# Patient Record
Sex: FEMALE | Race: WHITE | Employment: OTHER | URBAN - METROPOLITAN AREA
[De-identification: names, ages, dates, MRNs, and addresses within clinical notes are randomized per-mention and may not be internally consistent; named-entity substitution may affect disease eponyms.]

---

## 2017-01-10 DIAGNOSIS — K21.9 GASTROESOPHAGEAL REFLUX DISEASE WITHOUT ESOPHAGITIS: ICD-10-CM

## 2017-01-11 ENCOUNTER — OFFICE VISIT (OUTPATIENT)
Dept: PAIN MANAGEMENT | Age: 62
End: 2017-01-11

## 2017-01-11 VITALS
BODY MASS INDEX: 19.77 KG/M2 | WEIGHT: 130 LBS | HEART RATE: 99 BPM | DIASTOLIC BLOOD PRESSURE: 84 MMHG | SYSTOLIC BLOOD PRESSURE: 140 MMHG

## 2017-01-11 DIAGNOSIS — M48.061 SPINAL STENOSIS OF LUMBAR REGION: ICD-10-CM

## 2017-01-11 DIAGNOSIS — G51.0 FACIAL NERVE PARALYSIS: ICD-10-CM

## 2017-01-11 DIAGNOSIS — M47.816 FACET ARTHROPATHY, LUMBAR: ICD-10-CM

## 2017-01-11 DIAGNOSIS — M48.00 CENTRAL SPINAL STENOSIS: ICD-10-CM

## 2017-01-11 DIAGNOSIS — G89.4 CHRONIC PAIN SYNDROME: Primary | ICD-10-CM

## 2017-01-11 DIAGNOSIS — F51.01 PRIMARY INSOMNIA: ICD-10-CM

## 2017-01-11 DIAGNOSIS — M47.819 FACET ARTHROPATHY: ICD-10-CM

## 2017-01-11 DIAGNOSIS — M96.1 FAILED BACK SYNDROME: ICD-10-CM

## 2017-01-11 PROCEDURE — 99213 OFFICE O/P EST LOW 20 MIN: CPT | Performed by: NURSE PRACTITIONER

## 2017-01-11 RX ORDER — METAXALONE 400 MG/1
400 TABLET ORAL 2 TIMES DAILY
Qty: 56 TABLET | Refills: 0 | Status: SHIPPED | OUTPATIENT
Start: 2017-01-11 | End: 2017-02-08

## 2017-01-11 RX ORDER — NORTRIPTYLINE HYDROCHLORIDE 25 MG/1
25 CAPSULE ORAL NIGHTLY
Qty: 30 CAPSULE | Refills: 0 | Status: SHIPPED | OUTPATIENT
Start: 2017-01-11 | End: 2017-01-11 | Stop reason: SDUPTHER

## 2017-01-11 RX ORDER — HYDROCODONE BITARTRATE AND ACETAMINOPHEN 7.5; 325 MG/1; MG/1
1 TABLET ORAL EVERY 6 HOURS PRN
Qty: 112 TABLET | Refills: 0 | Status: SHIPPED | OUTPATIENT
Start: 2017-01-11 | End: 2017-02-08 | Stop reason: SDUPTHER

## 2017-01-12 RX ORDER — DEXLANSOPRAZOLE 60 MG/1
CAPSULE, DELAYED RELEASE ORAL
Qty: 30 CAPSULE | Refills: 5 | Status: SHIPPED | OUTPATIENT
Start: 2017-01-12 | End: 2017-07-04 | Stop reason: SDUPTHER

## 2017-02-08 ENCOUNTER — OFFICE VISIT (OUTPATIENT)
Dept: PAIN MANAGEMENT | Age: 62
End: 2017-02-08

## 2017-02-08 VITALS
SYSTOLIC BLOOD PRESSURE: 129 MMHG | DIASTOLIC BLOOD PRESSURE: 77 MMHG | BODY MASS INDEX: 19.77 KG/M2 | WEIGHT: 130 LBS | HEART RATE: 97 BPM

## 2017-02-08 DIAGNOSIS — M47.819 FACET ARTHROPATHY: ICD-10-CM

## 2017-02-08 DIAGNOSIS — G89.4 CHRONIC PAIN SYNDROME: Primary | ICD-10-CM

## 2017-02-08 DIAGNOSIS — M47.816 FACET ARTHROPATHY, LUMBAR: ICD-10-CM

## 2017-02-08 DIAGNOSIS — M48.00 CENTRAL SPINAL STENOSIS: ICD-10-CM

## 2017-02-08 DIAGNOSIS — M48.061 SPINAL STENOSIS OF LUMBAR REGION: ICD-10-CM

## 2017-02-08 DIAGNOSIS — F51.01 PRIMARY INSOMNIA: ICD-10-CM

## 2017-02-08 DIAGNOSIS — M96.1 FAILED BACK SYNDROME: ICD-10-CM

## 2017-02-08 PROCEDURE — 99213 OFFICE O/P EST LOW 20 MIN: CPT | Performed by: NURSE PRACTITIONER

## 2017-02-08 RX ORDER — HYDROCODONE BITARTRATE AND ACETAMINOPHEN 7.5; 325 MG/1; MG/1
1 TABLET ORAL EVERY 6 HOURS PRN
Qty: 112 TABLET | Refills: 0 | Status: SHIPPED | OUTPATIENT
Start: 2017-02-08 | End: 2017-03-08 | Stop reason: SDUPTHER

## 2017-02-08 RX ORDER — TRAZODONE HYDROCHLORIDE 50 MG/1
50 TABLET ORAL NIGHTLY
Qty: 60 TABLET | Refills: 0 | Status: SHIPPED | OUTPATIENT
Start: 2017-02-08 | End: 2017-02-08 | Stop reason: SDUPTHER

## 2017-02-09 RX ORDER — TRAZODONE HYDROCHLORIDE 50 MG/1
TABLET ORAL
Qty: 90 TABLET | Refills: 0 | Status: SHIPPED | OUTPATIENT
Start: 2017-02-09 | End: 2017-04-05 | Stop reason: SDUPTHER

## 2017-03-08 ENCOUNTER — OFFICE VISIT (OUTPATIENT)
Dept: PAIN MANAGEMENT | Age: 62
End: 2017-03-08

## 2017-03-08 VITALS
BODY MASS INDEX: 19.46 KG/M2 | DIASTOLIC BLOOD PRESSURE: 74 MMHG | WEIGHT: 128 LBS | HEART RATE: 92 BPM | SYSTOLIC BLOOD PRESSURE: 116 MMHG

## 2017-03-08 DIAGNOSIS — F51.01 PRIMARY INSOMNIA: ICD-10-CM

## 2017-03-08 DIAGNOSIS — F33.9 RECURRENT MAJOR DEPRESSIVE DISORDER, REMISSION STATUS UNSPECIFIED (HCC): ICD-10-CM

## 2017-03-08 DIAGNOSIS — R10.30 LOWER ABDOMINAL PAIN: ICD-10-CM

## 2017-03-08 DIAGNOSIS — M47.816 FACET ARTHROPATHY, LUMBAR: ICD-10-CM

## 2017-03-08 DIAGNOSIS — M47.819 FACET ARTHROPATHY: ICD-10-CM

## 2017-03-08 DIAGNOSIS — M48.00 CENTRAL SPINAL STENOSIS: ICD-10-CM

## 2017-03-08 DIAGNOSIS — G89.4 CHRONIC PAIN SYNDROME: Primary | ICD-10-CM

## 2017-03-08 DIAGNOSIS — M48.061 SPINAL STENOSIS OF LUMBAR REGION: ICD-10-CM

## 2017-03-08 DIAGNOSIS — M96.1 FAILED BACK SYNDROME: ICD-10-CM

## 2017-03-08 PROCEDURE — 99213 OFFICE O/P EST LOW 20 MIN: CPT | Performed by: NURSE PRACTITIONER

## 2017-03-08 RX ORDER — HYDROCODONE BITARTRATE AND ACETAMINOPHEN 7.5; 325 MG/1; MG/1
1 TABLET ORAL EVERY 6 HOURS PRN
Qty: 112 TABLET | Refills: 0 | Status: SHIPPED | OUTPATIENT
Start: 2017-03-08 | End: 2017-04-05 | Stop reason: SDUPTHER

## 2017-04-05 ENCOUNTER — OFFICE VISIT (OUTPATIENT)
Dept: PAIN MANAGEMENT | Age: 62
End: 2017-04-05

## 2017-04-05 VITALS
HEART RATE: 102 BPM | DIASTOLIC BLOOD PRESSURE: 76 MMHG | BODY MASS INDEX: 19.31 KG/M2 | SYSTOLIC BLOOD PRESSURE: 111 MMHG | WEIGHT: 127 LBS

## 2017-04-05 DIAGNOSIS — F33.9 RECURRENT MAJOR DEPRESSIVE DISORDER, REMISSION STATUS UNSPECIFIED (HCC): ICD-10-CM

## 2017-04-05 DIAGNOSIS — M47.816 FACET ARTHROPATHY, LUMBAR: ICD-10-CM

## 2017-04-05 DIAGNOSIS — M48.061 SPINAL STENOSIS OF LUMBAR REGION: ICD-10-CM

## 2017-04-05 DIAGNOSIS — G89.4 CHRONIC PAIN SYNDROME: Primary | ICD-10-CM

## 2017-04-05 DIAGNOSIS — M48.00 CENTRAL SPINAL STENOSIS: ICD-10-CM

## 2017-04-05 DIAGNOSIS — F51.01 PRIMARY INSOMNIA: ICD-10-CM

## 2017-04-05 DIAGNOSIS — M96.1 FAILED BACK SYNDROME: ICD-10-CM

## 2017-04-05 DIAGNOSIS — M47.819 FACET ARTHROPATHY: ICD-10-CM

## 2017-04-05 PROCEDURE — 99213 OFFICE O/P EST LOW 20 MIN: CPT | Performed by: NURSE PRACTITIONER

## 2017-04-05 RX ORDER — HYDROCODONE BITARTRATE AND ACETAMINOPHEN 7.5; 325 MG/1; MG/1
1 TABLET ORAL EVERY 6 HOURS PRN
Qty: 40 TABLET | Refills: 0 | Status: SHIPPED | OUTPATIENT
Start: 2017-04-05 | End: 2017-04-24

## 2017-04-05 RX ORDER — TRAZODONE HYDROCHLORIDE 50 MG/1
TABLET ORAL
Qty: 90 TABLET | Refills: 0 | Status: SHIPPED | OUTPATIENT
Start: 2017-04-05 | End: 2017-04-24

## 2017-04-24 ENCOUNTER — OFFICE VISIT (OUTPATIENT)
Dept: FAMILY MEDICINE CLINIC | Age: 62
End: 2017-04-24

## 2017-04-24 VITALS
RESPIRATION RATE: 16 BRPM | SYSTOLIC BLOOD PRESSURE: 114 MMHG | WEIGHT: 124 LBS | HEIGHT: 68 IN | BODY MASS INDEX: 18.79 KG/M2 | OXYGEN SATURATION: 98 % | HEART RATE: 111 BPM | TEMPERATURE: 98.6 F | DIASTOLIC BLOOD PRESSURE: 72 MMHG

## 2017-04-24 DIAGNOSIS — R05.9 COUGH: Primary | ICD-10-CM

## 2017-04-24 DIAGNOSIS — J45.41 MODERATE PERSISTENT ASTHMA WITH ACUTE EXACERBATION: ICD-10-CM

## 2017-04-24 DIAGNOSIS — J06.9 ACUTE URI: ICD-10-CM

## 2017-04-24 DIAGNOSIS — H61.23 BILATERAL IMPACTED CERUMEN: ICD-10-CM

## 2017-04-24 PROCEDURE — 99213 OFFICE O/P EST LOW 20 MIN: CPT | Performed by: NURSE PRACTITIONER

## 2017-04-24 RX ORDER — ALBUTEROL SULFATE 2.5 MG/3ML
SOLUTION RESPIRATORY (INHALATION)
Qty: 180 VIAL | Refills: 11 | Status: SHIPPED | OUTPATIENT
Start: 2017-04-24 | End: 2019-08-26 | Stop reason: SDUPTHER

## 2017-04-24 RX ORDER — FLUTICASONE PROPIONATE 110 UG/1
1 AEROSOL, METERED RESPIRATORY (INHALATION) 2 TIMES DAILY
Qty: 12 G | Refills: 5 | Status: SHIPPED | OUTPATIENT
Start: 2017-04-24 | End: 2018-10-26 | Stop reason: SDUPTHER

## 2017-04-24 RX ORDER — CEFUROXIME AXETIL 250 MG/1
250 TABLET ORAL 2 TIMES DAILY
Qty: 20 TABLET | Refills: 0 | Status: SHIPPED | OUTPATIENT
Start: 2017-04-24 | End: 2017-05-04

## 2017-04-24 ASSESSMENT — PATIENT HEALTH QUESTIONNAIRE - PHQ9
2. FEELING DOWN, DEPRESSED OR HOPELESS: 0
1. LITTLE INTEREST OR PLEASURE IN DOING THINGS: 0
SUM OF ALL RESPONSES TO PHQ QUESTIONS 1-9: 0
SUM OF ALL RESPONSES TO PHQ9 QUESTIONS 1 & 2: 0

## 2017-04-24 ASSESSMENT — ENCOUNTER SYMPTOMS
RHINORRHEA: 1
SINUS PRESSURE: 1
SORE THROAT: 0
COUGH: 1
WHEEZING: 1

## 2017-04-27 DIAGNOSIS — G89.29 CHRONIC BACK PAIN: ICD-10-CM

## 2017-04-27 DIAGNOSIS — M54.9 CHRONIC BACK PAIN: ICD-10-CM

## 2017-04-28 RX ORDER — TIZANIDINE 4 MG/1
TABLET ORAL
Qty: 90 TABLET | Refills: 0 | Status: SHIPPED | OUTPATIENT
Start: 2017-04-28 | End: 2017-05-03 | Stop reason: SDUPTHER

## 2017-05-03 ENCOUNTER — OFFICE VISIT (OUTPATIENT)
Dept: PAIN MANAGEMENT | Age: 62
End: 2017-05-03

## 2017-05-03 VITALS
WEIGHT: 127 LBS | DIASTOLIC BLOOD PRESSURE: 71 MMHG | SYSTOLIC BLOOD PRESSURE: 103 MMHG | BODY MASS INDEX: 19.31 KG/M2 | HEART RATE: 100 BPM

## 2017-05-03 DIAGNOSIS — M96.1 FAILED BACK SYNDROME: ICD-10-CM

## 2017-05-03 DIAGNOSIS — M48.00 CENTRAL SPINAL STENOSIS: ICD-10-CM

## 2017-05-03 DIAGNOSIS — F32.A DEPRESSION, UNSPECIFIED DEPRESSION TYPE: ICD-10-CM

## 2017-05-03 DIAGNOSIS — F33.9 RECURRENT MAJOR DEPRESSIVE DISORDER, REMISSION STATUS UNSPECIFIED (HCC): ICD-10-CM

## 2017-05-03 DIAGNOSIS — M47.819 FACET ARTHROPATHY: ICD-10-CM

## 2017-05-03 DIAGNOSIS — K58.9 IRRITABLE BOWEL SYNDROME WITHOUT DIARRHEA: ICD-10-CM

## 2017-05-03 DIAGNOSIS — G89.4 CHRONIC PAIN SYNDROME: Primary | ICD-10-CM

## 2017-05-03 DIAGNOSIS — F51.01 PRIMARY INSOMNIA: ICD-10-CM

## 2017-05-03 PROCEDURE — 99213 OFFICE O/P EST LOW 20 MIN: CPT | Performed by: NURSE PRACTITIONER

## 2017-05-03 RX ORDER — SERTRALINE HYDROCHLORIDE 100 MG/1
TABLET, FILM COATED ORAL
Qty: 90 TABLET | Refills: 1 | Status: SHIPPED | OUTPATIENT
Start: 2017-05-03 | End: 2017-07-06

## 2017-05-03 RX ORDER — TIZANIDINE 4 MG/1
TABLET ORAL
Qty: 90 TABLET | Refills: 0 | Status: SHIPPED | OUTPATIENT
Start: 2017-05-03 | End: 2017-06-07 | Stop reason: SDUPTHER

## 2017-05-09 ENCOUNTER — OFFICE VISIT (OUTPATIENT)
Dept: ENT CLINIC | Age: 62
End: 2017-05-09

## 2017-05-09 VITALS
HEART RATE: 101 BPM | WEIGHT: 128 LBS | BODY MASS INDEX: 19.4 KG/M2 | DIASTOLIC BLOOD PRESSURE: 73 MMHG | SYSTOLIC BLOOD PRESSURE: 125 MMHG | HEIGHT: 68 IN

## 2017-05-09 DIAGNOSIS — J20.9 ACUTE BRONCHITIS, UNSPECIFIED ORGANISM: Primary | ICD-10-CM

## 2017-05-09 DIAGNOSIS — J30.9 ALLERGIC SINUSITIS: ICD-10-CM

## 2017-05-09 PROCEDURE — 96372 THER/PROPH/DIAG INJ SC/IM: CPT | Performed by: OTOLARYNGOLOGY

## 2017-05-09 PROCEDURE — 99213 OFFICE O/P EST LOW 20 MIN: CPT | Performed by: OTOLARYNGOLOGY

## 2017-05-09 RX ORDER — METHYLPREDNISOLONE ACETATE 40 MG/ML
40 INJECTION, SUSPENSION INTRA-ARTICULAR; INTRALESIONAL; INTRAMUSCULAR; SOFT TISSUE ONCE
Status: COMPLETED | OUTPATIENT
Start: 2017-05-09 | End: 2017-05-09

## 2017-05-09 RX ORDER — TRAMADOL HYDROCHLORIDE 50 MG/1
50 TABLET ORAL EVERY 6 HOURS PRN
Qty: 25 TABLET | Refills: 0 | Status: SHIPPED | OUTPATIENT
Start: 2017-05-09 | End: 2017-05-19

## 2017-05-09 RX ORDER — LEVOFLOXACIN 500 MG/1
500 TABLET, FILM COATED ORAL DAILY
Qty: 10 TABLET | Refills: 0 | Status: SHIPPED | OUTPATIENT
Start: 2017-05-09 | End: 2017-06-06

## 2017-05-09 RX ORDER — BENZONATATE 100 MG/1
100 CAPSULE ORAL EVERY 6 HOURS PRN
Qty: 20 CAPSULE | Refills: 0 | Status: SHIPPED | OUTPATIENT
Start: 2017-05-09 | End: 2017-06-06

## 2017-05-09 RX ADMIN — METHYLPREDNISOLONE ACETATE 40 MG: 40 INJECTION, SUSPENSION INTRA-ARTICULAR; INTRALESIONAL; INTRAMUSCULAR; SOFT TISSUE at 15:43

## 2017-05-12 ENCOUNTER — TELEPHONE (OUTPATIENT)
Dept: PAIN MANAGEMENT | Age: 62
End: 2017-05-12

## 2017-05-12 DIAGNOSIS — M48.061 SPINAL STENOSIS OF LUMBAR REGION: ICD-10-CM

## 2017-05-12 DIAGNOSIS — G89.4 CHRONIC PAIN SYNDROME: ICD-10-CM

## 2017-05-12 DIAGNOSIS — M96.1 FAILED BACK SYNDROME: ICD-10-CM

## 2017-05-12 DIAGNOSIS — M47.816 FACET ARTHROPATHY, LUMBAR: ICD-10-CM

## 2017-05-15 RX ORDER — HYDROCODONE BITARTRATE AND ACETAMINOPHEN 7.5; 325 MG/1; MG/1
1 TABLET ORAL 3 TIMES DAILY
Qty: 72 TABLET | Refills: 0 | Status: SHIPPED | OUTPATIENT
Start: 2017-05-15 | End: 2017-06-07 | Stop reason: SDUPTHER

## 2017-06-06 ENCOUNTER — OFFICE VISIT (OUTPATIENT)
Dept: FAMILY MEDICINE CLINIC | Age: 62
End: 2017-06-06

## 2017-06-06 ENCOUNTER — TELEPHONE (OUTPATIENT)
Dept: FAMILY MEDICINE CLINIC | Age: 62
End: 2017-06-06

## 2017-06-06 VITALS
OXYGEN SATURATION: 97 % | HEIGHT: 68 IN | WEIGHT: 125 LBS | RESPIRATION RATE: 16 BRPM | SYSTOLIC BLOOD PRESSURE: 128 MMHG | BODY MASS INDEX: 18.94 KG/M2 | DIASTOLIC BLOOD PRESSURE: 82 MMHG | HEART RATE: 100 BPM

## 2017-06-06 DIAGNOSIS — R25.2 CRAMPS OF LEFT LOWER EXTREMITY: ICD-10-CM

## 2017-06-06 DIAGNOSIS — R25.2 CRAMPS OF LEFT LOWER EXTREMITY: Primary | ICD-10-CM

## 2017-06-06 LAB
ANION GAP SERPL CALCULATED.3IONS-SCNC: 16 MMOL/L (ref 3–16)
BUN BLDV-MCNC: 17 MG/DL (ref 7–20)
CALCIUM SERPL-MCNC: 9.2 MG/DL (ref 8.3–10.6)
CHLORIDE BLD-SCNC: 101 MMOL/L (ref 99–110)
CO2: 24 MMOL/L (ref 21–32)
CREAT SERPL-MCNC: 0.6 MG/DL (ref 0.6–1.2)
GFR AFRICAN AMERICAN: >60
GFR NON-AFRICAN AMERICAN: >60
GLUCOSE BLD-MCNC: 88 MG/DL (ref 70–99)
POTASSIUM SERPL-SCNC: 5 MMOL/L (ref 3.5–5.1)
SODIUM BLD-SCNC: 141 MMOL/L (ref 136–145)
TOTAL CK: 95 U/L (ref 26–192)

## 2017-06-06 PROCEDURE — 99213 OFFICE O/P EST LOW 20 MIN: CPT | Performed by: NURSE PRACTITIONER

## 2017-06-06 ASSESSMENT — ENCOUNTER SYMPTOMS: BACK PAIN: 1

## 2017-06-07 ENCOUNTER — OFFICE VISIT (OUTPATIENT)
Dept: PAIN MANAGEMENT | Age: 62
End: 2017-06-07

## 2017-06-07 ENCOUNTER — TELEPHONE (OUTPATIENT)
Dept: PAIN MANAGEMENT | Age: 62
End: 2017-06-07

## 2017-06-07 VITALS
WEIGHT: 125 LBS | DIASTOLIC BLOOD PRESSURE: 81 MMHG | SYSTOLIC BLOOD PRESSURE: 136 MMHG | HEART RATE: 76 BPM | BODY MASS INDEX: 19.01 KG/M2

## 2017-06-07 DIAGNOSIS — M47.819 FACET ARTHROPATHY: ICD-10-CM

## 2017-06-07 DIAGNOSIS — M96.1 FAILED BACK SYNDROME: ICD-10-CM

## 2017-06-07 DIAGNOSIS — F51.01 PRIMARY INSOMNIA: ICD-10-CM

## 2017-06-07 DIAGNOSIS — F33.9 RECURRENT MAJOR DEPRESSIVE DISORDER, REMISSION STATUS UNSPECIFIED (HCC): ICD-10-CM

## 2017-06-07 DIAGNOSIS — M48.00 CENTRAL SPINAL STENOSIS: ICD-10-CM

## 2017-06-07 DIAGNOSIS — M47.816 FACET ARTHROPATHY, LUMBAR: ICD-10-CM

## 2017-06-07 DIAGNOSIS — G89.4 CHRONIC PAIN SYNDROME: ICD-10-CM

## 2017-06-07 DIAGNOSIS — M25.552 HIP PAIN, ACUTE, LEFT: Primary | ICD-10-CM

## 2017-06-07 DIAGNOSIS — M48.061 SPINAL STENOSIS OF LUMBAR REGION: ICD-10-CM

## 2017-06-07 PROCEDURE — 99213 OFFICE O/P EST LOW 20 MIN: CPT | Performed by: NURSE PRACTITIONER

## 2017-06-07 RX ORDER — TIZANIDINE 4 MG/1
TABLET ORAL
Qty: 90 TABLET | Refills: 0 | Status: SHIPPED | OUTPATIENT
Start: 2017-06-07 | End: 2017-08-09 | Stop reason: SDUPTHER

## 2017-06-07 RX ORDER — HYDROCODONE BITARTRATE AND ACETAMINOPHEN 7.5; 325 MG/1; MG/1
1 TABLET ORAL 3 TIMES DAILY
Qty: 84 TABLET | Refills: 0 | Status: SHIPPED | OUTPATIENT
Start: 2017-06-07 | End: 2017-07-05

## 2017-06-07 RX ORDER — BUPRENORPHINE 10 UG/H
1 PATCH TRANSDERMAL WEEKLY
Qty: 4 PATCH | Refills: 0 | Status: SHIPPED | OUTPATIENT
Start: 2017-06-07 | End: 2017-06-19

## 2017-06-07 RX ORDER — TRAZODONE HYDROCHLORIDE 50 MG/1
50 TABLET ORAL NIGHTLY
Qty: 30 TABLET | Refills: 0 | Status: SHIPPED | OUTPATIENT
Start: 2017-06-07 | End: 2017-07-05 | Stop reason: SDUPTHER

## 2017-06-09 RX ORDER — TIZANIDINE 4 MG/1
TABLET ORAL
Qty: 270 TABLET | Refills: 0 | OUTPATIENT
Start: 2017-06-09

## 2017-06-12 RX ORDER — TRAZODONE HYDROCHLORIDE 50 MG/1
TABLET ORAL
Qty: 90 TABLET | Refills: 0 | OUTPATIENT
Start: 2017-06-12

## 2017-06-14 RX ORDER — MONTELUKAST SODIUM 10 MG/1
TABLET ORAL
Qty: 90 TABLET | Refills: 5 | Status: SHIPPED | OUTPATIENT
Start: 2017-06-14 | End: 2018-07-05 | Stop reason: SDUPTHER

## 2017-06-19 ENCOUNTER — HOSPITAL ENCOUNTER (OUTPATIENT)
Dept: OTHER | Age: 62
Discharge: HOME | End: 2017-06-19
Attending: NURSE PRACTITIONER | Admitting: NURSE PRACTITIONER

## 2017-06-19 ENCOUNTER — HOSPITAL ENCOUNTER (OUTPATIENT)
Dept: OTHER | Age: 62
Discharge: HOME | End: 2017-06-19
Attending: INTERNAL MEDICINE | Admitting: INTERNAL MEDICINE

## 2017-06-19 ENCOUNTER — OFFICE VISIT (OUTPATIENT)
Dept: RHEUMATOLOGY | Age: 62
End: 2017-06-19

## 2017-06-19 VITALS
DIASTOLIC BLOOD PRESSURE: 60 MMHG | HEIGHT: 67 IN | SYSTOLIC BLOOD PRESSURE: 98 MMHG | BODY MASS INDEX: 19.21 KG/M2 | HEART RATE: 82 BPM | WEIGHT: 122.4 LBS

## 2017-06-19 DIAGNOSIS — M25.552 HIP PAIN, ACUTE, LEFT: ICD-10-CM

## 2017-06-19 DIAGNOSIS — M15.9 PRIMARY OSTEOARTHRITIS INVOLVING MULTIPLE JOINTS: ICD-10-CM

## 2017-06-19 DIAGNOSIS — M15.9 PRIMARY OSTEOARTHRITIS INVOLVING MULTIPLE JOINTS: Primary | ICD-10-CM

## 2017-06-19 PROCEDURE — 99214 OFFICE O/P EST MOD 30 MIN: CPT | Performed by: INTERNAL MEDICINE

## 2017-06-23 ENCOUNTER — TELEPHONE (OUTPATIENT)
Dept: INTERNAL MEDICINE CLINIC | Age: 62
End: 2017-06-23

## 2017-07-04 DIAGNOSIS — K21.9 GASTROESOPHAGEAL REFLUX DISEASE WITHOUT ESOPHAGITIS: ICD-10-CM

## 2017-07-05 ENCOUNTER — OFFICE VISIT (OUTPATIENT)
Dept: PAIN MANAGEMENT | Age: 62
End: 2017-07-05

## 2017-07-05 VITALS
SYSTOLIC BLOOD PRESSURE: 123 MMHG | HEART RATE: 105 BPM | DIASTOLIC BLOOD PRESSURE: 76 MMHG | WEIGHT: 121 LBS | BODY MASS INDEX: 19.18 KG/M2

## 2017-07-05 DIAGNOSIS — F33.9 RECURRENT MAJOR DEPRESSIVE DISORDER, REMISSION STATUS UNSPECIFIED (HCC): ICD-10-CM

## 2017-07-05 DIAGNOSIS — I31.39 PERICARDIAL EFFUSION: ICD-10-CM

## 2017-07-05 DIAGNOSIS — G89.4 CHRONIC PAIN SYNDROME: Primary | ICD-10-CM

## 2017-07-05 DIAGNOSIS — F32.A DEPRESSION, UNSPECIFIED DEPRESSION TYPE: ICD-10-CM

## 2017-07-05 DIAGNOSIS — M48.00 CENTRAL SPINAL STENOSIS: ICD-10-CM

## 2017-07-05 DIAGNOSIS — M47.819 FACET ARTHROPATHY: ICD-10-CM

## 2017-07-05 DIAGNOSIS — F51.01 PRIMARY INSOMNIA: ICD-10-CM

## 2017-07-05 DIAGNOSIS — M96.1 FAILED BACK SYNDROME: ICD-10-CM

## 2017-07-05 DIAGNOSIS — K58.9 IRRITABLE BOWEL SYNDROME WITHOUT DIARRHEA: ICD-10-CM

## 2017-07-05 PROCEDURE — 99213 OFFICE O/P EST LOW 20 MIN: CPT | Performed by: NURSE PRACTITIONER

## 2017-07-05 RX ORDER — TRAZODONE HYDROCHLORIDE 50 MG/1
50 TABLET ORAL NIGHTLY
Qty: 30 TABLET | Refills: 0 | Status: SHIPPED | OUTPATIENT
Start: 2017-07-05 | End: 2017-08-11 | Stop reason: SDUPTHER

## 2017-07-05 RX ORDER — HYDROCODONE BITARTRATE AND ACETAMINOPHEN 7.5; 325 MG/1; MG/1
1 TABLET ORAL 2 TIMES DAILY PRN
Qty: 56 TABLET | Refills: 0 | Status: SHIPPED | OUTPATIENT
Start: 2017-07-05 | End: 2017-07-19 | Stop reason: SDUPTHER

## 2017-07-05 RX ORDER — DEXLANSOPRAZOLE 60 MG/1
CAPSULE, DELAYED RELEASE ORAL
Qty: 30 CAPSULE | Refills: 5 | Status: SHIPPED | OUTPATIENT
Start: 2017-07-05 | End: 2018-01-02 | Stop reason: SDUPTHER

## 2017-07-06 DIAGNOSIS — F32.A DEPRESSION, UNSPECIFIED DEPRESSION TYPE: ICD-10-CM

## 2017-07-06 RX ORDER — SERTRALINE HYDROCHLORIDE 100 MG/1
TABLET, FILM COATED ORAL
Qty: 90 TABLET | Refills: 0 | Status: SHIPPED | OUTPATIENT
Start: 2017-07-06 | End: 2017-07-06 | Stop reason: SDUPTHER

## 2017-07-06 RX ORDER — SERTRALINE HYDROCHLORIDE 100 MG/1
TABLET, FILM COATED ORAL
Qty: 270 TABLET | Refills: 0 | Status: SHIPPED | OUTPATIENT
Start: 2017-07-06 | End: 2018-01-02 | Stop reason: SDUPTHER

## 2017-07-19 ENCOUNTER — TELEPHONE (OUTPATIENT)
Dept: PAIN MANAGEMENT | Age: 62
End: 2017-07-19

## 2017-07-19 DIAGNOSIS — M48.00 CENTRAL SPINAL STENOSIS: ICD-10-CM

## 2017-07-19 DIAGNOSIS — M96.1 FAILED BACK SYNDROME: ICD-10-CM

## 2017-07-19 DIAGNOSIS — G89.4 CHRONIC PAIN SYNDROME: ICD-10-CM

## 2017-07-19 DIAGNOSIS — M47.819 FACET ARTHROPATHY: ICD-10-CM

## 2017-07-19 RX ORDER — HYDROCODONE BITARTRATE AND ACETAMINOPHEN 7.5; 325 MG/1; MG/1
1 TABLET ORAL 2 TIMES DAILY PRN
Qty: 14 TABLET | Refills: 0 | Status: SHIPPED | OUTPATIENT
Start: 2017-07-19 | End: 2017-08-09 | Stop reason: SDUPTHER

## 2017-08-09 ENCOUNTER — TELEPHONE (OUTPATIENT)
Dept: PAIN MANAGEMENT | Age: 62
End: 2017-08-09

## 2017-08-09 ENCOUNTER — OFFICE VISIT (OUTPATIENT)
Dept: PAIN MANAGEMENT | Age: 62
End: 2017-08-09

## 2017-08-09 VITALS
WEIGHT: 118 LBS | BODY MASS INDEX: 18.7 KG/M2 | SYSTOLIC BLOOD PRESSURE: 118 MMHG | DIASTOLIC BLOOD PRESSURE: 72 MMHG | HEART RATE: 81 BPM

## 2017-08-09 DIAGNOSIS — M96.1 FAILED BACK SYNDROME: ICD-10-CM

## 2017-08-09 DIAGNOSIS — F51.01 PRIMARY INSOMNIA: ICD-10-CM

## 2017-08-09 DIAGNOSIS — M48.00 CENTRAL SPINAL STENOSIS: ICD-10-CM

## 2017-08-09 DIAGNOSIS — M54.31 SCIATICA OF RIGHT SIDE: ICD-10-CM

## 2017-08-09 DIAGNOSIS — M47.819 FACET ARTHROPATHY: ICD-10-CM

## 2017-08-09 DIAGNOSIS — F33.9 RECURRENT MAJOR DEPRESSIVE DISORDER, REMISSION STATUS UNSPECIFIED (HCC): ICD-10-CM

## 2017-08-09 DIAGNOSIS — G89.4 CHRONIC PAIN SYNDROME: ICD-10-CM

## 2017-08-09 DIAGNOSIS — G89.4 CHRONIC PAIN SYNDROME: Primary | ICD-10-CM

## 2017-08-09 DIAGNOSIS — G51.0 FACIAL NERVE PARALYSIS: ICD-10-CM

## 2017-08-09 PROCEDURE — 99213 OFFICE O/P EST LOW 20 MIN: CPT | Performed by: NURSE PRACTITIONER

## 2017-08-09 RX ORDER — TIZANIDINE 4 MG/1
TABLET ORAL
Qty: 90 TABLET | Refills: 0 | Status: SHIPPED | OUTPATIENT
Start: 2017-08-09 | End: 2017-09-06 | Stop reason: SDUPTHER

## 2017-08-09 RX ORDER — HYDROCODONE BITARTRATE 20 MG/1
20 TABLET, EXTENDED RELEASE ORAL EVERY 24 HOURS
Qty: 28 TABLET | Refills: 0 | Status: SHIPPED | OUTPATIENT
Start: 2017-08-09 | End: 2017-09-06

## 2017-08-09 RX ORDER — HYDROCODONE BITARTRATE AND ACETAMINOPHEN 7.5; 325 MG/1; MG/1
1 TABLET ORAL 2 TIMES DAILY PRN
Qty: 56 TABLET | Refills: 0 | Status: SHIPPED | OUTPATIENT
Start: 2017-08-09 | End: 2017-09-06 | Stop reason: SDUPTHER

## 2017-08-09 RX ORDER — TRAZODONE HYDROCHLORIDE 50 MG/1
TABLET ORAL
Qty: 30 TABLET | Refills: 0 | OUTPATIENT
Start: 2017-08-09

## 2017-08-11 RX ORDER — TRAZODONE HYDROCHLORIDE 50 MG/1
50 TABLET ORAL NIGHTLY
Qty: 30 TABLET | Refills: 0 | Status: SHIPPED | OUTPATIENT
Start: 2017-08-11 | End: 2017-09-06 | Stop reason: SDUPTHER

## 2017-08-30 ENCOUNTER — OFFICE VISIT (OUTPATIENT)
Dept: ENT CLINIC | Age: 62
End: 2017-08-30

## 2017-08-30 VITALS
BODY MASS INDEX: 18.19 KG/M2 | HEIGHT: 68 IN | DIASTOLIC BLOOD PRESSURE: 69 MMHG | WEIGHT: 120 LBS | HEART RATE: 88 BPM | SYSTOLIC BLOOD PRESSURE: 122 MMHG

## 2017-08-30 DIAGNOSIS — M79.2 NEURITIS: ICD-10-CM

## 2017-08-30 DIAGNOSIS — M79.2 NEURITIS: Primary | ICD-10-CM

## 2017-08-30 PROCEDURE — 99213 OFFICE O/P EST LOW 20 MIN: CPT | Performed by: OTOLARYNGOLOGY

## 2017-08-30 PROCEDURE — 96372 THER/PROPH/DIAG INJ SC/IM: CPT | Performed by: OTOLARYNGOLOGY

## 2017-08-30 RX ORDER — BUTALBITAL, ACETAMINOPHEN AND CAFFEINE 50; 325; 40 MG/1; MG/1; MG/1
1 TABLET ORAL EVERY 4 HOURS PRN
Qty: 50 TABLET | Refills: 0 | Status: SHIPPED | OUTPATIENT
Start: 2017-08-30 | End: 2017-10-04

## 2017-08-30 RX ORDER — METHYLPREDNISOLONE ACETATE 40 MG/ML
40 INJECTION, SUSPENSION INTRA-ARTICULAR; INTRALESIONAL; INTRAMUSCULAR; SOFT TISSUE ONCE
Status: COMPLETED | OUTPATIENT
Start: 2017-08-30 | End: 2017-08-30

## 2017-08-30 RX ORDER — GABAPENTIN 300 MG/1
300 CAPSULE ORAL 2 TIMES DAILY
Qty: 60 CAPSULE | Refills: 3 | Status: SHIPPED | OUTPATIENT
Start: 2017-08-30 | End: 2017-08-30 | Stop reason: SDUPTHER

## 2017-08-30 RX ORDER — GABAPENTIN 300 MG/1
CAPSULE ORAL
Qty: 180 CAPSULE | Refills: 3 | Status: SHIPPED | OUTPATIENT
Start: 2017-08-30 | End: 2017-09-06 | Stop reason: SDUPTHER

## 2017-08-30 RX ADMIN — METHYLPREDNISOLONE ACETATE 40 MG: 40 INJECTION, SUSPENSION INTRA-ARTICULAR; INTRALESIONAL; INTRAMUSCULAR; SOFT TISSUE at 15:39

## 2017-09-06 ENCOUNTER — OFFICE VISIT (OUTPATIENT)
Dept: PAIN MANAGEMENT | Age: 62
End: 2017-09-06

## 2017-09-06 VITALS
WEIGHT: 115 LBS | BODY MASS INDEX: 17.49 KG/M2 | HEART RATE: 86 BPM | DIASTOLIC BLOOD PRESSURE: 89 MMHG | SYSTOLIC BLOOD PRESSURE: 132 MMHG

## 2017-09-06 DIAGNOSIS — K52.9 GASTROENTERITIS: ICD-10-CM

## 2017-09-06 DIAGNOSIS — M79.2 NEURITIS: ICD-10-CM

## 2017-09-06 DIAGNOSIS — M48.00 CENTRAL SPINAL STENOSIS: ICD-10-CM

## 2017-09-06 DIAGNOSIS — G51.0 FACIAL NERVE PARALYSIS: ICD-10-CM

## 2017-09-06 DIAGNOSIS — K58.9 IRRITABLE BOWEL SYNDROME WITHOUT DIARRHEA: ICD-10-CM

## 2017-09-06 DIAGNOSIS — M47.819 FACET ARTHROPATHY: ICD-10-CM

## 2017-09-06 DIAGNOSIS — F51.01 PRIMARY INSOMNIA: ICD-10-CM

## 2017-09-06 DIAGNOSIS — G89.4 CHRONIC PAIN SYNDROME: Primary | ICD-10-CM

## 2017-09-06 DIAGNOSIS — F33.9 RECURRENT MAJOR DEPRESSIVE DISORDER, REMISSION STATUS UNSPECIFIED (HCC): ICD-10-CM

## 2017-09-06 DIAGNOSIS — F32.A DEPRESSION, UNSPECIFIED DEPRESSION TYPE: ICD-10-CM

## 2017-09-06 DIAGNOSIS — M96.1 FAILED BACK SYNDROME: ICD-10-CM

## 2017-09-06 PROCEDURE — 99213 OFFICE O/P EST LOW 20 MIN: CPT | Performed by: NURSE PRACTITIONER

## 2017-09-06 RX ORDER — SERTRALINE HYDROCHLORIDE 100 MG/1
TABLET, FILM COATED ORAL
Qty: 90 TABLET | Refills: 0 | OUTPATIENT
Start: 2017-09-06

## 2017-09-06 RX ORDER — HYDROCODONE BITARTRATE AND ACETAMINOPHEN 7.5; 325 MG/1; MG/1
1 TABLET ORAL EVERY 6 HOURS PRN
Qty: 40 TABLET | Refills: 0 | Status: SHIPPED | OUTPATIENT
Start: 2017-09-06 | End: 2017-09-19 | Stop reason: SDUPTHER

## 2017-09-06 RX ORDER — GABAPENTIN 300 MG/1
CAPSULE ORAL
Qty: 180 CAPSULE | Refills: 0 | Status: SHIPPED | OUTPATIENT
Start: 2017-09-06 | End: 2017-10-04

## 2017-09-06 RX ORDER — TIZANIDINE 4 MG/1
4 TABLET ORAL 2 TIMES DAILY
Qty: 60 TABLET | Refills: 1 | Status: SHIPPED | OUTPATIENT
Start: 2017-09-06 | End: 2017-11-01 | Stop reason: SDUPTHER

## 2017-09-06 RX ORDER — TRAZODONE HYDROCHLORIDE 50 MG/1
50 TABLET ORAL NIGHTLY
Qty: 30 TABLET | Refills: 0 | Status: SHIPPED | OUTPATIENT
Start: 2017-09-06 | End: 2017-10-04 | Stop reason: SDUPTHER

## 2017-09-18 ENCOUNTER — TELEPHONE (OUTPATIENT)
Dept: PAIN MANAGEMENT | Age: 62
End: 2017-09-18

## 2017-09-18 DIAGNOSIS — M47.819 FACET ARTHROPATHY: ICD-10-CM

## 2017-09-18 DIAGNOSIS — G89.4 CHRONIC PAIN SYNDROME: ICD-10-CM

## 2017-09-18 DIAGNOSIS — M96.1 FAILED BACK SYNDROME: ICD-10-CM

## 2017-09-18 DIAGNOSIS — M48.00 CENTRAL SPINAL STENOSIS: ICD-10-CM

## 2017-09-19 RX ORDER — HYDROCODONE BITARTRATE AND ACETAMINOPHEN 7.5; 325 MG/1; MG/1
1 TABLET ORAL EVERY 6 HOURS PRN
Qty: 64 TABLET | Refills: 0 | Status: SHIPPED | OUTPATIENT
Start: 2017-09-19 | End: 2017-10-04 | Stop reason: SDUPTHER

## 2017-10-04 ENCOUNTER — OFFICE VISIT (OUTPATIENT)
Dept: PAIN MANAGEMENT | Age: 62
End: 2017-10-04

## 2017-10-04 VITALS
BODY MASS INDEX: 17.64 KG/M2 | HEART RATE: 95 BPM | DIASTOLIC BLOOD PRESSURE: 77 MMHG | WEIGHT: 116 LBS | SYSTOLIC BLOOD PRESSURE: 121 MMHG

## 2017-10-04 DIAGNOSIS — M96.1 FAILED BACK SYNDROME: ICD-10-CM

## 2017-10-04 DIAGNOSIS — M47.819 FACET ARTHROPATHY: ICD-10-CM

## 2017-10-04 DIAGNOSIS — G51.0 FACIAL NERVE PARALYSIS: ICD-10-CM

## 2017-10-04 DIAGNOSIS — M48.00 CENTRAL SPINAL STENOSIS: ICD-10-CM

## 2017-10-04 DIAGNOSIS — F51.01 PRIMARY INSOMNIA: ICD-10-CM

## 2017-10-04 DIAGNOSIS — G89.4 CHRONIC PAIN SYNDROME: Primary | ICD-10-CM

## 2017-10-04 DIAGNOSIS — F33.9 RECURRENT MAJOR DEPRESSIVE DISORDER, REMISSION STATUS UNSPECIFIED (HCC): ICD-10-CM

## 2017-10-04 DIAGNOSIS — K58.9 IRRITABLE BOWEL SYNDROME WITHOUT DIARRHEA: ICD-10-CM

## 2017-10-04 PROCEDURE — 99213 OFFICE O/P EST LOW 20 MIN: CPT | Performed by: NURSE PRACTITIONER

## 2017-10-04 RX ORDER — HYDROCODONE BITARTRATE AND ACETAMINOPHEN 7.5; 325 MG/1; MG/1
1 TABLET ORAL EVERY 8 HOURS PRN
Qty: 84 TABLET | Refills: 0 | Status: SHIPPED | OUTPATIENT
Start: 2017-10-04 | End: 2017-11-01

## 2017-10-04 RX ORDER — TRAZODONE HYDROCHLORIDE 50 MG/1
50 TABLET ORAL NIGHTLY
Qty: 30 TABLET | Refills: 0 | Status: SHIPPED | OUTPATIENT
Start: 2017-10-04 | End: 2017-11-01 | Stop reason: SDUPTHER

## 2017-10-04 NOTE — MR AVS SNAPSHOT
BMI by increasing your calorie intake and building muscle through strength training. Learn more at: [email or web address] org/healthy-ways-to-gain-weight-if-youre-underweight/          Instructions         Back Stretches: Exercises  Your Care Instructions  Here are some examples of exercises for stretching your back. Start each exercise slowly. Ease off the exercise if you start to have pain. Your doctor or physical therapist will tell you when you can start these exercises and which ones will work best for you. How to do the exercises  Overhead stretch    1. Stand comfortably with your feet shoulder-width apart. 2. Looking straight ahead, raise both arms over your head and reach toward the ceiling. Do not allow your head to tilt back. 3. Hold for 15 to 30 seconds, then lower your arms to your sides. 4. Repeat 2 to 4 times. Side stretch    1. Stand comfortably with your feet shoulder-width apart. 2. Raise one arm over your head, and then lean to the other side. 3. Slide your hand down your leg as you let the weight of your arm gently stretch your side muscles. Hold for 15 to 30 seconds. 4. Repeat 2 to 4 times on each side. Press-up    1. Lie on your stomach, supporting your body with your forearms. 2. Press your elbows down into the floor to raise your upper back. As you do this, relax your stomach muscles and allow your back to arch without using your back muscles. As your press up, do not let your hips or pelvis come off the floor. 3. Hold for 15 to 30 seconds, then relax. 4. Repeat 2 to 4 times. Relax and rest    1. Lie on your back with a rolled towel under your neck and a pillow under your knees. Extend your arms comfortably to your sides. 2. Relax and breathe normally. 3. Remain in this position for about 10 minutes. 4. If you can, do this 2 or 3 times each day. Follow-up care is a key part of your treatment and safety.  Be sure to make and go to all appointments, and call your doctor if you are having problems. It's also a good idea to know your test results and keep a list of the medicines you take. Where can you learn more? Go to [email or web address] and sign in to your [employer] account. Enter E723 in the [facility] box to learn more about \"Back Stretches: Exercises. \"     If you do not have an account, please click on the \"Sign Up Now\" link. Current as of: March 21, 2017  Content Version: 11.3  © 9905-2861 [employer]. Care instructions adapted under license by [facility] ([facility]). If you have questions about a medical condition or this instruction, always ask your healthcare professional. [address] any warranty or liability for your use of this information. Today's Medication Changes          These changes are accurate as of: 10/4/17 10:26 AM.  If you have any questions, ask your nurse or doctor. CHANGE how you take these medications           HYDROcodone-acetaminophen 7.5-325 MG per tablet   Commonly known as:  NORCO   Instructions: Take 1 tablet by mouth every 8 hours as needed for Pain .    Quantity:  84 tablet   Refills:  0   What changed:  when to take this   Changed by:  Jorge Keane NP         STOP taking these medications           butalbital-acetaminophen-caffeine -40 MG per tablet   Commonly known as:  FIORICET, ESGIC   Stopped by:  Jorge Keane NP       gabapentin 300 MG capsule   Commonly known as:  NEURONTIN   Stopped by:  Jorge Keane NP            Where to Get Your Medications      These medications were sent to [facility], [address]  [address], [address] [place] [id number]     Phone:  [phone]     traZODone 50 MG tablet         You can get these medications from any pharmacy Primary insomnia    History of MI (myocardial infarction)    Diarrhea    Neuritis    Pyelonephritis    Choledocholithiasis    Elevated sed rate    Allergic rhinitis    Gastroenteritis    Hypokalemia    Accelerated hypertension    Vertigo, labyrinthine    Facial nerve paralysis    IBS (irritable bowel syndrome)    Hypokalemia    Colitis    Family history of breast cancer in first degree relative    Asthma    Chronic pain    Glaucoma      Your Goals as of 10/4/2017 at 10:26 AM              5/10/16    4/5/16    7/28/15       Lifestyle    will check my blood pressure at home at least 1X/week, and bring these readings to my next office visit. 10/28/14     Not on track  Not on track    Notes    Patient mainly checks it out at the stores. 05/28/15      Check bp at least once a week. 04/05/16   On track          Immunizations as of 10/4/2017     Name Date    Influenza Virus Vaccine 9/18/2015, 10/28/2014    Influenza, Intradermal, Preservative free 11/12/2013      Preventive Care        Date Due    Hepatitis C screening is recommended for all adults regardless of risk factors born between [facility] at least once (lifetime) who have never been tested. 1955    HIV screening is recommended for all people regardless of risk factors  aged 15-65 years at least once (lifetime) who have never been HIV tested. 4/1/1969    Tetanus Combination Vaccine (1 - Tdap) 4/1/1973    Pneumococcal Vaccine - Pneumovax for adults aged 19-64 years with: chronic heart disease, chronic lung disease, diabetes mellitus, alcoholism, chronic liver disease, or cigarette smoking. (1 of 1 - PPSV23) 4/1/1973    Zoster Vaccine 4/1/2014    Cholesterol Screening 1/24/2016    Mammograms are recommended every 2 years for low/average risk patients aged 48 - 69, and every year for high risk patients per updated national guidelines. However these guidelines can be individualized by your provider.  2/20/2016    Pap Smear 5/13/2016 Yearly Flu Vaccine (1) 9/1/2017    Colonoscopy 10/10/2022            MyChart Signup           Our records indicate that you have declined MyChart signup.

## 2017-10-04 NOTE — PROGRESS NOTES
[facility]  1955  [id number]      HISTORY OF PRESENT ILLNESS:  Ms. Sharri Neely is a 60 y.o. female returns for a follow up visit for pain management  She has a diagnosis of   1. Chronic pain syndrome    2. Failed back syndrome    3. Facet arthropathy ([facility] [address]    4. Central spinal stenosis    5. Irritable bowel syndrome without diarrhea    6. Recurrent major depressive disorder, remission status unspecified ([facility] [address]    7. Primary insomnia    8. Facial nerve paralysis    . She complains of pain in the lower back, legs. She rates the pain 6/10 and describes it as aching, burning with standing, bending, sitting, lifting, increased physical activities, walking. Current treatment regimen has helped relieve about 70% of the pain. She denies any side effects from the current pain regimen. Patient reports that since the last follow up visit the physical functioning is worse, family/social relationships are unchanged, mood is unchanged sleep patterns are worse, and that the overall functioning is better. Patient denies misusing/abusing her narcotic pain medications or using any illegal drugs. Patient states that pain has been bothersome to the upper back. She was ordered an MRI of the [place] spine by Dr. Char Maddox, and follows up with him next month. Her mood has been irritable due to pain, sleep is fair with an average of 4-5 hours. Denies having issues with constipation. Tolerating activities with moderate tenderness to the lower back. ALLERGIES: Patients list of allergies were reviewed     MEDICATIONS: Ms. Sharri Neely list of medications were reviewed. Her current medications are   Outpatient Medications Prior to Visit   Medication Sig Dispense Refill    diclofenac sodium (VOLTAREN) 1 % GEL APPLY 4 GRAMS TOPICALLY FOUR TIMES DAILY 500 g 3    tiZANidine (ZANAFLEX) 4 MG tablet Take 1 tablet by mouth 2 times daily 60 tablet 1    sertraline (ZOLOFT) 100 MG tablet TAKE 1 TABLET BY MOUTH THREE TIMES DAILY 270 rate, regular rhythm, normal heart sounds, and does not have murmur. Pulmonary/Chest: Effort normal. No respiratory distress. She does not have wheezes in the lung fields. She has no rales. Neurological/Psychiatric:She is alert and oriented to person, place, and time. Coordination is  normal. Her mood isAppropriate and affect is Flat/blunted . IMPRESSION:   1. Chronic pain syndrome    2. Failed back syndrome    3. Facet arthropathy ([facility] [address]    4. Central spinal stenosis    5. Irritable bowel syndrome without diarrhea    6. Recurrent major depressive disorder, remission status unspecified ([facility] [address]    7. Primary insomnia    8. Facial nerve paralysis        PLAN:  Informed verbal consent was obtained  -Continue with Norco, Desyrel, V.gel, Zanaflex,    -Opted to continue with home exercises  -Continue to f/u with Dr. David Jeronimo for DDD [place]  -Coaching completed on failed UDS +Marijuana, Patient has a failed UDS of Marijuana, patient is aware that that she can not be prescribed narcotics while using marijuana  -She continue under my care without narcotics, until she is able to comply with pain contract, and care. She verbalized understanding. She was given a 28 day prescription, and advised to stretch her medications as she will not get any prescriptions for opioids on the next visit  -. CBT techniques- relaxation therapies such as biofeedback, mindfulness based stress reduction, imagery, cognitive restructuring, problem solving discussed with patient  -Advised patient to quit smoking for  health related concerns and to improve the treatment outcomes. Education was given on quitting smoking and the use of different modalities including medications, hypnotherapy, counselling  and biofeedback. These were discussed with patient.  -Last UDS failed  -Return in about 4 weeks (around 11/1/2017).      Current Outpatient Prescriptions   Medication Sig Dispense Refill    HYDROcodone-acetaminophen (NORCO) 7.5-325 MG per tablet Take 1 tablet by mouth every 8 hours as needed for Pain . 84 tablet 0    traZODone (DESYREL) 50 MG tablet Take 1 tablet by mouth nightly 30 tablet 0    diclofenac sodium (VOLTAREN) 1 % GEL APPLY 4 GRAMS TOPICALLY FOUR TIMES DAILY 500 g 3    tiZANidine (ZANAFLEX) 4 MG tablet Take 1 tablet by mouth 2 times daily 60 tablet 1    sertraline (ZOLOFT) 100 MG tablet TAKE 1 TABLET BY MOUTH THREE TIMES DAILY 270 tablet 0    DEXILANT 60 MG CPDR delayed release capsule TAKE ONE CAPSULE BY MOUTH EVERY DAY 30 capsule 5    montelukast (SINGULAIR) 10 MG tablet TAKE 1 TABLET BY MOUTH DAILY 90 tablet 5    albuterol (PROVENTIL) (2.5 MG/3ML) 0.083% nebulizer solution USE 1 VIAL IN NEBULIZER EVERY 6 HOURS 180 vial 11    fluticasone (FLOVENT HFA) 110 MCG/ACT inhaler Inhale 1 puff into the lungs 2 times daily 12 g 5    nitroGLYCERIN (NITROSTAT) 0.4 MG SL tablet Place 1 tablet under the tongue every 5 minutes as needed for Chest pain 25 tablet 3    albuterol sulfate HFA (PROAIR HFA) 108 (90 BASE) MCG/ACT inhaler INHALE 2 PUFFS INTO THE LUNGS EVERY 4 HOURS AS NEEDED FOR WHEEZING 8.5 g 11     No current facility-administered medications for this visit. I will continue her current medication regimen  which is part of the above treatment schedule. It has been helping with Ms. Razo's chronic  medical problems which for this visit include: The primary encounter diagnosis was Chronic pain syndrome. Diagnoses of Failed back syndrome, Facet arthropathy ([address] Central spinal stenosis, Irritable bowel syndrome without diarrhea, Recurrent major depressive disorder, remission status unspecified ([address] Primary insomnia, and Facial nerve paralysis were also pertinent to this visit. Risks and benefits of the medications and other alternative treatments  including no treatment were discussed with the patient. The common side effects of these medications were also explained to the patient. Informed verbal consent was obtained.

## 2017-11-01 ENCOUNTER — OFFICE VISIT (OUTPATIENT)
Dept: PAIN MANAGEMENT | Age: 62
End: 2017-11-01

## 2017-11-01 VITALS
DIASTOLIC BLOOD PRESSURE: 71 MMHG | HEART RATE: 83 BPM | BODY MASS INDEX: 17.64 KG/M2 | SYSTOLIC BLOOD PRESSURE: 112 MMHG | WEIGHT: 116 LBS | OXYGEN SATURATION: 97 %

## 2017-11-01 DIAGNOSIS — M47.819 FACET ARTHROPATHY: ICD-10-CM

## 2017-11-01 DIAGNOSIS — M96.1 FAILED BACK SYNDROME: ICD-10-CM

## 2017-11-01 DIAGNOSIS — M48.00 CENTRAL SPINAL STENOSIS: ICD-10-CM

## 2017-11-01 DIAGNOSIS — K58.9 IRRITABLE BOWEL SYNDROME WITHOUT DIARRHEA: ICD-10-CM

## 2017-11-01 DIAGNOSIS — G89.4 CHRONIC PAIN SYNDROME: Primary | ICD-10-CM

## 2017-11-01 DIAGNOSIS — G51.0 FACIAL NERVE PARALYSIS: ICD-10-CM

## 2017-11-01 DIAGNOSIS — F33.9 RECURRENT MAJOR DEPRESSIVE DISORDER, REMISSION STATUS UNSPECIFIED (HCC): ICD-10-CM

## 2017-11-01 DIAGNOSIS — F51.01 PRIMARY INSOMNIA: ICD-10-CM

## 2017-11-01 PROCEDURE — G8484 FLU IMMUNIZE NO ADMIN: HCPCS | Performed by: NURSE PRACTITIONER

## 2017-11-01 PROCEDURE — G8427 DOCREV CUR MEDS BY ELIG CLIN: HCPCS | Performed by: NURSE PRACTITIONER

## 2017-11-01 PROCEDURE — 1036F TOBACCO NON-USER: CPT | Performed by: NURSE PRACTITIONER

## 2017-11-01 PROCEDURE — 99213 OFFICE O/P EST LOW 20 MIN: CPT | Performed by: NURSE PRACTITIONER

## 2017-11-01 PROCEDURE — G8419 CALC BMI OUT NRM PARAM NOF/U: HCPCS | Performed by: NURSE PRACTITIONER

## 2017-11-01 PROCEDURE — 3017F COLORECTAL CA SCREEN DOC REV: CPT | Performed by: NURSE PRACTITIONER

## 2017-11-01 PROCEDURE — 3014F SCREEN MAMMO DOC REV: CPT | Performed by: NURSE PRACTITIONER

## 2017-11-01 RX ORDER — TRAZODONE HYDROCHLORIDE 50 MG/1
50 TABLET ORAL NIGHTLY
Qty: 30 TABLET | Refills: 0 | Status: SHIPPED | OUTPATIENT
Start: 2017-11-01 | End: 2017-11-14 | Stop reason: SDUPTHER

## 2017-11-01 RX ORDER — TIZANIDINE 4 MG/1
4 TABLET ORAL 3 TIMES DAILY
Qty: 90 TABLET | Refills: 1 | Status: SHIPPED | OUTPATIENT
Start: 2017-11-01 | End: 2018-06-05 | Stop reason: SDUPTHER

## 2017-11-01 NOTE — PROGRESS NOTES
TIMES DAILY 500 g 3    sertraline (ZOLOFT) 100 MG tablet TAKE 1 TABLET BY MOUTH THREE TIMES DAILY 270 tablet 0    DEXILANT 60 MG CPDR delayed release capsule TAKE ONE CAPSULE BY MOUTH EVERY DAY 30 capsule 5    montelukast (SINGULAIR) 10 MG tablet TAKE 1 TABLET BY MOUTH DAILY 90 tablet 5    albuterol (PROVENTIL) (2.5 MG/3ML) 0.083% nebulizer solution USE 1 VIAL IN NEBULIZER EVERY 6 HOURS 180 vial 11    fluticasone (FLOVENT HFA) 110 MCG/ACT inhaler Inhale 1 puff into the lungs 2 times daily 12 g 5    nitroGLYCERIN (NITROSTAT) 0.4 MG SL tablet Place 1 tablet under the tongue every 5 minutes as needed for Chest pain 25 tablet 3    albuterol sulfate HFA (PROAIR HFA) 108 (90 BASE) MCG/ACT inhaler INHALE 2 PUFFS INTO THE LUNGS EVERY 4 HOURS AS NEEDED FOR WHEEZING 8.5 g 11    HYDROcodone-acetaminophen (NORCO) 7.5-325 MG per tablet Take 1 tablet by mouth every 8 hours as needed for Pain . 84 tablet 0    traZODone (DESYREL) 50 MG tablet Take 1 tablet by mouth nightly 30 tablet 0    tiZANidine (ZANAFLEX) 4 MG tablet Take 1 tablet by mouth 2 times daily 60 tablet 1     No facility-administered medications prior to visit. SOCIAL/FAMILY/PAST MEDICAL HISTORY: Ms. Rebeca Mcburney, family and past medical history was reviewed. REVIEW OF SYSTEMS:    Respiratory: Negative for apnea, chest tightness and shortness of breath or change in baseline breathing. Gastrointestinal: Negative for nausea, vomiting, abdominal pain, diarrhea, constipation, blood in stool and abdominal distention. PHYSICAL EXAM:   Nursing note and vitals reviewed. /71   Pulse 83   Wt 116 lb (52.6 kg)   SpO2 97%   Breastfeeding? No   BMI 17.64 kg/m²   Constitutional: She appears well-developed and well-nourished. No acute distress. Skin: Skin is warm and dry, good turgor. No rash noted. She is not diaphoretic. Cardiovascular: Normal rate, regular rhythm, normal heart sounds, and does not have murmur.      Pulmonary/Chest: Effort normal. No respiratory distress. She does not have wheezes in the lung fields. She has no rales. Neurological/Psychiatric:She is alert and oriented to person, place, and time. Coordination is  normal. Her mood isAppropriate and affect is Flat/blunted . IMPRESSION:   1. Chronic pain syndrome    2. Failed back syndrome    3. Facet arthropathy    4. Central spinal stenosis    5. Irritable bowel syndrome without diarrhea    6. Facial nerve paralysis    7. Recurrent major depressive disorder, remission status unspecified ([facility]    8. Primary insomnia        PLAN:  Informed verbal consent was obtained  -Continue with Zanaflex, trazodone , and Voltaren gel  -Opted to continue with home exercises  -Coaching completed on multiple failed UDS's, she has had a total 4 failed UDS's, she will not be a candidate for narcotics under my care. She can otherwise continue without narcotics. She verbalized understanding and requested for at least of other pain providers just incase she is able to allocate another provider.   -A list of other providers was provided to the patient, recommended that she abide by the [facility] contract if she is able to start therapy elsewhere, she verbalized understanding.   -Informed patient that opioids cannot be prescribed for her while using marijuana, she can otherwise be treated with non opioids if she chose to continue with marijuana. Advised to quit using marijuana if she hoped to obtain opioids therapy for pain elsewhere. Patient verbalized understanding  -CBT techniques- relaxation therapies such as biofeedback, mindfulness based stress reduction, imagery, cognitive restructuring, problem solving discussed with patient  -Last UDS incosistent  -Return in about 4 weeks (around 11/29/2017).      Current Outpatient Prescriptions   Medication Sig Dispense Refill    traZODone (DESYREL) 50 MG tablet Take 1 tablet by mouth nightly 30 tablet 0    tiZANidine (ZANAFLEX) 4 MG tablet Take 1 tablet utilization and  decreased medication consumption. Will continue to monitor progress towards achieving/maintaining therapeutic goals with special emphasis on  1. Improvement in perceived interfernce  of pain with ADL's. Ability to do home exercises independently. Ability to do household chores indoor and/or outdoor work and social and leisure activities. Improve psychosocial and physical functioning. - she is showing progression towards this treatment goal with the current regimen. She was advised against drinking alcohol with the narcotic pain medicines, advised against driving or handling machinery while adjusting the dose of medicines or if having cognitive  issues related to the current medications. Risk of overdose and death, if medicines not taken as prescribed, were also discussed. If the patient develops new symptoms or if the symptoms worsen, the patient should call the office. While transcribing every attempt was made to maintain the accuracy of the note in terms of it's contents,there may have been some errors made inadvertently. Thank you for allowing me to participate in the care of this patient. Daniel Kimbrough CNP.     Cc: Frank Walker MD

## 2017-11-01 NOTE — PATIENT INSTRUCTIONS
Patient Education        Back Stretches: Exercises  Your Care Instructions  Here are some examples of exercises for stretching your back. Start each exercise slowly. Ease off the exercise if you start to have pain. Your doctor or physical therapist will tell you when you can start these exercises and which ones will work best for you. How to do the exercises  Overhead stretch    1. Stand comfortably with your feet shoulder-width apart. 2. Looking straight ahead, raise both arms over your head and reach toward the ceiling. Do not allow your head to tilt back. 3. Hold for 15 to 30 seconds, then lower your arms to your sides. 4. Repeat 2 to 4 times. Side stretch    1. Stand comfortably with your feet shoulder-width apart. 2. Raise one arm over your head, and then lean to the other side. 3. Slide your hand down your leg as you let the weight of your arm gently stretch your side muscles. Hold for 15 to 30 seconds. 4. Repeat 2 to 4 times on each side. Press-up    1. Lie on your stomach, supporting your body with your forearms. 2. Press your elbows down into the floor to raise your upper back. As you do this, relax your stomach muscles and allow your back to arch without using your back muscles. As your press up, do not let your hips or pelvis come off the floor. 3. Hold for 15 to 30 seconds, then relax. 4. Repeat 2 to 4 times. Relax and rest    1. Lie on your back with a rolled towel under your neck and a pillow under your knees. Extend your arms comfortably to your sides. 2. Relax and breathe normally. 3. Remain in this position for about 10 minutes. 4. If you can, do this 2 or 3 times each day. Follow-up care is a key part of your treatment and safety. Be sure to make and go to all appointments, and call your doctor if you are having problems. It's also a good idea to know your test results and keep a list of the medicines you take. Where can you learn more?   Go to [email or web address] and sign in to your [employer] account. Enter W717 in the [employer] box to learn more about \"Back Stretches: Exercises. \"     If you do not have an account, please click on the \"Sign Up Now\" link. Current as of: March 21, 2017  Content Version: 11.3  © 3749-0489 [employer]. Care instructions adapted under license by [facility] ([facility]). If you have questions about a medical condition or this instruction, always ask your healthcare professional. [address] any warranty or liability for your use of this information.

## 2017-11-14 ENCOUNTER — OFFICE VISIT (OUTPATIENT)
Dept: FAMILY MEDICINE CLINIC | Age: 62
End: 2017-11-14

## 2017-11-14 VITALS
DIASTOLIC BLOOD PRESSURE: 68 MMHG | OXYGEN SATURATION: 98 % | WEIGHT: 121.8 LBS | BODY MASS INDEX: 18.52 KG/M2 | HEART RATE: 86 BPM | SYSTOLIC BLOOD PRESSURE: 128 MMHG

## 2017-11-14 DIAGNOSIS — Z23 NEEDS FLU SHOT: ICD-10-CM

## 2017-11-14 DIAGNOSIS — R09.89 BILATERAL CAROTID BRUITS: ICD-10-CM

## 2017-11-14 DIAGNOSIS — G89.4 CHRONIC PAIN SYNDROME: Primary | ICD-10-CM

## 2017-11-14 DIAGNOSIS — F51.01 PRIMARY INSOMNIA: ICD-10-CM

## 2017-11-14 PROCEDURE — G8420 CALC BMI NORM PARAMETERS: HCPCS | Performed by: FAMILY MEDICINE

## 2017-11-14 PROCEDURE — 1036F TOBACCO NON-USER: CPT | Performed by: FAMILY MEDICINE

## 2017-11-14 PROCEDURE — 3017F COLORECTAL CA SCREEN DOC REV: CPT | Performed by: FAMILY MEDICINE

## 2017-11-14 PROCEDURE — 90630 INFLUENZA, QUADV, 18-64 YRS, ID, PF, MICRO INJ, 0.1ML (FLUZONE QUADV, PF): CPT | Performed by: FAMILY MEDICINE

## 2017-11-14 PROCEDURE — G0008 ADMIN INFLUENZA VIRUS VAC: HCPCS | Performed by: FAMILY MEDICINE

## 2017-11-14 PROCEDURE — G8427 DOCREV CUR MEDS BY ELIG CLIN: HCPCS | Performed by: FAMILY MEDICINE

## 2017-11-14 PROCEDURE — G8484 FLU IMMUNIZE NO ADMIN: HCPCS | Performed by: FAMILY MEDICINE

## 2017-11-14 PROCEDURE — 99213 OFFICE O/P EST LOW 20 MIN: CPT | Performed by: FAMILY MEDICINE

## 2017-11-14 PROCEDURE — 3014F SCREEN MAMMO DOC REV: CPT | Performed by: FAMILY MEDICINE

## 2017-11-14 RX ORDER — HYDROCODONE BITARTRATE AND ACETAMINOPHEN 7.5; 325 MG/1; MG/1
1 TABLET ORAL EVERY 8 HOURS PRN
Qty: 90 TABLET | Refills: 0 | Status: SHIPPED | OUTPATIENT
Start: 2017-11-14 | End: 2017-12-12 | Stop reason: SDUPTHER

## 2017-11-14 RX ORDER — TRAZODONE HYDROCHLORIDE 50 MG/1
100 TABLET ORAL NIGHTLY
Qty: 60 TABLET | Refills: 3 | Status: SHIPPED | OUTPATIENT
Start: 2017-11-14 | End: 2018-03-07 | Stop reason: SDUPTHER

## 2017-11-14 ASSESSMENT — ENCOUNTER SYMPTOMS
RESPIRATORY NEGATIVE: 1
GASTROINTESTINAL NEGATIVE: 1
BACK PAIN: 1

## 2017-11-14 NOTE — PATIENT INSTRUCTIONS
Patient Education        Influenza (Flu) Vaccine (Inactivated or Recombinant): What You Need to Know  Why get vaccinated? Influenza (\"flu\") is a contagious disease that spreads around the [place] every winter, usually between October and May. Flu is caused by influenza viruses and is spread mainly by coughing, sneezing, and close contact. Anyone can get flu. Flu strikes suddenly and can last several days. Symptoms vary by age, but can include:  · Fever/chills. · Sore throat. · Muscle aches. · Fatigue. · Cough. · Headache. · Runny or stuffy nose. Flu can also lead to pneumonia and blood infections, and cause diarrhea and seizures in children. If you have a medical condition, such as heart or lung disease, flu can make it worse. Flu is more dangerous for some people. Infants and young children, people 72years of age and older, pregnant women, and people with certain health conditions or a weakened immune system are at greatest risk. Each year thousands of people in the [facility] die from flu, and many more are hospitalized. Flu vaccine can:  · Keep you from getting flu. · Make flu less severe if you do get it. · Keep you from spreading flu to your family and other people. Inactivated and recombinant flu vaccines  A dose of flu vaccine is recommended every flu season. Children 6 months through 6years of age may need two doses during the same flu season. Everyone else needs only one dose each flu season. Some inactivated flu vaccines contain a very small amount of a mercury-based preservative called thimerosal. Studies have not shown thimerosal in vaccines to be harmful, but flu vaccines that do not contain thimerosal are available. There is no live flu virus in flu shots. They cannot cause the flu. There are many flu viruses, and they are always changing.  Each year a new flu vaccine is made to protect against three or four viruses that are likely to cause disease in the upcoming flu season. But even when the vaccine doesn't exactly match these viruses, it may still provide some protection. Flu vaccine cannot prevent:  · Flu that is caused by a virus not covered by the vaccine. · Illnesses that look like flu but are not. Some people should not get this vaccine  Tell the person who is giving you the vaccine:  · If you have any severe (life-threatening) allergies. If you ever had a life-threatening allergic reaction after a dose of flu vaccine, or have a severe allergy to any part of this vaccine, you may be advised not to get vaccinated. Most, but not all, types of flu vaccine contain a small amount of egg protein. · If you ever had Guillain-Barré syndrome (also called GBS) Some people with a history of GBS should not get this vaccine. This should be discussed with your doctor. · If you are not feeling well. It is usually okay to get flu vaccine when you have a mild illness, but you might be asked to come back when you feel better. Risks of a vaccine reaction  With any medicine, including vaccines, there is a chance of reactions. These are usually mild and go away on their own, but serious reactions are also possible. Most people who get a flu shot do not have any problems with it. Minor problems following a flu shot include:  · Soreness, redness, or swelling where the shot was given  · Hoarseness  · Sore, red or itchy eyes  · Cough  · Fever  · Aches  · Headache  · Itching  · Fatigue  If these problems occur, they usually begin soon after the shot and last 1 or 2 days. More serious problems following a flu shot can include the following:  · There may be a small increased risk of Guillain-Barré Syndrome (GBS) after inactivated flu vaccine. This risk has been estimated at 1 or 2 additional cases per million people vaccinated. This is much lower than the risk of severe complications from flu, which can be prevented by flu vaccine.   · Sajan Maria children who get the flu shot along with [phone]. [facility] does not give medical advice. The National Vaccine Injury Compensation Program  The National Vaccine Injury Compensation Program (VICP) is a federal program that was created to compensate people who may have been injured by certain vaccines. Persons who believe they may have been injured by a vaccine can learn about the program and about filing a claim by calling 2-876.257.2559 or visiting the [employer] website at www.[facility].gov/vaccinecompensation. There is a time limit to file a claim for compensation. How can I learn more? · Ask your healthcare provider. He or she can give you the vaccine package insert or suggest other sources of information. · Call your local or state health department. · Contact the Centers for Disease Control and Prevention (CDC):  ¨ Call 0-340.803.6862 (1-800-CDC-INFO) or  ¨ Visit CDC's website at www.cdc.gov/flu  Vaccine Information Statement  Inactivated Influenza Vaccine  8/7/2015)  [address] [employer] Alert 179PN-36  Department of Health and Human Services  Centers for Disease Control and Prevention  Many Vaccine Information Statements are available in Irish and other languages. See www.immunize.org/vis. Muchas hojas de información sobre vacunas están disponibles en español y en otros idiomas. Visite www.immunize.org/vis. Care instructions adapted under license by [facility] ([facility]). If you have questions about a medical condition or this instruction, always ask your healthcare professional. [place] [address] any warranty or liability for your use of this information.

## 2017-11-14 NOTE — PROGRESS NOTES
Increase traZODone (DESYREL) 50 MG tablet; Take 2 tablets by mouth nightly    Needs flu shot  -     INFLUENZA, QUADV, 18-64 YRS, ID, PF, MICRO INJ, 0.1ML (FLUZONE QUADV, PF)    Bilateral carotid bruits  -     Ultrasound carotid doppler;  Future

## 2017-11-16 ENCOUNTER — TELEPHONE (OUTPATIENT)
Dept: FAMILY MEDICINE CLINIC | Age: 62
End: 2017-11-16

## 2017-12-12 DIAGNOSIS — G89.4 CHRONIC PAIN SYNDROME: ICD-10-CM

## 2017-12-12 RX ORDER — HYDROCODONE BITARTRATE AND ACETAMINOPHEN 7.5; 325 MG/1; MG/1
1 TABLET ORAL EVERY 8 HOURS PRN
Qty: 90 TABLET | Refills: 0 | Status: SHIPPED | OUTPATIENT
Start: 2017-12-12 | End: 2018-01-10 | Stop reason: SDUPTHER

## 2017-12-12 NOTE — TELEPHONE ENCOUNTER
Medication and Quantity requested:   HYDROcodone-acetaminophen (Joaquim Dus) 7.5-325 MG qty 90    Last Visit 11/14/2017    Pharmacy and phone number updated in EPIC:  yes

## 2017-12-20 ENCOUNTER — TELEPHONE (OUTPATIENT)
Dept: ENT CLINIC | Age: 62
End: 2017-12-20

## 2017-12-20 ENCOUNTER — OFFICE VISIT (OUTPATIENT)
Dept: ENT CLINIC | Age: 62
End: 2017-12-20

## 2017-12-20 VITALS — DIASTOLIC BLOOD PRESSURE: 70 MMHG | WEIGHT: 120 LBS | BODY MASS INDEX: 18.25 KG/M2 | SYSTOLIC BLOOD PRESSURE: 104 MMHG

## 2017-12-20 DIAGNOSIS — M79.2 NEURITIS: Primary | ICD-10-CM

## 2017-12-20 DIAGNOSIS — J30.9 ALLERGIC SINUSITIS: ICD-10-CM

## 2017-12-20 DIAGNOSIS — Z86.69 HISTORY OF HEARING LOSS: Primary | ICD-10-CM

## 2017-12-20 PROCEDURE — 99213 OFFICE O/P EST LOW 20 MIN: CPT | Performed by: OTOLARYNGOLOGY

## 2017-12-20 PROCEDURE — 1036F TOBACCO NON-USER: CPT | Performed by: OTOLARYNGOLOGY

## 2017-12-20 PROCEDURE — 3017F COLORECTAL CA SCREEN DOC REV: CPT | Performed by: OTOLARYNGOLOGY

## 2017-12-20 PROCEDURE — 3014F SCREEN MAMMO DOC REV: CPT | Performed by: OTOLARYNGOLOGY

## 2017-12-20 PROCEDURE — G8427 DOCREV CUR MEDS BY ELIG CLIN: HCPCS | Performed by: OTOLARYNGOLOGY

## 2017-12-20 PROCEDURE — G8484 FLU IMMUNIZE NO ADMIN: HCPCS | Performed by: OTOLARYNGOLOGY

## 2017-12-20 PROCEDURE — G8419 CALC BMI OUT NRM PARAM NOF/U: HCPCS | Performed by: OTOLARYNGOLOGY

## 2017-12-20 PROCEDURE — 96372 THER/PROPH/DIAG INJ SC/IM: CPT | Performed by: OTOLARYNGOLOGY

## 2017-12-20 RX ORDER — METHYLPREDNISOLONE ACETATE 40 MG/ML
40 INJECTION, SUSPENSION INTRA-ARTICULAR; INTRALESIONAL; INTRAMUSCULAR; SOFT TISSUE ONCE
Status: COMPLETED | OUTPATIENT
Start: 2017-12-20 | End: 2017-12-20

## 2017-12-20 RX ADMIN — METHYLPREDNISOLONE ACETATE 40 MG: 40 INJECTION, SUSPENSION INTRA-ARTICULAR; INTRALESIONAL; INTRAMUSCULAR; SOFT TISSUE at 15:29

## 2017-12-20 NOTE — TELEPHONE ENCOUNTER
The patient came into the office stating that she has had hearing loss and it is getting worse in the past few years in her right ear. She is requesting a hearing test please advise.

## 2017-12-20 NOTE — PROGRESS NOTES
Patient is known to this office for many years due to a facial nerve decompression she required in 2001 for a Bell's palsy    Today there is a several week history of nasal congestion with clear postnasal drip. There has been no fever, discolored secretions, or tender glands. There is been no exposure to fumes or chemicals, in no known contagious contacts   There is also discomfort in front of the ear which was diagnosed as neuritis during her last visit in August.  There is difficulty and discomfort when she moves her jaw and she does suspect that there is clenching and grinding  This is created an uncomfortable area just in front above the ear where she finds it necessary to taper her glasses. Denies autophony, otorrhea, otalgia, vertigo, or tinnitus. 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 She is also followed for depression, hypertension, insomnia. Her medications include Norco, Desyrel, Voltaren, Zoloft, Proventil, Singulair, Nitrostat, Flovent, pro-air    Exam:    The patient appears well developed and well nourished.; oriented to person, place, and time. The head is normocephalic and atraumatic; no facial scars or weakness are noted. The facial skeleton is normal.The voice has a normal quality and tonality to it. Eyes: Conjunctivae and lids normal. Full EOM. The skin is warm and dry. No pallor. There is a persistent mild right facial weakness but her eye closes without difficulty     The pinnae are normal in appearance. Otoscopy reveals clear ear canals without cerumen or keratin debris. Both eardrums are normal with good aeration of the middle ear space. There is no attic retraction, and a normal light reflex.      There is tenderness over the right temporomandibular joint but no palpable or audible crepitus    The turbinates appear 1+ boggy and swollen but not obstructive and not

## 2017-12-22 ENCOUNTER — TELEPHONE (OUTPATIENT)
Dept: INTERNAL MEDICINE CLINIC | Age: 62
End: 2017-12-22

## 2017-12-22 NOTE — TELEPHONE ENCOUNTER
Pt called at 9:15 to cx appt for today she is very sick and doesn't want to spread it---will call back next week to monique.

## 2018-01-02 DIAGNOSIS — F32.A DEPRESSION, UNSPECIFIED DEPRESSION TYPE: ICD-10-CM

## 2018-01-02 DIAGNOSIS — K21.9 GASTROESOPHAGEAL REFLUX DISEASE WITHOUT ESOPHAGITIS: ICD-10-CM

## 2018-01-02 RX ORDER — SERTRALINE HYDROCHLORIDE 100 MG/1
TABLET, FILM COATED ORAL
Qty: 270 TABLET | Refills: 0 | Status: SHIPPED | OUTPATIENT
Start: 2018-01-02 | End: 2018-04-04 | Stop reason: SDUPTHER

## 2018-01-02 RX ORDER — DEXLANSOPRAZOLE 60 MG/1
CAPSULE, DELAYED RELEASE ORAL
Qty: 30 CAPSULE | Refills: 0 | Status: SHIPPED | OUTPATIENT
Start: 2018-01-02 | End: 2018-02-06 | Stop reason: SDUPTHER

## 2018-01-10 DIAGNOSIS — G89.4 CHRONIC PAIN SYNDROME: ICD-10-CM

## 2018-01-10 RX ORDER — NITROGLYCERIN 0.4 MG/1
TABLET SUBLINGUAL
Qty: 25 TABLET | Refills: 0 | Status: SHIPPED | OUTPATIENT
Start: 2018-01-10 | End: 2018-11-30

## 2018-01-10 RX ORDER — HYDROCODONE BITARTRATE AND ACETAMINOPHEN 7.5; 325 MG/1; MG/1
1 TABLET ORAL EVERY 8 HOURS PRN
Qty: 90 TABLET | Refills: 0 | Status: SHIPPED | OUTPATIENT
Start: 2018-01-10 | End: 2018-02-05 | Stop reason: SDUPTHER

## 2018-01-10 NOTE — TELEPHONE ENCOUNTER
Medication and Quantity requested: Darius Villalobos      Last Visit  11/14/17    Pharmacy and phone number updated in EPIC:  yes

## 2018-02-05 DIAGNOSIS — G89.4 CHRONIC PAIN SYNDROME: ICD-10-CM

## 2018-02-05 RX ORDER — HYDROCODONE BITARTRATE AND ACETAMINOPHEN 7.5; 325 MG/1; MG/1
1 TABLET ORAL EVERY 8 HOURS PRN
Qty: 90 TABLET | Refills: 0 | Status: SHIPPED | OUTPATIENT
Start: 2018-02-05 | End: 2018-03-02 | Stop reason: SDUPTHER

## 2018-02-05 NOTE — TELEPHONE ENCOUNTER
.Medication and Quantity requested: HYDROcodone-acetaminophen ([address]) 7.5-325 MG per tablet qty 90  Last Visit 11/14/2017       Pharmacy and phone number updated in EPIC:  yes

## 2018-03-02 ENCOUNTER — OFFICE VISIT (OUTPATIENT)
Dept: FAMILY MEDICINE CLINIC | Age: 63
End: 2018-03-02

## 2018-03-02 VITALS
WEIGHT: 119.2 LBS | SYSTOLIC BLOOD PRESSURE: 122 MMHG | HEART RATE: 77 BPM | BODY MASS INDEX: 17.66 KG/M2 | HEIGHT: 69 IN | OXYGEN SATURATION: 98 % | DIASTOLIC BLOOD PRESSURE: 74 MMHG

## 2018-03-02 DIAGNOSIS — G89.4 CHRONIC PAIN SYNDROME: Primary | ICD-10-CM

## 2018-03-02 DIAGNOSIS — L98.9 LESION OF THUMB: ICD-10-CM

## 2018-03-02 DIAGNOSIS — R09.89 BILATERAL CAROTID BRUITS: ICD-10-CM

## 2018-03-02 DIAGNOSIS — J45.20 MILD INTERMITTENT ASTHMA WITHOUT COMPLICATION: ICD-10-CM

## 2018-03-02 PROCEDURE — G8484 FLU IMMUNIZE NO ADMIN: HCPCS | Performed by: FAMILY MEDICINE

## 2018-03-02 PROCEDURE — 99214 OFFICE O/P EST MOD 30 MIN: CPT | Performed by: FAMILY MEDICINE

## 2018-03-02 PROCEDURE — G8419 CALC BMI OUT NRM PARAM NOF/U: HCPCS | Performed by: FAMILY MEDICINE

## 2018-03-02 PROCEDURE — 3017F COLORECTAL CA SCREEN DOC REV: CPT | Performed by: FAMILY MEDICINE

## 2018-03-02 PROCEDURE — 1036F TOBACCO NON-USER: CPT | Performed by: FAMILY MEDICINE

## 2018-03-02 PROCEDURE — 3014F SCREEN MAMMO DOC REV: CPT | Performed by: FAMILY MEDICINE

## 2018-03-02 PROCEDURE — G8427 DOCREV CUR MEDS BY ELIG CLIN: HCPCS | Performed by: FAMILY MEDICINE

## 2018-03-02 RX ORDER — HYDROCODONE BITARTRATE AND ACETAMINOPHEN 7.5; 325 MG/1; MG/1
1 TABLET ORAL EVERY 8 HOURS PRN
Qty: 90 TABLET | Refills: 0 | Status: SHIPPED | OUTPATIENT
Start: 2018-03-02 | End: 2018-03-22 | Stop reason: SDUPTHER

## 2018-03-02 ASSESSMENT — ENCOUNTER SYMPTOMS
COUGH: 1
WHEEZING: 1
SHORTNESS OF BREATH: 0
BACK PAIN: 1
GASTROINTESTINAL NEGATIVE: 1

## 2018-03-07 DIAGNOSIS — F51.01 PRIMARY INSOMNIA: ICD-10-CM

## 2018-03-08 RX ORDER — TRAZODONE HYDROCHLORIDE 50 MG/1
TABLET ORAL
Qty: 60 TABLET | Refills: 5 | Status: SHIPPED | OUTPATIENT
Start: 2018-03-08 | End: 2018-09-14 | Stop reason: SDUPTHER

## 2018-03-14 ENCOUNTER — OFFICE VISIT (OUTPATIENT)
Dept: ORTHOPEDIC SURGERY | Age: 63
End: 2018-03-14

## 2018-03-14 VITALS — HEART RATE: 79 BPM | DIASTOLIC BLOOD PRESSURE: 63 MMHG | SYSTOLIC BLOOD PRESSURE: 129 MMHG

## 2018-03-14 DIAGNOSIS — M18.12 PRIMARY OSTEOARTHRITIS OF FIRST CARPOMETACARPAL JOINT OF LEFT HAND: ICD-10-CM

## 2018-03-14 DIAGNOSIS — M79.645 PAIN OF LEFT THUMB: Primary | ICD-10-CM

## 2018-03-14 DIAGNOSIS — M67.449 MUCOUS CYST OF FINGER: ICD-10-CM

## 2018-03-14 PROCEDURE — 3014F SCREEN MAMMO DOC REV: CPT | Performed by: ORTHOPAEDIC SURGERY

## 2018-03-14 PROCEDURE — G8419 CALC BMI OUT NRM PARAM NOF/U: HCPCS | Performed by: ORTHOPAEDIC SURGERY

## 2018-03-14 PROCEDURE — G8427 DOCREV CUR MEDS BY ELIG CLIN: HCPCS | Performed by: ORTHOPAEDIC SURGERY

## 2018-03-14 PROCEDURE — L3924 HFO WITHOUT JOINTS PRE OTS: HCPCS | Performed by: ORTHOPAEDIC SURGERY

## 2018-03-14 PROCEDURE — 99203 OFFICE O/P NEW LOW 30 MIN: CPT | Performed by: ORTHOPAEDIC SURGERY

## 2018-03-14 PROCEDURE — 3017F COLORECTAL CA SCREEN DOC REV: CPT | Performed by: ORTHOPAEDIC SURGERY

## 2018-03-14 PROCEDURE — G8482 FLU IMMUNIZE ORDER/ADMIN: HCPCS | Performed by: ORTHOPAEDIC SURGERY

## 2018-03-14 NOTE — LETTER
CONSENT TO OPERATION  AND/OR OTHER PROCEDURE(S)          PATIENT : Arturo Grandchild OF BIRTH:  1955      DATE : 3/14/18          1. I request and consent that Dr. Karlo Grullon. Sahra Walsh,  and/or his associates or assistants perform an operation and/or procedures on the above patient at  [place] [address], to treat the condition(s) which appear indicated by the diagnostic studies already performed. I have been told that in general terms the nature, purpose and reasonable expectations of the operation and/or procedure(s) are:      Left Thumb Digital Mucous Cyst  Excision with DIP Joint Debridement      2. It has been explained to me by the informing physician that during the course of the operation and/or procedure(s) unforeseen conditions may be revealed that necessitate an extension of the original operation and/or procedure(s) or different operation and/or procedures than those set forth in Paragraph 1. I therefore authorize and request that my physician and/or his associates or assistants perform such operations and/or procedures as are necessary and desirable in the exercise of professional judgment. The authority granted under this Paragraph 2 shall extend to all conditions that require treatment and are known to my physician at the time the operation is commenced. 3. I have been made aware by the informing physician of certain risks and consequences that are associated with the operation and/or procedure(s) described in Paragraph 1, the reasonable alternative methods or treatment, the possible consequences, the possibility that the operation and/or procedure(s) may be unsuccessful and the possibility of complications. I understand the reasonably known risks to be:      ? Bleeding  ? Infection  ? Poor Healing  ? Possible Damage to Nerve, Vessel, Tendon/Muscle or Bone  ? Need for further Treatment/Surgery  ? Stiffness  ? Pain  ?  Residual or Recurrent Symptoms ? Anesthetic and/or Medical Risks                    4. I have also been informed by the informing physician that there are other risks from both known and unknown causes that are attendant to the performance of any surgical procedure. I am aware that the practice of medicine and surgery is not an exact science, and that no guarantees have been made to me concerning the results of the operation and/or procedure(s). 5. I   CONSENT / REFUSE CONSENT  (strike the phrase that does not apply) to the taking of photographs before, during and/or after the operation or procedure for scientific/educational purposes. 6. I consent to the administration of anesthesia and to the use of such anesthetics as may be deemed advisable by the anesthesiologist who has been engaged by me or my physician. 7. I certify that I have read and understand the above consent to operation and/or other procedure(s); that the explanations therein referred to were made to me by the informing physician in advance of my signing this consent; that all blanks or statements requiring insertion or completion were filled in and inapplicable paragraphs, if any, were stricken before I signed; and that all questions asked by me about the operation and/or procedure(s) which I have consented to have been fully answered in a satisfactory manner.               _______________________  Witness        Signature Of Patient          Gagan Gayle Ahumada      _______________________  Informing Physician         Signature of Informing Physician           If patient is unable to sign or is a minor, complete one of the following:    (A)  Patient is a minor   years of age. (B)  Patient is unable to sign because: The undersigned represents that he or she is duly authorized to execute this consent for and on behalf of the above named patient.                Witness               o  Parent  o  Guardian   o  Spouse       o  Other (specify)

## 2018-03-14 NOTE — Clinical Note
Dear  Kandis Vail MD,  Thank you very much for your referral or Ms. Suki Walker to me for evaluation and treatment of her Hand & Wrist condition. I appreciate your confidence in me and thank you for allowing me the opportunity to care for your patients. If I can be of any further assistance to you on this or any other patient, please do not hesitate to contact me. Sincerely,  Karlo Grullon.  Sahra Walsh MD

## 2018-03-14 NOTE — PROGRESS NOTES
Ms. Izzy Nuñez is a 60 y.o. right handed individual  who is seen today in Hand Surgical Consultation at the request of Tala Gastelum MD.    She presents today regarding a left sided Thumb mass which have been present for approximately 3 weeks. A history of antecedent trauma or injury is Absent. She reports a prominent mass on the Dorsal aspect of the Thumb with symptoms that include pain at the extremes of position &  limitation of motion. Finger symptoms are exacerbated with certain gripping  or weight bearing activities. The size of the mass has been increasing with time and change in activity level. She reports that the mass has not ever drained in the past;  There has not been any previous sign of infection in the region of the mass. Previous treatment has included none. She does not claim relation of her symptoms to her required work activities. She has not undergone any form of testing. The patient's , past medical history, medications, allergies,  family history, social history, and review of systems have been reviewed, and dated and are recorded in the chart. Physical Exam:  Ms. Elizabeth Paige most recent vitals:  Vitals  BP: 129/63  Pulse: 79    She is well nourished, oriented to person, place & time. She demonstrates appropriate mood and affect as well as normal gait and station. Skin: Skin color, texture, turgor normal. No rashes or lesions bilaterally. There is not erythema, warmth or other sign of infection. Digital range of motion is limited by pain on the Left, normal on the Right. Wrist range of motion is full and equal bilateral bilaterally. There is no evidence of gross joint instability bilaterally. Sensation is normal Whole Hand bilaterally  Muscular strength is clinically appropriate bilaterally.   Vascular examination reveals normal, good capillary refill and good color bilaterally  There is mild Swelling on the Left, normal on the Right  There are

## 2018-03-15 ENCOUNTER — TELEPHONE (OUTPATIENT)
Dept: CARDIOLOGY CLINIC | Age: 63
End: 2018-03-15

## 2018-03-21 ENCOUNTER — OFFICE VISIT (OUTPATIENT)
Dept: FAMILY MEDICINE CLINIC | Age: 63
End: 2018-03-21

## 2018-03-21 VITALS
BODY MASS INDEX: 17.88 KG/M2 | TEMPERATURE: 97.1 F | OXYGEN SATURATION: 95 % | HEART RATE: 107 BPM | SYSTOLIC BLOOD PRESSURE: 100 MMHG | RESPIRATION RATE: 16 BRPM | HEIGHT: 68 IN | WEIGHT: 118 LBS | DIASTOLIC BLOOD PRESSURE: 70 MMHG

## 2018-03-21 DIAGNOSIS — G89.4 CHRONIC PAIN SYNDROME: ICD-10-CM

## 2018-03-21 DIAGNOSIS — M48.00 CENTRAL SPINAL STENOSIS: ICD-10-CM

## 2018-03-21 DIAGNOSIS — J45.20 MILD INTERMITTENT ASTHMA WITHOUT COMPLICATION: ICD-10-CM

## 2018-03-21 DIAGNOSIS — Z01.818 PREOP EXAMINATION: ICD-10-CM

## 2018-03-21 DIAGNOSIS — I42.9 CARDIOMYOPATHY, UNSPECIFIED TYPE (HCC): ICD-10-CM

## 2018-03-21 DIAGNOSIS — I25.2 HISTORY OF MI (MYOCARDIAL INFARCTION): ICD-10-CM

## 2018-03-21 DIAGNOSIS — M67.449 MUCOUS CYST OF FINGER: Primary | ICD-10-CM

## 2018-03-21 LAB
ANION GAP SERPL CALCULATED.3IONS-SCNC: 16 MMOL/L (ref 3–16)
BUN BLDV-MCNC: 15 MG/DL (ref 7–20)
CALCIUM SERPL-MCNC: 9.1 MG/DL (ref 8.3–10.6)
CHLORIDE BLD-SCNC: 99 MMOL/L (ref 99–110)
CO2: 27 MMOL/L (ref 21–32)
CREAT SERPL-MCNC: 0.6 MG/DL (ref 0.6–1.2)
GFR AFRICAN AMERICAN: >60
GFR NON-AFRICAN AMERICAN: >60
GLUCOSE BLD-MCNC: 62 MG/DL (ref 70–99)
HCT VFR BLD CALC: 39.7 % (ref 36–48)
HEMOGLOBIN: 13.5 G/DL (ref 12–16)
MCH RBC QN AUTO: 30.5 PG (ref 26–34)
MCHC RBC AUTO-ENTMCNC: 34.1 G/DL (ref 31–36)
MCV RBC AUTO: 89.4 FL (ref 80–100)
PDW BLD-RTO: 13.8 % (ref 12.4–15.4)
PLATELET # BLD: 238 K/UL (ref 135–450)
PMV BLD AUTO: 8.7 FL (ref 5–10.5)
POTASSIUM SERPL-SCNC: 4.2 MMOL/L (ref 3.5–5.1)
RBC # BLD: 4.44 M/UL (ref 4–5.2)
SODIUM BLD-SCNC: 142 MMOL/L (ref 136–145)
WBC # BLD: 7.5 K/UL (ref 4–11)

## 2018-03-21 PROCEDURE — 3014F SCREEN MAMMO DOC REV: CPT | Performed by: NURSE PRACTITIONER

## 2018-03-21 PROCEDURE — 3017F COLORECTAL CA SCREEN DOC REV: CPT | Performed by: NURSE PRACTITIONER

## 2018-03-21 PROCEDURE — G8427 DOCREV CUR MEDS BY ELIG CLIN: HCPCS | Performed by: NURSE PRACTITIONER

## 2018-03-21 PROCEDURE — G8482 FLU IMMUNIZE ORDER/ADMIN: HCPCS | Performed by: NURSE PRACTITIONER

## 2018-03-21 PROCEDURE — 99214 OFFICE O/P EST MOD 30 MIN: CPT | Performed by: NURSE PRACTITIONER

## 2018-03-21 PROCEDURE — G8419 CALC BMI OUT NRM PARAM NOF/U: HCPCS | Performed by: NURSE PRACTITIONER

## 2018-03-21 PROCEDURE — 1036F TOBACCO NON-USER: CPT | Performed by: NURSE PRACTITIONER

## 2018-03-22 DIAGNOSIS — G89.4 CHRONIC PAIN SYNDROME: ICD-10-CM

## 2018-03-22 RX ORDER — HYDROCODONE BITARTRATE AND ACETAMINOPHEN 7.5; 325 MG/1; MG/1
1 TABLET ORAL EVERY 6 HOURS PRN
Qty: 120 TABLET | Refills: 0 | Status: SHIPPED | OUTPATIENT
Start: 2018-03-22 | End: 2018-05-03 | Stop reason: SDUPTHER

## 2018-03-22 NOTE — TELEPHONE ENCOUNTER
Pharmacy received a request for Hydrocodone with directions every 6 hours. The patient picked up a prescription on March 8 with directions every 8 hours. When should they refill this prescription? It states may refill when time.

## 2018-03-22 NOTE — TELEPHONE ENCOUNTER
Patient is having surgery on her left hand on 4/3/18 by Dr. Ronaldo Zavaleta and saw Sonia Mina yesterday for the pre-op PE. She is asking if Dr. Edgardo Cespedes will increase her Norco to 4 per day and if she can get a refill before he leaves for vacation.

## 2018-03-22 NOTE — TELEPHONE ENCOUNTER
I have sent in a new prescription for her reflecting the directions that she can take it 4 times a day. She can pick this up when she is due for the prescription. Try and get back to 3 times a day as soon as possible.

## 2018-03-22 NOTE — TELEPHONE ENCOUNTER
Dennis Uribe called, on HIPAA,  Gave him Dr. Viral Tenorio instructions and he verbalized understanding

## 2018-03-23 ENCOUNTER — OFFICE VISIT (OUTPATIENT)
Dept: CARDIOLOGY CLINIC | Age: 63
End: 2018-03-23

## 2018-03-23 VITALS
SYSTOLIC BLOOD PRESSURE: 106 MMHG | DIASTOLIC BLOOD PRESSURE: 60 MMHG | HEIGHT: 68 IN | BODY MASS INDEX: 17.85 KG/M2 | HEART RATE: 72 BPM | WEIGHT: 117.8 LBS

## 2018-03-23 DIAGNOSIS — I42.9 CARDIOMYOPATHY, UNSPECIFIED TYPE (HCC): Primary | ICD-10-CM

## 2018-03-23 DIAGNOSIS — R09.89 BILATERAL CAROTID BRUITS: ICD-10-CM

## 2018-03-23 PROCEDURE — 3017F COLORECTAL CA SCREEN DOC REV: CPT | Performed by: INTERNAL MEDICINE

## 2018-03-23 PROCEDURE — 1036F TOBACCO NON-USER: CPT | Performed by: INTERNAL MEDICINE

## 2018-03-23 PROCEDURE — G8419 CALC BMI OUT NRM PARAM NOF/U: HCPCS | Performed by: INTERNAL MEDICINE

## 2018-03-23 PROCEDURE — G8482 FLU IMMUNIZE ORDER/ADMIN: HCPCS | Performed by: INTERNAL MEDICINE

## 2018-03-23 PROCEDURE — 99214 OFFICE O/P EST MOD 30 MIN: CPT | Performed by: INTERNAL MEDICINE

## 2018-03-23 PROCEDURE — 93000 ELECTROCARDIOGRAM COMPLETE: CPT | Performed by: INTERNAL MEDICINE

## 2018-03-23 PROCEDURE — G8427 DOCREV CUR MEDS BY ELIG CLIN: HCPCS | Performed by: INTERNAL MEDICINE

## 2018-03-23 PROCEDURE — 3014F SCREEN MAMMO DOC REV: CPT | Performed by: INTERNAL MEDICINE

## 2018-03-23 NOTE — TELEPHONE ENCOUNTER
Patient stated that has not picked up the medication that was prescribed for every 6 hours. Patient is going to start that medication after surgery which is on  April 3rd. Should I tell pharmacy not to fill until after 04/03/2018?

## 2018-04-02 ENCOUNTER — PAT TELEPHONE (OUTPATIENT)
Dept: PREADMISSION TESTING | Age: 63
End: 2018-04-02

## 2018-04-02 VITALS — BODY MASS INDEX: 17.88 KG/M2 | HEIGHT: 68 IN | WEIGHT: 118 LBS

## 2018-04-02 RX ORDER — GABAPENTIN 300 MG/1
300 CAPSULE ORAL 2 TIMES DAILY
COMMUNITY
Start: 2018-01-04 | End: 2018-04-06 | Stop reason: SDUPTHER

## 2018-04-02 ASSESSMENT — PAIN DESCRIPTION - ORIENTATION: ORIENTATION: LEFT

## 2018-04-02 ASSESSMENT — PAIN SCALES - GENERAL: PAINLEVEL_OUTOF10: 7

## 2018-04-02 ASSESSMENT — PAIN DESCRIPTION - PAIN TYPE: TYPE: ACUTE PAIN

## 2018-04-02 ASSESSMENT — PAIN DESCRIPTION - DIRECTION: RADIATING_TOWARDS: THUMB

## 2018-04-02 ASSESSMENT — PAIN - FUNCTIONAL ASSESSMENT: PAIN_FUNCTIONAL_ASSESSMENT: 0-10

## 2018-04-03 ENCOUNTER — HOSPITAL ENCOUNTER (OUTPATIENT)
Dept: SURGERY | Age: 63
Discharge: HOME | End: 2018-04-03
Attending: ORTHOPAEDIC SURGERY | Admitting: ORTHOPAEDIC SURGERY

## 2018-04-03 VITALS
SYSTOLIC BLOOD PRESSURE: 139 MMHG | OXYGEN SATURATION: 98 % | DIASTOLIC BLOOD PRESSURE: 59 MMHG | WEIGHT: 119.05 LBS | TEMPERATURE: 97 F | RESPIRATION RATE: 16 BRPM | HEART RATE: 62 BPM | BODY MASS INDEX: 18.1 KG/M2

## 2018-04-03 RX ORDER — FENTANYL CITRATE 50 UG/ML
25 INJECTION, SOLUTION INTRAMUSCULAR; INTRAVENOUS EVERY 5 MIN PRN
Status: DISCONTINUED | OUTPATIENT
Start: 2018-04-03 | End: 2018-04-04 | Stop reason: HOSPADM

## 2018-04-03 RX ORDER — SODIUM CHLORIDE 0.9 % (FLUSH) 0.9 %
10 SYRINGE (ML) INJECTION EVERY 12 HOURS SCHEDULED
Status: DISCONTINUED | OUTPATIENT
Start: 2018-04-03 | End: 2018-04-04 | Stop reason: HOSPADM

## 2018-04-03 RX ORDER — LABETALOL HYDROCHLORIDE 5 MG/ML
5 INJECTION, SOLUTION INTRAVENOUS EVERY 10 MIN PRN
Status: DISCONTINUED | OUTPATIENT
Start: 2018-04-03 | End: 2018-04-04 | Stop reason: HOSPADM

## 2018-04-03 RX ORDER — PROMETHAZINE HYDROCHLORIDE 25 MG/ML
6.25 INJECTION, SOLUTION INTRAMUSCULAR; INTRAVENOUS
Status: ACTIVE | OUTPATIENT
Start: 2018-04-03 | End: 2018-04-03

## 2018-04-03 RX ORDER — SODIUM CHLORIDE 0.9 % (FLUSH) 0.9 %
10 SYRINGE (ML) INJECTION PRN
Status: DISCONTINUED | OUTPATIENT
Start: 2018-04-03 | End: 2018-04-04 | Stop reason: HOSPADM

## 2018-04-03 RX ORDER — SODIUM CHLORIDE 9 MG/ML
INJECTION, SOLUTION INTRAVENOUS CONTINUOUS
Status: DISCONTINUED | OUTPATIENT
Start: 2018-04-03 | End: 2018-04-04 | Stop reason: HOSPADM

## 2018-04-03 RX ORDER — APREPITANT 40 MG/1
40 CAPSULE ORAL ONCE
Status: COMPLETED | OUTPATIENT
Start: 2018-04-03 | End: 2018-04-03

## 2018-04-03 RX ORDER — MORPHINE SULFATE 2 MG/ML
2 INJECTION, SOLUTION INTRAMUSCULAR; INTRAVENOUS EVERY 5 MIN PRN
Status: DISCONTINUED | OUTPATIENT
Start: 2018-04-03 | End: 2018-04-04 | Stop reason: HOSPADM

## 2018-04-03 RX ADMIN — FENTANYL CITRATE 25 MCG: 50 INJECTION, SOLUTION INTRAMUSCULAR; INTRAVENOUS at 11:16

## 2018-04-03 RX ADMIN — FENTANYL CITRATE 25 MCG: 50 INJECTION, SOLUTION INTRAMUSCULAR; INTRAVENOUS at 11:11

## 2018-04-03 RX ADMIN — SODIUM CHLORIDE: 9 INJECTION, SOLUTION INTRAVENOUS at 10:05

## 2018-04-03 RX ADMIN — APREPITANT 40 MG: 40 CAPSULE ORAL at 10:24

## 2018-04-03 ASSESSMENT — PAIN - FUNCTIONAL ASSESSMENT: PAIN_FUNCTIONAL_ASSESSMENT: 0-10

## 2018-04-03 ASSESSMENT — PAIN SCALES - GENERAL
PAINLEVEL_OUTOF10: 7
PAINLEVEL_OUTOF10: 0
PAINLEVEL_OUTOF10: 6
PAINLEVEL_OUTOF10: 0

## 2018-04-04 DIAGNOSIS — F32.A DEPRESSION, UNSPECIFIED DEPRESSION TYPE: ICD-10-CM

## 2018-04-04 RX ORDER — SERTRALINE HYDROCHLORIDE 100 MG/1
TABLET, FILM COATED ORAL
Qty: 270 TABLET | Refills: 0 | Status: SHIPPED | OUTPATIENT
Start: 2018-04-04 | End: 2018-10-01 | Stop reason: SDUPTHER

## 2018-04-06 ENCOUNTER — OFFICE VISIT (OUTPATIENT)
Dept: RHEUMATOLOGY | Age: 63
End: 2018-04-06

## 2018-04-06 VITALS
SYSTOLIC BLOOD PRESSURE: 98 MMHG | BODY MASS INDEX: 17.87 KG/M2 | WEIGHT: 117.9 LBS | DIASTOLIC BLOOD PRESSURE: 82 MMHG | HEIGHT: 68 IN

## 2018-04-06 DIAGNOSIS — M15.9 PRIMARY OSTEOARTHRITIS INVOLVING MULTIPLE JOINTS: Primary | ICD-10-CM

## 2018-04-06 DIAGNOSIS — M25.561 CHRONIC PAIN OF RIGHT KNEE: ICD-10-CM

## 2018-04-06 DIAGNOSIS — G89.29 CHRONIC PAIN OF RIGHT KNEE: ICD-10-CM

## 2018-04-06 PROCEDURE — G8419 CALC BMI OUT NRM PARAM NOF/U: HCPCS | Performed by: INTERNAL MEDICINE

## 2018-04-06 PROCEDURE — 3014F SCREEN MAMMO DOC REV: CPT | Performed by: INTERNAL MEDICINE

## 2018-04-06 PROCEDURE — 1036F TOBACCO NON-USER: CPT | Performed by: INTERNAL MEDICINE

## 2018-04-06 PROCEDURE — 99214 OFFICE O/P EST MOD 30 MIN: CPT | Performed by: INTERNAL MEDICINE

## 2018-04-06 PROCEDURE — 20600 DRAIN/INJ JOINT/BURSA W/O US: CPT | Performed by: INTERNAL MEDICINE

## 2018-04-06 PROCEDURE — G8427 DOCREV CUR MEDS BY ELIG CLIN: HCPCS | Performed by: INTERNAL MEDICINE

## 2018-04-06 PROCEDURE — 3017F COLORECTAL CA SCREEN DOC REV: CPT | Performed by: INTERNAL MEDICINE

## 2018-04-06 RX ORDER — TRIAMCINOLONE ACETONIDE 40 MG/ML
10 INJECTION, SUSPENSION INTRA-ARTICULAR; INTRAMUSCULAR ONCE
Status: COMPLETED | OUTPATIENT
Start: 2018-04-06 | End: 2018-04-06

## 2018-04-06 RX ORDER — LIDOCAINE HYDROCHLORIDE 10 MG/ML
0.5 INJECTION, SOLUTION INFILTRATION; PERINEURAL ONCE
Status: COMPLETED | OUTPATIENT
Start: 2018-04-06 | End: 2018-04-06

## 2018-04-06 RX ORDER — GABAPENTIN 300 MG/1
300 CAPSULE ORAL 3 TIMES DAILY
Qty: 90 CAPSULE | Refills: 2 | Status: SHIPPED | OUTPATIENT
Start: 2018-04-06 | End: 2018-08-23

## 2018-04-06 RX ADMIN — LIDOCAINE HYDROCHLORIDE 0.5 ML: 10 INJECTION, SOLUTION INFILTRATION; PERINEURAL at 12:38

## 2018-04-06 RX ADMIN — LIDOCAINE HYDROCHLORIDE 0.5 ML: 10 INJECTION, SOLUTION INFILTRATION; PERINEURAL at 12:39

## 2018-04-06 RX ADMIN — TRIAMCINOLONE ACETONIDE 10 MG: 40 INJECTION, SUSPENSION INTRA-ARTICULAR; INTRAMUSCULAR at 12:40

## 2018-04-11 ENCOUNTER — OFFICE VISIT (OUTPATIENT)
Dept: ORTHOPEDIC SURGERY | Age: 63
End: 2018-04-11

## 2018-04-11 VITALS
DIASTOLIC BLOOD PRESSURE: 76 MMHG | BODY MASS INDEX: 17.88 KG/M2 | SYSTOLIC BLOOD PRESSURE: 131 MMHG | HEART RATE: 70 BPM | WEIGHT: 118 LBS | HEIGHT: 68 IN

## 2018-04-11 DIAGNOSIS — M67.449 MUCOUS CYST OF FINGER: Primary | ICD-10-CM

## 2018-04-11 PROCEDURE — 99024 POSTOP FOLLOW-UP VISIT: CPT | Performed by: PHYSICIAN ASSISTANT

## 2018-04-12 ENCOUNTER — HOSPITAL ENCOUNTER (OUTPATIENT)
Dept: ULTRASOUND IMAGING | Age: 63
Discharge: HOME | End: 2018-04-12
Attending: INTERNAL MEDICINE | Admitting: INTERNAL MEDICINE

## 2018-04-12 ENCOUNTER — HOSPITAL ENCOUNTER (OUTPATIENT)
Dept: VASCULAR LAB | Age: 63
Discharge: HOME | End: 2018-04-12
Attending: FAMILY MEDICINE | Admitting: FAMILY MEDICINE

## 2018-04-12 DIAGNOSIS — R09.89 OTHER SPECIFIED SYMPTOMS AND SIGNS INVOLVING THE CIRCULATORY AND RESPIRATORY SYSTEMS: ICD-10-CM

## 2018-04-12 DIAGNOSIS — M25.561 RIGHT KNEE PAIN, UNSPECIFIED CHRONICITY: ICD-10-CM

## 2018-04-12 DIAGNOSIS — M25.561 PAIN IN RIGHT KNEE: ICD-10-CM

## 2018-04-17 ENCOUNTER — TELEPHONE (OUTPATIENT)
Dept: CARDIOLOGY CLINIC | Age: 63
End: 2018-04-17

## 2018-05-03 DIAGNOSIS — G89.4 CHRONIC PAIN SYNDROME: ICD-10-CM

## 2018-05-03 RX ORDER — HYDROCODONE BITARTRATE AND ACETAMINOPHEN 7.5; 325 MG/1; MG/1
1 TABLET ORAL EVERY 6 HOURS PRN
Qty: 120 TABLET | Refills: 0 | Status: SHIPPED | OUTPATIENT
Start: 2018-05-03 | End: 2018-06-05 | Stop reason: SDUPTHER

## 2018-06-05 ENCOUNTER — OFFICE VISIT (OUTPATIENT)
Dept: FAMILY MEDICINE CLINIC | Age: 63
End: 2018-06-05

## 2018-06-05 VITALS
SYSTOLIC BLOOD PRESSURE: 112 MMHG | BODY MASS INDEX: 17.52 KG/M2 | HEART RATE: 93 BPM | OXYGEN SATURATION: 98 % | DIASTOLIC BLOOD PRESSURE: 64 MMHG | WEIGHT: 115.2 LBS

## 2018-06-05 DIAGNOSIS — G89.4 CHRONIC PAIN SYNDROME: Primary | ICD-10-CM

## 2018-06-05 DIAGNOSIS — L30.1 DYSHIDROSIS: ICD-10-CM

## 2018-06-05 DIAGNOSIS — L72.0 EPIDERMOID CYST OF SKIN OF CHEST: ICD-10-CM

## 2018-06-05 PROCEDURE — 3017F COLORECTAL CA SCREEN DOC REV: CPT | Performed by: FAMILY MEDICINE

## 2018-06-05 PROCEDURE — G8427 DOCREV CUR MEDS BY ELIG CLIN: HCPCS | Performed by: FAMILY MEDICINE

## 2018-06-05 PROCEDURE — 1036F TOBACCO NON-USER: CPT | Performed by: FAMILY MEDICINE

## 2018-06-05 PROCEDURE — 99213 OFFICE O/P EST LOW 20 MIN: CPT | Performed by: FAMILY MEDICINE

## 2018-06-05 PROCEDURE — G8419 CALC BMI OUT NRM PARAM NOF/U: HCPCS | Performed by: FAMILY MEDICINE

## 2018-06-05 RX ORDER — HYDROCODONE BITARTRATE AND ACETAMINOPHEN 7.5; 325 MG/1; MG/1
1 TABLET ORAL EVERY 6 HOURS PRN
Qty: 120 TABLET | Refills: 0 | Status: SHIPPED | OUTPATIENT
Start: 2018-06-05 | End: 2018-07-02 | Stop reason: SDUPTHER

## 2018-06-05 RX ORDER — TIZANIDINE 4 MG/1
4 TABLET ORAL 3 TIMES DAILY
Qty: 90 TABLET | Refills: 5 | Status: SHIPPED | OUTPATIENT
Start: 2018-06-05 | End: 2018-06-05 | Stop reason: SDUPTHER

## 2018-06-05 ASSESSMENT — PATIENT HEALTH QUESTIONNAIRE - PHQ9
SUM OF ALL RESPONSES TO PHQ9 QUESTIONS 1 & 2: 0
SUM OF ALL RESPONSES TO PHQ QUESTIONS 1-9: 0
2. FEELING DOWN, DEPRESSED OR HOPELESS: 0
1. LITTLE INTEREST OR PLEASURE IN DOING THINGS: 0

## 2018-06-05 ASSESSMENT — ENCOUNTER SYMPTOMS
RESPIRATORY NEGATIVE: 1
GASTROINTESTINAL NEGATIVE: 1
BACK PAIN: 1

## 2018-06-14 ENCOUNTER — OFFICE VISIT (OUTPATIENT)
Dept: SURGERY | Age: 63
End: 2018-06-14

## 2018-06-14 VITALS
DIASTOLIC BLOOD PRESSURE: 66 MMHG | BODY MASS INDEX: 17.28 KG/M2 | SYSTOLIC BLOOD PRESSURE: 110 MMHG | HEIGHT: 68 IN | WEIGHT: 114 LBS

## 2018-06-14 DIAGNOSIS — L08.9 INFECTED EPIDERMOID CYST: Primary | ICD-10-CM

## 2018-06-14 DIAGNOSIS — L72.0 INFECTED EPIDERMOID CYST: Primary | ICD-10-CM

## 2018-06-14 PROCEDURE — G8419 CALC BMI OUT NRM PARAM NOF/U: HCPCS | Performed by: SURGERY

## 2018-06-14 PROCEDURE — 3017F COLORECTAL CA SCREEN DOC REV: CPT | Performed by: SURGERY

## 2018-06-14 PROCEDURE — G8427 DOCREV CUR MEDS BY ELIG CLIN: HCPCS | Performed by: SURGERY

## 2018-06-14 PROCEDURE — 99202 OFFICE O/P NEW SF 15 MIN: CPT | Performed by: SURGERY

## 2018-07-02 DIAGNOSIS — G89.4 CHRONIC PAIN SYNDROME: ICD-10-CM

## 2018-07-02 RX ORDER — HYDROCODONE BITARTRATE AND ACETAMINOPHEN 7.5; 325 MG/1; MG/1
1 TABLET ORAL EVERY 6 HOURS PRN
Qty: 120 TABLET | Refills: 0 | Status: SHIPPED | OUTPATIENT
Start: 2018-07-02 | End: 2018-07-31 | Stop reason: SDUPTHER

## 2018-07-03 ENCOUNTER — PROCEDURE VISIT (OUTPATIENT)
Dept: SURGERY | Age: 63
End: 2018-07-03

## 2018-07-03 VITALS — BODY MASS INDEX: 17.03 KG/M2 | DIASTOLIC BLOOD PRESSURE: 82 MMHG | SYSTOLIC BLOOD PRESSURE: 120 MMHG | WEIGHT: 112 LBS

## 2018-07-03 DIAGNOSIS — L72.3 SEBACEOUS CYST: Primary | ICD-10-CM

## 2018-07-03 DIAGNOSIS — L08.9 INFECTED EPIDERMOID CYST: ICD-10-CM

## 2018-07-03 DIAGNOSIS — L72.0 INFECTED EPIDERMOID CYST: ICD-10-CM

## 2018-07-03 PROCEDURE — 11402 EXC TR-EXT B9+MARG 1.1-2 CM: CPT | Performed by: SURGERY

## 2018-07-17 ENCOUNTER — OFFICE VISIT (OUTPATIENT)
Dept: SURGERY | Age: 63
End: 2018-07-17

## 2018-07-17 VITALS — WEIGHT: 110 LBS | SYSTOLIC BLOOD PRESSURE: 100 MMHG | DIASTOLIC BLOOD PRESSURE: 60 MMHG | BODY MASS INDEX: 16.73 KG/M2

## 2018-07-17 DIAGNOSIS — L72.3 SEBACEOUS CYST: Primary | ICD-10-CM

## 2018-07-17 PROCEDURE — 99024 POSTOP FOLLOW-UP VISIT: CPT | Performed by: SURGERY

## 2018-07-31 DIAGNOSIS — G89.4 CHRONIC PAIN SYNDROME: ICD-10-CM

## 2018-07-31 RX ORDER — HYDROCODONE BITARTRATE AND ACETAMINOPHEN 7.5; 325 MG/1; MG/1
1 TABLET ORAL EVERY 6 HOURS PRN
Qty: 120 TABLET | Refills: 0 | Status: SHIPPED | OUTPATIENT
Start: 2018-07-31 | End: 2018-08-30 | Stop reason: SDUPTHER

## 2018-08-23 ENCOUNTER — OFFICE VISIT (OUTPATIENT)
Dept: ENT CLINIC | Age: 63
End: 2018-08-23

## 2018-08-23 VITALS — SYSTOLIC BLOOD PRESSURE: 104 MMHG | OXYGEN SATURATION: 96 % | HEART RATE: 90 BPM | DIASTOLIC BLOOD PRESSURE: 60 MMHG

## 2018-08-23 DIAGNOSIS — J30.9 ALLERGIC SINUSITIS: ICD-10-CM

## 2018-08-23 DIAGNOSIS — J32.9 RECURRENT SINUSITIS: Primary | ICD-10-CM

## 2018-08-23 PROCEDURE — 96372 THER/PROPH/DIAG INJ SC/IM: CPT | Performed by: OTOLARYNGOLOGY

## 2018-08-23 PROCEDURE — 99213 OFFICE O/P EST LOW 20 MIN: CPT | Performed by: OTOLARYNGOLOGY

## 2018-08-23 PROCEDURE — G8427 DOCREV CUR MEDS BY ELIG CLIN: HCPCS | Performed by: OTOLARYNGOLOGY

## 2018-08-23 PROCEDURE — 3017F COLORECTAL CA SCREEN DOC REV: CPT | Performed by: OTOLARYNGOLOGY

## 2018-08-23 PROCEDURE — 1036F TOBACCO NON-USER: CPT | Performed by: OTOLARYNGOLOGY

## 2018-08-23 PROCEDURE — G8419 CALC BMI OUT NRM PARAM NOF/U: HCPCS | Performed by: OTOLARYNGOLOGY

## 2018-08-23 RX ORDER — METHYLPREDNISOLONE ACETATE 40 MG/ML
40 INJECTION, SUSPENSION INTRA-ARTICULAR; INTRALESIONAL; INTRAMUSCULAR; SOFT TISSUE ONCE
Status: COMPLETED | OUTPATIENT
Start: 2018-08-23 | End: 2018-08-23

## 2018-08-23 RX ORDER — DOXYCYCLINE 100 MG/1
100 TABLET ORAL 2 TIMES DAILY
Qty: 20 TABLET | Refills: 0 | Status: SHIPPED | OUTPATIENT
Start: 2018-08-23 | End: 2018-09-02

## 2018-08-23 RX ORDER — BUTALBITAL, ACETAMINOPHEN AND CAFFEINE 50; 325; 40 MG/1; MG/1; MG/1
1 TABLET ORAL EVERY 4 HOURS PRN
Qty: 50 TABLET | Refills: 0 | Status: SHIPPED | OUTPATIENT
Start: 2018-08-23 | End: 2020-03-06

## 2018-08-23 RX ADMIN — METHYLPREDNISOLONE ACETATE 40 MG: 40 INJECTION, SUSPENSION INTRA-ARTICULAR; INTRALESIONAL; INTRAMUSCULAR; SOFT TISSUE at 13:39

## 2018-08-23 NOTE — PROGRESS NOTES
Over the course the past 2 weeks, the patient has noted a marked increase in sinus congestion with drainage and sometimes puffiness in her eyes. She is using her Flonase nasal spray daily as well as Singulair. She's had questionable feverish feeling. She did have rather severe viral infection approximately a month ago which lasted for a month and resulted in some weight loss for which she is now making up. Currently, she does appear to be quite thin. No obvious acute distress is present. Irrigation examination reveals normal findings. Oral examination is unremarkable. The neck is free of any adenopathy, mass, thyroid enlargement. Nasal mucosa treated with cotton pledgets  impregnated with Afrin and lidocaine placed in middle meatuses against turbinates. Pledgets left in place for five minutes and removed enabling enhanced visualization. The exam revealed positive edema of mucosa. it was positive for erythema indicative of infection. There was not evidence of deviation of the septum which was not significant. There were not polyps present. .There were not other masses present. The turbinates were not enlarged beyond normal.  Findings remained suggestive of a sinus infection with associated allergy. We will start her on doxycycline monohydrate and give her Depo-Medrol injection. She will continue her Flonase and Singulair. I have asked that she contact me in 1 week if no significant improvement might occur.

## 2018-08-30 DIAGNOSIS — G89.4 CHRONIC PAIN SYNDROME: ICD-10-CM

## 2018-08-30 RX ORDER — HYDROCODONE BITARTRATE AND ACETAMINOPHEN 7.5; 325 MG/1; MG/1
1 TABLET ORAL EVERY 6 HOURS PRN
Qty: 120 TABLET | Refills: 0 | Status: SHIPPED | OUTPATIENT
Start: 2018-08-30 | End: 2018-09-26 | Stop reason: SDUPTHER

## 2018-08-30 NOTE — TELEPHONE ENCOUNTER
Medication and Quantity requested:  HYDROcodone-acetaminophen (NORCO) 7.5-325 MG - qty 120        Last Visit  06/05/18 - Dr Rachel Regalado and phone number updated in EPIC:  yes

## 2018-09-04 ENCOUNTER — OFFICE VISIT (OUTPATIENT)
Dept: FAMILY MEDICINE CLINIC | Age: 63
End: 2018-09-04

## 2018-09-04 VITALS
SYSTOLIC BLOOD PRESSURE: 98 MMHG | BODY MASS INDEX: 16.54 KG/M2 | OXYGEN SATURATION: 98 % | HEART RATE: 98 BPM | WEIGHT: 108.8 LBS | DIASTOLIC BLOOD PRESSURE: 72 MMHG

## 2018-09-04 DIAGNOSIS — R63.4 WEIGHT LOSS: ICD-10-CM

## 2018-09-04 DIAGNOSIS — G89.4 CHRONIC PAIN SYNDROME: Primary | ICD-10-CM

## 2018-09-04 LAB
T4 TOTAL: 4.7 UG/DL (ref 4.5–10.9)
TSH SERPL DL<=0.05 MIU/L-ACNC: 0.34 UIU/ML (ref 0.27–4.2)

## 2018-09-04 PROCEDURE — G8419 CALC BMI OUT NRM PARAM NOF/U: HCPCS | Performed by: FAMILY MEDICINE

## 2018-09-04 PROCEDURE — 3017F COLORECTAL CA SCREEN DOC REV: CPT | Performed by: FAMILY MEDICINE

## 2018-09-04 PROCEDURE — 1036F TOBACCO NON-USER: CPT | Performed by: FAMILY MEDICINE

## 2018-09-04 PROCEDURE — G8427 DOCREV CUR MEDS BY ELIG CLIN: HCPCS | Performed by: FAMILY MEDICINE

## 2018-09-04 PROCEDURE — 99213 OFFICE O/P EST LOW 20 MIN: CPT | Performed by: FAMILY MEDICINE

## 2018-09-04 ASSESSMENT — ENCOUNTER SYMPTOMS
BACK PAIN: 1
COUGH: 0
RESPIRATORY NEGATIVE: 1
GASTROINTESTINAL NEGATIVE: 1
VOMITING: 0
NAUSEA: 0
BLOOD IN STOOL: 0

## 2018-09-04 ASSESSMENT — PATIENT HEALTH QUESTIONNAIRE - PHQ9
SUM OF ALL RESPONSES TO PHQ QUESTIONS 1-9: 0
2. FEELING DOWN, DEPRESSED OR HOPELESS: 0
SUM OF ALL RESPONSES TO PHQ QUESTIONS 1-9: 0
1. LITTLE INTEREST OR PLEASURE IN DOING THINGS: 0
SUM OF ALL RESPONSES TO PHQ9 QUESTIONS 1 & 2: 0

## 2018-09-04 NOTE — LETTER
MEDICATION AGREEMENT     Sy Kaiserhannah  [birth year]      For certain conditions, multiple classes of medications may be used to help better manage your symptoms, and to improve your ability to function at home, work and in social settings. However, these medications do have risks, which will be discussed with you, including addiction and dependency. The following prescribed medications need frequent monitoring and will require you to partner and assist in your healthcare. Medication  Dose, instructions and quantity as indicated on current prescription bottle Diagnosis/Reason(s) for Taking Category   Norco 7.5/325                             Benefits and goals of Controlled Substance Medications: There are two potential goals for your treatment: (1) decreased pain and suffering (2) improved daily life functions. There are many possible treatments for your chronic condition(s), and, in addition to controlled substance medications, we will try alternatives such as physical therapy, yoga, massage, home daily exercise, meditation, relaxation techniques, injections, chiropractic manipulations, surgery, cognitive therapy, hypnosis and many medications that are not habit-forming. Use of controlled substance medications may be helpful, but they are unlikely to resolve all of your symptoms or restore all function. Risks of Controlled Substance Medications:    Opioid pain medications: These medications can lead to problems such as addiction/dependence, sedation, lightheadedness/dizziness, memory issues, falls, constipation, nausea, or vomiting. They may also impair the ability to drive or operate machinery. Additionally, these medications may lower testosterone levels, leading to loss of bone strength, stamina and sex drive.   They may cause problems with breathing, sleep apnea and reduced coughing, which are especially dangerous for patients with lung supplements and oral decongestants. Dependence withdrawal symptoms may include depressed mood, loss of interest, suicidal thoughts, anxiety, fatigue, appetite changes and agitation. Testosterone replacement therapy:  Potential side effects include increased risk of stroke and heart attack, blood clots, increased blood pressure, increased cholesterol, enlarged prostate, sleep apnea, irritability/aggression and other mood disorders, and decreased fertility. Other:     1. I understand that I have the following responsibilities:  · I will take medications at the dose and frequency prescribed. · I will not increase or change how I take my medications without the approval of the health care provider who signs this Medication Agreement. · I will arrange for refills at the prescribed interval ONLY during regular office hours. I will not ask for refills earlier than agreed, after-hours, on holidays or on weekends. · I will obtain all refills for these medications at  ·  ____________________________________  pharmacy (phone number  ·  ________________________), with full consent for my provider and pharmacist to exchange information in writing or verbally. · I will not request any pain medications or controlled substances from other providers and will inform this provider of all other medications I am taking. · I will inform my other health care providers that I am taking these medications and of the existence of this [address]. In the event of an emergency, I will provide the same information to the emergency department providers. · I will protect my prescriptions and medications. I understand that lost or misplaced prescriptions will not be replaced. · I will keep medications only for my own use and will not share them with others. I will keep all medications away from children. · I agree to participate in any medical, psychological or psychiatric assessments recommended by my provider. which may also result in my being prevented from receiving further care from this office. · Other:____________________________________________________________________    AGREEMENT:    I have read the above and have had all of my questions answered. For chronic disease management, I know that my symptoms can be managed with many types of treatments. A chronic medication trial may be part of my treatment, but I must be an active participant in my care. Medication therapy is only one part of my symptom management plan. In some cases, there may be limited scientific evidence to support the chronic use of certain medications to improve symptoms and daily function. Furthermore, in certain circumstances, there may be scientific information that suggests that use of chronic controlled substances may actually worsen my symptoms and increase my risk of unintentional death directly related to this medication therapy. I know that if my provider feels my risk from controlled medications is greater than my benefit, I will have my controlled substance medication(s) compassionately lowered or removed altogether. I agree to a controlled substance medication trial.      I further agree to allow this office to contact family or friends if there are concerns about my safety and use of the controlled medications. I have agreed to use the following medications above as instructed by my physician and as stated in this [address].      Patient Signature:  ______________________  Date:9/4/2018 or _____________    Provider Signature:______________________  Date:9/4/2018 or _____________

## 2018-09-04 NOTE — PROGRESS NOTES
Subjective:      Patient ID: Milton Bocanegra is a 58 y.o. female. HPI  OARRS visit and med check. She continues to need med for chronic pain and maintenance of ADL's and quality of life. Weight Loss: state she cannot gain weight more than losing weight althoug review of her Flowsheet shows she has lost approximately 10 lbs. . Has a good appetite and will gain some and the lose it. States she has been skinny all of her life. Despite her back pain she tries to walk 1 mile per day with her brace on. Besides the pain she denies systemic symptoms. See ROS. CBC and chemistry done in March. Review of Systems   Constitutional: Negative. Negative for activity change, chills, diaphoresis and fever. Respiratory: Negative. Negative for cough. Cardiovascular: Negative. Negative for chest pain. Gastrointestinal: Negative. Negative for blood in stool, nausea and vomiting. Endocrine: Negative. Genitourinary: Negative. Musculoskeletal: Positive for back pain. Neurological: Negative for syncope, speech difficulty and weakness. Objective:   Physical Exam   Constitutional: She is oriented to person, place, and time. No distress. Neck: Neck supple. Carotid bruit is not present. No thyromegaly present. Cardiovascular: Normal rate and regular rhythm. Pulmonary/Chest: Effort normal and breath sounds normal. She has no wheezes. She has no rales. Musculoskeletal: She exhibits no edema. Lymphadenopathy:     She has no cervical adenopathy. Neurological: She is alert and oriented to person, place, and time. Skin: Skin is warm and dry. Psychiatric: She has a normal mood and affect. Her behavior is normal. Judgment and thought content normal.       Assessment/Plan:    Carlo Officer was seen today for chronic pain.     Diagnoses and all orders for this visit:    Chronic pain syndrome  OARRS report reviewed and no inconsistencies noted   The patient understands the risks of dependency/addiction with Hydrocodone and will take as little as possible and discontinue as soon as possible  States she had a urine drug screen at her previous pain clinic. Will recheck next visit  Return 3 months      Weight loss  -     TSH without Reflex;  Future  -     T4; Future         Inge Spurling, MD

## 2018-09-04 NOTE — PATIENT INSTRUCTIONS
flexible. · Try heat, cold packs, and massage. · Get enough sleep. Chronic pain can make you tired and drain your energy. Talk with your doctor if you have trouble sleeping because of pain. · Think positive. Your thoughts can affect your pain level. Do things that you enjoy to distract yourself when you have pain instead of focusing on the pain. See a movie, read a book, listen to music, or spend time with a friend. · If you think you are depressed, talk to your doctor about treatment. · Keep a daily pain diary. Record how your moods, thoughts, sleep patterns, activities, and medicine affect your pain. You may find that your pain is worse during or after certain activities or when you are feeling a certain emotion. Having a record of your pain can help you and your doctor find the best ways to treat your pain. · Take pain medicines exactly as directed. ¨ If the doctor gave you a prescription medicine for pain, take it as prescribed. ¨ If you are not taking a prescription pain medicine, ask your doctor if you can take an over-the-counter medicine. Reducing constipation caused by pain medicine  · Include fruits, vegetables, beans, and whole grains in your diet each day. These foods are high in fiber. · Drink plenty of fluids, enough so that your urine is light yellow or clear like water. If you have kidney, heart, or liver disease and have to limit fluids, talk with your doctor before you increase the amount of fluids you drink. · If your doctor recommends it, get more exercise. Walking is a good choice. Bit by bit, increase the amount you walk every day. Try for at least 30 minutes on most days of the week. · Schedule time each day for a bowel movement. A daily routine may help. Take your time and do not strain when having a bowel movement. When should you call for help?   Call your doctor now or seek immediate medical care if:    · Your pain gets worse or is out of control.     · You feel down or blue,

## 2018-09-14 DIAGNOSIS — F51.01 PRIMARY INSOMNIA: ICD-10-CM

## 2018-09-14 RX ORDER — TRAZODONE HYDROCHLORIDE 50 MG/1
TABLET ORAL
Qty: 60 TABLET | Refills: 0 | Status: SHIPPED | OUTPATIENT
Start: 2018-09-14 | End: 2018-10-15 | Stop reason: SDUPTHER

## 2018-09-26 DIAGNOSIS — G89.4 CHRONIC PAIN SYNDROME: ICD-10-CM

## 2018-09-27 RX ORDER — HYDROCODONE BITARTRATE AND ACETAMINOPHEN 7.5; 325 MG/1; MG/1
1 TABLET ORAL EVERY 6 HOURS PRN
Qty: 120 TABLET | Refills: 0 | Status: SHIPPED | OUTPATIENT
Start: 2018-09-27 | End: 2018-10-24 | Stop reason: SDUPTHER

## 2018-10-01 DIAGNOSIS — F32.A DEPRESSION, UNSPECIFIED DEPRESSION TYPE: ICD-10-CM

## 2018-10-01 RX ORDER — SERTRALINE HYDROCHLORIDE 100 MG/1
TABLET, FILM COATED ORAL
Qty: 270 TABLET | Refills: 1 | Status: SHIPPED | OUTPATIENT
Start: 2018-10-01 | End: 2019-04-25 | Stop reason: SDUPTHER

## 2018-10-05 ENCOUNTER — TELEPHONE (OUTPATIENT)
Dept: INTERNAL MEDICINE CLINIC | Age: 63
End: 2018-10-05

## 2018-10-08 RX ORDER — GABAPENTIN 300 MG/1
300 CAPSULE ORAL 3 TIMES DAILY
Qty: 90 CAPSULE | Refills: 3 | Status: SHIPPED | OUTPATIENT
Start: 2018-10-08 | End: 2019-03-25 | Stop reason: SDUPTHER

## 2018-10-15 DIAGNOSIS — F51.01 PRIMARY INSOMNIA: ICD-10-CM

## 2018-10-15 RX ORDER — TRAZODONE HYDROCHLORIDE 50 MG/1
TABLET ORAL
Qty: 60 TABLET | Refills: 0 | Status: SHIPPED | OUTPATIENT
Start: 2018-10-15 | End: 2018-11-13 | Stop reason: SDUPTHER

## 2018-10-15 RX ORDER — TRAZODONE HYDROCHLORIDE 50 MG/1
TABLET ORAL
Qty: 180 TABLET | Refills: 0 | OUTPATIENT
Start: 2018-10-15

## 2018-10-24 DIAGNOSIS — G89.4 CHRONIC PAIN SYNDROME: ICD-10-CM

## 2018-10-24 RX ORDER — HYDROCODONE BITARTRATE AND ACETAMINOPHEN 7.5; 325 MG/1; MG/1
1 TABLET ORAL EVERY 6 HOURS PRN
Qty: 120 TABLET | Refills: 0 | Status: SHIPPED | OUTPATIENT
Start: 2018-10-24 | End: 2018-11-23 | Stop reason: SDUPTHER

## 2018-10-26 DIAGNOSIS — R05.9 COUGH: ICD-10-CM

## 2018-10-26 DIAGNOSIS — J45.41 MODERATE PERSISTENT ASTHMA WITH ACUTE EXACERBATION: ICD-10-CM

## 2018-10-26 RX ORDER — DEXAMETHASONE 4 MG/1
TABLET ORAL
Qty: 12 G | Refills: 5 | Status: SHIPPED | OUTPATIENT
Start: 2018-10-26 | End: 2019-04-09 | Stop reason: SDUPTHER

## 2018-11-23 DIAGNOSIS — G89.4 CHRONIC PAIN SYNDROME: ICD-10-CM

## 2018-11-23 RX ORDER — HYDROCODONE BITARTRATE AND ACETAMINOPHEN 7.5; 325 MG/1; MG/1
1 TABLET ORAL EVERY 6 HOURS PRN
Qty: 120 TABLET | Refills: 0 | Status: SHIPPED | OUTPATIENT
Start: 2018-11-23 | End: 2018-12-21 | Stop reason: SDUPTHER

## 2018-11-26 ENCOUNTER — OFFICE VISIT (OUTPATIENT)
Dept: FAMILY MEDICINE CLINIC | Age: 63
End: 2018-11-26
Payer: MEDICARE

## 2018-11-26 VITALS
WEIGHT: 108 LBS | BODY MASS INDEX: 16.42 KG/M2 | SYSTOLIC BLOOD PRESSURE: 106 MMHG | HEART RATE: 92 BPM | DIASTOLIC BLOOD PRESSURE: 80 MMHG | OXYGEN SATURATION: 98 %

## 2018-11-26 DIAGNOSIS — R63.4 WEIGHT LOSS: ICD-10-CM

## 2018-11-26 DIAGNOSIS — J45.20 MILD INTERMITTENT ASTHMA WITHOUT COMPLICATION: ICD-10-CM

## 2018-11-26 DIAGNOSIS — M15.9 PRIMARY OSTEOARTHRITIS INVOLVING MULTIPLE JOINTS: ICD-10-CM

## 2018-11-26 DIAGNOSIS — G89.4 CHRONIC PAIN SYNDROME: Primary | ICD-10-CM

## 2018-11-26 PROCEDURE — 99213 OFFICE O/P EST LOW 20 MIN: CPT | Performed by: FAMILY MEDICINE

## 2018-11-26 ASSESSMENT — ENCOUNTER SYMPTOMS
RESPIRATORY NEGATIVE: 1
BACK PAIN: 1
GASTROINTESTINAL NEGATIVE: 1

## 2018-11-26 ASSESSMENT — PATIENT HEALTH QUESTIONNAIRE - PHQ9
2. FEELING DOWN, DEPRESSED OR HOPELESS: 0
SUM OF ALL RESPONSES TO PHQ QUESTIONS 1-9: 0
SUM OF ALL RESPONSES TO PHQ QUESTIONS 1-9: 0
1. LITTLE INTEREST OR PLEASURE IN DOING THINGS: 0
SUM OF ALL RESPONSES TO PHQ9 QUESTIONS 1 & 2: 0

## 2018-11-26 NOTE — LETTER
[address] [address]  Phone: [phone]  Fax: [phone]    Joy Aguilar MD        November 26, 2018     Patient: Salima Rowe   YOB: 1955   Date of Visit: 11/26/2018       To Whom It May Concern: It is my medical opinion that Keyla Castillo requires a disability parking placard for the following reasons:  She cannot walk 200 feet without stopping to rest.  Duration of need: 5 years    If you have any questions or concerns, please don't hesitate to call.     Sincerely,        Joy Aguilar MD

## 2018-11-26 NOTE — PATIENT INSTRUCTIONS
pneumococcal vaccine (PCV13) and/or DTaP vaccine at the same time might be slightly more likely to have a seizure caused by fever. Ask your doctor for more information. Tell your doctor if a child who is getting flu vaccine has ever had a seizure  Problems that could happen after any injected vaccine:  · People sometimes faint after a medical procedure, including vaccination. Sitting or lying down for about 15 minutes can help prevent fainting, and injuries caused by a fall. Tell your doctor if you feel dizzy, or have vision changes or ringing in the ears. · Some people get severe pain in the shoulder and have difficulty moving the arm where a shot was given. This happens very rarely. · Any medication can cause a severe allergic reaction. Such reactions from a vaccine are very rare, estimated at about 1 in a million doses, and would happen within a few minutes to a few hours after the vaccination. As with any medicine, there is a very remote chance of a vaccine causing a serious injury or death. The safety of vaccines is always being monitored. For more information, visit: www.cdc.gov/vaccinesafety/. What if there is a serious reaction? What should I look for? · Look for anything that concerns you, such as signs of a severe allergic reaction, very high fever, or unusual behavior. Signs of a severe allergic reaction can include hives, swelling of the face and throat, difficulty breathing, a fast heartbeat, dizziness, and weakness - usually within a few minutes to a few hours after the vaccination. What should I do? · If you think it is a severe allergic reaction or other emergency that can't wait, call 9-1-1 and get the person to the nearest hospital. Otherwise, call your doctor. · Reactions should be reported to the \"Vaccine Adverse Event Reporting System\" (VAERS). Your doctor should file this report, or you can do it yourself through the VAERS website at www.vaers. [facility].gov, or by calling

## 2018-11-30 RX ORDER — NITROGLYCERIN 0.4 MG/1
TABLET SUBLINGUAL
Qty: 25 TABLET | Refills: 0 | Status: SHIPPED | OUTPATIENT
Start: 2018-11-30 | End: 2019-11-15 | Stop reason: SDUPTHER

## 2018-12-05 DIAGNOSIS — G89.4 CHRONIC PAIN SYNDROME: ICD-10-CM

## 2018-12-05 RX ORDER — TIZANIDINE 4 MG/1
TABLET ORAL
Qty: 90 TABLET | Refills: 0 | Status: SHIPPED | OUTPATIENT
Start: 2018-12-05 | End: 2019-01-07 | Stop reason: SDUPTHER

## 2018-12-21 DIAGNOSIS — G89.4 CHRONIC PAIN SYNDROME: ICD-10-CM

## 2018-12-21 RX ORDER — HYDROCODONE BITARTRATE AND ACETAMINOPHEN 7.5; 325 MG/1; MG/1
1 TABLET ORAL EVERY 6 HOURS PRN
Qty: 120 TABLET | Refills: 0 | Status: SHIPPED | OUTPATIENT
Start: 2018-12-21 | End: 2019-01-18 | Stop reason: SDUPTHER

## 2019-01-07 ENCOUNTER — OFFICE VISIT (OUTPATIENT)
Dept: ENT CLINIC | Age: 64
End: 2019-01-07
Payer: MEDICARE

## 2019-01-07 VITALS — OXYGEN SATURATION: 97 % | DIASTOLIC BLOOD PRESSURE: 62 MMHG | SYSTOLIC BLOOD PRESSURE: 123 MMHG | HEART RATE: 87 BPM

## 2019-01-07 DIAGNOSIS — G89.4 CHRONIC PAIN SYNDROME: ICD-10-CM

## 2019-01-07 DIAGNOSIS — G44.001 INTRACTABLE CLUSTER HEADACHE SYNDROME, UNSPECIFIED CHRONICITY PATTERN: ICD-10-CM

## 2019-01-07 DIAGNOSIS — J30.9 ALLERGIC SINUSITIS: Primary | ICD-10-CM

## 2019-01-07 DIAGNOSIS — J20.9 ACUTE BRONCHITIS, UNSPECIFIED ORGANISM: ICD-10-CM

## 2019-01-07 PROCEDURE — 1036F TOBACCO NON-USER: CPT | Performed by: OTOLARYNGOLOGY

## 2019-01-07 PROCEDURE — G8427 DOCREV CUR MEDS BY ELIG CLIN: HCPCS | Performed by: OTOLARYNGOLOGY

## 2019-01-07 PROCEDURE — 96372 THER/PROPH/DIAG INJ SC/IM: CPT | Performed by: OTOLARYNGOLOGY

## 2019-01-07 PROCEDURE — 99213 OFFICE O/P EST LOW 20 MIN: CPT | Performed by: OTOLARYNGOLOGY

## 2019-01-07 PROCEDURE — G8484 FLU IMMUNIZE NO ADMIN: HCPCS | Performed by: OTOLARYNGOLOGY

## 2019-01-07 PROCEDURE — 3017F COLORECTAL CA SCREEN DOC REV: CPT | Performed by: OTOLARYNGOLOGY

## 2019-01-07 PROCEDURE — G8419 CALC BMI OUT NRM PARAM NOF/U: HCPCS | Performed by: OTOLARYNGOLOGY

## 2019-01-07 RX ORDER — BENZONATATE 100 MG/1
100 CAPSULE ORAL EVERY 6 HOURS PRN
Qty: 20 CAPSULE | Refills: 0 | Status: SHIPPED | OUTPATIENT
Start: 2019-01-07 | End: 2019-04-04 | Stop reason: SINTOL

## 2019-01-07 RX ORDER — BUTALBITAL, ACETAMINOPHEN AND CAFFEINE 50; 325; 40 MG/1; MG/1; MG/1
1 TABLET ORAL EVERY 4 HOURS PRN
Qty: 30 TABLET | Refills: 2 | Status: SHIPPED | OUTPATIENT
Start: 2019-01-07 | End: 2019-02-07 | Stop reason: SDUPTHER

## 2019-01-07 RX ORDER — METHYLPREDNISOLONE ACETATE 40 MG/ML
40 INJECTION, SUSPENSION INTRA-ARTICULAR; INTRALESIONAL; INTRAMUSCULAR; SOFT TISSUE ONCE
Status: COMPLETED | OUTPATIENT
Start: 2019-01-07 | End: 2019-01-07

## 2019-01-07 RX ORDER — LEVOFLOXACIN 500 MG/1
500 TABLET, FILM COATED ORAL DAILY
Qty: 10 TABLET | Refills: 0 | Status: SHIPPED | OUTPATIENT
Start: 2019-01-07 | End: 2019-04-04

## 2019-01-07 RX ADMIN — METHYLPREDNISOLONE ACETATE 40 MG: 40 INJECTION, SUSPENSION INTRA-ARTICULAR; INTRALESIONAL; INTRAMUSCULAR; SOFT TISSUE at 16:53

## 2019-01-08 RX ORDER — TIZANIDINE 4 MG/1
TABLET ORAL
Qty: 90 TABLET | Refills: 5 | Status: SHIPPED | OUTPATIENT
Start: 2019-01-08 | End: 2019-10-19 | Stop reason: SDUPTHER

## 2019-01-18 DIAGNOSIS — G89.4 CHRONIC PAIN SYNDROME: ICD-10-CM

## 2019-01-18 RX ORDER — HYDROCODONE BITARTRATE AND ACETAMINOPHEN 7.5; 325 MG/1; MG/1
1 TABLET ORAL EVERY 6 HOURS PRN
Qty: 120 TABLET | Refills: 0 | Status: SHIPPED | OUTPATIENT
Start: 2019-01-18 | End: 2019-02-15 | Stop reason: SDUPTHER

## 2019-02-07 ENCOUNTER — TELEPHONE (OUTPATIENT)
Dept: ENT CLINIC | Age: 64
End: 2019-02-07

## 2019-02-07 DIAGNOSIS — J32.9 RECURRENT SINUSITIS: Primary | ICD-10-CM

## 2019-02-07 DIAGNOSIS — G44.001 INTRACTABLE CLUSTER HEADACHE SYNDROME, UNSPECIFIED CHRONICITY PATTERN: ICD-10-CM

## 2019-02-07 RX ORDER — BUTALBITAL, ACETAMINOPHEN AND CAFFEINE 50; 325; 40 MG/1; MG/1; MG/1
1 TABLET ORAL EVERY 4 HOURS PRN
Qty: 30 TABLET | Refills: 2 | Status: SHIPPED | OUTPATIENT
Start: 2019-02-07 | End: 2019-03-06 | Stop reason: SDUPTHER

## 2019-02-07 RX ORDER — CEFDINIR 300 MG/1
300 CAPSULE ORAL 2 TIMES DAILY
Qty: 20 CAPSULE | Refills: 0 | Status: SHIPPED | OUTPATIENT
Start: 2019-02-07 | End: 2019-03-08

## 2019-02-15 DIAGNOSIS — G89.4 CHRONIC PAIN SYNDROME: ICD-10-CM

## 2019-02-15 RX ORDER — HYDROCODONE BITARTRATE AND ACETAMINOPHEN 7.5; 325 MG/1; MG/1
1 TABLET ORAL EVERY 6 HOURS PRN
Qty: 120 TABLET | Refills: 0 | Status: SHIPPED | OUTPATIENT
Start: 2019-02-15 | End: 2019-03-15 | Stop reason: SDUPTHER

## 2019-02-18 ENCOUNTER — OFFICE VISIT (OUTPATIENT)
Dept: ENT CLINIC | Age: 64
End: 2019-02-18
Payer: MEDICARE

## 2019-02-18 VITALS — OXYGEN SATURATION: 96 % | SYSTOLIC BLOOD PRESSURE: 118 MMHG | HEART RATE: 90 BPM | DIASTOLIC BLOOD PRESSURE: 64 MMHG

## 2019-02-18 DIAGNOSIS — J30.9 ALLERGIC SINUSITIS: Primary | ICD-10-CM

## 2019-02-18 PROCEDURE — 99213 OFFICE O/P EST LOW 20 MIN: CPT | Performed by: OTOLARYNGOLOGY

## 2019-02-18 PROCEDURE — 96372 THER/PROPH/DIAG INJ SC/IM: CPT | Performed by: OTOLARYNGOLOGY

## 2019-02-18 RX ORDER — METHYLPREDNISOLONE ACETATE 40 MG/ML
40 INJECTION, SUSPENSION INTRA-ARTICULAR; INTRALESIONAL; INTRAMUSCULAR; SOFT TISSUE ONCE
Status: COMPLETED | OUTPATIENT
Start: 2019-02-18 | End: 2019-02-18

## 2019-02-18 RX ADMIN — METHYLPREDNISOLONE ACETATE 40 MG: 40 INJECTION, SUSPENSION INTRA-ARTICULAR; INTRALESIONAL; INTRAMUSCULAR; SOFT TISSUE at 17:15

## 2019-03-06 DIAGNOSIS — G44.001 INTRACTABLE CLUSTER HEADACHE SYNDROME, UNSPECIFIED CHRONICITY PATTERN: ICD-10-CM

## 2019-03-07 RX ORDER — BUTALBITAL, ACETAMINOPHEN AND CAFFEINE 50; 325; 40 MG/1; MG/1; MG/1
1 TABLET ORAL EVERY 4 HOURS PRN
Qty: 30 TABLET | Refills: 0 | Status: SHIPPED | OUTPATIENT
Start: 2019-03-07 | End: 2019-03-25 | Stop reason: SDUPTHER

## 2019-03-08 ENCOUNTER — OFFICE VISIT (OUTPATIENT)
Dept: FAMILY MEDICINE CLINIC | Age: 64
End: 2019-03-08
Payer: MEDICARE

## 2019-03-08 VITALS
OXYGEN SATURATION: 97 % | WEIGHT: 106.6 LBS | BODY MASS INDEX: 16.21 KG/M2 | SYSTOLIC BLOOD PRESSURE: 120 MMHG | DIASTOLIC BLOOD PRESSURE: 78 MMHG | HEART RATE: 65 BPM

## 2019-03-08 DIAGNOSIS — Z11.4 SCREENING FOR HUMAN IMMUNODEFICIENCY VIRUS WITHOUT PRESENCE OF RISK FACTORS: ICD-10-CM

## 2019-03-08 DIAGNOSIS — G89.4 CHRONIC PAIN SYNDROME: ICD-10-CM

## 2019-03-08 DIAGNOSIS — Z12.31 ENCOUNTER FOR SCREENING MAMMOGRAM FOR BREAST CANCER: ICD-10-CM

## 2019-03-08 DIAGNOSIS — J45.20 MILD INTERMITTENT ASTHMA WITHOUT COMPLICATION: ICD-10-CM

## 2019-03-08 DIAGNOSIS — R63.4 WEIGHT LOSS: ICD-10-CM

## 2019-03-08 DIAGNOSIS — Z11.59 ENCOUNTER FOR HEPATITIS C SCREENING TEST FOR LOW RISK PATIENT: ICD-10-CM

## 2019-03-08 DIAGNOSIS — I10 ESSENTIAL HYPERTENSION: Primary | ICD-10-CM

## 2019-03-08 PROCEDURE — 3017F COLORECTAL CA SCREEN DOC REV: CPT | Performed by: FAMILY MEDICINE

## 2019-03-08 PROCEDURE — 1036F TOBACCO NON-USER: CPT | Performed by: FAMILY MEDICINE

## 2019-03-08 PROCEDURE — G8419 CALC BMI OUT NRM PARAM NOF/U: HCPCS | Performed by: FAMILY MEDICINE

## 2019-03-08 PROCEDURE — G8484 FLU IMMUNIZE NO ADMIN: HCPCS | Performed by: FAMILY MEDICINE

## 2019-03-08 PROCEDURE — 99214 OFFICE O/P EST MOD 30 MIN: CPT | Performed by: FAMILY MEDICINE

## 2019-03-08 PROCEDURE — G8427 DOCREV CUR MEDS BY ELIG CLIN: HCPCS | Performed by: FAMILY MEDICINE

## 2019-03-15 ENCOUNTER — TELEPHONE (OUTPATIENT)
Dept: FAMILY MEDICINE CLINIC | Age: 64
End: 2019-03-15

## 2019-03-15 DIAGNOSIS — G89.4 CHRONIC PAIN SYNDROME: ICD-10-CM

## 2019-03-15 RX ORDER — HYDROCODONE BITARTRATE AND ACETAMINOPHEN 7.5; 325 MG/1; MG/1
1 TABLET ORAL EVERY 6 HOURS PRN
Qty: 120 TABLET | Refills: 0 | Status: SHIPPED | OUTPATIENT
Start: 2019-03-15 | End: 2019-04-09 | Stop reason: SDUPTHER

## 2019-03-19 ENCOUNTER — OFFICE VISIT (OUTPATIENT)
Dept: FAMILY MEDICINE CLINIC | Age: 64
End: 2019-03-19
Payer: MEDICARE

## 2019-03-19 ENCOUNTER — HOSPITAL ENCOUNTER (OUTPATIENT)
Age: 64
Discharge: HOME | End: 2019-03-19
Payer: MEDICARE

## 2019-03-19 ENCOUNTER — HOSPITAL ENCOUNTER (OUTPATIENT)
Dept: WOMENS IMAGING | Age: 64
Discharge: HOME | End: 2019-03-19
Payer: MEDICARE

## 2019-03-19 VITALS
SYSTOLIC BLOOD PRESSURE: 100 MMHG | BODY MASS INDEX: 15.87 KG/M2 | WEIGHT: 104.4 LBS | HEART RATE: 90 BPM | DIASTOLIC BLOOD PRESSURE: 64 MMHG | OXYGEN SATURATION: 98 %

## 2019-03-19 DIAGNOSIS — K52.9 GASTROENTERITIS: ICD-10-CM

## 2019-03-19 DIAGNOSIS — Z12.31 ENCOUNTER FOR SCREENING MAMMOGRAM FOR BREAST CANCER: ICD-10-CM

## 2019-03-19 DIAGNOSIS — K52.9 GASTROENTERITIS: Primary | ICD-10-CM

## 2019-03-19 DIAGNOSIS — Z11.59 ENCOUNTER FOR HEPATITIS C SCREENING TEST FOR LOW RISK PATIENT: ICD-10-CM

## 2019-03-19 DIAGNOSIS — I10 ESSENTIAL HYPERTENSION: ICD-10-CM

## 2019-03-19 DIAGNOSIS — Z11.4 SCREENING FOR HUMAN IMMUNODEFICIENCY VIRUS WITHOUT PRESENCE OF RISK FACTORS: ICD-10-CM

## 2019-03-19 DIAGNOSIS — K43.9 VENTRAL HERNIA WITHOUT OBSTRUCTION OR GANGRENE: ICD-10-CM

## 2019-03-19 LAB
A/G RATIO: 1.4 (ref 1.1–2.2)
ALBUMIN SERPL-MCNC: 4.4 G/DL (ref 3.4–5)
ALP BLD-CCNC: 91 U/L (ref 40–129)
ALT SERPL-CCNC: 14 U/L (ref 10–40)
ANION GAP SERPL CALCULATED.3IONS-SCNC: 14 MMOL/L (ref 3–16)
AST SERPL-CCNC: 17 U/L (ref 15–37)
BASOPHILS ABSOLUTE: 0 K/UL (ref 0–0.2)
BASOPHILS RELATIVE PERCENT: 0.2 %
BILIRUB SERPL-MCNC: 0.3 MG/DL (ref 0–1)
BUN BLDV-MCNC: 16 MG/DL (ref 7–20)
CALCIUM SERPL-MCNC: 9.4 MG/DL (ref 8.3–10.6)
CHLORIDE BLD-SCNC: 102 MMOL/L (ref 99–110)
CHOLESTEROL, TOTAL: 200 MG/DL (ref 0–199)
CO2: 26 MMOL/L (ref 21–32)
CREAT SERPL-MCNC: 0.6 MG/DL (ref 0.6–1.2)
EOSINOPHILS ABSOLUTE: 0.2 K/UL (ref 0–0.6)
EOSINOPHILS RELATIVE PERCENT: 2.3 %
GFR AFRICAN AMERICAN: >60
GFR NON-AFRICAN AMERICAN: >60
GLOBULIN: 3.1 G/DL
GLUCOSE BLD-MCNC: 91 MG/DL (ref 70–99)
HCT VFR BLD CALC: 41.4 % (ref 36–48)
HDLC SERPL-MCNC: 59 MG/DL (ref 40–60)
HEMOGLOBIN: 13.8 G/DL (ref 12–16)
HEPATITIS C ANTIBODY INTERPRETATION: NORMAL
HIV AG/AB: NORMAL
HIV ANTIGEN: NORMAL
HIV-1 ANTIBODY: NORMAL
HIV-2 AB: NORMAL
LDL CHOLESTEROL CALCULATED: 114 MG/DL
LYMPHOCYTES ABSOLUTE: 2 K/UL (ref 1–5.1)
LYMPHOCYTES RELATIVE PERCENT: 28.7 %
MCH RBC QN AUTO: 31.5 PG (ref 26–34)
MCHC RBC AUTO-ENTMCNC: 33.3 G/DL (ref 31–36)
MCV RBC AUTO: 94.7 FL (ref 80–100)
MONOCYTES ABSOLUTE: 0.5 K/UL (ref 0–1.3)
MONOCYTES RELATIVE PERCENT: 7.2 %
NEUTROPHILS ABSOLUTE: 4.4 K/UL (ref 1.7–7.7)
NEUTROPHILS RELATIVE PERCENT: 61.6 %
PDW BLD-RTO: 14.1 % (ref 12.4–15.4)
PLATELET # BLD: 235 K/UL (ref 135–450)
PMV BLD AUTO: 8.4 FL (ref 5–10.5)
POTASSIUM SERPL-SCNC: 4.4 MMOL/L (ref 3.5–5.1)
RBC # BLD: 4.37 M/UL (ref 4–5.2)
SODIUM BLD-SCNC: 142 MMOL/L (ref 136–145)
TOTAL PROTEIN: 7.5 G/DL (ref 6.4–8.2)
TRIGL SERPL-MCNC: 134 MG/DL (ref 0–150)
TSH REFLEX: 0.9 UIU/ML (ref 0.27–4.2)
VLDLC SERPL CALC-MCNC: 27 MG/DL
WBC # BLD: 7.1 K/UL (ref 4–11)

## 2019-03-19 PROCEDURE — 80053 COMPREHEN METABOLIC PANEL: CPT

## 2019-03-19 PROCEDURE — 86701 HIV-1ANTIBODY: CPT

## 2019-03-19 PROCEDURE — 99213 OFFICE O/P EST LOW 20 MIN: CPT | Performed by: FAMILY MEDICINE

## 2019-03-19 PROCEDURE — 86702 HIV-2 ANTIBODY: CPT

## 2019-03-19 PROCEDURE — 80061 LIPID PANEL: CPT

## 2019-03-19 PROCEDURE — 77067 SCR MAMMO BI INCL CAD: CPT

## 2019-03-19 PROCEDURE — 86803 HEPATITIS C AB TEST: CPT

## 2019-03-19 PROCEDURE — G8484 FLU IMMUNIZE NO ADMIN: HCPCS | Performed by: FAMILY MEDICINE

## 2019-03-19 PROCEDURE — G8419 CALC BMI OUT NRM PARAM NOF/U: HCPCS | Performed by: FAMILY MEDICINE

## 2019-03-19 PROCEDURE — 3017F COLORECTAL CA SCREEN DOC REV: CPT | Performed by: FAMILY MEDICINE

## 2019-03-19 PROCEDURE — 36415 COLL VENOUS BLD VENIPUNCTURE: CPT

## 2019-03-19 PROCEDURE — 84443 ASSAY THYROID STIM HORMONE: CPT

## 2019-03-19 PROCEDURE — 87390 HIV-1 AG IA: CPT

## 2019-03-19 PROCEDURE — 85025 COMPLETE CBC W/AUTO DIFF WBC: CPT

## 2019-03-19 PROCEDURE — G8427 DOCREV CUR MEDS BY ELIG CLIN: HCPCS | Performed by: FAMILY MEDICINE

## 2019-03-19 PROCEDURE — 1036F TOBACCO NON-USER: CPT | Performed by: FAMILY MEDICINE

## 2019-03-19 RX ORDER — SUCRALFATE 1 G/1
1 TABLET ORAL 4 TIMES DAILY
Qty: 120 TABLET | Refills: 3 | Status: SHIPPED | OUTPATIENT
Start: 2019-03-19 | End: 2019-07-29

## 2019-03-19 RX ORDER — SUCRALFATE 1 G/1
TABLET ORAL
Qty: 360 TABLET | Refills: 3 | OUTPATIENT
Start: 2019-03-19

## 2019-03-19 RX ORDER — GABAPENTIN 300 MG/1
300 CAPSULE ORAL 3 TIMES DAILY
Qty: 90 CAPSULE | Refills: 3 | Status: CANCELLED | OUTPATIENT
Start: 2019-03-19 | End: 2019-04-19

## 2019-03-19 ASSESSMENT — ENCOUNTER SYMPTOMS
ABDOMINAL PAIN: 1
RESPIRATORY NEGATIVE: 1
BACK PAIN: 1

## 2019-03-20 RX ORDER — GABAPENTIN 300 MG/1
300 CAPSULE ORAL 3 TIMES DAILY
Qty: 90 CAPSULE | Refills: 3 | OUTPATIENT
Start: 2019-03-20 | End: 2019-04-20

## 2019-03-25 DIAGNOSIS — G44.001 INTRACTABLE CLUSTER HEADACHE SYNDROME, UNSPECIFIED CHRONICITY PATTERN: ICD-10-CM

## 2019-03-25 RX ORDER — BUTALBITAL, ACETAMINOPHEN AND CAFFEINE 50; 325; 40 MG/1; MG/1; MG/1
1 TABLET ORAL EVERY 4 HOURS PRN
Qty: 30 TABLET | Refills: 0 | Status: SHIPPED | OUTPATIENT
Start: 2019-03-25 | End: 2019-04-04

## 2019-03-25 RX ORDER — GABAPENTIN 300 MG/1
300 CAPSULE ORAL 3 TIMES DAILY
Qty: 90 CAPSULE | Refills: 5 | Status: SHIPPED | OUTPATIENT
Start: 2019-03-25 | End: 2019-04-16 | Stop reason: DRUGHIGH

## 2019-03-25 NOTE — TELEPHONE ENCOUNTER
LOV: 3/19/19  LRF: 10/8/18, 90, 3 refills  Teed up   Lab Results   Component Value Date     03/19/2019    K 4.4 03/19/2019     03/19/2019    CO2 26 03/19/2019    BUN 16 03/19/2019    CREATININE 0.6 03/19/2019    GLUCOSE 91 03/19/2019    CALCIUM 9.4 03/19/2019    PROT 7.5 03/19/2019    LABALBU 4.4 03/19/2019    BILITOT 0.3 03/19/2019    ALKPHOS 91 03/19/2019    AST 17 03/19/2019    ALT 14 03/19/2019    LABGLOM >60 03/19/2019    GFRAA >60 03/19/2019    AGRATIO 1.4 03/19/2019    GLOB 3.1 03/19/2019

## 2019-04-04 ENCOUNTER — OFFICE VISIT (OUTPATIENT)
Dept: SURGERY | Age: 64
End: 2019-04-04
Payer: MEDICARE

## 2019-04-04 VITALS
HEIGHT: 68 IN | BODY MASS INDEX: 15.46 KG/M2 | WEIGHT: 102 LBS | SYSTOLIC BLOOD PRESSURE: 120 MMHG | DIASTOLIC BLOOD PRESSURE: 82 MMHG

## 2019-04-04 DIAGNOSIS — K43.2 INCISIONAL HERNIA, WITHOUT OBSTRUCTION OR GANGRENE: Primary | ICD-10-CM

## 2019-04-04 PROCEDURE — G8400 PT W/DXA NO RESULTS DOC: HCPCS | Performed by: SURGERY

## 2019-04-04 PROCEDURE — 4040F PNEUMOC VAC/ADMIN/RCVD: CPT | Performed by: SURGERY

## 2019-04-04 PROCEDURE — 1090F PRES/ABSN URINE INCON ASSESS: CPT | Performed by: SURGERY

## 2019-04-04 PROCEDURE — G8427 DOCREV CUR MEDS BY ELIG CLIN: HCPCS | Performed by: SURGERY

## 2019-04-04 PROCEDURE — 1036F TOBACCO NON-USER: CPT | Performed by: SURGERY

## 2019-04-04 PROCEDURE — 1123F ACP DISCUSS/DSCN MKR DOCD: CPT | Performed by: SURGERY

## 2019-04-04 PROCEDURE — G8419 CALC BMI OUT NRM PARAM NOF/U: HCPCS | Performed by: SURGERY

## 2019-04-04 PROCEDURE — 99214 OFFICE O/P EST MOD 30 MIN: CPT | Performed by: SURGERY

## 2019-04-04 PROCEDURE — 3017F COLORECTAL CA SCREEN DOC REV: CPT | Performed by: SURGERY

## 2019-04-04 ASSESSMENT — ENCOUNTER SYMPTOMS
EYE DISCHARGE: 1
BACK PAIN: 1
ABDOMINAL DISTENTION: 1
ABDOMINAL PAIN: 1
SINUS PRESSURE: 1
COUGH: 1

## 2019-04-04 NOTE — PROGRESS NOTES
[facility]        PATIENT NAME: Dannielle Escamilla     TODAY'S DATE: 4/4/2019    Reason for Consult:  Abd pain    Requesting Physician:  Dr. Megan Rodriguez:              The patient is a 71 y.o. female who presents with abd pain, left lower, focal, sharp, moderate, worse with pressure. Has had surgical incisions X 2, and has had bulging and pain in the area with pressure and cough. She has had symptoms for the past month. Associated symptoms are bloating. No N/V. [place] [employer] The patient has not had prior hernia surgery. She has had previous abdominal surgery. Had anterior approach to spine and prior hysterectomy.       Past Medical History:        Diagnosis Date    Arthritis     fingers, hands    Asthma     Back pain     Bell's palsy     CHF (congestive heart failure) (HCC)     Chronic pain     back, neck, left arm    Colitis 2/14/2013    Depression     Elevated sed rate     Family history of breast cancer in first degree relative     Gastric ulcer     GERD (gastroesophageal reflux disease)     colitis    Glaucoma     Hypertension     Movement disorder     chronic back pain    Neuromuscular disorder (HCC)     sciatica rt. leg    Osteoarthritis     PONV (postoperative nausea and vomiting)     SEVERE       Past Surgical History:        Procedure Laterality Date    BACK SURGERY      x 4    COLONOSCOPY  10/16/12    polyp removed and biopsy sent    COLONOSCOPY  3/26/13    CYSTOSCOPY  1/13/16    urethral dilitation    FACIAL NERVE SURGERY      D/T Bell's Palsy    FINGER SURGERY Left 04/03/2018    Excision of Left Thumb Digital Mucous cyst & DIP Joint Arthrotomy & Debridement    FINGER SURGERY  04/2018    left thumb    FINGER SURGERY Left 04/03/2018    thumb surgery     HYSTERECTOMY      Partial    UPPER GASTROINTESTINAL ENDOSCOPY  10/15/12    UPPER GASTROINTESTINAL ENDOSCOPY  2/11/15    with EUS       Current Medications:   No current facility-administered medications for this visit. Prior to Admission medications    Medication Sig Start Date End Date Taking? Authorizing Provider   gabapentin (NEURONTIN) 300 MG capsule Take 1 capsule by mouth 3 times daily for 31 days. 3/25/19 4/25/19 Yes Guillermo Patel MD   HYDROcodone-acetaminophen (NORCO) 7.5-325 MG per tablet Take 1 tablet by mouth every 6 hours as needed for Pain for up to 30 days.  3/15/19 4/14/19 Yes Guillermo Patel MD   DEXILANT 60 MG CPDR delayed release capsule TAKE 1 CAPSULE BY MOUTH DAILY 1/29/19  Yes Guillermo Patel MD   tiZANidine (ZANAFLEX) 4 MG tablet TAKE 1 TABLET BY MOUTH THREE TIMES DAILY 1/8/19  Yes Guillermo Patel MD   NITROSTAT 0.4 MG SL tablet PLACE ONE TABLET UNDER THE TONGUE EVERY 5 MINUTES AS NEEDED FOR CHEST PAIN 11/30/18  Yes Jeanette Rubinstein, MD   diclofenac sodium (VOLTAREN) 1 % GEL Apply 4 grams to lower extremity joints and 2 grams to upper extremity joints as needed 4 times/day, do not exceed 32 grams/day or 16 grams/joint/day 11/26/18  Yes Karlo Castillo MD   traZODone (DESYREL) 50 MG tablet TAKE 2 TABLETS BY MOUTH EVERY NIGHT 11/14/18  Yes Guillermo Patel MD   [facility] 110 MCG/ACT inhaler INHALE 1 PUFF INTO THE LUNGS TWICE DAILY 10/26/18  Yes Guillermo Patel MD   sertraline (ZOLOFT) 100 MG tablet TAKE 1 TABLET BY MOUTH THREE TIMES DAILY 10/1/18  Yes Guillermo Patel MD   butalbital-acetaminophen-caffeine (FIORICET, ESGIC) -50 MG per tablet Take 1 tablet by mouth every 4 hours as needed for Headaches May take 2 tablets every 4 hours as needed for pain 8/23/18  Yes Pierce Hernandez MD   montelukast (SINGULAIR) 10 MG tablet TAKE 1 TABLET BY MOUTH DAILY 7/5/18  Yes Guillermo Patel MD   Nutritional Supplements (BOOST CALORIE SMART) LIQD Take 1 Can by mouth See Admin Instructions EVERY 3 DAYS   Yes Historical Provider, MD   PROAIR  (90 Base) MCG/ACT inhaler INHALE 2 PUFFS BY MOUTH EVERY 4 HOURS AS NEEDED FOR WHEEZING 1/9/18  Yes Guilelrmo Patel MD   albuterol ([address]) (2.5 MG/3ML) 0.083% nebulizer solution USE 1 VIAL IN NEBULIZER EVERY 6 HOURS 4/24/17  Yes JOAO Jimenez - CNP   sucralfate (CARAFATE) 1 GM tablet Take 1 tablet by mouth 4 times daily 3/19/19   Guillermo Patel MD        Allergies:  Antihistamine [diphenhydramine hcl]; Aspirin; Codeine; Dilaudid [hydromorphone]; Nsaids; Prednisone; and Zithromax [azithromycin]    Social History:    reports that she quit smoking about 3 years ago. Her smoking use included cigarettes. She has a 5.00 pack-year smoking history. She has never used smokeless tobacco. She reports that she does not drink alcohol or use drugs. Family History:        Problem Relation Age of Onset    Heart Disease Mother     High Blood Pressure Mother     Arthritis Mother     Breast Cancer Sister     High Blood Pressure Brother     Diabetes Neg Hx     Stroke Neg Hx        REVIEW OF SYSTEMS:  Review of Systems   Constitutional: Positive for activity change. HENT: Positive for congestion, dental problem, sinus pressure and sneezing. Eyes: Positive for discharge. Respiratory: Positive for cough. Cardiovascular: Negative. Gastrointestinal: Positive for abdominal distention and abdominal pain. Endocrine: Negative. Genitourinary: Negative. Musculoskeletal: Positive for back pain. Skin: Negative. Allergic/Immunologic: Positive for environmental allergies. Neurological: Positive for headaches. Hematological: Negative. Psychiatric/Behavioral: Negative.         PHYSICAL EXAM:  VITALS:  /82   Ht 5' 8\" (1.727 m)   Wt 102 lb (46.3 kg)   BMI 15.51 kg/m²   CONSTITUTIONAL:  alert, mild distress and thin  EYES:  sclera clear  ENT:  normocepalic, without obvious abnormality  NECK:  supple, symmetrical, trachea midline  LUNGS:  clear to auscultation  CARDIOVASCULAR:  regular rate and rhythm  ABDOMEN:  scars noted are healed, normal bowel sounds, soft, non-distended, tenderness noted in the left lower quadrant, voluntary guarding absent, no masses palpated, no hepatosplenomegally and hernia present in incisional area, left perimedial incision  MUSCULOSKELETAL:  0+ edema lower extremities  NEUROLOGIC:  Mental Status Exam:  Level of Alertness:   awake  Orientation:   person, place, time  SKIN:  no bruising or bleeding, normal skin color, texture, turgor, no redness, warmth, or swelling and no jaundice    DATA:      Radiology Review:   None    IMPRESSION/RECOMMENDATIONS:    Ventral Incisional hernia without incarceration. Site quite tender and symptomatic. Laparoscopic ventral incisional hernia repair with mesh will be scheduled. The plan for surgery has been reviewed in detail today. Risks and benefits have been reviewed, and all questions answered. Schedule as ourpt at [facility].       Jodie Murguia

## 2019-04-09 ENCOUNTER — TELEPHONE (OUTPATIENT)
Dept: CARDIOLOGY CLINIC | Age: 64
End: 2019-04-09

## 2019-04-09 ENCOUNTER — OFFICE VISIT (OUTPATIENT)
Dept: FAMILY MEDICINE CLINIC | Age: 64
End: 2019-04-09
Payer: MEDICARE

## 2019-04-09 VITALS
OXYGEN SATURATION: 97 % | HEART RATE: 81 BPM | SYSTOLIC BLOOD PRESSURE: 110 MMHG | BODY MASS INDEX: 16.03 KG/M2 | WEIGHT: 105.4 LBS | DIASTOLIC BLOOD PRESSURE: 64 MMHG

## 2019-04-09 DIAGNOSIS — G89.4 CHRONIC PAIN SYNDROME: ICD-10-CM

## 2019-04-09 DIAGNOSIS — Z01.818 PREOP EXAMINATION: ICD-10-CM

## 2019-04-09 DIAGNOSIS — I42.9 CARDIOMYOPATHY, UNSPECIFIED TYPE (HCC): ICD-10-CM

## 2019-04-09 DIAGNOSIS — I10 ESSENTIAL HYPERTENSION: ICD-10-CM

## 2019-04-09 DIAGNOSIS — J45.41 MODERATE PERSISTENT ASTHMA WITH ACUTE EXACERBATION: ICD-10-CM

## 2019-04-09 DIAGNOSIS — K43.9 VENTRAL HERNIA WITHOUT OBSTRUCTION OR GANGRENE: Primary | ICD-10-CM

## 2019-04-09 PROCEDURE — G8419 CALC BMI OUT NRM PARAM NOF/U: HCPCS | Performed by: FAMILY MEDICINE

## 2019-04-09 PROCEDURE — G8427 DOCREV CUR MEDS BY ELIG CLIN: HCPCS | Performed by: FAMILY MEDICINE

## 2019-04-09 PROCEDURE — 1123F ACP DISCUSS/DSCN MKR DOCD: CPT | Performed by: FAMILY MEDICINE

## 2019-04-09 PROCEDURE — 1090F PRES/ABSN URINE INCON ASSESS: CPT | Performed by: FAMILY MEDICINE

## 2019-04-09 PROCEDURE — 3017F COLORECTAL CA SCREEN DOC REV: CPT | Performed by: FAMILY MEDICINE

## 2019-04-09 PROCEDURE — 1036F TOBACCO NON-USER: CPT | Performed by: FAMILY MEDICINE

## 2019-04-09 PROCEDURE — G8400 PT W/DXA NO RESULTS DOC: HCPCS | Performed by: FAMILY MEDICINE

## 2019-04-09 PROCEDURE — 4040F PNEUMOC VAC/ADMIN/RCVD: CPT | Performed by: FAMILY MEDICINE

## 2019-04-09 PROCEDURE — 99214 OFFICE O/P EST MOD 30 MIN: CPT | Performed by: FAMILY MEDICINE

## 2019-04-09 RX ORDER — FLUTICASONE PROPIONATE 110 UG/1
AEROSOL, METERED RESPIRATORY (INHALATION)
Qty: 12 G | Refills: 5 | Status: SHIPPED | OUTPATIENT
Start: 2019-04-09 | End: 2020-07-10

## 2019-04-09 RX ORDER — HYDROCODONE BITARTRATE AND ACETAMINOPHEN 7.5; 325 MG/1; MG/1
1 TABLET ORAL EVERY 6 HOURS PRN
Qty: 120 TABLET | Refills: 0 | Status: SHIPPED | OUTPATIENT
Start: 2019-04-09 | End: 2019-05-09 | Stop reason: SDUPTHER

## 2019-04-09 ASSESSMENT — PATIENT HEALTH QUESTIONNAIRE - PHQ9
SUM OF ALL RESPONSES TO PHQ9 QUESTIONS 1 & 2: 0
2. FEELING DOWN, DEPRESSED OR HOPELESS: 0
SUM OF ALL RESPONSES TO PHQ QUESTIONS 1-9: 0
SUM OF ALL RESPONSES TO PHQ QUESTIONS 1-9: 0
1. LITTLE INTEREST OR PLEASURE IN DOING THINGS: 0

## 2019-04-09 NOTE — TELEPHONE ENCOUNTER
Please advise. Pt was last seen by [facility] 3/23/18  Plan:  1. No change in medication  2. Patient is low cardiovascular risk for hand surgery  3. Carotid ultrasound at her convenience.    4.  Follow up with cardiology as needed

## 2019-04-09 NOTE — PATIENT INSTRUCTIONS

## 2019-04-09 NOTE — TELEPHONE ENCOUNTER
Pt is having hernia surgery on 4-15-19 at [facility]. Surgeon is Dr. Toby Sever. Pt wants to know if she needs to see [facility] prior to surgery? She is seeing her PCP Dr. Matias Wellington today. Surgeon never mentioned getting cardiac cx but she wants to make sure. Pls call to advise. Thank you.

## 2019-04-09 NOTE — PROGRESS NOTES
Vomiting       Social History     Tobacco Use    Smoking status: Former Smoker     Packs/day: 0.25     Years: 20.00     Pack years: 5.00     Types: Cigarettes     Last attempt to quit: 4/1/2016     Years since quitting: 3.0    Smokeless tobacco: Never Used   Substance Use Topics    Alcohol use: No     Alcohol/week: 0.0 oz    Drug use: No        Family History   Problem Relation Age of Onset    Heart Disease Mother     High Blood Pressure Mother     Arthritis Mother     Breast Cancer Sister     High Blood Pressure Brother     Diabetes Neg Hx     Stroke Neg Hx         Review Of Systems    Skin: negative   Eyes: negative  Ears/Nose/Throat: negative  Respiratory: negative for any asthma exacerbations recently  Cardiovascular: negative  Gastrointestinal: negative  Genitourinary: negative  Musculoskeletal: chronic back pain  Neurologic: negative  Psychiatric: negative  Hematologic/Lymphatic/Immunologic: negative  Endocrine: negative    PHYSICAL EXAMINATION:  /64 (Site: Left Upper Arm, Position: Sitting, Cuff Size: Small Adult)   Pulse 81   Wt 105 lb 6.4 oz (47.8 kg)   SpO2 97%   BMI 16.03 kg/m²   General appearance - alert, no distress  Skin - Skin color, texture, turgor normal. No rashes or lesions. Head - Normocephalic. No masses, lesions, tenderness or abnormalities  Eyes - conjunctivae/corneas clear. PERRL, EOM's intact. Ears - External ears normal. Canals clear. TM's normal.  Nose/Sinuses - Nares normal.  Mucosa normal. No drainage or sinus tenderness. Oropharynx - clear  Neck - Neck supple. No adenopathy. Thyroid symmetric, normal size,  Back - Back symmetric. No CVA tenderness. Lungs - Percussion normal. Good diaphragmatic excursion. Lungs clear  Heart - Regular rate and rhythm, with no rub, murmur or gallop noted. Abdomen -  Soft, ventral hernia,. No masses, organomegaly  Extremities - No edema    ASSESSMENT:    Encounter Diagnoses   Name Primary?     Ventral hernia without obstruction or gangrene Yes    Chronic pain syndrome     Moderate persistent asthma with acute exacerbation     Essential hypertension     Cardiomyopathy, unspecified type ([facility] [address] [address]     Preop examination       She is medically cleared for surgery and anesthesia. Plan:    Per operating surgeon.

## 2019-04-10 NOTE — TELEPHONE ENCOUNTER
Spoke to patient. She was seen by Dr. Jennifer Mar yesterday and was given clearance of hernia surgery. She denies any cardiac symptoms including exertional chest pain, shortness of breath, dizziness, palpitations, edema, or abdominal bloating. She has no concerns from the cardiac standpoint. Her activity is only limited by the pain she has from her hernia. This information was discussed with Dr. Gracia Tracy. Per Dr. Gracia Tracy, patient has no cardiac contraindications to proposed hernia surgery.

## 2019-04-11 NOTE — PROGRESS NOTES
Name_______________________________________Printed:____________________  Date and time of fllywnx__8-67-08______________________Wkxqpxz Time:__0700 MASC______________   1. Do not eat or drink anything after 12 midnight (or____hours) prior to surgery. This includes no water, chewing gum or mints. Endoscopy patients follow your doctors bowel prep instructions,which may include taking part of prep after midnight. 2. Take the following pills with a small sip of water on the morning of surgery_DEXILANT, FLOVENT, BRING RESCUE INHALER ____________________________________________________   3. Aspirin, Ibuprofen, Advil, Naproxen, Vitamin E and other Anti-inflammatory products should be stopped for 7 days before surgery or as directed by your physician. 4. Check with your Doctor regarding stopping Plavix, Coumadin,Eliquis, Lovenox,Effient,Pradaxa,Xarelto, Fragmin or other blood thinners and follow their instructions. 5. Do not smoke, and do not drink any alcoholic beverages 24 hours prior to surgery. This includes NA Beer. Refrain from the usage of any recreational drugs. 6. You may brush your teeth and gargle the morning of surgery. DO NOT SWALLOW WATER   7. You MUST make arrangements for a responsible adult to stay on site while you are here and take you home after your surgery. You will not be allowed to leave alone or drive yourself home. It is strongly suggested someone stay with you the first 24 hrs. Your surgery will be cancelled if you do not have a ride home. 8. A parent/legal guardian must accompany a child scheduled for surgery and plan to stay at the hospital until the child is discharged. Please do not bring other children with you. 9. Please wear simple, loose fitting clothing to the hospital.  Faiza Sayres not bring valuables (money, credit cards, checkbooks, etc.) Do not wear any makeup (including no eye makeup) or nail polish on your fingers or toes.              10. DO NOT wear any jewelry or piercings on day of surgery. All body piercing jewelry must be removed. 11. If you have ___dentures, they will be removed before going to the OR; we will provide you a container. If you wear ___contact lenses or ___glasses, they will be removed; please bring a case for them. 12. Please see your family doctor/pediatrician for a history & physical and/or concerning medications. Bring any test results/reports from your physician's office. PCP__________________Phone___________H&P Appt. Date________             13 If you  have a Living Will and Durable Power of  for Healthcare, please bring in a copy. 15. Notify your Surgeon if you develop any illness between now and surgery  time, cough, cold, fever, sore throat, nausea, vomiting, etc.  Please notify your surgeon if you experience dizziness, shortness of breath or blurred vision between now & the time of your surgery             15. DO NOT shave your operative site 96 hours prior to surgery. For face & neck surgery, men may use an electric razor 48 hours prior to surgery. 16. Shower the night before surgery with ___Antibacterial soap ___Hibiclens             17. To provide excellent care visitors will be limited to one in the room at any given time. 18.  Please bring picture ID and insurance card. 19.  Visit our web site for additional information:  [email or web address]              20.During flu season no children under the age of 15 are permitted in the hospital for the safety of all patients. 21. If you take a long acting insulin in the evening only  take half of your usual  dose the night  before your procedure              22. If you use a c-pap please bring DOS if staying overnight,             23.For your convenience [address] [facility] has a pharmacy on site to fill your prescriptions.              24. If you use oxygen and have a portable tank please bring it  with you the DOS             25. Bring a complete list of all your medications with name and dose include any supplements. 26. Other__________________________________________   *Please call pre admission testing if you any further questions   [address]    [address] [facility]  [phone]   [address] [place] [address]       All above information reviewed with patient in person or by phone. Patient verbalizes understanding. All questions and concerns addressed.                                                                                                  Patient/Rep_PT___________________                                                                                                                                    PRE OP INSTRUCTIONS

## 2019-04-15 ENCOUNTER — ANESTHESIA EVENT (OUTPATIENT)
Dept: OPERATING ROOM | Age: 64
End: 2019-04-15
Payer: MEDICARE

## 2019-04-15 ENCOUNTER — HOSPITAL ENCOUNTER (OUTPATIENT)
Age: 64
Setting detail: OUTPATIENT SURGERY
Discharge: HOME | End: 2019-04-15
Attending: SURGERY | Admitting: SURGERY
Payer: MEDICARE

## 2019-04-15 ENCOUNTER — ANESTHESIA (OUTPATIENT)
Dept: OPERATING ROOM | Age: 64
End: 2019-04-15
Payer: MEDICARE

## 2019-04-15 VITALS
TEMPERATURE: 95.5 F | DIASTOLIC BLOOD PRESSURE: 62 MMHG | RESPIRATION RATE: 17 BRPM | OXYGEN SATURATION: 100 % | SYSTOLIC BLOOD PRESSURE: 127 MMHG

## 2019-04-15 VITALS
WEIGHT: 105.5 LBS | RESPIRATION RATE: 17 BRPM | OXYGEN SATURATION: 97 % | TEMPERATURE: 99 F | DIASTOLIC BLOOD PRESSURE: 86 MMHG | BODY MASS INDEX: 15.99 KG/M2 | HEIGHT: 68 IN | HEART RATE: 81 BPM | SYSTOLIC BLOOD PRESSURE: 149 MMHG

## 2019-04-15 DIAGNOSIS — K43.2 INCISIONAL HERNIA, WITHOUT OBSTRUCTION OR GANGRENE: Primary | ICD-10-CM

## 2019-04-15 PROCEDURE — 7100000010 HC PHASE II RECOVERY - FIRST 15 MIN: Performed by: SURGERY

## 2019-04-15 PROCEDURE — 6360000002 HC RX W HCPCS: Performed by: ANESTHESIOLOGY

## 2019-04-15 PROCEDURE — 2720000010 HC SURG SUPPLY STERILE: Performed by: SURGERY

## 2019-04-15 PROCEDURE — 2500000003 HC RX 250 WO HCPCS: Performed by: SURGERY

## 2019-04-15 PROCEDURE — 6370000000 HC RX 637 (ALT 250 FOR IP): Performed by: ANESTHESIOLOGY

## 2019-04-15 PROCEDURE — 6360000002 HC RX W HCPCS: Performed by: SURGERY

## 2019-04-15 PROCEDURE — 2580000003 HC RX 258: Performed by: NURSE ANESTHETIST, CERTIFIED REGISTERED

## 2019-04-15 PROCEDURE — 3700000001 HC ADD 15 MINUTES (ANESTHESIA): Performed by: SURGERY

## 2019-04-15 PROCEDURE — 49654 PR LAP, INCISIONAL HERNIA REPAIR,REDUCIBLE: CPT | Performed by: SURGERY

## 2019-04-15 PROCEDURE — 6360000002 HC RX W HCPCS: Performed by: NURSE ANESTHETIST, CERTIFIED REGISTERED

## 2019-04-15 PROCEDURE — 2580000003 HC RX 258: Performed by: SURGERY

## 2019-04-15 PROCEDURE — 7100000001 HC PACU RECOVERY - ADDTL 15 MIN: Performed by: SURGERY

## 2019-04-15 PROCEDURE — 2500000003 HC RX 250 WO HCPCS: Performed by: NURSE ANESTHETIST, CERTIFIED REGISTERED

## 2019-04-15 PROCEDURE — 2709999900 HC NON-CHARGEABLE SUPPLY: Performed by: SURGERY

## 2019-04-15 PROCEDURE — C1781 MESH (IMPLANTABLE): HCPCS | Performed by: SURGERY

## 2019-04-15 PROCEDURE — 7100000000 HC PACU RECOVERY - FIRST 15 MIN: Performed by: SURGERY

## 2019-04-15 PROCEDURE — 3600000004 HC SURGERY LEVEL 4 BASE: Performed by: SURGERY

## 2019-04-15 PROCEDURE — 3700000000 HC ANESTHESIA ATTENDED CARE: Performed by: SURGERY

## 2019-04-15 PROCEDURE — 3600000014 HC SURGERY LEVEL 4 ADDTL 15MIN: Performed by: SURGERY

## 2019-04-15 PROCEDURE — 7100000011 HC PHASE II RECOVERY - ADDTL 15 MIN: Performed by: SURGERY

## 2019-04-15 DEVICE — MESH HERN DIA6IN CIR W/ ECHO PS POS SYS VENTRALIGHT ST: Type: IMPLANTABLE DEVICE | Site: ABDOMEN | Status: FUNCTIONAL

## 2019-04-15 RX ORDER — ROCURONIUM BROMIDE 10 MG/ML
INJECTION, SOLUTION INTRAVENOUS PRN
Status: DISCONTINUED | OUTPATIENT
Start: 2019-04-15 | End: 2019-04-15 | Stop reason: SDUPTHER

## 2019-04-15 RX ORDER — PROMETHAZINE HYDROCHLORIDE 25 MG/ML
6.25 INJECTION, SOLUTION INTRAMUSCULAR; INTRAVENOUS ONCE
Status: COMPLETED | OUTPATIENT
Start: 2019-04-15 | End: 2019-04-15

## 2019-04-15 RX ORDER — OXYCODONE HYDROCHLORIDE 5 MG/1
10 TABLET ORAL
Status: COMPLETED | OUTPATIENT
Start: 2019-04-15 | End: 2019-04-15

## 2019-04-15 RX ORDER — SUCCINYLCHOLINE CHLORIDE 20 MG/ML
INJECTION INTRAMUSCULAR; INTRAVENOUS PRN
Status: DISCONTINUED | OUTPATIENT
Start: 2019-04-15 | End: 2019-04-15 | Stop reason: SDUPTHER

## 2019-04-15 RX ORDER — SODIUM CHLORIDE 9 MG/ML
INJECTION, SOLUTION INTRAVENOUS CONTINUOUS
Status: DISCONTINUED | OUTPATIENT
Start: 2019-04-15 | End: 2019-04-15 | Stop reason: HOSPADM

## 2019-04-15 RX ORDER — HYDROCODONE BITARTRATE AND ACETAMINOPHEN 5; 325 MG/1; MG/1
1 TABLET ORAL
Status: DISCONTINUED | OUTPATIENT
Start: 2019-04-15 | End: 2019-04-15 | Stop reason: HOSPADM

## 2019-04-15 RX ORDER — ACETAMINOPHEN 10 MG/ML
1000 INJECTION, SOLUTION INTRAVENOUS ONCE
Status: COMPLETED | OUTPATIENT
Start: 2019-04-15 | End: 2019-04-15

## 2019-04-15 RX ORDER — APREPITANT 40 MG/1
40 CAPSULE ORAL ONCE
Status: COMPLETED | OUTPATIENT
Start: 2019-04-15 | End: 2019-04-15

## 2019-04-15 RX ORDER — MORPHINE SULFATE 10 MG/ML
2 INJECTION, SOLUTION INTRAMUSCULAR; INTRAVENOUS EVERY 5 MIN PRN
Status: DISCONTINUED | OUTPATIENT
Start: 2019-04-15 | End: 2019-04-15 | Stop reason: HOSPADM

## 2019-04-15 RX ORDER — LIDOCAINE HYDROCHLORIDE 20 MG/ML
INJECTION, SOLUTION INFILTRATION; PERINEURAL PRN
Status: DISCONTINUED | OUTPATIENT
Start: 2019-04-15 | End: 2019-04-15 | Stop reason: SDUPTHER

## 2019-04-15 RX ORDER — GLYCOPYRROLATE 0.2 MG/ML
INJECTION INTRAMUSCULAR; INTRAVENOUS PRN
Status: DISCONTINUED | OUTPATIENT
Start: 2019-04-15 | End: 2019-04-15 | Stop reason: SDUPTHER

## 2019-04-15 RX ORDER — SODIUM CHLORIDE 9 MG/ML
INJECTION, SOLUTION INTRAVENOUS CONTINUOUS PRN
Status: DISCONTINUED | OUTPATIENT
Start: 2019-04-15 | End: 2019-04-15 | Stop reason: SDUPTHER

## 2019-04-15 RX ORDER — HYDROCODONE BITARTRATE AND ACETAMINOPHEN 5; 325 MG/1; MG/1
1 TABLET ORAL EVERY 4 HOURS PRN
Qty: 25 TABLET | Refills: 0 | Status: SHIPPED | OUTPATIENT
Start: 2019-04-15 | End: 2019-04-16 | Stop reason: SDUPTHER

## 2019-04-15 RX ORDER — CEFAZOLIN SODIUM 2 G/100ML
2 INJECTION, SOLUTION INTRAVENOUS
Status: COMPLETED | OUTPATIENT
Start: 2019-04-15 | End: 2019-04-15

## 2019-04-15 RX ORDER — FENTANYL CITRATE 50 UG/ML
INJECTION, SOLUTION INTRAMUSCULAR; INTRAVENOUS PRN
Status: DISCONTINUED | OUTPATIENT
Start: 2019-04-15 | End: 2019-04-15 | Stop reason: SDUPTHER

## 2019-04-15 RX ORDER — PROPOFOL 10 MG/ML
INJECTION, EMULSION INTRAVENOUS PRN
Status: DISCONTINUED | OUTPATIENT
Start: 2019-04-15 | End: 2019-04-15 | Stop reason: SDUPTHER

## 2019-04-15 RX ORDER — OXYCODONE HYDROCHLORIDE 5 MG/1
TABLET ORAL
Status: DISCONTINUED
Start: 2019-04-15 | End: 2019-04-15 | Stop reason: HOSPADM

## 2019-04-15 RX ORDER — SODIUM CHLORIDE 9 MG/ML
INJECTION, SOLUTION INTRAVENOUS
Status: DISCONTINUED
Start: 2019-04-15 | End: 2019-04-15 | Stop reason: HOSPADM

## 2019-04-15 RX ORDER — SODIUM CHLORIDE 0.9 % (FLUSH) 0.9 %
SYRINGE (ML) INJECTION
Status: DISCONTINUED
Start: 2019-04-15 | End: 2019-04-15 | Stop reason: HOSPADM

## 2019-04-15 RX ORDER — BUPIVACAINE HYDROCHLORIDE AND EPINEPHRINE 5; 5 MG/ML; UG/ML
INJECTION, SOLUTION EPIDURAL; INTRACAUDAL; PERINEURAL
Status: COMPLETED | OUTPATIENT
Start: 2019-04-15 | End: 2019-04-15

## 2019-04-15 RX ORDER — MORPHINE SULFATE 10 MG/ML
1 INJECTION, SOLUTION INTRAMUSCULAR; INTRAVENOUS EVERY 5 MIN PRN
Status: DISCONTINUED | OUTPATIENT
Start: 2019-04-15 | End: 2019-04-15 | Stop reason: HOSPADM

## 2019-04-15 RX ORDER — ONDANSETRON 2 MG/ML
INJECTION INTRAMUSCULAR; INTRAVENOUS PRN
Status: DISCONTINUED | OUTPATIENT
Start: 2019-04-15 | End: 2019-04-15 | Stop reason: SDUPTHER

## 2019-04-15 RX ORDER — LIDOCAINE HYDROCHLORIDE 10 MG/ML
1 INJECTION, SOLUTION EPIDURAL; INFILTRATION; INTRACAUDAL; PERINEURAL
Status: DISCONTINUED | OUTPATIENT
Start: 2019-04-15 | End: 2019-04-15 | Stop reason: HOSPADM

## 2019-04-15 RX ORDER — MORPHINE SULFATE 2 MG/ML
2 INJECTION, SOLUTION INTRAMUSCULAR; INTRAVENOUS ONCE
Status: DISCONTINUED | OUTPATIENT
Start: 2019-04-15 | End: 2019-04-15 | Stop reason: HOSPADM

## 2019-04-15 RX ORDER — NEOSTIGMINE METHYLSULFATE 5 MG/5 ML
SYRINGE (ML) INTRAVENOUS PRN
Status: DISCONTINUED | OUTPATIENT
Start: 2019-04-15 | End: 2019-04-15 | Stop reason: SDUPTHER

## 2019-04-15 RX ORDER — ONDANSETRON 2 MG/ML
4 INJECTION INTRAMUSCULAR; INTRAVENOUS
Status: COMPLETED | OUTPATIENT
Start: 2019-04-15 | End: 2019-04-15

## 2019-04-15 RX ORDER — PROMETHAZINE HYDROCHLORIDE 25 MG/ML
INJECTION, SOLUTION INTRAMUSCULAR; INTRAVENOUS
Status: DISCONTINUED
Start: 2019-04-15 | End: 2019-04-15 | Stop reason: HOSPADM

## 2019-04-15 RX ADMIN — SUCCINYLCHOLINE CHLORIDE 140 MG: 20 INJECTION, SOLUTION INTRAMUSCULAR; INTRAVENOUS at 08:44

## 2019-04-15 RX ADMIN — FENTANYL CITRATE 25 MCG: 50 INJECTION, SOLUTION INTRAMUSCULAR; INTRAVENOUS at 09:14

## 2019-04-15 RX ADMIN — SODIUM CHLORIDE: 9 INJECTION, SOLUTION INTRAVENOUS at 09:23

## 2019-04-15 RX ADMIN — OXYCODONE HYDROCHLORIDE 10 MG: 5 TABLET ORAL at 11:56

## 2019-04-15 RX ADMIN — MORPHINE SULFATE 2 MG: 10 INJECTION, SOLUTION INTRAMUSCULAR; INTRAVENOUS at 10:46

## 2019-04-15 RX ADMIN — PROPOFOL 150 MG: 10 INJECTION, EMULSION INTRAVENOUS at 08:44

## 2019-04-15 RX ADMIN — ROCURONIUM BROMIDE 30 MG: 10 INJECTION, SOLUTION INTRAVENOUS at 08:50

## 2019-04-15 RX ADMIN — APREPITANT 40 MG: 40 CAPSULE ORAL at 08:06

## 2019-04-15 RX ADMIN — ONDANSETRON 4 MG: 2 INJECTION INTRAMUSCULAR; INTRAVENOUS at 10:58

## 2019-04-15 RX ADMIN — MORPHINE SULFATE 2 MG: 10 INJECTION INTRAVENOUS at 08:22

## 2019-04-15 RX ADMIN — SODIUM CHLORIDE: 9 INJECTION, SOLUTION INTRAVENOUS at 08:40

## 2019-04-15 RX ADMIN — GLYCOPYRROLATE 0.2 MG: 0.2 INJECTION, SOLUTION INTRAMUSCULAR; INTRAVENOUS at 09:08

## 2019-04-15 RX ADMIN — ACETAMINOPHEN 1000 MG: 10 INJECTION, SOLUTION INTRAVENOUS at 11:32

## 2019-04-15 RX ADMIN — FENTANYL CITRATE 50 MCG: 50 INJECTION, SOLUTION INTRAMUSCULAR; INTRAVENOUS at 08:40

## 2019-04-15 RX ADMIN — CEFAZOLIN SODIUM 2 G: 2 INJECTION, SOLUTION INTRAVENOUS at 08:07

## 2019-04-15 RX ADMIN — PROMETHAZINE HYDROCHLORIDE 6.25 MG: 25 INJECTION INTRAMUSCULAR; INTRAVENOUS at 12:59

## 2019-04-15 RX ADMIN — ONDANSETRON 4 MG: 2 INJECTION INTRAMUSCULAR; INTRAVENOUS at 09:34

## 2019-04-15 RX ADMIN — SODIUM CHLORIDE: 9 INJECTION, SOLUTION INTRAVENOUS at 07:36

## 2019-04-15 RX ADMIN — MORPHINE SULFATE 2 MG: 10 INJECTION, SOLUTION INTRAMUSCULAR; INTRAVENOUS at 10:34

## 2019-04-15 RX ADMIN — Medication 3 MG: at 10:00

## 2019-04-15 RX ADMIN — GLYCOPYRROLATE 0.6 MG: 0.2 INJECTION, SOLUTION INTRAMUSCULAR; INTRAVENOUS at 10:00

## 2019-04-15 RX ADMIN — LIDOCAINE HYDROCHLORIDE 10 MG: 20 INJECTION, SOLUTION INFILTRATION; PERINEURAL at 08:44

## 2019-04-15 ASSESSMENT — PULMONARY FUNCTION TESTS
PIF_VALUE: 24
PIF_VALUE: 15
PIF_VALUE: 24
PIF_VALUE: 1
PIF_VALUE: 18
PIF_VALUE: 18
PIF_VALUE: 19
PIF_VALUE: 2
PIF_VALUE: 0
PIF_VALUE: 17
PIF_VALUE: 0
PIF_VALUE: 18
PIF_VALUE: 24
PIF_VALUE: 18
PIF_VALUE: 23
PIF_VALUE: 24
PIF_VALUE: 18
PIF_VALUE: 1
PIF_VALUE: 23
PIF_VALUE: 24
PIF_VALUE: 24
PIF_VALUE: 19
PIF_VALUE: 17
PIF_VALUE: 24
PIF_VALUE: 15
PIF_VALUE: 24
PIF_VALUE: 24
PIF_VALUE: 18
PIF_VALUE: 18
PIF_VALUE: 24
PIF_VALUE: 17
PIF_VALUE: 24
PIF_VALUE: 18
PIF_VALUE: 22
PIF_VALUE: 24
PIF_VALUE: 18
PIF_VALUE: 24
PIF_VALUE: 19
PIF_VALUE: 2
PIF_VALUE: 12
PIF_VALUE: 23
PIF_VALUE: 17
PIF_VALUE: 19
PIF_VALUE: 14
PIF_VALUE: 19
PIF_VALUE: 23
PIF_VALUE: 17
PIF_VALUE: 18
PIF_VALUE: 18
PIF_VALUE: 1
PIF_VALUE: 18
PIF_VALUE: 1
PIF_VALUE: 18
PIF_VALUE: 17
PIF_VALUE: 24
PIF_VALUE: 25
PIF_VALUE: 24
PIF_VALUE: 24
PIF_VALUE: 25
PIF_VALUE: 3
PIF_VALUE: 20
PIF_VALUE: 19
PIF_VALUE: 23
PIF_VALUE: 17
PIF_VALUE: 17
PIF_VALUE: 2
PIF_VALUE: 3
PIF_VALUE: 20
PIF_VALUE: 24
PIF_VALUE: 17
PIF_VALUE: 18
PIF_VALUE: 17
PIF_VALUE: 24
PIF_VALUE: 22
PIF_VALUE: 17
PIF_VALUE: 19
PIF_VALUE: 25
PIF_VALUE: 23
PIF_VALUE: 24
PIF_VALUE: 19
PIF_VALUE: 24
PIF_VALUE: 23
PIF_VALUE: 2

## 2019-04-15 ASSESSMENT — PAIN DESCRIPTION - DESCRIPTORS: DESCRIPTORS: ACHING

## 2019-04-15 ASSESSMENT — PAIN SCALES - GENERAL
PAINLEVEL_OUTOF10: 10
PAINLEVEL_OUTOF10: 8
PAINLEVEL_OUTOF10: 10

## 2019-04-15 ASSESSMENT — PAIN - FUNCTIONAL ASSESSMENT
PAIN_FUNCTIONAL_ASSESSMENT: PREVENTS OR INTERFERES SOME ACTIVE ACTIVITIES AND ADLS
PAIN_FUNCTIONAL_ASSESSMENT: 0-10

## 2019-04-15 NOTE — BRIEF OP NOTE
Brief Postoperative Note  ______________________________________________________________    Patient: Erika Whiteside  YOB: 1955  MRN: [id number]  Date of Procedure: 4/15/2019    Pre-Op Diagnosis: K43.2 INCISIONAL HERNIA    Post-Op Diagnosis: Same       Procedure(s):  LAPAROSCOPIC REPAIR OF INCISIONAL HERNIA WITH MESH    Anesthesia: General    Surgeon(s):  Katherine Machado MD      Estimated Blood Loss (mL): less than 50     Complications: None    Specimens:   * No specimens in log *    Implants:  Implant Name Type Inv.  Item Serial No.  Lot No. LRB No. Used   MESH VENTRALIGHT ST W/ECHO PS CIR 6IN Mesh MESH VENTRALIGHT ST W/ECHO PS CIR 6IN  CR [employer] [id number] N/A 1         Drains:   [REMOVED] Urethral Catheter Non-latex 16 fr (Removed)       Findings: Left abd incisional hernia repaired with ventralite 15 cm mesh    Katherine Machado MD  Date: 4/15/2019  Time: 10:12 AM

## 2019-04-15 NOTE — ANESTHESIA POSTPROCEDURE EVALUATION
Department of Anesthesiology  Postprocedure Note    Patient: Roland Wick  MRN: [id number]  YOB: 1955  Date of evaluation: 4/15/2019  Time:  10:18 AM     Procedure Summary     Date:  04/15/19 Room / Location:  [facility] OR  / [facility] OR    Anesthesia Start:  0840 Anesthesia Stop:  4485    Procedure:  LAPAROSCOPIC REPAIR OF INCISIONAL HERNIA WITH MESH (N/A ) Diagnosis:  (K43.2 INCISIONAL HERNIA)    Surgeon:  Vani Velázquez MD Responsible Provider:  Noelle Meyer MD    Anesthesia Type:  general ASA Status:  3          Anesthesia Type: general    Gala Phase I:      Gala Phase II:      Last vitals: Reviewed and per EMR flowsheets.        Anesthesia Post Evaluation

## 2019-04-15 NOTE — PROGRESS NOTES
CLINICAL PHARMACY NOTE: MEDS TO [address] Select Patient?: No  Total # of Prescriptions Filled: 1   The following medications were delivered to the patient:  · Hydrocodone/apap 5-325mg  Total # of Interventions Completed: 0  Time Spent (min): 15    Additional Documentation:  Delivered to Patient partner    Ata Quezada

## 2019-04-15 NOTE — ANESTHESIA POSTPROCEDURE EVALUATION
Department of Anesthesiology  Postprocedure Note    Patient: Bonnie Mittal  MRN: [id number]  YOB: 1955  Date of evaluation: 4/15/2019  Time:  12:32 PM     Procedure Summary     Date:  04/15/19 Room / Location:  [facility] ASC OR [address] [place] [address] ASC OR    Anesthesia Start:  0840 Anesthesia Stop:  4665    Procedure:  LAPAROSCOPIC REPAIR OF INCISIONAL HERNIA WITH MESH (N/A ) Diagnosis:  (K43.2 INCISIONAL HERNIA)    Surgeon:  Nick Guajardo MD Responsible Provider:  Breonna Claire MD    Anesthesia Type:  general ASA Status:  3          Anesthesia Type: general    Gala Phase I: Gala Score: 10    Gala Phase II:      Last vitals: Reviewed and per EMR flowsheets.        Anesthesia Post Evaluation    Patient location during evaluation: PACU  Patient participation: complete - patient participated  Level of consciousness: awake  Airway patency: patent  Nausea & Vomiting: no vomiting  Complications: no  Cardiovascular status: hemodynamically stable  Respiratory status: acceptable  Hydration status: euvolemic

## 2019-04-15 NOTE — PROGRESS NOTES
Discharge instructions reviewed with patient/. All home medications have been reviewed, questions answered and patient verbalized understanding. Discharge instructions signed. Pt dc'd per wheelchair. Patient discharged home with upper dentures, one medication and other belongings.  taking stable pt home.

## 2019-04-15 NOTE — ANESTHESIA PRE PROCEDURE
EVERY NIGHT 11/14/18   Varsha Tobias MD   sertraline (ZOLOFT) 100 MG tablet TAKE 1 TABLET BY MOUTH THREE TIMES DAILY 10/1/18   Varsha Tobias MD   butalbital-acetaminophen-caffeine [facility]) -63 MG per tablet Take 1 tablet by mouth every 4 hours as needed for Headaches May take 2 tablets every 4 hours as needed for pain 8/23/18   Ale Hu MD   Nutritional Supplements (BOOST CALORIE SMART) LIQD Take 1 Can by mouth See Admin Instructions EVERY 3 DAYS    Historical Provider, MD   PROAIR  (90 Base) MCG/ACT inhaler INHALE 2 PUFFS BY MOUTH EVERY 4 HOURS AS NEEDED FOR WHEEZING 1/9/18   Varsha Tobias MD   albuterol (PROVENTIL) (2.5 MG/3ML) 0.083% nebulizer solution USE 1 VIAL IN NEBULIZER EVERY 6 HOURS 4/24/17   JOAO Cheung - CNP       Current medications:    No current facility-administered medications for this encounter. Allergies:     Allergies   Allergen Reactions    Beta Adrenergic Blockers Shortness Of Breath    Pheniramine      Other reaction(s): rapid heartbeat    Antihistamine [Diphenhydramine Hcl] Nausea And Vomiting    Aspirin Hives    Codeine Hives    Dilaudid [Hydromorphone] Nausea And Vomiting    Nsaids Hives and Nausea And Vomiting    Prednisone Other (See Comments)     GI upset    Zithromax [Azithromycin] Nausea And Vomiting       Problem List:    Patient Active Problem List   Diagnosis Code    Glaucoma H40.9    Chronic pain G89.29    Family history of breast cancer in first degree relative Z80.3    Colitis K52.9    Hypokalemia E87.6    Abdominal pain, generalized R10.84    IBS (irritable bowel syndrome) K58.9    Pericardial effusion I31.3    Cardiomyopathy (HCC) I42.9    Vertigo, labyrinthine H81.09    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    Gastroenteritis K52.9    Hypokalemia E87.6    Allergic rhinitis J30.9    Elevated sed rate R70.0    Pyelonephritis N12    Choledocholithiasis K80.50    Neuritis M79.2    Diarrhea R19.7    History of MI use: No     Alcohol/week: 0.0 oz                                Counseling given: Not Answered      Vital Signs (Current):   Vitals:    04/11/19 1159 04/15/19 0712   Weight: 105 lb (47.6 kg) 105 lb 8 oz (47.9 kg)   Height: 5' 8\" (1.727 m) 5' 8\" (1.727 m)                                              BP Readings from Last 3 Encounters:   04/09/19 110/64   04/04/19 120/82   03/19/19 100/64       NPO Status: Time of last liquid consumption: 1900                        Time of last solid consumption: 1900                        Date of last liquid consumption: 04/14/19                        Date of last solid food consumption: 04/14/19    BMI:   Wt Readings from Last 3 Encounters:   04/15/19 105 lb 8 oz (47.9 kg)   04/09/19 105 lb 6.4 oz (47.8 kg)   04/04/19 102 lb (46.3 kg)     Body mass index is 16.04 kg/m². CBC:   Lab Results   Component Value Date    WBC 7.1 03/19/2019    RBC 4.37 03/19/2019    HGB 13.8 03/19/2019    HCT 41.4 03/19/2019    MCV 94.7 03/19/2019    RDW 14.1 03/19/2019     03/19/2019       CMP:   Lab Results   Component Value Date     03/19/2019    K 4.4 03/19/2019     03/19/2019    CO2 26 03/19/2019    BUN 16 03/19/2019    CREATININE 0.6 03/19/2019    GFRAA >60 03/19/2019    GFRAA >60 03/26/2013    AGRATIO 1.4 03/19/2019    LABGLOM >60 03/19/2019    GLUCOSE 91 03/19/2019    PROT 7.5 03/19/2019    PROT 6.1 02/16/2013    CALCIUM 9.4 03/19/2019    BILITOT 0.3 03/19/2019    ALKPHOS 91 03/19/2019    AST 17 03/19/2019    ALT 14 03/19/2019       POC Tests: No results for input(s): POCGLU, POCNA, POCK, POCCL, POCBUN, POCHEMO, POCHCT in the last 72 hours.     Coags:   Lab Results   Component Value Date    PROTIME 10.3 05/12/2016    INR 1.0 05/12/2016    APTT 35.2 05/12/2016       HCG (If Applicable): No results found for: PREGTESTUR, PREGSERUM, HCG, HCGQUANT     ABGs: No results found for: PHART, PO2ART, GNT9PXT, VTG3VCI, BEART, S6MTXXRM     Type & Screen (If Applicable):  Lab Results Component Value Date    LABABO O 05/12/2016    [facility] DEAN Positive 05/12/2016       Anesthesia Evaluation  Patient summary reviewed   history of anesthetic complications: PONV. Airway: Mallampati: II  TM distance: >3 FB   Neck ROM: full  Mouth opening: > = 3 FB Dental: normal exam   (+) upper dentures      Pulmonary: breath sounds clear to auscultation  (+) asthma (last inhaler use several days ago):                            Cardiovascular:  Exercise tolerance: good (>4 METS),   (+) hypertension:, CHF:,         Rhythm: regular  Rate: normal                 ROS comment: -Normal left ventricle size, wall thickness and systolic function with an   estimated ejection fraction of 55%.   -Mild mitral prolapse and regurgitation is present.   -There is mild tricuspid regurgitation with RVSP estimated at 30 mmHg.   -There is a small localized pericardial effusion noted.      Signature        Neuro/Psych:   (+) neuromuscular disease:, headaches:, psychiatric history:             ROS comment: Chronic back pain GI/Hepatic/Renal:   (+) GERD: well controlled, PUD,           Endo/Other:    (+) : arthritis:., .                 Abdominal:           Vascular:                                    Anesthesia Plan      general     ASA 3       Induction: intravenous. MIPS: Postoperative opioids intended, Prophylactic antiemetics administered and Postoperative trial extubation. Anesthetic plan and risks discussed with patient. Plan discussed with CRNA.                   Maggie Barone MD   4/15/2019

## 2019-04-15 NOTE — PROGRESS NOTES
Pt awake and alert. Pt on RA, VSS.  at bedside. Pt with c/o pain and some nausea (see MAR), tolerating PO. Pt meets criteria to be discharged from phase 1.

## 2019-04-15 NOTE — PROGRESS NOTES
Pt still having c/o pain 10/10 and states that she can't eat or drink anything to take a pain pill because the pain is too great. This RN spoke face to face with Dr. Victor Manuel Ramirez and Dr. Victor Manuel Ramirez is going to put in an order for IV Tylenol. Will continue to monitor.

## 2019-04-15 NOTE — OP NOTE
[facility] [address]                     [address] [facility], [address] [facility]                                OPERATIVE REPORT    PATIENT NAME: Key Shabazz                  :        1955  MED REC NO:   [id number]                          ROOM:  ACCOUNT NO:   [de-identified]                           ADMIT DATE: 04/15/2019  PROVIDER:     Donald Clay MD    DATE OF PROCEDURE:  04/15/2019    PREOPERATIVE DIAGNOSIS:  Incisional hernia. POSTOPERATIVE DIAGNOSIS:  Incisional hernia. PROCEDURE:  Laparoscopic prior repair of incisional hernia with mesh. SURGEON:  Donald Clay MD    ANESTHESIA  General plus local at port sites. ESTIMATED BLOOD LOSS:  Minimal.    COMPLICATIONS:  None. SPECIMENS:  None. INDICATIONS:  The patient is a 60-year-old presenting with a tender  enlarging left abdominal wall incisional hernia for repair today. The  site of surgery is noted and marked and confirmed with the patient  preoperatively. All questions were answered. She consents to proceed. ADDITIONAL DETAILS OF SURGERY:  The patient is brought to the operating  room, placed on the operative table in supine position. Compression  boots were placed. Anesthetic was administered. The abdomen was  prepped and draped sterilely. A Grewal catheter was used for the case  which was removed postoperatively. Time-out was done. A 5-mm direct entry view port was placed in the right upper quadrant and  abdominal cavity was insufflated 15 mmHg of pressure. There was no  bowel stuck up in the hernia and a 12 mm port was also placed under  direct vision. The outline of the hernia was noted and marked on the  anterior wall using a marker and the local anesthesia needle to feel the  measured areas and felt the hernia with fascial defects going in Swiss  cheese like fashion through the prior incision most approximately 5 x 5  cm in size.   The 15-cm round circular mesh was selected for the repair. This was positioned centrally in the abdomen and brought up centrally. The balloon inflating system was inflated holding in place and then the  secure strap tackers were used to tack the mesh in position with a two  row tack process completed. The balloon inflation system was then  removed. [place] sutures in four quadrants were then placed  percutaneously with the PMI suture passer and tied securely anchored to  the four corners of mesh in position as well. All pack sites appeared  hemostatic. The mesh sat nicely for the repair. All underlying bowel  and tissue in the region appeared hemostatic and fine. The instruments  and ports then removed. The port sites appeared hemostatic. The fascia  at the 12-mm site was closed with 0 Vicryl figure-of-eight suture. Skin  at all sites closed with 4-0 Monocryl suture. Skin Affix glue was  placed. The patient was taken to recovery room in stable condition  postop. Nova North MD    D: 04/15/2019 10:29:30       T: 04/15/2019 10:32:04     [email or web address]  Job#: [id number]     Doc#: [id number]    CC:   Anastasiya Hernandez MD

## 2019-04-16 ENCOUNTER — HOSPITAL ENCOUNTER (OUTPATIENT)
Age: 64
Setting detail: OBSERVATION
Discharge: HOME | End: 2019-04-17
Attending: EMERGENCY MEDICINE | Admitting: SURGERY
Payer: MEDICARE

## 2019-04-16 DIAGNOSIS — R11.2 INTRACTABLE VOMITING WITH NAUSEA, UNSPECIFIED VOMITING TYPE: Primary | ICD-10-CM

## 2019-04-16 DIAGNOSIS — R10.84 GENERALIZED ABDOMINAL PAIN: ICD-10-CM

## 2019-04-16 DIAGNOSIS — N30.00 ACUTE CYSTITIS WITHOUT HEMATURIA: ICD-10-CM

## 2019-04-16 PROBLEM — Z98.890 PONV (POSTOPERATIVE NAUSEA AND VOMITING): Status: ACTIVE | Noted: 2019-04-16

## 2019-04-16 LAB
A/G RATIO: 1.4 (ref 1.1–2.2)
ALBUMIN SERPL-MCNC: 4.4 G/DL (ref 3.4–5)
ALP BLD-CCNC: 95 U/L (ref 40–129)
ALT SERPL-CCNC: 21 U/L (ref 10–40)
ANION GAP SERPL CALCULATED.3IONS-SCNC: 20 MMOL/L (ref 3–16)
AST SERPL-CCNC: 29 U/L (ref 15–37)
BASOPHILS ABSOLUTE: 0.1 K/UL (ref 0–0.2)
BASOPHILS RELATIVE PERCENT: 0.8 %
BILIRUB SERPL-MCNC: 0.6 MG/DL (ref 0–1)
BILIRUBIN URINE: NEGATIVE
BLOOD, URINE: NEGATIVE
BUN BLDV-MCNC: 10 MG/DL (ref 7–20)
CALCIUM SERPL-MCNC: 9.2 MG/DL (ref 8.3–10.6)
CHLORIDE BLD-SCNC: 98 MMOL/L (ref 99–110)
CLARITY: CLEAR
CO2: 22 MMOL/L (ref 21–32)
COLOR: YELLOW
CREAT SERPL-MCNC: 0.5 MG/DL (ref 0.6–1.2)
EKG ATRIAL RATE: 99 BPM
EKG DIAGNOSIS: NORMAL
EKG P AXIS: 85 DEGREES
EKG P-R INTERVAL: 128 MS
EKG Q-T INTERVAL: 396 MS
EKG QRS DURATION: 80 MS
EKG QTC CALCULATION (BAZETT): 508 MS
EKG R AXIS: 76 DEGREES
EKG T AXIS: 59 DEGREES
EKG VENTRICULAR RATE: 99 BPM
EOSINOPHILS ABSOLUTE: 0 K/UL (ref 0–0.6)
EOSINOPHILS RELATIVE PERCENT: 0.2 %
EPITHELIAL CELLS, UA: 2 /HPF (ref 0–5)
GFR AFRICAN AMERICAN: >60
GFR NON-AFRICAN AMERICAN: >60
GLOBULIN: 3.1 G/DL
GLUCOSE BLD-MCNC: 97 MG/DL (ref 70–99)
GLUCOSE URINE: NEGATIVE MG/DL
HCT VFR BLD CALC: 44.4 % (ref 36–48)
HEMOGLOBIN: 14.9 G/DL (ref 12–16)
HYALINE CASTS: 1 /LPF (ref 0–8)
KETONES, URINE: 40 MG/DL
LEUKOCYTE ESTERASE, URINE: ABNORMAL
LIPASE: 17 U/L (ref 13–60)
LYMPHOCYTES ABSOLUTE: 1.3 K/UL (ref 1–5.1)
LYMPHOCYTES RELATIVE PERCENT: 12.3 %
MCH RBC QN AUTO: 30.9 PG (ref 26–34)
MCHC RBC AUTO-ENTMCNC: 33.6 G/DL (ref 31–36)
MCV RBC AUTO: 92 FL (ref 80–100)
MICROSCOPIC EXAMINATION: YES
MONOCYTES ABSOLUTE: 0.5 K/UL (ref 0–1.3)
MONOCYTES RELATIVE PERCENT: 4.5 %
NEUTROPHILS ABSOLUTE: 8.9 K/UL (ref 1.7–7.7)
NEUTROPHILS RELATIVE PERCENT: 82.2 %
NITRITE, URINE: NEGATIVE
PDW BLD-RTO: 13.8 % (ref 12.4–15.4)
PH UA: 7 (ref 5–8)
PLATELET # BLD: 229 K/UL (ref 135–450)
PMV BLD AUTO: 7.9 FL (ref 5–10.5)
POTASSIUM SERPL-SCNC: 3.5 MMOL/L (ref 3.5–5.1)
PROTEIN UA: ABNORMAL MG/DL
RBC # BLD: 4.82 M/UL (ref 4–5.2)
RBC UA: 4 /HPF (ref 0–4)
SODIUM BLD-SCNC: 140 MMOL/L (ref 136–145)
SPECIFIC GRAVITY UA: 1.01 (ref 1–1.03)
TOTAL PROTEIN: 7.5 G/DL (ref 6.4–8.2)
URINE REFLEX TO CULTURE: YES
URINE TYPE: ABNORMAL
UROBILINOGEN, URINE: 0.2 E.U./DL
WBC # BLD: 10.8 K/UL (ref 4–11)
WBC UA: 12 /HPF (ref 0–5)

## 2019-04-16 PROCEDURE — 81001 URINALYSIS AUTO W/SCOPE: CPT

## 2019-04-16 PROCEDURE — 6370000000 HC RX 637 (ALT 250 FOR IP): Performed by: SURGERY

## 2019-04-16 PROCEDURE — 2580000003 HC RX 258: Performed by: PHYSICIAN ASSISTANT

## 2019-04-16 PROCEDURE — 2580000003 HC RX 258: Performed by: SURGERY

## 2019-04-16 PROCEDURE — G0378 HOSPITAL OBSERVATION PER HR: HCPCS

## 2019-04-16 PROCEDURE — 99285 EMERGENCY DEPT VISIT HI MDM: CPT

## 2019-04-16 PROCEDURE — 93005 ELECTROCARDIOGRAM TRACING: CPT | Performed by: EMERGENCY MEDICINE

## 2019-04-16 PROCEDURE — 2500000003 HC RX 250 WO HCPCS: Performed by: SURGERY

## 2019-04-16 PROCEDURE — 96376 TX/PRO/DX INJ SAME DRUG ADON: CPT

## 2019-04-16 PROCEDURE — C9113 INJ PANTOPRAZOLE SODIUM, VIA: HCPCS | Performed by: SURGERY

## 2019-04-16 PROCEDURE — 83690 ASSAY OF LIPASE: CPT

## 2019-04-16 PROCEDURE — 94760 N-INVAS EAR/PLS OXIMETRY 1: CPT

## 2019-04-16 PROCEDURE — 96372 THER/PROPH/DIAG INJ SC/IM: CPT

## 2019-04-16 PROCEDURE — 87086 URINE CULTURE/COLONY COUNT: CPT

## 2019-04-16 PROCEDURE — 96375 TX/PRO/DX INJ NEW DRUG ADDON: CPT

## 2019-04-16 PROCEDURE — 6360000002 HC RX W HCPCS: Performed by: PHYSICIAN ASSISTANT

## 2019-04-16 PROCEDURE — 96361 HYDRATE IV INFUSION ADD-ON: CPT

## 2019-04-16 PROCEDURE — 93010 ELECTROCARDIOGRAM REPORT: CPT | Performed by: INTERNAL MEDICINE

## 2019-04-16 PROCEDURE — 80053 COMPREHEN METABOLIC PANEL: CPT

## 2019-04-16 PROCEDURE — 6360000002 HC RX W HCPCS: Performed by: SURGERY

## 2019-04-16 PROCEDURE — 96374 THER/PROPH/DIAG INJ IV PUSH: CPT

## 2019-04-16 PROCEDURE — 85025 COMPLETE CBC W/AUTO DIFF WBC: CPT

## 2019-04-16 PROCEDURE — 99024 POSTOP FOLLOW-UP VISIT: CPT | Performed by: SURGERY

## 2019-04-16 RX ORDER — MORPHINE SULFATE 2 MG/ML
2 INJECTION, SOLUTION INTRAMUSCULAR; INTRAVENOUS
Status: DISCONTINUED | OUTPATIENT
Start: 2019-04-16 | End: 2019-04-17 | Stop reason: HOSPADM

## 2019-04-16 RX ORDER — MORPHINE SULFATE 4 MG/ML
4 INJECTION, SOLUTION INTRAMUSCULAR; INTRAVENOUS ONCE
Status: COMPLETED | OUTPATIENT
Start: 2019-04-16 | End: 2019-04-16

## 2019-04-16 RX ORDER — BUDESONIDE 0.5 MG/2ML
0.5 INHALANT ORAL 2 TIMES DAILY
Status: DISCONTINUED | OUTPATIENT
Start: 2019-04-16 | End: 2019-04-17 | Stop reason: HOSPADM

## 2019-04-16 RX ORDER — TRAZODONE HYDROCHLORIDE 50 MG/1
100 TABLET ORAL NIGHTLY
Status: DISCONTINUED | OUTPATIENT
Start: 2019-04-16 | End: 2019-04-17 | Stop reason: HOSPADM

## 2019-04-16 RX ORDER — 0.9 % SODIUM CHLORIDE 0.9 %
10 VIAL (ML) INJECTION DAILY
Status: DISCONTINUED | OUTPATIENT
Start: 2019-04-16 | End: 2019-04-17 | Stop reason: HOSPADM

## 2019-04-16 RX ORDER — FLUTICASONE PROPIONATE 110 UG/1
1 AEROSOL, METERED RESPIRATORY (INHALATION) 2 TIMES DAILY
Status: DISCONTINUED | OUTPATIENT
Start: 2019-04-16 | End: 2019-04-16 | Stop reason: CLARIF

## 2019-04-16 RX ORDER — PROMETHAZINE HYDROCHLORIDE 25 MG/ML
25 INJECTION, SOLUTION INTRAMUSCULAR; INTRAVENOUS ONCE
Status: COMPLETED | OUTPATIENT
Start: 2019-04-16 | End: 2019-04-16

## 2019-04-16 RX ORDER — ACETAMINOPHEN 325 MG/1
650 TABLET ORAL EVERY 4 HOURS PRN
Status: DISCONTINUED | OUTPATIENT
Start: 2019-04-16 | End: 2019-04-17 | Stop reason: HOSPADM

## 2019-04-16 RX ORDER — ALBUTEROL SULFATE 90 UG/1
2 AEROSOL, METERED RESPIRATORY (INHALATION) EVERY 4 HOURS PRN
Status: DISCONTINUED | OUTPATIENT
Start: 2019-04-16 | End: 2019-04-17 | Stop reason: HOSPADM

## 2019-04-16 RX ORDER — 0.9 % SODIUM CHLORIDE 0.9 %
1000 INTRAVENOUS SOLUTION INTRAVENOUS ONCE
Status: COMPLETED | OUTPATIENT
Start: 2019-04-16 | End: 2019-04-16

## 2019-04-16 RX ORDER — NITROGLYCERIN 0.4 MG/1
0.4 TABLET SUBLINGUAL EVERY 5 MIN PRN
Status: DISCONTINUED | OUTPATIENT
Start: 2019-04-16 | End: 2019-04-17 | Stop reason: HOSPADM

## 2019-04-16 RX ORDER — ONDANSETRON 2 MG/ML
4 INJECTION INTRAMUSCULAR; INTRAVENOUS ONCE
Status: COMPLETED | OUTPATIENT
Start: 2019-04-16 | End: 2019-04-16

## 2019-04-16 RX ORDER — LIDOCAINE 4 G/G
1 PATCH TOPICAL EVERY 12 HOURS
Status: DISCONTINUED | OUTPATIENT
Start: 2019-04-16 | End: 2019-04-17 | Stop reason: HOSPADM

## 2019-04-16 RX ORDER — METOCLOPRAMIDE HYDROCHLORIDE 5 MG/ML
10 INJECTION INTRAMUSCULAR; INTRAVENOUS EVERY 6 HOURS
Status: DISCONTINUED | OUTPATIENT
Start: 2019-04-16 | End: 2019-04-17 | Stop reason: HOSPADM

## 2019-04-16 RX ORDER — GABAPENTIN 300 MG/1
300 CAPSULE ORAL 2 TIMES DAILY
Status: DISCONTINUED | OUTPATIENT
Start: 2019-04-16 | End: 2019-04-17 | Stop reason: HOSPADM

## 2019-04-16 RX ORDER — ALBUTEROL SULFATE 2.5 MG/3ML
2.5 SOLUTION RESPIRATORY (INHALATION) EVERY 6 HOURS PRN
Status: DISCONTINUED | OUTPATIENT
Start: 2019-04-16 | End: 2019-04-17 | Stop reason: HOSPADM

## 2019-04-16 RX ORDER — TIZANIDINE 4 MG/1
4 TABLET ORAL EVERY 8 HOURS PRN
Status: DISCONTINUED | OUTPATIENT
Start: 2019-04-16 | End: 2019-04-17 | Stop reason: HOSPADM

## 2019-04-16 RX ORDER — HYDROMORPHONE HYDROCHLORIDE 1 MG/ML
1 INJECTION, SOLUTION INTRAMUSCULAR; INTRAVENOUS; SUBCUTANEOUS ONCE
Status: DISCONTINUED | OUTPATIENT
Start: 2019-04-16 | End: 2019-04-16

## 2019-04-16 RX ORDER — SODIUM CHLORIDE 0.9 % (FLUSH) 0.9 %
10 SYRINGE (ML) INJECTION PRN
Status: DISCONTINUED | OUTPATIENT
Start: 2019-04-16 | End: 2019-04-17 | Stop reason: HOSPADM

## 2019-04-16 RX ORDER — MORPHINE SULFATE 4 MG/ML
4 INJECTION, SOLUTION INTRAMUSCULAR; INTRAVENOUS
Status: DISCONTINUED | OUTPATIENT
Start: 2019-04-16 | End: 2019-04-16

## 2019-04-16 RX ORDER — ONDANSETRON 2 MG/ML
4 INJECTION INTRAMUSCULAR; INTRAVENOUS EVERY 4 HOURS PRN
Status: DISCONTINUED | OUTPATIENT
Start: 2019-04-16 | End: 2019-04-17 | Stop reason: HOSPADM

## 2019-04-16 RX ORDER — MORPHINE SULFATE 2 MG/ML
2 INJECTION, SOLUTION INTRAMUSCULAR; INTRAVENOUS
Status: DISCONTINUED | OUTPATIENT
Start: 2019-04-16 | End: 2019-04-16

## 2019-04-16 RX ORDER — SODIUM CHLORIDE 0.9 % (FLUSH) 0.9 %
10 SYRINGE (ML) INJECTION EVERY 12 HOURS SCHEDULED
Status: DISCONTINUED | OUTPATIENT
Start: 2019-04-16 | End: 2019-04-17 | Stop reason: HOSPADM

## 2019-04-16 RX ORDER — GABAPENTIN 300 MG/1
300 CAPSULE ORAL 2 TIMES DAILY
COMMUNITY
End: 2019-11-25

## 2019-04-16 RX ORDER — DEXTROSE, SODIUM CHLORIDE, AND POTASSIUM CHLORIDE 5; .45; .15 G/100ML; G/100ML; G/100ML
INJECTION INTRAVENOUS CONTINUOUS
Status: DISCONTINUED | OUTPATIENT
Start: 2019-04-16 | End: 2019-04-17

## 2019-04-16 RX ORDER — PROMETHAZINE HYDROCHLORIDE 25 MG/ML
12.5 INJECTION, SOLUTION INTRAMUSCULAR; INTRAVENOUS EVERY 4 HOURS PRN
Status: DISCONTINUED | OUTPATIENT
Start: 2019-04-16 | End: 2019-04-17 | Stop reason: HOSPADM

## 2019-04-16 RX ORDER — PANTOPRAZOLE SODIUM 40 MG/10ML
40 INJECTION, POWDER, LYOPHILIZED, FOR SOLUTION INTRAVENOUS DAILY
Status: DISCONTINUED | OUTPATIENT
Start: 2019-04-16 | End: 2019-04-17 | Stop reason: HOSPADM

## 2019-04-16 RX ORDER — MORPHINE SULFATE 10 MG/ML
6 INJECTION, SOLUTION INTRAMUSCULAR; INTRAVENOUS
Status: DISCONTINUED | OUTPATIENT
Start: 2019-04-16 | End: 2019-04-17 | Stop reason: HOSPADM

## 2019-04-16 RX ORDER — HYOSCYAMINE SULFATE 0.5 MG/ML
500 INJECTION, SOLUTION SUBCUTANEOUS ONCE
Status: COMPLETED | OUTPATIENT
Start: 2019-04-16 | End: 2019-04-16

## 2019-04-16 RX ORDER — SUCRALFATE 1 G/1
1 TABLET ORAL EVERY 12 HOURS SCHEDULED
Status: DISCONTINUED | OUTPATIENT
Start: 2019-04-16 | End: 2019-04-17 | Stop reason: HOSPADM

## 2019-04-16 RX ADMIN — METOCLOPRAMIDE 10 MG: 5 INJECTION, SOLUTION INTRAMUSCULAR; INTRAVENOUS at 21:51

## 2019-04-16 RX ADMIN — MORPHINE SULFATE 4 MG: 4 INJECTION INTRAVENOUS at 15:08

## 2019-04-16 RX ADMIN — Medication 1 G: at 11:13

## 2019-04-16 RX ADMIN — PANTOPRAZOLE SODIUM 40 MG: 40 INJECTION, POWDER, FOR SOLUTION INTRAVENOUS at 16:11

## 2019-04-16 RX ADMIN — MORPHINE SULFATE 4 MG: 4 INJECTION INTRAVENOUS at 09:33

## 2019-04-16 RX ADMIN — PROMETHAZINE HYDROCHLORIDE 12.5 MG: 25 INJECTION INTRAMUSCULAR; INTRAVENOUS at 15:03

## 2019-04-16 RX ADMIN — PROMETHAZINE HYDROCHLORIDE 25 MG: 25 INJECTION INTRAMUSCULAR; INTRAVENOUS at 08:48

## 2019-04-16 RX ADMIN — HYOSCYAMINE SULFATE 500 MCG: 0.5 INJECTION, SOLUTION SUBCUTANEOUS at 08:48

## 2019-04-16 RX ADMIN — PROMETHAZINE HYDROCHLORIDE 12.5 MG: 25 INJECTION INTRAMUSCULAR; INTRAVENOUS at 20:02

## 2019-04-16 RX ADMIN — Medication 10 ML: at 20:03

## 2019-04-16 RX ADMIN — Medication 10 ML: at 16:12

## 2019-04-16 RX ADMIN — MORPHINE SULFATE 4 MG: 4 INJECTION INTRAVENOUS at 11:24

## 2019-04-16 RX ADMIN — MORPHINE SULFATE 4 MG: 4 INJECTION INTRAVENOUS at 17:04

## 2019-04-16 RX ADMIN — METOCLOPRAMIDE 10 MG: 5 INJECTION, SOLUTION INTRAMUSCULAR; INTRAVENOUS at 16:11

## 2019-04-16 RX ADMIN — MORPHINE SULFATE 6 MG: 10 INJECTION INTRAVENOUS at 19:09

## 2019-04-16 RX ADMIN — ONDANSETRON 4 MG: 2 INJECTION INTRAMUSCULAR; INTRAVENOUS at 08:48

## 2019-04-16 RX ADMIN — Medication 10 ML: at 15:02

## 2019-04-16 RX ADMIN — ONDANSETRON 4 MG: 2 INJECTION INTRAMUSCULAR; INTRAVENOUS at 16:25

## 2019-04-16 RX ADMIN — POTASSIUM CHLORIDE, DEXTROSE MONOHYDRATE AND SODIUM CHLORIDE: 150; 5; 450 INJECTION, SOLUTION INTRAVENOUS at 18:59

## 2019-04-16 RX ADMIN — SODIUM CHLORIDE 1000 ML: 9 INJECTION, SOLUTION INTRAVENOUS at 08:48

## 2019-04-16 RX ADMIN — SODIUM CHLORIDE 1000 ML: 9 INJECTION, SOLUTION INTRAVENOUS at 10:08

## 2019-04-16 RX ADMIN — ONDANSETRON 4 MG: 2 INJECTION INTRAMUSCULAR; INTRAVENOUS at 10:05

## 2019-04-16 ASSESSMENT — ENCOUNTER SYMPTOMS
SHORTNESS OF BREATH: 0
RHINORRHEA: 0
ABDOMINAL PAIN: 1
VOMITING: 1
NAUSEA: 1
DIARRHEA: 0
COUGH: 0

## 2019-04-16 ASSESSMENT — PAIN DESCRIPTION - PAIN TYPE
TYPE: SURGICAL PAIN
TYPE: ACUTE PAIN

## 2019-04-16 ASSESSMENT — PAIN DESCRIPTION - PROGRESSION: CLINICAL_PROGRESSION: NOT CHANGED

## 2019-04-16 ASSESSMENT — PAIN DESCRIPTION - LOCATION
LOCATION: ABDOMEN

## 2019-04-16 ASSESSMENT — PAIN SCALES - GENERAL
PAINLEVEL_OUTOF10: 10
PAINLEVEL_OUTOF10: 9
PAINLEVEL_OUTOF10: 10
PAINLEVEL_OUTOF10: 8

## 2019-04-16 ASSESSMENT — PAIN DESCRIPTION - FREQUENCY: FREQUENCY: CONTINUOUS

## 2019-04-16 ASSESSMENT — PAIN DESCRIPTION - DESCRIPTORS: DESCRIPTORS: CONSTANT

## 2019-04-16 ASSESSMENT — PAIN DESCRIPTION - ONSET: ONSET: ON-GOING

## 2019-04-16 NOTE — PLAN OF CARE
Problem: Pain:  Goal: Control of acute pain  Description  Control of acute pain  Outcome: Ongoing  Note:   C/o 10/10 abd pain (and chronic low back pain), and states was having dry heaves, with persistent nausea recently - administered Morphine 4mg + Phenergan 12.5 mg IV

## 2019-04-16 NOTE — ED NOTES
Pharmacy Medication History Note      List of current medications patient is taking is complete. Source of information: patient    Changes made to medication list:  Medications flagged for removal (include reason, ex. noncompliance):  N/A    Medications removed (include reason, ex. therapy complete or physician discontinued):  Gabapentin- dose adjustment  Norco- duplicate    Medications added/doses adjusted:  Gabapentin 300mg- BID    Other notes (ex. Recent course of antibiotics, Coumadin dosing):  Denies use of other OTC or herbal medications. Last dose times updated.    Mylene Laguna [facility]    Current Facility-Administered Medications on File Prior to Encounter   Medication Dose Route Frequency Provider Last Rate Last Dose    [COMPLETED] acetaminophen (OFIRMEV) infusion 1,000 mg  1,000 mg Intravenous Once Alistair Norwood  mL/hr at 04/15/19 1132 1,000 mg at 04/15/19 1132    [COMPLETED] oxyCODONE (ROXICODONE) immediate release tablet 10 mg  10 mg Oral Once PRN Darlene Plascencia MD   10 mg at 04/15/19 1156    [COMPLETED] promethazine (PHENERGAN) injection 6.25 mg  6.25 mg Intravenous Once Darlene Plascencia MD   6.25 mg at 04/15/19 1259    [DISCONTINUED] 0.9 % sodium chloride infusion   Intravenous Continuous Alistair Norwood MD   Stopped at 04/15/19 1320    [DISCONTINUED] 0.9 % sodium chloride infusion   Intravenous Continuous Darlene Plascencia MD        [DISCONTINUED] lidocaine PF 1 % injection 1 mL  1 mL Intradermal Once PRN Darlene Plascencia MD        [DISCONTINUED] HYDROcodone-acetaminophen (NORCO) 5-325 MG per tablet 1 tablet  1 tablet Oral Once PRN Darlene Plascencia MD        [DISCONTINUED] morphine injection 1 mg  1 mg Intravenous Q5 Min PRN Darlene Plascencia MD        [DISCONTINUED] morphine injection 2 mg  2 mg Intravenous Q5 Min PRN Darlene Plascencia MD   2 mg at 04/15/19 5742    [DISCONTINUED] morphine (PF) injection 2 mg  2 mg Intravenous Once Darlene Plascencia MD [DISCONTINUED] morphine injection 2 mg  2 mg Intravenous Q5 Min PRN Hussein Nolasco MD   2 mg at 04/15/19 1046    [DISCONTINUED] oxyCODONE (ROXICODONE) 5 MG immediate release tablet             [DISCONTINUED] promethazine (PHENERGAN) 25 MG/ML injection             [DISCONTINUED] sodium chloride flush 0.9 % injection             [DISCONTINUED] sodium chloride 0.9 % infusion              Current Outpatient Medications on File Prior to Encounter   Medication Sig Dispense Refill    gabapentin (NEURONTIN) 300 MG capsule Take 300 mg by mouth 2 times daily. HYDROcodone-acetaminophen (NORCO) 7.5-325 MG per tablet Take 1 tablet by mouth every 6 hours as needed for Pain for up to 30 days.  120 tablet 0    fluticasone (FLOVENT HFA) 110 MCG/ACT inhaler INHALE 1 PUFF INTO THE LUNGS TWICE DAILY 12 g 5    sucralfate (CARAFATE) 1 GM tablet Take 1 tablet by mouth 4 times daily 120 tablet 3    DEXILANT 60 MG CPDR delayed release capsule TAKE 1 CAPSULE BY MOUTH DAILY 30 capsule 5    tiZANidine (ZANAFLEX) 4 MG tablet TAKE 1 TABLET BY MOUTH THREE TIMES DAILY 90 tablet 5    NITROSTAT 0.4 MG SL tablet PLACE ONE TABLET UNDER THE TONGUE EVERY 5 MINUTES AS NEEDED FOR CHEST PAIN 25 tablet 0    diclofenac sodium (VOLTAREN) 1 % GEL Apply 4 grams to lower extremity joints and 2 grams to upper extremity joints as needed 4 times/day, do not exceed 32 grams/day or 16 grams/joint/day 500 g 0    traZODone (DESYREL) 50 MG tablet TAKE 2 TABLETS BY MOUTH EVERY NIGHT 60 tablet 5    sertraline (ZOLOFT) 100 MG tablet TAKE 1 TABLET BY MOUTH THREE TIMES DAILY 270 tablet 1    butalbital-acetaminophen-caffeine (FIORICET, ESGIC) -40 MG per tablet Take 1 tablet by mouth every 4 hours as needed for Headaches May take 2 tablets every 4 hours as needed for pain 50 tablet 0    Nutritional Supplements (BOOST CALORIE SMART) LIQD Take 1 Can by mouth See Admin Instructions EVERY 3 DAYS      PROAIR  (90 Base) MCG/ACT inhaler INHALE 2 PUFFS BY MOUTH

## 2019-04-16 NOTE — ED PROVIDER NOTES
[address] [facility]  eMERGENCY dEPARTMENT eNCOUnter        Pt Name: James Jimenez  MRN: [id number]  [place] 1955  Date of evaluation: 4/16/2019  Provider: Marchelle Simmonds, PA-C  PCP: Guillermo Patel MD    This patient was seen and evaluated by the attending physician Patty Price MD.      Jb Garnicanabeel       Chief Complaint   Patient presents with    Emesis     pt had mesh placed for hernia surgery yesterday. pt had trouble getting n/v under control. pt now having increased n/v.     Post-op Problem       HISTORY OF PRESENT ILLNESS   (Location/Symptom, Timing/Onset, Context/Setting, Quality, Duration, Modifying Factors, Severity)  Note limiting factors. James Jimenez is a 71 y.o. female presents to the emergency department today for evaluation for nausea, vomiting and abdominal pain. The patient had outpatient surgery by Dr. Kristen Rodrigues performed yesterday she did have misplaced for hernia repair. Patient states that she's had multiple episodes of nausea and vomiting since that time, the  states that patient typically has nausea and vomiting postoperatively, but he states \"I know when she gets like this weakness will be able to get it under control\". No bilious emesis or hematemesis. Patient is complaining of generalized abdominal pain is sharp, constant arrhythmias a 10/10. No diarrhea. She is passing gas but she's not had a bowel movement. She denies any fever or chills. No chest pain or shortness of breath. She denies any dysuria hematuria. She has no other complaints at this time. Nursing Notes were all reviewed and agreed with or any disagreements were addressed  in the HPI. REVIEW OF SYSTEMS    (2-9 systems for level 4, 10 or more for level 5)     Review of Systems   Constitutional: Negative for activity change, appetite change, chills and fever. HENT: Negative for congestion and rhinorrhea.     Respiratory: Negative for cough and shortness of breath. Cardiovascular: Negative for chest pain. Gastrointestinal: Positive for abdominal pain, nausea and vomiting. Negative for diarrhea. Genitourinary: Negative for difficulty urinating, dysuria and hematuria. Positives and Pertinent negatives as per HPI. Except as noted abovein the ROS, all other systems were reviewed and negative.        PAST MEDICAL HISTORY     Past Medical History:   Diagnosis Date    Arthritis     fingers, hands    Asthma     Back pain     Bell's palsy     CHF (congestive heart failure) (HCC)     Chronic pain     BACK SURGERY X4, neck, left arm    Colitis 2/14/2013    Depression     Elevated sed rate     Family history of breast cancer in first degree relative     Gastric ulcer     GERD (gastroesophageal reflux disease)     colitis    Glaucoma     Hypertension     Movement disorder     chronic back pain    Neuromuscular disorder (HCC)     sciatica rt. leg    Osteoarthritis     PONV (postoperative nausea and vomiting)     SEVERE         SURGICAL HISTORY     Past Surgical History:   Procedure Laterality Date    BACK SURGERY      x 4    COLONOSCOPY  10/16/12    polyp removed and biopsy sent    COLONOSCOPY  3/26/13    CYSTOSCOPY  1/13/16    urethral dilitation    FACIAL NERVE SURGERY      D/T Bell's Palsy    FINGER SURGERY Left 04/03/2018    Excision of Left Thumb Digital Mucous cyst & DIP Joint Arthrotomy & Debridement    FINGER SURGERY  04/2018    left thumb    FINGER SURGERY Left 04/03/2018    thumb surgery     HYSTERECTOMY      Partial    UPPER GASTROINTESTINAL ENDOSCOPY  10/15/12    UPPER GASTROINTESTINAL ENDOSCOPY  2/11/15    with EUS    VENTRAL HERNIA REPAIR N/A 4/15/2019    LAPAROSCOPIC REPAIR OF INCISIONAL HERNIA WITH MESH performed by Vani Velázquez MD at [address] [place] [address]       Previous Medications    ALBUTEROL (PROVENTIL) (2.5 MG/3ML) 0.083% NEBULIZER SOLUTION    USE 1 VIAL IN NEBULIZER EVERY 6 HOURS    BUTALBITAL-ACETAMINOPHEN-CAFFEINE (FIORICET, ESGIC) -40 MG PER TABLET    Take 1 tablet by mouth every 4 hours as needed for Headaches May take 2 tablets every 4 hours as needed for pain    DEXILANT 60 MG CPDR DELAYED RELEASE CAPSULE    TAKE 1 CAPSULE BY MOUTH DAILY    DICLOFENAC SODIUM (VOLTAREN) 1 % GEL    Apply 4 grams to lower extremity joints and 2 grams to upper extremity joints as needed 4 times/day, do not exceed 32 grams/day or 16 grams/joint/day    FLUTICASONE (FLOVENT HFA) 110 MCG/ACT INHALER    INHALE 1 PUFF INTO THE LUNGS TWICE DAILY    GABAPENTIN (NEURONTIN) 300 MG CAPSULE    Take 1 capsule by mouth 3 times daily for 31 days. HYDROCODONE-ACETAMINOPHEN (NORCO) 5-325 MG PER TABLET    Take 1 tablet by mouth every 4 hours as needed for Pain for up to 7 days. HYDROCODONE-ACETAMINOPHEN (NORCO) 7.5-325 MG PER TABLET    Take 1 tablet by mouth every 6 hours as needed for Pain for up to 30 days. NITROSTAT 0.4 MG SL TABLET    PLACE ONE TABLET UNDER THE TONGUE EVERY 5 MINUTES AS NEEDED FOR CHEST PAIN    NUTRITIONAL SUPPLEMENTS (BOOST CALORIE SMART) LIQD    Take 1 Can by mouth See Admin Instructions EVERY 3 DAYS    PROAIR  (90 BASE) MCG/ACT INHALER    INHALE 2 PUFFS BY MOUTH EVERY 4 HOURS AS NEEDED FOR WHEEZING    SERTRALINE (ZOLOFT) 100 MG TABLET    TAKE 1 TABLET BY MOUTH THREE TIMES DAILY    SUCRALFATE (CARAFATE) 1 GM TABLET    Take 1 tablet by mouth 4 times daily    TIZANIDINE (ZANAFLEX) 4 MG TABLET    TAKE 1 TABLET BY MOUTH THREE TIMES DAILY    TRAZODONE (DESYREL) 50 MG TABLET    TAKE 2 TABLETS BY MOUTH EVERY NIGHT         ALLERGIES     Beta adrenergic blockers; Pheniramine; Antihistamine [diphenhydramine hcl]; Aspirin; Codeine; Dilaudid [hydromorphone];  Nsaids; Prednisone; and Zithromax [azithromycin]    FAMILYHISTORY       Family History   Problem Relation Age of Onset    Heart Disease Mother     High Blood Pressure Mother     Arthritis Mother     Breast Cancer Sister    Ysabel García eye exhibits no discharge. Left eye exhibits no discharge. Neck: Normal range of motion. Neck supple. No tracheal deviation present. Cardiovascular: Normal rate, regular rhythm and normal heart sounds. No murmur heard. Pulmonary/Chest: Effort normal and breath sounds normal. No stridor. No respiratory distress. She has no wheezes. Abdominal: Soft. Bowel sounds are normal. She exhibits no distension. There is generalized tenderness. There is no rebound and no guarding. Well-healing incisions postoperatively to abdomen. No wound dehiscence. There is sterilized abdominal tenderness but there is no rebound or guarding. No peritoneal signs   Musculoskeletal: Normal range of motion. Neurological: She is alert and oriented to person, place, and time. Skin: Skin is warm and dry. She is not diaphoretic. Psychiatric: She has a normal mood and affect. Her behavior is normal.   Nursing note and vitals reviewed.       DIAGNOSTIC RESULTS   LABS:    Labs Reviewed   CBC WITH AUTO DIFFERENTIAL - Abnormal; Notable for the following components:       Result Value    Neutrophils # 8.9 (*)     All other components within normal limits    Narrative:     Performed at:  [facility] [address], [place], [address]   Phone [phone]   COMPREHENSIVE METABOLIC PANEL - Abnormal; Notable for the following components:    Chloride 98 (*)     Anion Gap 20 (*)     CREATININE 0.5 (*)     All other components within normal limits    Narrative:     Performed at:  [facility] [address], [place], [address]   Phone [phone]   URINE RT REFLEX TO CULTURE - Abnormal; Notable for the following components:    Ketones, Urine 40 (*)     Protein, UA TRACE (*)     Leukocyte Esterase, Urine SMALL (*)     All other components within normal limits    Narrative:     Performed at:  [facility] [address], [place], [place] 4/16/19 0848)   ondansetron (ZOFRAN) injection 4 mg (4 mg Intravenous Given 4/16/19 0848)   hyoscyamine (LEVSIN) 500 MCG/ML injection 500 mcg (500 mcg Intravenous Given 4/16/19 0848)   morphine injection 4 mg (4 mg Intravenous Given 4/16/19 0933)   ondansetron (ZOFRAN) injection 4 mg (4 mg Intravenous Given 4/16/19 1005)       The patient presents to the emergency department today for evaluation for nausea, vomiting and abdominal pain. The patient had outpatient surgery by Dr. Trev Rose performed yesterday she did have misplaced for hernia repair. Patient states that she's had multiple episodes of nausea and vomiting since that time, the  states that patient typically has nausea and vomiting postoperatively, but he states \"I know when she gets like this weakness will be able to get it under control\". No bilious emesis or hematemesis. Patient is complaining of generalized abdominal pain is sharp, constant arrhythmias a 10/10. No diarrhea. She is passing gas but she's not had a bowel movement. She denies any fever or chills. No chest pain or shortness of breath. She denies any dysuria hematuria. She has no other complaints at this time. On physical exam does have generalized abdominal tenderness, well-healing scars from previous surgery yesterday. There is no rebound or guarding. No peritoneal signs. CBC shows no evidence of leukocytosis or anemia. CMP unremarkable. Urine shows small leukocytes and 12 white blood cells, she was covered with Rocephin. Patient was given 2 L of fluid, several doses of pain medication and nausea medication, continues to complain of intractable pain as well as nausea and vomiting and I believe that she'll need to be admitted for further care and evaluation. Dr. Trev Rose has agreed for admission. She is stable for admission. FINAL IMPRESSION      1. Intractable vomiting with nausea, unspecified vomiting type    2. Generalized abdominal pain    3.  Acute cystitis without hematuria          DISPOSITION/PLAN   DISPOSITION Decision To Admit 04/16/2019 11:03:31 AM      PATIENT REFERREDTO:  No follow-up provider specified.     DISCHARGE MEDICATIONS:  New Prescriptions    No medications on file       DISCONTINUED MEDICATIONS:  Discontinued Medications    No medications on file              (Please note that portions ofthis note were completed with a voice recognition program.  Efforts were made to edit the dictations but occasionally words are mis-transcribed.)    Emily Acosta PA-C (electronically signed)            Emily Acosta PA-C  04/16/19 5132

## 2019-04-16 NOTE — H&P
[facility] GENERAL AND LAPAROSCOPIC SURGERY                       PATIENT NAME: Michael Solis     ADMISSION DATE: 4/16/2019  8:11 AM      TODAY'S DATE: 4/16/2019    Reason for Visit:  Pain, emesis      HISTORY OF PRESENT ILLNESS:              The patient is a 71 y.o. female who presents with PONV. Lap incisional hernia repair done yesterday. Pt with inability to keep po down well and feels bloated and in abd and back pain.     Past Medical History:        Diagnosis Date    Arthritis     fingers, hands    Asthma     Back pain     Bell's palsy     CHF (congestive heart failure) (HCC)     Chronic pain     BACK SURGERY X4, neck, left arm    Colitis 2/14/2013    Depression     Elevated sed rate     Family history of breast cancer in first degree relative     Gastric ulcer     GERD (gastroesophageal reflux disease)     colitis    Glaucoma     Hypertension     Movement disorder     chronic back pain    Neuromuscular disorder (HCC)     sciatica rt. leg    Osteoarthritis     PONV (postoperative nausea and vomiting)     SEVERE       Past Surgical History:        Procedure Laterality Date    BACK SURGERY      x 4    COLONOSCOPY  10/16/12    polyp removed and biopsy sent    COLONOSCOPY  3/26/13    CYSTOSCOPY  1/13/16    urethral dilitation    FACIAL NERVE SURGERY      D/T Bell's Palsy    FINGER SURGERY Left 04/03/2018    Excision of Left Thumb Digital Mucous cyst & DIP Joint Arthrotomy & Debridement    FINGER SURGERY  04/2018    left thumb    FINGER SURGERY Left 04/03/2018    thumb surgery     HYSTERECTOMY      Partial    UPPER GASTROINTESTINAL ENDOSCOPY  10/15/12    UPPER GASTROINTESTINAL ENDOSCOPY  2/11/15    with EUS    VENTRAL HERNIA REPAIR N/A 4/15/2019    LAPAROSCOPIC REPAIR OF INCISIONAL HERNIA WITH MESH performed by MD Noam at [place]       Current Medications:   Current Facility-Administered Medications: dextrose 5 % and 0.45 % NaCl with KCl 20 mEq infusion, , Intravenous, Continuous  sodium chloride flush 0.9 % injection 10 mL, 10 mL, Intravenous, 2 times per day  sodium chloride flush 0.9 % injection 10 mL, 10 mL, Intravenous, PRN  ondansetron (ZOFRAN) injection 4 mg, 4 mg, Intravenous, Q4H PRN  enoxaparin (LOVENOX) injection 30 mg, 30 mg, Subcutaneous, Daily  pantoprazole (PROTONIX) injection 40 mg, 40 mg, Intravenous, Daily **AND** sodium chloride (PF) 0.9 % injection 10 mL, 10 mL, Intravenous, Daily  morphine (PF) injection 2 mg, 2 mg, Intravenous, Q2H PRN **OR** morphine injection 4 mg, 4 mg, Intravenous, Q2H PRN  acetaminophen (TYLENOL) tablet 650 mg, 650 mg, Oral, Q4H PRN  lidocaine 4 % external patch 1 patch, 1 patch, Topical, Q12H  metoclopramide (REGLAN) injection 10 mg, 10 mg, Intravenous, Q6H  promethazine (PHENERGAN) injection 12.5 mg, 12.5 mg, Intravenous, Q4H PRN  albuterol (PROVENTIL) nebulizer solution 2.5 mg, 2.5 mg, Nebulization, Q6H PRN  gabapentin (NEURONTIN) capsule 300 mg, 300 mg, Oral, BID  nitroGLYCERIN (NITROSTAT) SL tablet 0.4 mg, 0.4 mg, Sublingual, Q5 Min PRN  sertraline (ZOLOFT) tablet 25 mg, 25 mg, Oral, Daily  sucralfate (CARAFATE) tablet 1 g, 1 g, Oral, 2 times per day  albuterol sulfate  (90 Base) MCG/ACT inhaler 2 puff, 2 puff, Inhalation, Q4H PRN  tiZANidine (ZANAFLEX) tablet 4 mg, 4 mg, Oral, Q8H PRN  traZODone (DESYREL) tablet 100 mg, 100 mg, Oral, Nightly  budesonide (PULMICORT) nebulizer suspension 500 mcg, 0.5 mg, Nebulization, BID  Prior to Admission medications    Medication Sig Start Date End Date Taking? Authorizing Provider   gabapentin (NEURONTIN) 300 MG capsule Take 300 mg by mouth 2 times daily. Yes Historical Provider, MD   HYDROcodone-acetaminophen (NORCO) 7.5-325 MG per tablet Take 1 tablet by mouth every 6 hours as needed for Pain for up to 30 days.  4/9/19 5/9/19 Yes Litzy Reece MD   fluticasone [facility]) 110 MCG/ACT inhaler INHALE 1 PUFF INTO THE LUNGS TWICE DAILY 4/9/19  Yes Litzy Reece, MD   sucralfate (CARAFATE) 1 GM tablet Take 1 tablet by mouth 4 times daily 3/19/19  Yes Lizett Arceo MD   DEXILANT 60 MG CPDR delayed release capsule TAKE 1 CAPSULE BY MOUTH DAILY 1/29/19  Yes Lizett Arceo MD   tiZANidine (ZANAFLEX) 4 MG tablet TAKE 1 TABLET BY MOUTH THREE TIMES DAILY 1/8/19  Yes Lizett Arceo MD   NITROSTAT 0.4 MG SL tablet PLACE ONE TABLET UNDER THE TONGUE EVERY 5 MINUTES AS NEEDED FOR CHEST PAIN 11/30/18  Yes Geena Trujillo MD   diclofenac sodium (VOLTAREN) 1 % GEL Apply 4 grams to lower extremity joints and 2 grams to upper extremity joints as needed 4 times/day, do not exceed 32 grams/day or 16 grams/joint/day 11/26/18  Yes Anay Ceja MD   traZODone (DESYREL) 50 MG tablet TAKE 2 TABLETS BY MOUTH EVERY NIGHT 11/14/18  Yes Lizett Arceo MD   sertraline (ZOLOFT) 100 MG tablet TAKE 1 TABLET BY MOUTH THREE TIMES DAILY 10/1/18  Yes Lizett Arceo MD   butalbital-acetaminophen-caffeine (FIORICET, ESGIC) -79 MG per tablet Take 1 tablet by mouth every 4 hours as needed for Headaches May take 2 tablets every 4 hours as needed for pain 8/23/18  Yes Ana Jones MD   Nutritional Supplements (BOOST CALORIE SMART) LIQD Take 1 Can by mouth See Admin Instructions EVERY 3 DAYS   Yes Historical Provider, MD   PROAIR  (90 Base) MCG/ACT inhaler INHALE 2 PUFFS BY MOUTH EVERY 4 HOURS AS NEEDED FOR WHEEZING 1/9/18  Yes Lizett Arceo MD   albuterol (PROVENTIL) (2.5 MG/3ML) 0.083% nebulizer solution USE 1 VIAL IN NEBULIZER EVERY 6 HOURS 4/24/17  Yes JOAO Washington - CNP        Allergies:  Beta adrenergic blockers; Pheniramine; Antihistamine [diphenhydramine hcl]; Aspirin; Codeine; Dilaudid [hydromorphone]; Nsaids; Prednisone; and Zithromax [azithromycin]    Social History:    reports that she quit smoking about 3 years ago. Her smoking use included cigarettes. She has a 5.00 pack-year smoking history.  She has never used smokeless tobacco. She reports that she does not drink alcohol or use drugs. Family History:        Problem Relation Age of Onset    Heart Disease Mother     High Blood Pressure Mother     Arthritis Mother     Breast Cancer Sister     High Blood Pressure Brother     Diabetes Neg Hx     Stroke Neg Hx        REVIEW OF SYSTEMS:  CONSTITUTIONAL:  positive for  fatigue and malaise  HEENT:  negative  RESPIRATORY:  negative  CARDIOVASCULAR:  negative  GASTROINTESTINAL:  positive for nausea, vomiting and abdominal pain  GENITOURINARY:  negative  HEMATOLOGIC/LYMPHATIC:  negative  NEUROLOGICAL:  Back pain  SKIN: negative    PHYSICAL EXAM:  VITALS:  BP (!) 172/91   Pulse 97   Temp 97.5 °F (36.4 °C) (Axillary)   Resp 18   Ht 5' 8\" (1.727 m)   Wt 105 lb 8 oz (47.9 kg)   SpO2 97%   BMI 16.04 kg/m²   24HR INTAKE/OUTPUT:  No intake or output data in the 24 hours ending 04/16/19 1625  DRAIN/TUBE OUTPUT:     CONSTITUTIONAL:  alert, mild distress and thin, looks meager   EYES:  sclera clear  ENT:  normocepalic, without obvious abnormality  NECK:  supple, symmetrical, trachea midline and no carotid bruits  LUNGS:  clear to auscultation  CARDIOVASCULAR:  regular rate and rhythm  ABDOMEN:  scars noted are healing, hypoactive bowel sounds, soft, non-distended, tenderness noted around incisions, voluntary guarding absent, no masses palpated, no hepatosplenomegally and hernia absent  MUSCULOSKELETAL:  0+ pitting edema lower extremities  NEUROLOGIC:  Mental Status Exam:  Level of Alertness:   awake  Orientation:   person, place, time  SKIN:  no bruising or bleeding, normal skin color, texture, turgor and no redness, warmth, or swelling    DATA:    CBC:   Recent Labs     04/16/19  0841   WBC 10.8   HGB 14.9   HCT 44.4        BMP:    Recent Labs     04/16/19  0841      K 3.5   CL 98*   CO2 22   BUN 10   CREATININE 0.5*   GLUCOSE 97     Hepatic:   Recent Labs     04/16/19  0841   AST 29   ALT 21   BILITOT 0.6   ALKPHOS 95     Mag:    No results for input(s): MG in the last 72 hours. Phos:   No results for input(s): PHOS in the last 72 hours. INR: No results for input(s): INR in the last 72 hours.     Radiology Review:   None    IMPRESSION/RECOMMENDATIONS:    PONV  Prior Lap incisional hernia 4/15, repair looks fine    Place in obsv, give fluids, IV pain and nausea meds  FU exam and labs      [facility]

## 2019-04-16 NOTE — ED PROVIDER NOTES
This patient was seen by the Mid-Level Provider. I have seen and examined the patient, agree with the workup, evaluation, management and diagnosis. Care plan has been discussed. My assessment reveals a well-nourished female who appeared to be in pain. The patient's history is significant for surgery for an abdominal hernia yesterday. She presents with abdominal pain. The patient was given antiemetics as well as pain medication. We have contacted her surgeon who has graciously accepted care of the patient. She'll be referred for further care. Examination: Abdominal exam shows some well-healing laparotomy scars. No signs of infection. Positive bowel sounds.     Results for orders placed or performed during the hospital encounter of 04/16/19   CBC Auto Differential   Result Value Ref Range    WBC 10.8 4.0 - 11.0 K/uL    RBC 4.82 4.00 - 5.20 M/uL    Hemoglobin 14.9 12.0 - 16.0 g/dL    Hematocrit 44.4 36.0 - 48.0 %    MCV 92.0 80.0 - 100.0 fL    MCH 30.9 26.0 - 34.0 pg    MCHC 33.6 31.0 - 36.0 g/dL    RDW 13.8 12.4 - 15.4 %    Platelets 680 261 - 364 K/uL    MPV 7.9 5.0 - 10.5 fL    Neutrophils % 82.2 %    Lymphocytes % 12.3 %    Monocytes % 4.5 %    Eosinophils % 0.2 %    Basophils % 0.8 %    Neutrophils # 8.9 (H) 1.7 - 7.7 K/uL    Lymphocytes # 1.3 1.0 - 5.1 K/uL    Monocytes # 0.5 0.0 - 1.3 K/uL    Eosinophils # 0.0 0.0 - 0.6 K/uL    Basophils # 0.1 0.0 - 0.2 K/uL   Comprehensive Metabolic Panel   Result Value Ref Range    Sodium 140 136 - 145 mmol/L    Potassium 3.5 3.5 - 5.1 mmol/L    Chloride 98 (L) 99 - 110 mmol/L    CO2 22 21 - 32 mmol/L    Anion Gap 20 (H) 3 - 16    Glucose 97 70 - 99 mg/dL    BUN 10 7 - 20 mg/dL    CREATININE 0.5 (L) 0.6 - 1.2 mg/dL    GFR Non-African American >60 >60    GFR African American >60 >60    Calcium 9.2 8.3 - 10.6 mg/dL    Total Protein 7.5 6.4 - 8.2 g/dL    Alb 4.4 3.4 - 5.0 g/dL    Albumin/Globulin Ratio 1.4 1.1 - 2.2    Total Bilirubin 0.6 0.0 - 1.0 mg/dL    Alkaline Phosphatase 95 40 - 129 U/L    ALT 21 10 - 40 U/L    AST 29 15 - 37 U/L    Globulin 3.1 g/dL   Lipase   Result Value Ref Range    Lipase 17.0 13.0 - 60.0 U/L   Urinalysis Reflex to Culture   Result Value Ref Range    Color, UA YELLOW Straw/Yellow    Clarity, UA Clear Clear    Glucose, Ur Negative Negative mg/dL    Bilirubin Urine Negative Negative    Ketones, Urine 40 (A) Negative mg/dL    Specific Gravity, UA 1.012 1.005 - 1.030    Blood, Urine Negative Negative    pH, UA 7.0 5.0 - 8.0    Protein, UA TRACE (A) Negative mg/dL    Urobilinogen, Urine 0.2 <2.0 E.U./dL    Nitrite, Urine Negative Negative    Leukocyte Esterase, Urine SMALL (A) Negative    Microscopic Examination YES     Urine Reflex to Culture Yes     Urine Type Voided    Microscopic Urinalysis   Result Value Ref Range    Hyaline Casts, UA 1 0 - 8 /LPF    WBC, UA 12 (H) 0 - 5 /HPF    RBC, UA 4 0 - 4 /HPF    Epi Cells 2 0 - 5 /HPF   EKG 12 Lead   Result Value Ref Range    Ventricular Rate 99 BPM    Atrial Rate 99 BPM    P-R Interval 128 ms    QRS Duration 80 ms    Q-T Interval 396 ms    QTc Calculation (Bazett) 508 ms    P Axis 85 degrees    R Axis 76 degrees    T Axis 59 degrees    Diagnosis       Normal sinus rhythmNonspecific ST abnormalityProlonged QTAbnormal ECGQT has lengthenedConfirmed by JUAN Escobar MD ([id number]) on 4/16/2019 12:52:30 PM     [facility]to Digital Screen Bilateral    Result Date: 3/19/2019  EXAMINATION: SCREENING DIGITAL BILATERAL  MAMMOGRAM WITH TOMOSYNTHESIS, 3/19/2019 7:50 am TECHNIQUE: Standard digital mammographic and tomographic views of the breasts were obtained. Computer-aided detection was utilized in the interpretation. COMPARISON: 02/20/2015 HISTORY: Annual screening FINDINGS: The parenchymal pattern is stable in appearance with extremely dense architecture. There are no suspicious masses or focal pleomorphic microcalcifications evident to reflect malignancy. Negative comparison.  BIRADS: ASSESSMENT:  ACR BI-RADS 1-NEGATIVE BI-RADS breast density:  D - The breast are extremely dense, which lowers the sensitivity of mammography. RECOMMENDATION:  Routine screening mammography in 1 year. A letter of notification will be sent to the patient regarding the results. The [facility] recommend annual mammograms for women 40 years and older.              Lashell Campos MD  04/16/19 0222

## 2019-04-16 NOTE — ED NOTES
Bed: 10  Expected date:   Expected time:   Means of arrival:   Comments:  Medic [address], [id number] [facility]  04/16/19 [id number]

## 2019-04-17 VITALS
RESPIRATION RATE: 14 BRPM | WEIGHT: 105.5 LBS | BODY MASS INDEX: 15.99 KG/M2 | HEIGHT: 68 IN | HEART RATE: 94 BPM | TEMPERATURE: 98.4 F | OXYGEN SATURATION: 94 % | DIASTOLIC BLOOD PRESSURE: 74 MMHG | SYSTOLIC BLOOD PRESSURE: 117 MMHG

## 2019-04-17 LAB
ANION GAP SERPL CALCULATED.3IONS-SCNC: 10 MMOL/L (ref 3–16)
BASOPHILS ABSOLUTE: 0 K/UL (ref 0–0.2)
BASOPHILS RELATIVE PERCENT: 0.5 %
BUN BLDV-MCNC: 11 MG/DL (ref 7–20)
CALCIUM SERPL-MCNC: 8.7 MG/DL (ref 8.3–10.6)
CHLORIDE BLD-SCNC: 103 MMOL/L (ref 99–110)
CO2: 25 MMOL/L (ref 21–32)
CREAT SERPL-MCNC: 0.6 MG/DL (ref 0.6–1.2)
EOSINOPHILS ABSOLUTE: 0 K/UL (ref 0–0.6)
EOSINOPHILS RELATIVE PERCENT: 0.4 %
GFR AFRICAN AMERICAN: >60
GFR NON-AFRICAN AMERICAN: >60
GLUCOSE BLD-MCNC: 125 MG/DL (ref 70–99)
HCT VFR BLD CALC: 44.3 % (ref 36–48)
HEMOGLOBIN: 14.6 G/DL (ref 12–16)
LYMPHOCYTES ABSOLUTE: 1.9 K/UL (ref 1–5.1)
LYMPHOCYTES RELATIVE PERCENT: 18.1 %
MCH RBC QN AUTO: 30.5 PG (ref 26–34)
MCHC RBC AUTO-ENTMCNC: 32.9 G/DL (ref 31–36)
MCV RBC AUTO: 92.5 FL (ref 80–100)
MONOCYTES ABSOLUTE: 1 K/UL (ref 0–1.3)
MONOCYTES RELATIVE PERCENT: 9.2 %
NEUTROPHILS ABSOLUTE: 7.4 K/UL (ref 1.7–7.7)
NEUTROPHILS RELATIVE PERCENT: 71.8 %
PDW BLD-RTO: 13.7 % (ref 12.4–15.4)
PLATELET # BLD: 238 K/UL (ref 135–450)
PMV BLD AUTO: 7.6 FL (ref 5–10.5)
POTASSIUM SERPL-SCNC: 4.5 MMOL/L (ref 3.5–5.1)
RBC # BLD: 4.79 M/UL (ref 4–5.2)
SODIUM BLD-SCNC: 138 MMOL/L (ref 136–145)
URINE CULTURE, ROUTINE: NORMAL
WBC # BLD: 10.3 K/UL (ref 4–11)

## 2019-04-17 PROCEDURE — 6360000002 HC RX W HCPCS: Performed by: SURGERY

## 2019-04-17 PROCEDURE — 99024 POSTOP FOLLOW-UP VISIT: CPT | Performed by: SURGERY

## 2019-04-17 PROCEDURE — 80048 BASIC METABOLIC PNL TOTAL CA: CPT

## 2019-04-17 PROCEDURE — 36415 COLL VENOUS BLD VENIPUNCTURE: CPT

## 2019-04-17 PROCEDURE — G0378 HOSPITAL OBSERVATION PER HR: HCPCS

## 2019-04-17 PROCEDURE — C9113 INJ PANTOPRAZOLE SODIUM, VIA: HCPCS | Performed by: SURGERY

## 2019-04-17 PROCEDURE — 85025 COMPLETE CBC W/AUTO DIFF WBC: CPT

## 2019-04-17 PROCEDURE — 2500000003 HC RX 250 WO HCPCS: Performed by: SURGERY

## 2019-04-17 PROCEDURE — 96376 TX/PRO/DX INJ SAME DRUG ADON: CPT

## 2019-04-17 PROCEDURE — 2580000003 HC RX 258: Performed by: SURGERY

## 2019-04-17 RX ORDER — ONDANSETRON 4 MG/1
4 TABLET, FILM COATED ORAL EVERY 4 HOURS PRN
Qty: 10 TABLET | Refills: 0 | Status: SHIPPED | OUTPATIENT
Start: 2019-04-17 | End: 2020-11-19 | Stop reason: SDUPTHER

## 2019-04-17 RX ORDER — PROMETHAZINE HYDROCHLORIDE 12.5 MG/1
12.5 TABLET ORAL 4 TIMES DAILY PRN
Qty: 20 TABLET | Refills: 0 | Status: SHIPPED | OUTPATIENT
Start: 2019-04-17 | End: 2019-04-24

## 2019-04-17 RX ADMIN — MORPHINE SULFATE 6 MG: 10 INJECTION INTRAVENOUS at 05:23

## 2019-04-17 RX ADMIN — Medication 10 ML: at 12:08

## 2019-04-17 RX ADMIN — Medication 10 ML: at 08:58

## 2019-04-17 RX ADMIN — POTASSIUM CHLORIDE, DEXTROSE MONOHYDRATE AND SODIUM CHLORIDE: 150; 5; 450 INJECTION, SOLUTION INTRAVENOUS at 05:04

## 2019-04-17 RX ADMIN — MORPHINE SULFATE 6 MG: 10 INJECTION INTRAVENOUS at 08:57

## 2019-04-17 RX ADMIN — METOCLOPRAMIDE 10 MG: 5 INJECTION, SOLUTION INTRAMUSCULAR; INTRAVENOUS at 03:17

## 2019-04-17 RX ADMIN — MORPHINE SULFATE 6 MG: 10 INJECTION INTRAVENOUS at 02:54

## 2019-04-17 RX ADMIN — Medication 10 ML: at 09:13

## 2019-04-17 RX ADMIN — MORPHINE SULFATE 2 MG: 2 INJECTION, SOLUTION INTRAMUSCULAR; INTRAVENOUS at 12:07

## 2019-04-17 RX ADMIN — MORPHINE SULFATE 6 MG: 10 INJECTION INTRAVENOUS at 00:14

## 2019-04-17 RX ADMIN — METOCLOPRAMIDE 10 MG: 5 INJECTION, SOLUTION INTRAMUSCULAR; INTRAVENOUS at 08:57

## 2019-04-17 RX ADMIN — PANTOPRAZOLE SODIUM 40 MG: 40 INJECTION, POWDER, FOR SOLUTION INTRAVENOUS at 08:56

## 2019-04-17 RX ADMIN — ONDANSETRON 4 MG: 2 INJECTION INTRAMUSCULAR; INTRAVENOUS at 12:07

## 2019-04-17 RX ADMIN — PROMETHAZINE HYDROCHLORIDE 12.5 MG: 25 INJECTION INTRAMUSCULAR; INTRAVENOUS at 08:56

## 2019-04-17 ASSESSMENT — PAIN DESCRIPTION - FREQUENCY
FREQUENCY: CONTINUOUS

## 2019-04-17 ASSESSMENT — PAIN DESCRIPTION - PAIN TYPE
TYPE: SURGICAL PAIN

## 2019-04-17 ASSESSMENT — PAIN DESCRIPTION - DESCRIPTORS
DESCRIPTORS: CONSTANT
DESCRIPTORS: CONSTANT;SHARP;STABBING
DESCRIPTORS: SHARP;CONSTANT
DESCRIPTORS: CONSTANT

## 2019-04-17 ASSESSMENT — PAIN SCALES - GENERAL
PAINLEVEL_OUTOF10: 6
PAINLEVEL_OUTOF10: 7
PAINLEVEL_OUTOF10: 8
PAINLEVEL_OUTOF10: 8
PAINLEVEL_OUTOF10: 9
PAINLEVEL_OUTOF10: 10
PAINLEVEL_OUTOF10: 7
PAINLEVEL_OUTOF10: 10
PAINLEVEL_OUTOF10: 7
PAINLEVEL_OUTOF10: 6
PAINLEVEL_OUTOF10: 0
PAINLEVEL_OUTOF10: 8
PAINLEVEL_OUTOF10: 7
PAINLEVEL_OUTOF10: 9
PAINLEVEL_OUTOF10: 7

## 2019-04-17 ASSESSMENT — PAIN DESCRIPTION - ONSET
ONSET: ON-GOING

## 2019-04-17 ASSESSMENT — PAIN DESCRIPTION - LOCATION
LOCATION: ABDOMEN;BACK
LOCATION: ABDOMEN;BACK
LOCATION: ABDOMEN

## 2019-04-17 ASSESSMENT — PAIN DESCRIPTION - ORIENTATION: ORIENTATION: LOWER

## 2019-04-17 NOTE — CARE COORDINATION
Patient admitted as Observation/OPIB with an anticipated short hospitalization length of stay. Chart reviewed and it appears that patient has minimal needs for discharge at this time. Discussed with patients nurse and requested that case management be notified if discharge needs are identified. *Case management will continue to follow progress and update discharge plan as needed.

## 2019-04-17 NOTE — DISCHARGE SUMMARY
capsule  TAKE 1 CAPSULE BY MOUTH DAILY             diclofenac sodium (VOLTAREN) 1 % GEL  Apply 4 grams to lower extremity joints and 2 grams to upper extremity joints as needed 4 times/day, do not exceed 32 grams/day or 16 grams/joint/day             fluticasone (FLOVENT HFA) 110 MCG/ACT inhaler  INHALE 1 PUFF INTO THE LUNGS TWICE DAILY             gabapentin (NEURONTIN) 300 MG capsule  Take 300 mg by mouth 2 times daily. HYDROcodone-acetaminophen (NORCO) 7.5-325 MG per tablet  Take 1 tablet by mouth every 6 hours as needed for Pain for up to 30 days. NITROSTAT 0.4 MG SL tablet  PLACE ONE TABLET UNDER THE TONGUE EVERY 5 MINUTES AS NEEDED FOR CHEST PAIN             Nutritional Supplements (BOOST CALORIE SMART) LIQD  Take 1 Can by mouth See Admin Instructions EVERY 3 DAYS             ondansetron (ZOFRAN) 4 MG tablet  Take 1 tablet by mouth every 4 hours as needed for Nausea             PROAIR  (90 Base) MCG/ACT inhaler  INHALE 2 PUFFS BY MOUTH EVERY 4 HOURS AS NEEDED FOR WHEEZING             promethazine (PHENERGAN) 12.5 MG tablet  Take 1 tablet by mouth 4 times daily as needed for Nausea             sertraline (ZOLOFT) 100 MG tablet  TAKE 1 TABLET BY MOUTH THREE TIMES DAILY             sucralfate (CARAFATE) 1 GM tablet  Take 1 tablet by mouth 4 times daily             tiZANidine (ZANAFLEX) 4 MG tablet  TAKE 1 TABLET BY MOUTH THREE TIMES DAILY             traZODone (DESYREL) 50 MG tablet  TAKE 2 TABLETS BY MOUTH EVERY NIGHT                  HPI and Hospital Course:     Pt admitted and underwent tx for PONV. She is discharged to home with normal a temperature and vitals, tolerating an oral diet well, and having normal bowel activity. Follow up is planned, and all questions have been answered.     [facility]

## 2019-04-17 NOTE — PROGRESS NOTES
Discharge instructions given to pt with understanding and teach back. Pt discharged home with all belongings.   Maye Sorensen RN

## 2019-04-17 NOTE — PLAN OF CARE
Problem: Nausea/Vomiting:  Goal: Absence of nausea/vomiting  Description  Absence of nausea/vomiting  Outcome: Ongoing     Problem: Cardiovascular  Goal: Hemodynamic stability  Outcome: Ongoing   Pt in bed c/o continued pain s/p morphine; next dose not allowed at this time. Will continue to monitor. VSS. Gave phenergan now per pt request for c/o nausea. See [facility] & all flowsheets.      Vitals:    04/16/19 1945   BP: 131/80   Pulse: 117   Resp: 14   Temp: 97.7 °F (36.5 °C)   SpO2: 97%

## 2019-04-17 NOTE — PLAN OF CARE
Problem: Pain:  Goal: Pain level will decrease  Description  Pain level will decrease  Outcome: Ongoing   Gave morphine now for c/o 8/10 abd pain;see MAR & all navdeep.

## 2019-04-17 NOTE — PLAN OF CARE
Problem: Pain:  Goal: Pain level will decrease  Description  Pain level will decrease  Outcome: Ongoing  Pt c/o pain;gave morphine 6mg IV at  0254. See [facility] & all flowsheets. Will continue to monitor.

## 2019-04-30 ENCOUNTER — OFFICE VISIT (OUTPATIENT)
Dept: SURGERY | Age: 64
End: 2019-04-30

## 2019-04-30 VITALS — SYSTOLIC BLOOD PRESSURE: 99 MMHG | WEIGHT: 103 LBS | BODY MASS INDEX: 15.66 KG/M2 | DIASTOLIC BLOOD PRESSURE: 60 MMHG

## 2019-04-30 DIAGNOSIS — G44.001 INTRACTABLE CLUSTER HEADACHE SYNDROME, UNSPECIFIED CHRONICITY PATTERN: ICD-10-CM

## 2019-04-30 DIAGNOSIS — K43.2 INCISIONAL HERNIA, WITHOUT OBSTRUCTION OR GANGRENE: Primary | ICD-10-CM

## 2019-04-30 PROCEDURE — 99024 POSTOP FOLLOW-UP VISIT: CPT | Performed by: SURGERY

## 2019-04-30 RX ORDER — BUTALBITAL, ACETAMINOPHEN AND CAFFEINE 50; 325; 40 MG/1; MG/1; MG/1
1 TABLET ORAL EVERY 4 HOURS PRN
Qty: 30 TABLET | Refills: 0 | Status: SHIPPED | OUTPATIENT
Start: 2019-04-30 | End: 2019-08-15 | Stop reason: SDUPTHER

## 2019-04-30 NOTE — PROGRESS NOTES
[facility]              PATIENT NAME: Juan Manuel Toth     TODAY'S DATE: 4/30/2019    SUBJECTIVE:      Pt. returns to the office today following a lap ventral incisional hernia repair with mesh. She had surgery on 4/15 at [facility]. She has been recovering well to date, with progression towards normal activity and diet. She has no complaints today. Feeling much better, had post op nausea and vomiting initially requiring post op admit to hospital.          OBJECTIVE:   VITALS:  BP 99/60   Wt 103 lb (46.7 kg)   BMI 15.66 kg/m²                                  CONSTITUTIONAL:  awake and alert  LUNGS:  clear to auscultation  ABDOMEN:   Hernia repair is intact, normal bowel sounds, soft, non-distended, non-tender except at incisions  INCISIONS: clean, dry, no drainage, healing        ASSESSMENT AND PLAN:  71 y.o. female status post lap ventral incisional hernia repair. Her recovery is progressing uneventfully, and she is released to progressive normal activity. PONV is resolved. She will call or return for any additional problems or questions.       Nguyen Miramontes

## 2019-05-01 DIAGNOSIS — M15.9 PRIMARY OSTEOARTHRITIS INVOLVING MULTIPLE JOINTS: ICD-10-CM

## 2019-05-09 DIAGNOSIS — G89.4 CHRONIC PAIN SYNDROME: ICD-10-CM

## 2019-05-09 RX ORDER — HYDROCODONE BITARTRATE AND ACETAMINOPHEN 7.5; 325 MG/1; MG/1
1 TABLET ORAL EVERY 6 HOURS PRN
Qty: 120 TABLET | Refills: 0 | Status: SHIPPED | OUTPATIENT
Start: 2019-05-09 | End: 2019-06-07 | Stop reason: SDUPTHER

## 2019-05-09 NOTE — TELEPHONE ENCOUNTER
Gautam  4/9/19 #120   LOV 4/9/19 Problem: Patient Care Overview  Goal: Plan of Care Review    Recommendations     Recommendation/Intervention:   1. Continue current renal diet and encourage PO intake. Intake adequate at this time.   2. RD following.     Goals: 1. Pt to receive nutrition < 48 hrs.  Nutrition Goal Status: goal met

## 2019-05-15 ENCOUNTER — OFFICE VISIT (OUTPATIENT)
Dept: ENT CLINIC | Age: 64
End: 2019-05-15
Payer: MEDICARE

## 2019-05-15 VITALS — DIASTOLIC BLOOD PRESSURE: 58 MMHG | SYSTOLIC BLOOD PRESSURE: 102 MMHG | OXYGEN SATURATION: 95 % | HEART RATE: 84 BPM

## 2019-05-15 DIAGNOSIS — G44.001 INTRACTABLE CLUSTER HEADACHE SYNDROME, UNSPECIFIED CHRONICITY PATTERN: ICD-10-CM

## 2019-05-15 DIAGNOSIS — J30.9 ALLERGIC SINUSITIS: Primary | ICD-10-CM

## 2019-05-15 PROCEDURE — 1090F PRES/ABSN URINE INCON ASSESS: CPT | Performed by: OTOLARYNGOLOGY

## 2019-05-15 PROCEDURE — 1036F TOBACCO NON-USER: CPT | Performed by: OTOLARYNGOLOGY

## 2019-05-15 PROCEDURE — G8400 PT W/DXA NO RESULTS DOC: HCPCS | Performed by: OTOLARYNGOLOGY

## 2019-05-15 PROCEDURE — 99213 OFFICE O/P EST LOW 20 MIN: CPT | Performed by: OTOLARYNGOLOGY

## 2019-05-15 PROCEDURE — 1123F ACP DISCUSS/DSCN MKR DOCD: CPT | Performed by: OTOLARYNGOLOGY

## 2019-05-15 PROCEDURE — 4040F PNEUMOC VAC/ADMIN/RCVD: CPT | Performed by: OTOLARYNGOLOGY

## 2019-05-15 PROCEDURE — 96372 THER/PROPH/DIAG INJ SC/IM: CPT | Performed by: OTOLARYNGOLOGY

## 2019-05-15 PROCEDURE — G8428 CUR MEDS NOT DOCUMENT: HCPCS | Performed by: OTOLARYNGOLOGY

## 2019-05-15 PROCEDURE — 3017F COLORECTAL CA SCREEN DOC REV: CPT | Performed by: OTOLARYNGOLOGY

## 2019-05-15 PROCEDURE — G8419 CALC BMI OUT NRM PARAM NOF/U: HCPCS | Performed by: OTOLARYNGOLOGY

## 2019-05-15 RX ORDER — BUTALBITAL, ACETAMINOPHEN AND CAFFEINE 50; 325; 40 MG/1; MG/1; MG/1
1 TABLET ORAL EVERY 4 HOURS PRN
Qty: 50 TABLET | Refills: 1 | Status: SHIPPED | OUTPATIENT
Start: 2019-05-15 | End: 2019-06-10 | Stop reason: SDUPTHER

## 2019-05-15 RX ORDER — METHYLPREDNISOLONE ACETATE 40 MG/ML
40 INJECTION, SUSPENSION INTRA-ARTICULAR; INTRALESIONAL; INTRAMUSCULAR; SOFT TISSUE ONCE
Status: COMPLETED | OUTPATIENT
Start: 2019-05-15 | End: 2019-05-15

## 2019-05-15 RX ADMIN — METHYLPREDNISOLONE ACETATE 40 MG: 40 INJECTION, SUSPENSION INTRA-ARTICULAR; INTRALESIONAL; INTRAMUSCULAR; SOFT TISSUE at 13:04

## 2019-05-16 DIAGNOSIS — M15.9 PRIMARY OSTEOARTHRITIS INVOLVING MULTIPLE JOINTS: ICD-10-CM

## 2019-06-06 ENCOUNTER — OFFICE VISIT (OUTPATIENT)
Dept: SURGERY | Age: 64
End: 2019-06-06

## 2019-06-06 VITALS — DIASTOLIC BLOOD PRESSURE: 72 MMHG | WEIGHT: 102 LBS | SYSTOLIC BLOOD PRESSURE: 119 MMHG | BODY MASS INDEX: 15.51 KG/M2

## 2019-06-06 DIAGNOSIS — T14.8XXA MUSCLE STRAIN: Primary | ICD-10-CM

## 2019-06-06 PROCEDURE — 99024 POSTOP FOLLOW-UP VISIT: CPT | Performed by: SURGERY

## 2019-06-06 NOTE — PROGRESS NOTES
[facility]              PATIENT NAME: Trevin Palencia     TODAY'S DATE: 6/6/2019    SUBJECTIVE:      Pt. returns to the office today following a lap ventral incisional hernia repair with mesh. She had surgery on 4/15 at [facility]. She has been recovered, had concern of left and pain. More with lying on side, some cramping with movement. No GI sx's. Mild overall, but more with pressure. Present mid and LLQ region. OBJECTIVE:   VITALS:  /72   Wt 102 lb (46.3 kg)   BMI 15.51 kg/m²                                  CONSTITUTIONAL:  awake and alert  LUNGS:  clear to auscultation  ABDOMEN:   Hernia repair is intact, normal bowel sounds, soft, non-distended, non-tender   INCISIONS: clean, dry, healed        ASSESSMENT AND PLAN:  71 y.o. female status post ventral incisional hernia repair. Has muscle strain of abd wall, lying on side, etc  Will do local care, heating pad. No testing or surgery needed. She will call or return for any additional problems or questions.       Blue Molina

## 2019-06-07 DIAGNOSIS — G89.4 CHRONIC PAIN SYNDROME: ICD-10-CM

## 2019-06-07 RX ORDER — HYDROCODONE BITARTRATE AND ACETAMINOPHEN 7.5; 325 MG/1; MG/1
1 TABLET ORAL EVERY 6 HOURS PRN
Qty: 120 TABLET | Refills: 0 | Status: SHIPPED | OUTPATIENT
Start: 2019-06-07 | End: 2019-07-08 | Stop reason: SDUPTHER

## 2019-06-07 NOTE — TELEPHONE ENCOUNTER
Medication and Quantity requested: HYDROcodone-acetaminophen (NORCO) 7.5-325 MG per tablet         Last Visit  05/15/19      Pharmacy and phone number updated in EPIC:  Yes, Ingrid

## 2019-06-10 DIAGNOSIS — G44.001 INTRACTABLE CLUSTER HEADACHE SYNDROME, UNSPECIFIED CHRONICITY PATTERN: ICD-10-CM

## 2019-06-10 RX ORDER — BUTALBITAL, ACETAMINOPHEN AND CAFFEINE 50; 325; 40 MG/1; MG/1; MG/1
1 TABLET ORAL EVERY 4 HOURS PRN
Qty: 50 TABLET | Refills: 0 | Status: SHIPPED | OUTPATIENT
Start: 2019-06-10 | End: 2019-06-21 | Stop reason: SDUPTHER

## 2019-06-14 ENCOUNTER — TELEPHONE (OUTPATIENT)
Dept: ENT CLINIC | Age: 64
End: 2019-06-14

## 2019-06-14 DIAGNOSIS — J32.9 RECURRENT SINUSITIS: Primary | ICD-10-CM

## 2019-06-14 RX ORDER — CEFDINIR 300 MG/1
300 CAPSULE ORAL 2 TIMES DAILY
Qty: 20 CAPSULE | Refills: 0 | Status: SHIPPED | OUTPATIENT
Start: 2019-06-14 | End: 2019-08-15 | Stop reason: ALTCHOICE

## 2019-06-21 DIAGNOSIS — G44.001 INTRACTABLE CLUSTER HEADACHE SYNDROME, UNSPECIFIED CHRONICITY PATTERN: ICD-10-CM

## 2019-06-24 RX ORDER — BUTALBITAL, ACETAMINOPHEN AND CAFFEINE 50; 325; 40 MG/1; MG/1; MG/1
1 TABLET ORAL EVERY 4 HOURS PRN
Qty: 50 TABLET | Refills: 0 | Status: SHIPPED | OUTPATIENT
Start: 2019-06-24 | End: 2019-07-06 | Stop reason: SDUPTHER

## 2019-07-05 DIAGNOSIS — G44.001 INTRACTABLE CLUSTER HEADACHE SYNDROME, UNSPECIFIED CHRONICITY PATTERN: Primary | ICD-10-CM

## 2019-07-06 RX ORDER — BUTALBITAL, ACETAMINOPHEN AND CAFFEINE 50; 325; 40 MG/1; MG/1; MG/1
1 TABLET ORAL EVERY 4 HOURS PRN
Qty: 50 TABLET | Refills: 0 | Status: SHIPPED | OUTPATIENT
Start: 2019-07-06 | End: 2019-07-17 | Stop reason: SDUPTHER

## 2019-07-08 DIAGNOSIS — G89.4 CHRONIC PAIN SYNDROME: ICD-10-CM

## 2019-07-08 RX ORDER — HYDROCODONE BITARTRATE AND ACETAMINOPHEN 7.5; 325 MG/1; MG/1
1 TABLET ORAL EVERY 6 HOURS PRN
Qty: 120 TABLET | Refills: 0 | Status: SHIPPED | OUTPATIENT
Start: 2019-07-08 | End: 2019-08-06 | Stop reason: SDUPTHER

## 2019-07-17 DIAGNOSIS — G44.001 INTRACTABLE CLUSTER HEADACHE SYNDROME, UNSPECIFIED CHRONICITY PATTERN: ICD-10-CM

## 2019-07-17 RX ORDER — BUTALBITAL, ACETAMINOPHEN AND CAFFEINE 50; 325; 40 MG/1; MG/1; MG/1
1 TABLET ORAL EVERY 4 HOURS PRN
Qty: 50 TABLET | Refills: 0 | Status: SHIPPED | OUTPATIENT
Start: 2019-07-17 | End: 2019-07-29 | Stop reason: SDUPTHER

## 2019-07-21 DIAGNOSIS — K21.9 GASTROESOPHAGEAL REFLUX DISEASE WITHOUT ESOPHAGITIS: ICD-10-CM

## 2019-07-23 RX ORDER — DEXLANSOPRAZOLE 60 MG/1
CAPSULE, DELAYED RELEASE ORAL
Qty: 30 CAPSULE | Refills: 11 | Status: SHIPPED | OUTPATIENT
Start: 2019-07-23 | End: 2020-07-22

## 2019-07-29 ENCOUNTER — OFFICE VISIT (OUTPATIENT)
Dept: FAMILY MEDICINE CLINIC | Age: 64
End: 2019-07-29
Payer: MEDICARE

## 2019-07-29 VITALS
WEIGHT: 101.6 LBS | BODY MASS INDEX: 15.45 KG/M2 | HEART RATE: 99 BPM | DIASTOLIC BLOOD PRESSURE: 78 MMHG | SYSTOLIC BLOOD PRESSURE: 118 MMHG | OXYGEN SATURATION: 97 %

## 2019-07-29 DIAGNOSIS — M15.9 PRIMARY OSTEOARTHRITIS INVOLVING MULTIPLE JOINTS: ICD-10-CM

## 2019-07-29 DIAGNOSIS — G44.001 INTRACTABLE CLUSTER HEADACHE SYNDROME, UNSPECIFIED CHRONICITY PATTERN: ICD-10-CM

## 2019-07-29 DIAGNOSIS — R10.13 EPIGASTRIC PAIN: Primary | ICD-10-CM

## 2019-07-29 PROCEDURE — G8419 CALC BMI OUT NRM PARAM NOF/U: HCPCS | Performed by: FAMILY MEDICINE

## 2019-07-29 PROCEDURE — 1123F ACP DISCUSS/DSCN MKR DOCD: CPT | Performed by: FAMILY MEDICINE

## 2019-07-29 PROCEDURE — 1090F PRES/ABSN URINE INCON ASSESS: CPT | Performed by: FAMILY MEDICINE

## 2019-07-29 PROCEDURE — 4040F PNEUMOC VAC/ADMIN/RCVD: CPT | Performed by: FAMILY MEDICINE

## 2019-07-29 PROCEDURE — G8400 PT W/DXA NO RESULTS DOC: HCPCS | Performed by: FAMILY MEDICINE

## 2019-07-29 PROCEDURE — 3017F COLORECTAL CA SCREEN DOC REV: CPT | Performed by: FAMILY MEDICINE

## 2019-07-29 PROCEDURE — 1036F TOBACCO NON-USER: CPT | Performed by: FAMILY MEDICINE

## 2019-07-29 PROCEDURE — 99213 OFFICE O/P EST LOW 20 MIN: CPT | Performed by: FAMILY MEDICINE

## 2019-07-29 PROCEDURE — G8427 DOCREV CUR MEDS BY ELIG CLIN: HCPCS | Performed by: FAMILY MEDICINE

## 2019-07-29 RX ORDER — BUTALBITAL, ACETAMINOPHEN AND CAFFEINE 50; 325; 40 MG/1; MG/1; MG/1
1 TABLET ORAL EVERY 4 HOURS PRN
Qty: 50 TABLET | Refills: 0 | Status: SHIPPED | OUTPATIENT
Start: 2019-07-29 | End: 2019-08-12 | Stop reason: SDUPTHER

## 2019-07-29 RX ORDER — SUCRALFATE 1 G/1
1 TABLET ORAL 4 TIMES DAILY
Qty: 120 TABLET | Refills: 3 | Status: SHIPPED | OUTPATIENT
Start: 2019-07-29 | End: 2019-07-29 | Stop reason: SDUPTHER

## 2019-07-29 RX ORDER — SUCRALFATE 1 G/1
1 TABLET ORAL 4 TIMES DAILY
Qty: 120 TABLET | Refills: 3 | Status: SHIPPED | OUTPATIENT
Start: 2019-07-29 | End: 2019-08-15 | Stop reason: SINTOL

## 2019-07-29 ASSESSMENT — ENCOUNTER SYMPTOMS
CONSTIPATION: 0
ABDOMINAL PAIN: 1
NAUSEA: 1
BLOOD IN STOOL: 0
ANAL BLEEDING: 0
VOMITING: 0
BACK PAIN: 1
DIARRHEA: 0
RESPIRATORY NEGATIVE: 1

## 2019-08-01 ENCOUNTER — HOSPITAL ENCOUNTER (OUTPATIENT)
Dept: ULTRASOUND IMAGING | Age: 64
Discharge: HOME | End: 2019-08-01
Payer: MEDICARE

## 2019-08-01 DIAGNOSIS — R10.13 EPIGASTRIC PAIN: ICD-10-CM

## 2019-08-01 PROCEDURE — 76705 ECHO EXAM OF ABDOMEN: CPT

## 2019-08-06 DIAGNOSIS — G89.4 CHRONIC PAIN SYNDROME: ICD-10-CM

## 2019-08-06 RX ORDER — HYDROCODONE BITARTRATE AND ACETAMINOPHEN 7.5; 325 MG/1; MG/1
1 TABLET ORAL EVERY 6 HOURS PRN
Qty: 120 TABLET | Refills: 0 | Status: SHIPPED | OUTPATIENT
Start: 2019-08-06 | End: 2019-09-06 | Stop reason: SDUPTHER

## 2019-08-12 ENCOUNTER — TELEPHONE (OUTPATIENT)
Dept: CARDIOLOGY CLINIC | Age: 64
End: 2019-08-12

## 2019-08-12 DIAGNOSIS — G44.001 INTRACTABLE CLUSTER HEADACHE SYNDROME, UNSPECIFIED CHRONICITY PATTERN: ICD-10-CM

## 2019-08-12 RX ORDER — BUTALBITAL, ACETAMINOPHEN AND CAFFEINE 50; 325; 40 MG/1; MG/1; MG/1
1 TABLET ORAL EVERY 4 HOURS PRN
Qty: 50 TABLET | Refills: 0 | OUTPATIENT
Start: 2019-08-12

## 2019-08-12 RX ORDER — BUTALBITAL, ACETAMINOPHEN AND CAFFEINE 50; 325; 40 MG/1; MG/1; MG/1
1 TABLET ORAL EVERY 4 HOURS PRN
Qty: 50 TABLET | Refills: 0 | Status: SHIPPED | OUTPATIENT
Start: 2019-08-12 | End: 2019-08-15 | Stop reason: SDUPTHER

## 2019-08-15 ENCOUNTER — OFFICE VISIT (OUTPATIENT)
Dept: CARDIOLOGY CLINIC | Age: 64
End: 2019-08-15
Payer: MEDICARE

## 2019-08-15 VITALS
HEIGHT: 68 IN | BODY MASS INDEX: 15.31 KG/M2 | OXYGEN SATURATION: 94 % | WEIGHT: 101 LBS | DIASTOLIC BLOOD PRESSURE: 80 MMHG | HEART RATE: 85 BPM | SYSTOLIC BLOOD PRESSURE: 110 MMHG

## 2019-08-15 DIAGNOSIS — I42.8 NONISCHEMIC CARDIOMYOPATHY (HCC): Primary | ICD-10-CM

## 2019-08-15 DIAGNOSIS — I31.39 PERICARDIAL EFFUSION: ICD-10-CM

## 2019-08-15 DIAGNOSIS — I10 ESSENTIAL HYPERTENSION: ICD-10-CM

## 2019-08-15 PROCEDURE — 99214 OFFICE O/P EST MOD 30 MIN: CPT | Performed by: NURSE PRACTITIONER

## 2019-08-15 PROCEDURE — G8400 PT W/DXA NO RESULTS DOC: HCPCS | Performed by: NURSE PRACTITIONER

## 2019-08-15 PROCEDURE — 4040F PNEUMOC VAC/ADMIN/RCVD: CPT | Performed by: NURSE PRACTITIONER

## 2019-08-15 PROCEDURE — 1036F TOBACCO NON-USER: CPT | Performed by: NURSE PRACTITIONER

## 2019-08-15 PROCEDURE — 1090F PRES/ABSN URINE INCON ASSESS: CPT | Performed by: NURSE PRACTITIONER

## 2019-08-15 PROCEDURE — 3017F COLORECTAL CA SCREEN DOC REV: CPT | Performed by: NURSE PRACTITIONER

## 2019-08-15 PROCEDURE — G8419 CALC BMI OUT NRM PARAM NOF/U: HCPCS | Performed by: NURSE PRACTITIONER

## 2019-08-15 PROCEDURE — G8427 DOCREV CUR MEDS BY ELIG CLIN: HCPCS | Performed by: NURSE PRACTITIONER

## 2019-08-15 PROCEDURE — 1123F ACP DISCUSS/DSCN MKR DOCD: CPT | Performed by: NURSE PRACTITIONER

## 2019-08-15 NOTE — PROGRESS NOTES
sounds, non-distended and non-tender to palpation  EXT: No edema, no calf tenderness. Pulses are present bilaterally. DATA:    Lab Results   Component Value Date    ALT 21 04/16/2019    AST 29 04/16/2019    ALKPHOS 95 04/16/2019    BILITOT 0.6 04/16/2019     Lab Results   Component Value Date    CREATININE 0.6 04/17/2019    BUN 11 04/17/2019     04/17/2019    K 4.5 04/17/2019     04/17/2019    CO2 25 04/17/2019     Lab Results   Component Value Date    TSH 0.34 09/04/2018    F3QSRMH 4.7 09/04/2018     Lab Results   Component Value Date    WBC 10.3 04/17/2019    HGB 14.6 04/17/2019    HCT 44.3 04/17/2019    MCV 92.5 04/17/2019     04/17/2019     No components found for: CHLPL  Lab Results   Component Value Date    TRIG 134 03/19/2019    TRIG 148 01/24/2011    TRIG 137 07/20/2010     Lab Results   Component Value Date    HDL 59 03/19/2019    HDL 64 (H) 01/24/2011    HDL 51 07/20/2010     Lab Results   Component Value Date    LDLCALC 114 (H) 03/19/2019    LDLCALC 139 (H) 01/24/2011    LDLCALC 169 (H) 07/20/2010     Lab Results   Component Value Date    LABVLDL 27 03/19/2019    LABVLDL 30 01/24/2011    LABVLDL 27 07/20/2010     Radiology Review:  Pertinent images / reports were reviewed as a part of this visit and reveals the following:    3/26/14: Holter: NSR avg 86 min 61 max 146. Brief episodes of AT    Last Echo: Dec '14:  Summary   -Normal left ventricle size, wall thickness and systolic function with an   estimated ejection fraction of 55%.   -Mild mitral prolapse and regurgitation is present.   -There is mild tricuspid regurgitation with RVSP estimated at 30 mmHg.   -There is a small localized pericardial effusion noted.     Last Stress Test: May '16:  Summary       No EKG evidence for ischemia with lexiscan       Preserved LV systolic function       Myocardial perfusion imaging with no evidence for ischemia with lexiscan         Assessment:      Diagnosis Orders   1.  Nonischemic

## 2019-08-19 ENCOUNTER — OFFICE VISIT (OUTPATIENT)
Dept: ENT CLINIC | Age: 64
End: 2019-08-19
Payer: MEDICARE

## 2019-08-19 VITALS — HEART RATE: 99 BPM | DIASTOLIC BLOOD PRESSURE: 68 MMHG | SYSTOLIC BLOOD PRESSURE: 102 MMHG | OXYGEN SATURATION: 94 %

## 2019-08-19 DIAGNOSIS — J32.9 RECURRENT SINUSITIS: Primary | ICD-10-CM

## 2019-08-19 DIAGNOSIS — J20.9 ACUTE BRONCHITIS, UNSPECIFIED ORGANISM: ICD-10-CM

## 2019-08-19 DIAGNOSIS — J30.9 ALLERGIC SINUSITIS: ICD-10-CM

## 2019-08-19 PROCEDURE — G8419 CALC BMI OUT NRM PARAM NOF/U: HCPCS | Performed by: OTOLARYNGOLOGY

## 2019-08-19 PROCEDURE — 4040F PNEUMOC VAC/ADMIN/RCVD: CPT | Performed by: OTOLARYNGOLOGY

## 2019-08-19 PROCEDURE — G8400 PT W/DXA NO RESULTS DOC: HCPCS | Performed by: OTOLARYNGOLOGY

## 2019-08-19 PROCEDURE — 1090F PRES/ABSN URINE INCON ASSESS: CPT | Performed by: OTOLARYNGOLOGY

## 2019-08-19 PROCEDURE — 1036F TOBACCO NON-USER: CPT | Performed by: OTOLARYNGOLOGY

## 2019-08-19 PROCEDURE — 96372 THER/PROPH/DIAG INJ SC/IM: CPT | Performed by: OTOLARYNGOLOGY

## 2019-08-19 PROCEDURE — 1123F ACP DISCUSS/DSCN MKR DOCD: CPT | Performed by: OTOLARYNGOLOGY

## 2019-08-19 PROCEDURE — 99213 OFFICE O/P EST LOW 20 MIN: CPT | Performed by: OTOLARYNGOLOGY

## 2019-08-19 PROCEDURE — G8427 DOCREV CUR MEDS BY ELIG CLIN: HCPCS | Performed by: OTOLARYNGOLOGY

## 2019-08-19 PROCEDURE — 3017F COLORECTAL CA SCREEN DOC REV: CPT | Performed by: OTOLARYNGOLOGY

## 2019-08-19 RX ORDER — CEFDINIR 300 MG/1
300 CAPSULE ORAL 2 TIMES DAILY
Qty: 20 CAPSULE | Refills: 0 | Status: SHIPPED | OUTPATIENT
Start: 2019-08-19 | End: 2019-11-15 | Stop reason: ALTCHOICE

## 2019-08-19 RX ORDER — METHYLPREDNISOLONE ACETATE 40 MG/ML
40 INJECTION, SUSPENSION INTRA-ARTICULAR; INTRALESIONAL; INTRAMUSCULAR; SOFT TISSUE ONCE
Status: COMPLETED | OUTPATIENT
Start: 2019-08-19 | End: 2019-08-19

## 2019-08-19 RX ADMIN — METHYLPREDNISOLONE ACETATE 40 MG: 40 INJECTION, SUSPENSION INTRA-ARTICULAR; INTRALESIONAL; INTRAMUSCULAR; SOFT TISSUE at 15:21

## 2019-08-21 DIAGNOSIS — G44.001 INTRACTABLE CLUSTER HEADACHE SYNDROME, UNSPECIFIED CHRONICITY PATTERN: ICD-10-CM

## 2019-08-21 RX ORDER — BUTALBITAL, ACETAMINOPHEN AND CAFFEINE 50; 325; 40 MG/1; MG/1; MG/1
1 TABLET ORAL EVERY 4 HOURS PRN
Qty: 50 TABLET | Refills: 0 | Status: SHIPPED | OUTPATIENT
Start: 2019-08-21 | End: 2019-08-31 | Stop reason: SDUPTHER

## 2019-08-22 ENCOUNTER — HOSPITAL ENCOUNTER (OUTPATIENT)
Dept: NON INVASIVE DIAGNOSTICS | Age: 64
Discharge: HOME | End: 2019-08-22
Payer: MEDICARE

## 2019-08-22 LAB
LV EF: 55 %
LVEF MODALITY: NORMAL

## 2019-08-22 PROCEDURE — 93306 TTE W/DOPPLER COMPLETE: CPT

## 2019-08-23 ENCOUNTER — TELEPHONE (OUTPATIENT)
Dept: CARDIOLOGY CLINIC | Age: 64
End: 2019-08-23

## 2019-08-23 DIAGNOSIS — I31.39 PERICARDIAL EFFUSION: Primary | ICD-10-CM

## 2019-08-26 ENCOUNTER — HOSPITAL ENCOUNTER (OUTPATIENT)
Age: 64
Discharge: HOME | End: 2019-08-26
Payer: MEDICARE

## 2019-08-26 DIAGNOSIS — J45.41 MODERATE PERSISTENT ASTHMA WITH ACUTE EXACERBATION: ICD-10-CM

## 2019-08-26 DIAGNOSIS — R05.9 COUGH: ICD-10-CM

## 2019-08-26 DIAGNOSIS — I31.39 PERICARDIAL EFFUSION: ICD-10-CM

## 2019-08-26 LAB
C-REACTIVE PROTEIN: 0.5 MG/L (ref 0–5.1)
SEDIMENTATION RATE, ERYTHROCYTE: 9 MM/HR (ref 0–30)

## 2019-08-26 PROCEDURE — 86140 C-REACTIVE PROTEIN: CPT

## 2019-08-26 PROCEDURE — 36415 COLL VENOUS BLD VENIPUNCTURE: CPT

## 2019-08-26 PROCEDURE — 85652 RBC SED RATE AUTOMATED: CPT

## 2019-08-26 RX ORDER — ALBUTEROL SULFATE 2.5 MG/3ML
SOLUTION RESPIRATORY (INHALATION)
Qty: 180 VIAL | Refills: 11 | Status: SHIPPED | OUTPATIENT
Start: 2019-08-26 | End: 2021-02-19 | Stop reason: SDUPTHER

## 2019-08-27 ENCOUNTER — HOSPITAL ENCOUNTER (OUTPATIENT)
Age: 64
Discharge: HOME | End: 2019-08-27
Payer: MEDICARE

## 2019-08-27 LAB — ANTI-NUCLEAR ANTIBODY (ANA): NEGATIVE

## 2019-08-27 PROCEDURE — 86038 ANTINUCLEAR ANTIBODIES: CPT

## 2019-08-27 PROCEDURE — 36415 COLL VENOUS BLD VENIPUNCTURE: CPT

## 2019-08-28 LAB
ANION GAP SERPL CALCULATED.3IONS-SCNC: 6 MMOL/L (ref 3–16)
BUN BLDV-MCNC: 11 MG/DL (ref 7–25)
CALCIUM SERPL-MCNC: 8.4 MG/DL (ref 8.6–10.3)
CHLORIDE BLD-SCNC: 105 MMOL/L (ref 98–110)
CO2: 30 MMOL/L (ref 21–33)
CREAT SERPL-MCNC: 0.67 MG/DL (ref 0.6–1.3)
GFR, ESTIMATED: >90 SEE NOTE.
GFR, ESTIMATED: >90 SEE NOTE.
GLUCOSE BLD-MCNC: 89 MG/DL (ref 70–100)
HCT VFR BLD CALC: 37.2 % (ref 35–45)
HEMOGLOBIN: 12.7 G/DL (ref 11.7–15.5)
MCH RBC QN AUTO: 31.9 PG (ref 27–33)
MCHC RBC AUTO-ENTMCNC: 34.2 G/DL (ref 32–36)
MCV RBC AUTO: 93.2 FL (ref 80–100)
OSMOLALITY CALCULATION: 291 MOSM/KG (ref 278–305)
PDW BLD-RTO: 13.9 % (ref 11–15)
PLATELET # BLD: 209 10E3/UL (ref 140–400)
PMV BLD AUTO: 7.8 FL (ref 7.5–11.5)
POTASSIUM SERPL-SCNC: 3.9 MMOL/L (ref 3.5–5.3)
RBC # BLD: 3.99 10E6/UL (ref 3.8–5.1)
SODIUM BLD-SCNC: 141 MMOL/L (ref 133–146)
SURGICAL PATHOLOGY REPORT: NORMAL
WBC # BLD: 5.3 10E3/UL (ref 3.8–10.8)

## 2019-08-31 DIAGNOSIS — G44.001 INTRACTABLE CLUSTER HEADACHE SYNDROME, UNSPECIFIED CHRONICITY PATTERN: ICD-10-CM

## 2019-09-03 RX ORDER — BUTALBITAL, ACETAMINOPHEN AND CAFFEINE 50; 325; 40 MG/1; MG/1; MG/1
1 TABLET ORAL EVERY 4 HOURS PRN
Qty: 50 TABLET | Refills: 0 | Status: SHIPPED | OUTPATIENT
Start: 2019-09-03 | End: 2019-09-12 | Stop reason: SDUPTHER

## 2019-09-05 ENCOUNTER — OFFICE VISIT (OUTPATIENT)
Dept: ENT CLINIC | Age: 64
End: 2019-09-05
Payer: MEDICARE

## 2019-09-05 VITALS — OXYGEN SATURATION: 97 % | DIASTOLIC BLOOD PRESSURE: 66 MMHG | SYSTOLIC BLOOD PRESSURE: 108 MMHG | HEART RATE: 80 BPM

## 2019-09-05 DIAGNOSIS — J32.9 RECURRENT SINUSITIS: Primary | ICD-10-CM

## 2019-09-05 DIAGNOSIS — J30.9 ALLERGIC SINUSITIS: ICD-10-CM

## 2019-09-05 PROCEDURE — G8400 PT W/DXA NO RESULTS DOC: HCPCS | Performed by: OTOLARYNGOLOGY

## 2019-09-05 PROCEDURE — 1090F PRES/ABSN URINE INCON ASSESS: CPT | Performed by: OTOLARYNGOLOGY

## 2019-09-05 PROCEDURE — G8419 CALC BMI OUT NRM PARAM NOF/U: HCPCS | Performed by: OTOLARYNGOLOGY

## 2019-09-05 PROCEDURE — 1036F TOBACCO NON-USER: CPT | Performed by: OTOLARYNGOLOGY

## 2019-09-05 PROCEDURE — 99213 OFFICE O/P EST LOW 20 MIN: CPT | Performed by: OTOLARYNGOLOGY

## 2019-09-05 PROCEDURE — G8427 DOCREV CUR MEDS BY ELIG CLIN: HCPCS | Performed by: OTOLARYNGOLOGY

## 2019-09-05 PROCEDURE — 96372 THER/PROPH/DIAG INJ SC/IM: CPT | Performed by: OTOLARYNGOLOGY

## 2019-09-05 PROCEDURE — 3017F COLORECTAL CA SCREEN DOC REV: CPT | Performed by: OTOLARYNGOLOGY

## 2019-09-05 PROCEDURE — 4040F PNEUMOC VAC/ADMIN/RCVD: CPT | Performed by: OTOLARYNGOLOGY

## 2019-09-05 PROCEDURE — 1123F ACP DISCUSS/DSCN MKR DOCD: CPT | Performed by: OTOLARYNGOLOGY

## 2019-09-05 RX ORDER — METHYLPREDNISOLONE ACETATE 40 MG/ML
40 INJECTION, SUSPENSION INTRA-ARTICULAR; INTRALESIONAL; INTRAMUSCULAR; SOFT TISSUE ONCE
Status: COMPLETED | OUTPATIENT
Start: 2019-09-05 | End: 2019-09-05

## 2019-09-05 RX ORDER — DOXYCYCLINE 100 MG/1
100 TABLET ORAL 2 TIMES DAILY
Qty: 14 TABLET | Refills: 0 | Status: SHIPPED | OUTPATIENT
Start: 2019-09-05 | End: 2019-09-12

## 2019-09-05 RX ADMIN — METHYLPREDNISOLONE ACETATE 40 MG: 40 INJECTION, SUSPENSION INTRA-ARTICULAR; INTRALESIONAL; INTRAMUSCULAR; SOFT TISSUE at 10:57

## 2019-09-05 NOTE — PROGRESS NOTES
Patient is noted an increase in sinus congestion and drainage over the course of the past week. No fevers been noted. I have suggested that she continue to monitor her weight since she is \"very thin and I did suggest that she utilize some enhancement treatment over-the-counter. Otherwise, she has not been febrile and has had no ear symptoms to speak of. Currently, she appears in no acute distress. Ear examination does reveal normal-appearing tympanic membranes and ear canals. Oral examination reveals normal findings. The neck is free of any adenopathy, mass, thyroid enlargement. Nasal mucosa treated with cotton pledgets  impregnated with Afrin and lidocaine placed in middle meatuses against turbinates. Pledgets left in place for five minutes and removed enabling enhanced visualization. The exam revealed positive edema of mucosa. it was positive for erythema indicative of infection. There was not evidence of deviation of the septum which was not significant. There were not polyps present. .There were not other masses present. The turbinates were not enlarged beyond normal.  Findings appear to be those of sinus infection with associated allergy. I have suggested a trial of doxycycline monohydrate as well as a Depo-Medrol injection. She will continue her current therapy as well.

## 2019-09-06 DIAGNOSIS — G89.4 CHRONIC PAIN SYNDROME: ICD-10-CM

## 2019-09-06 RX ORDER — HYDROCODONE BITARTRATE AND ACETAMINOPHEN 7.5; 325 MG/1; MG/1
1 TABLET ORAL EVERY 6 HOURS PRN
Qty: 120 TABLET | Refills: 0 | Status: SHIPPED | OUTPATIENT
Start: 2019-09-06 | End: 2019-10-04 | Stop reason: SDUPTHER

## 2019-09-12 DIAGNOSIS — G44.001 INTRACTABLE CLUSTER HEADACHE SYNDROME, UNSPECIFIED CHRONICITY PATTERN: ICD-10-CM

## 2019-09-13 RX ORDER — BUTALBITAL, ACETAMINOPHEN AND CAFFEINE 50; 325; 40 MG/1; MG/1; MG/1
1 TABLET ORAL EVERY 4 HOURS PRN
Qty: 50 TABLET | Refills: 0 | Status: SHIPPED | OUTPATIENT
Start: 2019-09-13 | End: 2019-09-23 | Stop reason: SDUPTHER

## 2019-09-22 DIAGNOSIS — G44.001 INTRACTABLE CLUSTER HEADACHE SYNDROME, UNSPECIFIED CHRONICITY PATTERN: ICD-10-CM

## 2019-09-23 RX ORDER — BUTALBITAL, ACETAMINOPHEN AND CAFFEINE 50; 325; 40 MG/1; MG/1; MG/1
1 TABLET ORAL EVERY 4 HOURS PRN
Qty: 50 TABLET | Refills: 0 | Status: SHIPPED | OUTPATIENT
Start: 2019-09-23 | End: 2019-10-02 | Stop reason: SDUPTHER

## 2019-10-02 DIAGNOSIS — G44.001 INTRACTABLE CLUSTER HEADACHE SYNDROME, UNSPECIFIED CHRONICITY PATTERN: ICD-10-CM

## 2019-10-03 RX ORDER — BUTALBITAL, ACETAMINOPHEN AND CAFFEINE 50; 325; 40 MG/1; MG/1; MG/1
1 TABLET ORAL EVERY 4 HOURS PRN
Qty: 50 TABLET | Refills: 0 | Status: SHIPPED | OUTPATIENT
Start: 2019-10-03 | End: 2019-10-18 | Stop reason: SDUPTHER

## 2019-10-04 DIAGNOSIS — G89.4 CHRONIC PAIN SYNDROME: ICD-10-CM

## 2019-10-04 RX ORDER — HYDROCODONE BITARTRATE AND ACETAMINOPHEN 7.5; 325 MG/1; MG/1
1 TABLET ORAL EVERY 6 HOURS PRN
Qty: 120 TABLET | Refills: 0 | Status: SHIPPED | OUTPATIENT
Start: 2019-10-04 | End: 2019-11-04 | Stop reason: SDUPTHER

## 2019-10-10 ENCOUNTER — OFFICE VISIT (OUTPATIENT)
Dept: ENT CLINIC | Age: 64
End: 2019-10-10
Payer: MEDICARE

## 2019-10-10 VITALS — SYSTOLIC BLOOD PRESSURE: 104 MMHG | OXYGEN SATURATION: 98 % | DIASTOLIC BLOOD PRESSURE: 66 MMHG | HEART RATE: 87 BPM

## 2019-10-10 DIAGNOSIS — J20.9 ACUTE BRONCHITIS, UNSPECIFIED ORGANISM: ICD-10-CM

## 2019-10-10 DIAGNOSIS — J20.9 ACUTE BRONCHITIS, UNSPECIFIED ORGANISM: Primary | ICD-10-CM

## 2019-10-10 PROCEDURE — 3017F COLORECTAL CA SCREEN DOC REV: CPT | Performed by: OTOLARYNGOLOGY

## 2019-10-10 PROCEDURE — 4040F PNEUMOC VAC/ADMIN/RCVD: CPT | Performed by: OTOLARYNGOLOGY

## 2019-10-10 PROCEDURE — G8484 FLU IMMUNIZE NO ADMIN: HCPCS | Performed by: OTOLARYNGOLOGY

## 2019-10-10 PROCEDURE — G8427 DOCREV CUR MEDS BY ELIG CLIN: HCPCS | Performed by: OTOLARYNGOLOGY

## 2019-10-10 PROCEDURE — 1036F TOBACCO NON-USER: CPT | Performed by: OTOLARYNGOLOGY

## 2019-10-10 PROCEDURE — 96372 THER/PROPH/DIAG INJ SC/IM: CPT | Performed by: OTOLARYNGOLOGY

## 2019-10-10 PROCEDURE — 1090F PRES/ABSN URINE INCON ASSESS: CPT | Performed by: OTOLARYNGOLOGY

## 2019-10-10 PROCEDURE — 99213 OFFICE O/P EST LOW 20 MIN: CPT | Performed by: OTOLARYNGOLOGY

## 2019-10-10 PROCEDURE — G8400 PT W/DXA NO RESULTS DOC: HCPCS | Performed by: OTOLARYNGOLOGY

## 2019-10-10 PROCEDURE — G8419 CALC BMI OUT NRM PARAM NOF/U: HCPCS | Performed by: OTOLARYNGOLOGY

## 2019-10-10 PROCEDURE — 1123F ACP DISCUSS/DSCN MKR DOCD: CPT | Performed by: OTOLARYNGOLOGY

## 2019-10-10 RX ORDER — METHYLPREDNISOLONE ACETATE 40 MG/ML
40 INJECTION, SUSPENSION INTRA-ARTICULAR; INTRALESIONAL; INTRAMUSCULAR; SOFT TISSUE ONCE
Status: COMPLETED | OUTPATIENT
Start: 2019-10-10 | End: 2019-10-10

## 2019-10-10 RX ORDER — BENZONATATE 100 MG/1
100 CAPSULE ORAL EVERY 6 HOURS PRN
Qty: 20 CAPSULE | Refills: 0 | Status: SHIPPED | OUTPATIENT
Start: 2019-10-10 | End: 2019-11-15 | Stop reason: SINTOL

## 2019-10-10 RX ORDER — MONTELUKAST SODIUM 10 MG/1
TABLET ORAL
Qty: 90 TABLET | Refills: 1 | Status: SHIPPED | OUTPATIENT
Start: 2019-10-10 | End: 2019-11-15 | Stop reason: ALTCHOICE

## 2019-10-10 RX ORDER — SULFAMETHOXAZOLE AND TRIMETHOPRIM 800; 160 MG/1; MG/1
1 TABLET ORAL 2 TIMES DAILY
Qty: 20 TABLET | Refills: 0 | Status: SHIPPED | OUTPATIENT
Start: 2019-10-10 | End: 2019-10-20

## 2019-10-10 RX ORDER — MONTELUKAST SODIUM 10 MG/1
10 TABLET ORAL NIGHTLY
Qty: 15 TABLET | Refills: 1 | Status: SHIPPED | OUTPATIENT
Start: 2019-10-10 | End: 2019-10-10 | Stop reason: SDUPTHER

## 2019-10-10 RX ADMIN — METHYLPREDNISOLONE ACETATE 40 MG: 40 INJECTION, SUSPENSION INTRA-ARTICULAR; INTRALESIONAL; INTRAMUSCULAR; SOFT TISSUE at 11:06

## 2019-10-15 ENCOUNTER — TELEPHONE (OUTPATIENT)
Dept: ENT CLINIC | Age: 64
End: 2019-10-15

## 2019-10-18 DIAGNOSIS — G44.001 INTRACTABLE CLUSTER HEADACHE SYNDROME, UNSPECIFIED CHRONICITY PATTERN: ICD-10-CM

## 2019-10-18 RX ORDER — BUTALBITAL, ACETAMINOPHEN AND CAFFEINE 50; 325; 40 MG/1; MG/1; MG/1
1 TABLET ORAL EVERY 4 HOURS PRN
Qty: 50 TABLET | Refills: 0 | Status: SHIPPED | OUTPATIENT
Start: 2019-10-18 | End: 2019-10-28 | Stop reason: SDUPTHER

## 2019-10-28 DIAGNOSIS — G44.001 INTRACTABLE CLUSTER HEADACHE SYNDROME, UNSPECIFIED CHRONICITY PATTERN: ICD-10-CM

## 2019-10-29 RX ORDER — BUTALBITAL, ACETAMINOPHEN AND CAFFEINE 50; 325; 40 MG/1; MG/1; MG/1
1 TABLET ORAL EVERY 4 HOURS PRN
Qty: 50 TABLET | Refills: 0 | Status: SHIPPED | OUTPATIENT
Start: 2019-10-29 | End: 2019-11-08 | Stop reason: SDUPTHER

## 2019-11-04 DIAGNOSIS — G89.4 CHRONIC PAIN SYNDROME: ICD-10-CM

## 2019-11-04 RX ORDER — HYDROCODONE BITARTRATE AND ACETAMINOPHEN 7.5; 325 MG/1; MG/1
1 TABLET ORAL EVERY 6 HOURS PRN
Qty: 120 TABLET | Refills: 0 | Status: SHIPPED | OUTPATIENT
Start: 2019-11-04 | End: 2019-12-03 | Stop reason: SDUPTHER

## 2019-11-08 DIAGNOSIS — G44.001 INTRACTABLE CLUSTER HEADACHE SYNDROME, UNSPECIFIED CHRONICITY PATTERN: ICD-10-CM

## 2019-11-08 RX ORDER — BUTALBITAL, ACETAMINOPHEN AND CAFFEINE 50; 325; 40 MG/1; MG/1; MG/1
1 TABLET ORAL EVERY 4 HOURS PRN
Qty: 50 TABLET | Refills: 0 | Status: SHIPPED | OUTPATIENT
Start: 2019-11-08 | End: 2019-11-17 | Stop reason: SDUPTHER

## 2019-11-11 ENCOUNTER — OFFICE VISIT (OUTPATIENT)
Dept: ENT CLINIC | Age: 64
End: 2019-11-11
Payer: MEDICARE

## 2019-11-11 ENCOUNTER — HOSPITAL ENCOUNTER (OUTPATIENT)
Dept: GENERAL RADIOLOGY | Age: 64
Discharge: HOME | End: 2019-11-11
Payer: MEDICARE

## 2019-11-11 ENCOUNTER — HOSPITAL ENCOUNTER (OUTPATIENT)
Age: 64
Discharge: HOME | End: 2019-11-11
Payer: MEDICARE

## 2019-11-11 VITALS — SYSTOLIC BLOOD PRESSURE: 110 MMHG | OXYGEN SATURATION: 97 % | DIASTOLIC BLOOD PRESSURE: 66 MMHG | HEART RATE: 79 BPM

## 2019-11-11 DIAGNOSIS — J20.9 ACUTE BRONCHITIS, UNSPECIFIED ORGANISM: Primary | ICD-10-CM

## 2019-11-11 DIAGNOSIS — J20.9 ACUTE BRONCHITIS, UNSPECIFIED ORGANISM: ICD-10-CM

## 2019-11-11 PROCEDURE — G8484 FLU IMMUNIZE NO ADMIN: HCPCS | Performed by: OTOLARYNGOLOGY

## 2019-11-11 PROCEDURE — 96372 THER/PROPH/DIAG INJ SC/IM: CPT | Performed by: OTOLARYNGOLOGY

## 2019-11-11 PROCEDURE — G8419 CALC BMI OUT NRM PARAM NOF/U: HCPCS | Performed by: OTOLARYNGOLOGY

## 2019-11-11 PROCEDURE — 4040F PNEUMOC VAC/ADMIN/RCVD: CPT | Performed by: OTOLARYNGOLOGY

## 2019-11-11 PROCEDURE — G8427 DOCREV CUR MEDS BY ELIG CLIN: HCPCS | Performed by: OTOLARYNGOLOGY

## 2019-11-11 PROCEDURE — 1036F TOBACCO NON-USER: CPT | Performed by: OTOLARYNGOLOGY

## 2019-11-11 PROCEDURE — 99213 OFFICE O/P EST LOW 20 MIN: CPT | Performed by: OTOLARYNGOLOGY

## 2019-11-11 PROCEDURE — 1123F ACP DISCUSS/DSCN MKR DOCD: CPT | Performed by: OTOLARYNGOLOGY

## 2019-11-11 PROCEDURE — 3017F COLORECTAL CA SCREEN DOC REV: CPT | Performed by: OTOLARYNGOLOGY

## 2019-11-11 PROCEDURE — 71046 X-RAY EXAM CHEST 2 VIEWS: CPT

## 2019-11-11 PROCEDURE — 1090F PRES/ABSN URINE INCON ASSESS: CPT | Performed by: OTOLARYNGOLOGY

## 2019-11-11 PROCEDURE — G8400 PT W/DXA NO RESULTS DOC: HCPCS | Performed by: OTOLARYNGOLOGY

## 2019-11-11 RX ORDER — LEVOFLOXACIN 500 MG/1
500 TABLET, FILM COATED ORAL DAILY
Qty: 10 TABLET | Refills: 0 | Status: SHIPPED | OUTPATIENT
Start: 2019-11-11 | End: 2020-07-28

## 2019-11-11 RX ORDER — METHYLPREDNISOLONE ACETATE 40 MG/ML
40 INJECTION, SUSPENSION INTRA-ARTICULAR; INTRALESIONAL; INTRAMUSCULAR; SOFT TISSUE ONCE
Status: COMPLETED | OUTPATIENT
Start: 2019-11-11 | End: 2019-11-11

## 2019-11-11 RX ADMIN — METHYLPREDNISOLONE ACETATE 40 MG: 40 INJECTION, SUSPENSION INTRA-ARTICULAR; INTRALESIONAL; INTRAMUSCULAR; SOFT TISSUE at 10:42

## 2019-11-15 ENCOUNTER — OFFICE VISIT (OUTPATIENT)
Dept: CARDIOLOGY CLINIC | Age: 64
End: 2019-11-15
Payer: MEDICARE

## 2019-11-15 VITALS
HEART RATE: 78 BPM | WEIGHT: 101.8 LBS | OXYGEN SATURATION: 98 % | DIASTOLIC BLOOD PRESSURE: 50 MMHG | HEIGHT: 68 IN | SYSTOLIC BLOOD PRESSURE: 98 MMHG | BODY MASS INDEX: 15.43 KG/M2

## 2019-11-15 DIAGNOSIS — I42.8 NONISCHEMIC CARDIOMYOPATHY (HCC): ICD-10-CM

## 2019-11-15 DIAGNOSIS — I31.39 PERICARDIAL EFFUSION: Primary | ICD-10-CM

## 2019-11-15 DIAGNOSIS — I10 ESSENTIAL HYPERTENSION: ICD-10-CM

## 2019-11-15 PROCEDURE — G8484 FLU IMMUNIZE NO ADMIN: HCPCS | Performed by: NURSE PRACTITIONER

## 2019-11-15 PROCEDURE — 1090F PRES/ABSN URINE INCON ASSESS: CPT | Performed by: NURSE PRACTITIONER

## 2019-11-15 PROCEDURE — 99214 OFFICE O/P EST MOD 30 MIN: CPT | Performed by: NURSE PRACTITIONER

## 2019-11-15 PROCEDURE — G8419 CALC BMI OUT NRM PARAM NOF/U: HCPCS | Performed by: NURSE PRACTITIONER

## 2019-11-15 PROCEDURE — G8427 DOCREV CUR MEDS BY ELIG CLIN: HCPCS | Performed by: NURSE PRACTITIONER

## 2019-11-15 PROCEDURE — 4040F PNEUMOC VAC/ADMIN/RCVD: CPT | Performed by: NURSE PRACTITIONER

## 2019-11-15 PROCEDURE — 1123F ACP DISCUSS/DSCN MKR DOCD: CPT | Performed by: NURSE PRACTITIONER

## 2019-11-15 PROCEDURE — 1036F TOBACCO NON-USER: CPT | Performed by: NURSE PRACTITIONER

## 2019-11-15 PROCEDURE — 3017F COLORECTAL CA SCREEN DOC REV: CPT | Performed by: NURSE PRACTITIONER

## 2019-11-15 PROCEDURE — G8400 PT W/DXA NO RESULTS DOC: HCPCS | Performed by: NURSE PRACTITIONER

## 2019-11-15 RX ORDER — NITROGLYCERIN 0.4 MG/1
TABLET SUBLINGUAL
Qty: 25 TABLET | Refills: 0 | Status: SHIPPED | OUTPATIENT
Start: 2019-11-15 | End: 2020-01-30

## 2019-11-17 DIAGNOSIS — G44.001 INTRACTABLE CLUSTER HEADACHE SYNDROME, UNSPECIFIED CHRONICITY PATTERN: ICD-10-CM

## 2019-11-18 RX ORDER — BUTALBITAL, ACETAMINOPHEN AND CAFFEINE 50; 325; 40 MG/1; MG/1; MG/1
1 TABLET ORAL EVERY 4 HOURS PRN
Qty: 50 TABLET | Refills: 0 | Status: SHIPPED | OUTPATIENT
Start: 2019-11-18 | End: 2019-11-29 | Stop reason: SDUPTHER

## 2019-11-28 DIAGNOSIS — G44.001 INTRACTABLE CLUSTER HEADACHE SYNDROME, UNSPECIFIED CHRONICITY PATTERN: ICD-10-CM

## 2019-11-29 RX ORDER — BUTALBITAL, ACETAMINOPHEN AND CAFFEINE 50; 325; 40 MG/1; MG/1; MG/1
1 TABLET ORAL EVERY 4 HOURS PRN
Qty: 50 TABLET | Refills: 0 | Status: SHIPPED | OUTPATIENT
Start: 2019-11-29 | End: 2019-12-10 | Stop reason: SDUPTHER

## 2019-12-03 DIAGNOSIS — G89.4 CHRONIC PAIN SYNDROME: ICD-10-CM

## 2019-12-03 RX ORDER — HYDROCODONE BITARTRATE AND ACETAMINOPHEN 7.5; 325 MG/1; MG/1
1 TABLET ORAL EVERY 6 HOURS PRN
Qty: 120 TABLET | Refills: 0 | Status: SHIPPED | OUTPATIENT
Start: 2019-12-03 | End: 2020-01-03 | Stop reason: SDUPTHER

## 2019-12-10 DIAGNOSIS — G44.001 INTRACTABLE CLUSTER HEADACHE SYNDROME, UNSPECIFIED CHRONICITY PATTERN: ICD-10-CM

## 2019-12-10 RX ORDER — BUTALBITAL, ACETAMINOPHEN AND CAFFEINE 50; 325; 40 MG/1; MG/1; MG/1
1 TABLET ORAL EVERY 4 HOURS PRN
Qty: 50 TABLET | Refills: 0 | Status: SHIPPED | OUTPATIENT
Start: 2019-12-10 | End: 2019-12-20 | Stop reason: SDUPTHER

## 2019-12-20 DIAGNOSIS — G44.001 INTRACTABLE CLUSTER HEADACHE SYNDROME, UNSPECIFIED CHRONICITY PATTERN: ICD-10-CM

## 2019-12-20 RX ORDER — BUTALBITAL, ACETAMINOPHEN AND CAFFEINE 50; 325; 40 MG/1; MG/1; MG/1
1 TABLET ORAL EVERY 4 HOURS PRN
Qty: 50 TABLET | Refills: 0 | Status: SHIPPED | OUTPATIENT
Start: 2019-12-20 | End: 2019-12-31 | Stop reason: SDUPTHER

## 2019-12-30 ENCOUNTER — HOSPITAL ENCOUNTER (EMERGENCY)
Age: 64
Discharge: HOME | End: 2019-12-30
Payer: MEDICARE

## 2019-12-30 VITALS
SYSTOLIC BLOOD PRESSURE: 120 MMHG | BODY MASS INDEX: 15.19 KG/M2 | DIASTOLIC BLOOD PRESSURE: 86 MMHG | OXYGEN SATURATION: 95 % | RESPIRATION RATE: 18 BRPM | HEIGHT: 68 IN | TEMPERATURE: 98.2 F | HEART RATE: 85 BPM | WEIGHT: 100.25 LBS

## 2019-12-30 DIAGNOSIS — G44.001 INTRACTABLE CLUSTER HEADACHE SYNDROME, UNSPECIFIED CHRONICITY PATTERN: ICD-10-CM

## 2019-12-30 PROCEDURE — 99282 EMERGENCY DEPT VISIT SF MDM: CPT

## 2019-12-30 PROCEDURE — 4500000022 HC ED LEVEL 2 PROCEDURE

## 2019-12-30 RX ORDER — SULFACETAMIDE SODIUM 100 MG/ML
SOLUTION/ DROPS OPHTHALMIC
Status: DISCONTINUED
Start: 2019-12-30 | End: 2019-12-30 | Stop reason: HOSPADM

## 2019-12-30 RX ORDER — PROPARACAINE HYDROCHLORIDE 5 MG/ML
SOLUTION/ DROPS OPHTHALMIC
Status: DISCONTINUED
Start: 2019-12-30 | End: 2019-12-30 | Stop reason: HOSPADM

## 2019-12-30 RX ORDER — BALANCED SALT SOLUTION ENRICHED WITH BICARBONATE, DEXTROSE, AND GLUTATHIONE
KIT INTRAOCULAR
Status: DISCONTINUED
Start: 2019-12-30 | End: 2019-12-30 | Stop reason: HOSPADM

## 2019-12-30 ASSESSMENT — PAIN DESCRIPTION - PAIN TYPE: TYPE: ACUTE PAIN

## 2019-12-30 ASSESSMENT — PAIN SCALES - GENERAL: PAINLEVEL_OUTOF10: 10

## 2019-12-30 ASSESSMENT — PAIN DESCRIPTION - LOCATION: LOCATION: EYE

## 2019-12-30 ASSESSMENT — PAIN DESCRIPTION - ORIENTATION: ORIENTATION: RIGHT

## 2019-12-30 NOTE — ED NOTES
Reviewed discharge instructions with patient. No questions at this time. No sign of distress. AOx3. Pt ambulated to ER exit with steady gait.         Kenrick Laguna RN  12/30/19 2254

## 2019-12-30 NOTE — ED PROVIDER NOTES
**EVALUATED BY ADVANCED PRACTICE PROVIDER**        [facility]      Pt Name: Gracia Kaufman  CUY:[id number]  [place] 1955  Date of evaluation: 12/30/2019  Provider: Susan Bermudez PA-C      Chief Complaint:    Chief Complaint   Patient presents with    Foreign Body in Eye     Pt. comes in today with a possible foreign body in her eye. Pt. thinks she may have a piece of metal in her eye. Pt. was fixing a cabinet at home and thinks a piece may have fallen into her eye. Nursing Notes, Past Medical Hx, Past Surgical Hx, Social Hx, Allergies, and Family Hx were all reviewed and agreed with or any disagreements were addressed in the HPI.    HPI:  (Location, Duration, Timing, Severity, Quality, Assoc Sx, Context, Modifying factors)  This is a  71 y.o. female that presents to the emergency department with a chief complaint of right eye pain and tearing. 2 days ago she was using a dremel to cut into a shelf bracket. She states she was wearing safety glasses but felt like she got something in her eye. Has been having discomfort ever since. Last tetanus shot was within the last 5 years. She does wear corrective lenses but denies use of contacts. She denies fevers, nausea, vomiting. Rates the pain a 10 out of 10. Denies any other symptoms.     PastMedical/Surgical History:      Diagnosis Date    Arthritis     fingers, hands    Asthma     Back pain     Bell's palsy     CHF [facility]congestive heart failure) ([facility])     Chronic pain     BACK SURGERY X4, neck, left arm    Colitis 2/14/2013    Depression     Elevated sed rate     Family history of breast cancer in first degree relative     Gastric ulcer     GERD (gastroesophageal reflux disease)     colitis    Glaucoma     Hypertension     Movement disorder     chronic back pain    Neuromuscular disorder (HCC)     sciatica rt. leg    Osteoarthritis     PONV (postoperative nausea and vomiting)     SEVERE         Procedure Laterality Date    BACK SURGERY      x 4    COLONOSCOPY  10/16/12    polyp removed and biopsy sent    COLONOSCOPY  3/26/13    CYSTOSCOPY  1/13/16    urethral dilitation    FACIAL NERVE SURGERY      D/T Bell's Palsy    FINGER SURGERY Left 04/03/2018    Excision of Left Thumb Digital Mucous cyst & DIP Joint Arthrotomy & Debridement    FINGER SURGERY  04/2018    left thumb    FINGER SURGERY Left 04/03/2018    thumb surgery     HYSTERECTOMY      Partial    UPPER GASTROINTESTINAL ENDOSCOPY  10/15/12    UPPER GASTROINTESTINAL ENDOSCOPY  2/11/15    with EUS    VENTRAL HERNIA REPAIR N/A 4/15/2019    LAPAROSCOPIC REPAIR OF INCISIONAL HERNIA WITH MESH performed by Myles Loja MD at [facility]       Medications:  Discharge Medication List as of 12/30/2019 10:16 AM      CONTINUE these medications which have NOT CHANGED    Details   HYDROcodone-acetaminophen (NORCO) 7.5-325 MG per tablet Take 1 tablet by mouth every 6 hours as needed for Pain for up to 30 days. , Disp-120 tablet, R-0Normal      gabapentin (NEURONTIN) 300 MG capsule TAKE 1 CAPSULE BY MOUTH THREE TIMES DAILY, Disp-90 capsule, R-5Normal      nitroGLYCERIN (NITROSTAT) 0.4 MG SL tablet PLACE ONE TABLET UNDER THE TONGUE EVERY 5 MINUTES AS NEEDED FOR CHEST PAIN, Disp-25 tablet, R-0Normal      traZODone (DESYREL) 50 MG tablet TAKE 2 TABLETS BY MOUTH EVERY NIGHT, Disp-60 tablet, R-11Normal      tiZANidine (ZANAFLEX) 4 MG tablet TAKE 1 TABLET BY MOUTH THREE TIMES DAILY, Disp-90 tablet, R-5Normal      albuterol (PROVENTIL) (2.5 MG/3ML) 0.083% nebulizer solution USE 1 VIAL IN NEBULIZER EVERY 6 HOURS, Disp-180 vial, R-11Normal      diclofenac sodium (VOLTAREN) 1 % GEL Apply 2 gms qid to hand, Disp-500 g, R-3, Normal      DEXILANT 60 MG CPDR delayed release capsule TAKE 1 CAPSULE BY MOUTH DAILY, Disp-30 capsule, R-11Normal      hydrocortisone (WESTCORT) 0.2 % cream APPLY EXTERNALLY TO THE AFFECTED AREA TWICE Movements: Extraocular movements intact. Pupils: Pupils are equal, round, and reactive to light. Comments: Right eye with some scleral conjunctival injection. Foreign body noted at the 7 o'clock position. No purulent drainage or surrounding erythema. After foreign body was removed fluorescein exam was performed that does not reveal any other fluorescein uptake besides over the area where the foreign body was removed. No ulceration or other foreign body noted. Neck:      Musculoskeletal: Normal range of motion. Cardiovascular:      Rate and Rhythm: Normal rate and regular rhythm. Heart sounds: Normal heart sounds. No murmur. No gallop. Pulmonary:      Effort: Pulmonary effort is normal.      Breath sounds: Normal breath sounds. No stridor. No wheezing, rhonchi or rales. Skin:     General: Skin is warm and dry. Findings: No erythema or rash. Neurological:      Mental Status: She is alert. Psychiatric:         Behavior: Behavior normal.         MEDICAL DECISION MAKING    Vitals:    Vitals:    12/30/19 0820   BP: 120/86   Pulse: 85   Resp: 18   Temp: 98.2 °F (36.8 °C)   TempSrc: Oral   SpO2: 95%   Weight: 100 lb 4 oz (45.5 kg)   Height: 5' 8\" (1.727 m)       LABS:Labs Reviewed - No data to display     Remainder of labs reviewed and werenegative at this time or not returned at the time of this note. RADIOLOGY:   Non-plain film images such as CT, Ultrasound and MRI are read by the radiologist. Kin Nolan PA-C have directly visualized the radiologic plain film image(s) with the below findings:        Interpretation per the Radiologist below, if available at the time of this note:    No orders to display        No results found.       MEDICAL DECISION MAKING / ED COURSE:      PROCEDURES:   Foreign Body  Performed by: Leidy Gunter PA-C  Authorized by: Leidy Gunter PA-C     Consent:     Consent obtained:  Verbal    Consent given by:  Patient    Risks discussed:

## 2019-12-31 RX ORDER — BUTALBITAL, ACETAMINOPHEN AND CAFFEINE 50; 325; 40 MG/1; MG/1; MG/1
1 TABLET ORAL EVERY 4 HOURS PRN
Qty: 50 TABLET | Refills: 0 | Status: SHIPPED | OUTPATIENT
Start: 2019-12-31 | End: 2020-01-09

## 2020-01-03 RX ORDER — HYDROCODONE BITARTRATE AND ACETAMINOPHEN 7.5; 325 MG/1; MG/1
1 TABLET ORAL EVERY 6 HOURS PRN
Qty: 120 TABLET | Refills: 0 | Status: SHIPPED | OUTPATIENT
Start: 2020-01-03 | End: 2020-02-03 | Stop reason: SDUPTHER

## 2020-01-09 RX ORDER — BUTALBITAL, ACETAMINOPHEN AND CAFFEINE 50; 325; 40 MG/1; MG/1; MG/1
1 TABLET ORAL EVERY 4 HOURS PRN
Qty: 50 TABLET | Refills: 0 | Status: SHIPPED | OUTPATIENT
Start: 2020-01-09 | End: 2020-01-20

## 2020-01-20 RX ORDER — BUTALBITAL, ACETAMINOPHEN AND CAFFEINE 50; 325; 40 MG/1; MG/1; MG/1
1 TABLET ORAL EVERY 4 HOURS PRN
Qty: 50 TABLET | Refills: 0 | Status: SHIPPED | OUTPATIENT
Start: 2020-01-20 | End: 2020-01-30

## 2020-01-30 RX ORDER — NITROGLYCERIN 0.4 MG/1
TABLET SUBLINGUAL
Qty: 25 TABLET | Refills: 2 | Status: SHIPPED | OUTPATIENT
Start: 2020-01-30 | End: 2021-08-26

## 2020-01-30 RX ORDER — BUTALBITAL, ACETAMINOPHEN AND CAFFEINE 50; 325; 40 MG/1; MG/1; MG/1
1 TABLET ORAL EVERY 4 HOURS PRN
Qty: 50 TABLET | Refills: 0 | Status: SHIPPED | OUTPATIENT
Start: 2020-01-30 | End: 2020-02-13

## 2020-01-30 NOTE — TELEPHONE ENCOUNTER
RX APPROVAL:      Refill:   Requested Prescriptions     Pending Prescriptions Disp Refills    nitroGLYCERIN (NITROSTAT) 0.4 MG SL tablet [Pharmacy Med Name: NITROGLYCERIN 0.4MG SUB TAB 25] 25 tablet 0     Sig: PLACE 1 TABLET UNDE THE TONGUE EVERY 5 MINUTES AS NEEDED FOR CHEST PAIN      Last OV: 11/15/2019   Last EKG:   Last Labs:   Lab Results   Component Value Date    GLUCOSE 89 08/28/2019    BUN 11 08/28/2019    CREATININE 0.67 08/28/2019    LABGLOM >60 04/17/2019     08/28/2019    K 3.9 08/28/2019     08/28/2019    CO2 30 08/28/2019    CALCIUM 8.4 08/28/2019     Lab Results   Component Value Date     08/28/2019     08/28/2019    CO2 30 08/28/2019    ANIONGAP 6 08/28/2019    GLUCOSE 89 08/28/2019    BUN 11 08/28/2019    CREATININE 0.67 08/28/2019    LABGLOM >60 04/17/2019    GFRAA >60 04/17/2019    GFRAA >60 03/26/2013    CALCIUM 8.4 08/28/2019    PROT 7.5 04/16/2019    PROT 6.1 02/16/2013    LABALBU 4.4 04/16/2019    BILITOT 0.6 04/16/2019    ALKPHOS 95 04/16/2019    AST 29 04/16/2019    ALT 21 04/16/2019    GLOB 3.1 04/16/2019     Lab Results   Component Value Date    ALT 21 04/16/2019    AST 29 04/16/2019     Lab Results   Component Value Date    K 3.9 08/28/2019       Plan and labs reviewed

## 2020-02-03 ENCOUNTER — OFFICE VISIT (OUTPATIENT)
Dept: FAMILY MEDICINE CLINIC | Age: 65
End: 2020-02-03
Payer: MEDICARE

## 2020-02-03 VITALS
HEART RATE: 73 BPM | OXYGEN SATURATION: 99 % | DIASTOLIC BLOOD PRESSURE: 76 MMHG | SYSTOLIC BLOOD PRESSURE: 110 MMHG | WEIGHT: 100.3 LBS | BODY MASS INDEX: 15.25 KG/M2

## 2020-02-03 PROCEDURE — G0438 PPPS, INITIAL VISIT: HCPCS | Performed by: FAMILY MEDICINE

## 2020-02-03 PROCEDURE — G8484 FLU IMMUNIZE NO ADMIN: HCPCS | Performed by: FAMILY MEDICINE

## 2020-02-03 PROCEDURE — 1123F ACP DISCUSS/DSCN MKR DOCD: CPT | Performed by: FAMILY MEDICINE

## 2020-02-03 PROCEDURE — 3017F COLORECTAL CA SCREEN DOC REV: CPT | Performed by: FAMILY MEDICINE

## 2020-02-03 PROCEDURE — 4040F PNEUMOC VAC/ADMIN/RCVD: CPT | Performed by: FAMILY MEDICINE

## 2020-02-03 RX ORDER — AMOXICILLIN 500 MG/1
500 CAPSULE ORAL 3 TIMES DAILY
Qty: 30 CAPSULE | Refills: 0 | Status: SHIPPED | OUTPATIENT
Start: 2020-02-03 | End: 2020-02-13

## 2020-02-03 RX ORDER — HYDROCODONE BITARTRATE AND ACETAMINOPHEN 7.5; 325 MG/1; MG/1
1 TABLET ORAL EVERY 6 HOURS PRN
Qty: 120 TABLET | Refills: 0 | Status: SHIPPED | OUTPATIENT
Start: 2020-02-03 | End: 2020-03-03 | Stop reason: SDUPTHER

## 2020-02-03 ASSESSMENT — PATIENT HEALTH QUESTIONNAIRE - PHQ9
SUM OF ALL RESPONSES TO PHQ QUESTIONS 1-9: 0
SUM OF ALL RESPONSES TO PHQ QUESTIONS 1-9: 0

## 2020-02-03 ASSESSMENT — LIFESTYLE VARIABLES
HOW OFTEN DO YOU HAVE A DRINK CONTAINING ALCOHOL: 0
HOW OFTEN DO YOU HAVE A DRINK CONTAINING ALCOHOL: 0

## 2020-02-03 NOTE — PROGRESS NOTES
Medicare Annual Wellness Visit  Name: Lida Russell Date: 2/3/2020   MRN: [id number] Sex: Female   Age: 71 y.o. Ethnicity: Non-/Non    : 1955 Race: Eugenia Rush is here for Medicare AWV; Swelling (left side); and Otalgia    Screenings for behavioral, psychosocial and functional/safety risks, and cognitive dysfunction are all negative except as indicated below. These results, as well as other patient data from the [address] [facility] [place] form, are documented in Flowsheets linked to this Encounter. Allergies   Allergen Reactions    Beta Adrenergic Blockers Shortness Of Breath    Pheniramine      Other reaction(s): rapid heartbeat    Antihistamine [Diphenhydramine Hcl] Nausea And Vomiting    Aspirin Hives    Codeine Hives    Dilaudid [Hydromorphone] Nausea And Vomiting    Nsaids Hives and Nausea And Vomiting    Prednisone Other (See Comments)     GI upset    Zithromax [Azithromycin] Nausea And Vomiting         Prior to Visit Medications    Medication Sig Taking? Authorizing Provider   HYDROcodone-acetaminophen (NORCO) 7.5-325 MG per tablet Take 1 tablet by mouth every 6 hours as needed for Pain for up to 30 days.  Yes Alphonse Samano MD   amoxicillin (AMOXIL) 500 MG capsule Take 1 capsule by mouth 3 times daily for 10 days Yes Alphonse Samano MD   butalbital-acetaminophen-caffeine (FIORICET, ESGIC) -40 MG per tablet TAKE 1 TABLET BY MOUTH EVERY 4 HOURS AS NEEDED FOR HEADACHES. MAY TAKE 2 TABLETS EVERY 4 HOURS AS NEEDED FOR PAIN Yes Maria A oBoth MD   nitroGLYCERIN (NITROSTAT) 0.4 MG SL tablet PLACE 1 TABLET UNDE THE TONGUE EVERY 5 MINUTES AS NEEDED FOR CHEST PAIN Yes JOAO Garber CNP   linaCLOtide (LINZESS PO) Take by mouth daily Yes Historical Provider, MD   Elastic Bandages & Supports ([facility] [address] 20-30MMHG) MISC 1 each by Does not apply route daily Yes JOAO Garber CNP   levofloxacin (LEVAQUIN) 500 MG tablet Take 1 (gastroesophageal reflux disease)     colitis    Glaucoma     Hypertension     Movement disorder     chronic back pain    Neuromuscular disorder (HCC)     sciatica rt. leg    Osteoarthritis     PONV (postoperative nausea and vomiting)     SEVERE       Past Surgical History:   Procedure Laterality Date    BACK SURGERY      x 4    COLONOSCOPY  10/16/12    polyp removed and biopsy sent    COLONOSCOPY  3/26/13    CYSTOSCOPY  1/13/16    urethral dilitation    FACIAL NERVE SURGERY      D/T Bell's Palsy    FINGER SURGERY Left 04/03/2018    Excision of Left Thumb Digital Mucous cyst & DIP Joint Arthrotomy & Debridement    FINGER SURGERY  04/2018    left thumb    FINGER SURGERY Left 04/03/2018    thumb surgery     HYSTERECTOMY      Partial    UPPER GASTROINTESTINAL ENDOSCOPY  10/15/12    UPPER GASTROINTESTINAL ENDOSCOPY  2/11/15    with EUS    VENTRAL HERNIA REPAIR N/A 4/15/2019    LAPAROSCOPIC REPAIR OF INCISIONAL HERNIA WITH MESH performed by Chiki Gayle MD at [place]         Family History   Problem Relation Age of Onset    Heart Disease Mother     High Blood Pressure Mother     Arthritis Mother     Breast Cancer Sister     High Blood Pressure Brother     Diabetes Neg Hx     Stroke Neg Hx        CareTeam (Including outside providers/suppliers regularly involved in providing care):   Patient Care Team:  Mike Sapp MD as PCP - Hugo Holland MD as PCP - [facility] Empaneled Provider  JOAO Pacheco CNP as Nurse Practitioner (Nurse Practitioner)  May Mackey MD as Consulting Physician (Physical Medicine and Rehab)  Dale Rojas MD as Consulting Physician (Cardiology)  Chiki Gayle MD as Surgeon (General Surgery)    Wt Readings from Last 3 Encounters:   02/03/20 100 lb 4.8 oz (45.5 kg)   12/30/19 100 lb 4 oz (45.5 kg)   11/15/19 101 lb 12.8 oz (46.2 kg)     Vitals:    02/03/20 1037   BP: 110/76   Pulse: 73   SpO2: 99%   Weight: 100 lb 4.8 oz (45.5 kg) Body mass index is 15.25 kg/m². Based upon direct observation of the patient, evaluation of cognition reveals recent and remote memory intact. Vitals:    02/03/20 1037   BP: 110/76   Pulse: 73   SpO2: 99%   Weight: 100 lb 4.8 oz (45.5 kg)     Body mass index is 15.25 kg/m². General Appearance: alert and oriented, in no acute distress  Skin: warm and dry, no rash or erythema  Head: normocephalic and atraumatic  Eyes: pupils equal, round, and reactive to light, extraocular eye movements intact, conjunctivae normal  ENT: tympanic membrane, external ear and ear canal normal bilaterally, nose without deformity,   Neck: supple and non-tender without mass, no thyromegaly or thyroid nodules, patient having discomfort on the left side of her neck I was not able to appreciate any significant cervical lymphadenopathy  Pulmonary/Chest: clear to auscultation bilaterally- no wheezes, rales or rhonchi, normal air movement, no respiratory distress  Cardiovascular: normal rate, regular rhythm, normal S1 and S2, no murmurs, rubs, clicks, or gallops, no carotid bruits  Abdomen: soft, non-tender, non-distended, normal bowel sounds, no masses or organomegaly  Extremities: no cyanosis, clubbing or edema  Neurologic: reflexes normal and symmetric, no cranial nerve deficit, speech normal  GYN:Rectal: deferred to Gynecologist  Breast: deferred to Gynecologist     Patient's complete Health Risk Assessment and screening values have been reviewed and are found in Flowsheets. The following problems were reviewed today and where indicated follow up appointments were made and/or referrals ordered. Positive Risk Factor Screenings with Interventions:     General Health:  General  In general, how would you say your health is?: Fair  In the past 7 days, have you experienced any of the following?  New or Increased Pain, New or Increased Fatigue, Loneliness, Social Isolation, Stress or Anger?: (!) New or Increased Pain  Do you get the

## 2020-02-03 NOTE — PATIENT INSTRUCTIONS
Personalized Preventive Plan for Mary Crawford - 2/3/2020  Medicare offers a range of preventive health benefits. Some of the tests and screenings are paid in full while other may be subject to a deductible, co-insurance, and/or copay. Some of these benefits include a comprehensive review of your medical history including lifestyle, illnesses that may run in your family, and various assessments and screenings as appropriate. After reviewing your medical record and screening and assessments performed today your provider may have ordered immunizations, labs, imaging, and/or referrals for you. A list of these orders (if applicable) as well as your Preventive Care list are included within your After Visit Summary for your review. Other Preventive Recommendations:    · A preventive eye exam performed by an eye specialist is recommended every 1-2 years to screen for glaucoma; cataracts, macular degeneration, and other eye disorders. · A preventive dental visit is recommended every 6 months. · Try to get at least 150 minutes of exercise per week or 10,000 steps per day on a pedometer . · Order or download the FREE \"Exercise & Physical Activity: Your Everyday Guide\" from The "[employer]. Call 2-435.484.8729 or search The "[employer] online. · You need 7224-1152 mg of calcium and 8813-3943 IU of vitamin D per day. It is possible to meet your calcium requirement with diet alone, but a vitamin D supplement is usually necessary to meet this goal.  · When exposed to the sun, use a sunscreen that protects against both UVA and UVB radiation with an SPF of 30 or greater. Reapply every 2 to 3 hours or after sweating, drying off with a towel, or swimming. · Always wear a seat belt when traveling in a car. Always wear a helmet when riding a bicycle or motorcycle.   Patient Education        DASH Diet: Care Instructions  Your Care Instructions    The DASH diet is an eating plan that whole-grain products as much as possible. Limit lean meat, poultry, and fish to 2 servings each day. A serving is 3 ounces, about the size of a deck of cards. Eat 4 to 5 servings of nuts, seeds, and legumes (cooked dried beans, lentils, and split peas) each week. A serving is 1/3 cup of nuts, 2 tablespoons of seeds, or ½ cup of cooked beans or peas. Limit fats and oils to 2 to 3 servings each day. A serving is 1 teaspoon of vegetable oil or 2 tablespoons of salad dressing. Limit sweets and added sugars to 5 servings or less a week. A serving is 1 tablespoon jelly or jam, ½ cup sorbet, or 1 cup of lemonade. Eat less than 2,300 milligrams (mg) of sodium a day. If you limit your sodium to 1,500 mg a day, you can lower your blood pressure even more. Tips for success  Start small. Do not try to make dramatic changes to your diet all at once. You might feel that you are missing out on your favorite foods and then be more likely to not follow the plan. Make small changes, and stick with them. Once those changes become habit, add a few more changes. Try some of the following:  Make it a goal to eat a fruit or vegetable at every meal and at snacks. This will make it easy to get the recommended amount of fruits and vegetables each day. Try yogurt topped with fruit and nuts for a snack or healthy dessert. Add lettuce, tomato, cucumber, and onion to sandwiches. Combine a ready-made pizza crust with low-fat mozzarella cheese and lots of vegetable toppings. Try using tomatoes, squash, spinach, broccoli, carrots, cauliflower, and onions. Have a variety of cut-up vegetables with a low-fat dip as an appetizer instead of chips and dip. Sprinkle sunflower seeds or chopped almonds over salads. Or try adding chopped walnuts or almonds to cooked vegetables. Try some vegetarian meals using beans and peas. Add garbanzo or kidney beans to salads. Make burritos and tacos with mashed gaming beans or black beans.   Where can you

## 2020-02-13 RX ORDER — BUTALBITAL, ACETAMINOPHEN AND CAFFEINE 50; 325; 40 MG/1; MG/1; MG/1
1 TABLET ORAL EVERY 4 HOURS PRN
Qty: 50 TABLET | Refills: 0 | Status: SHIPPED | OUTPATIENT
Start: 2020-02-13 | End: 2020-02-25

## 2020-02-25 RX ORDER — BUTALBITAL, ACETAMINOPHEN AND CAFFEINE 50; 325; 40 MG/1; MG/1; MG/1
1 TABLET ORAL EVERY 4 HOURS PRN
Qty: 50 TABLET | Refills: 0 | Status: SHIPPED | OUTPATIENT
Start: 2020-02-25 | End: 2020-02-26 | Stop reason: SDUPTHER

## 2020-02-26 ENCOUNTER — OFFICE VISIT (OUTPATIENT)
Dept: CARDIOLOGY CLINIC | Age: 65
End: 2020-02-26
Payer: MEDICARE

## 2020-02-26 ENCOUNTER — HOSPITAL ENCOUNTER (OUTPATIENT)
Dept: NON INVASIVE DIAGNOSTICS | Age: 65
Discharge: HOME | End: 2020-02-26
Payer: MEDICARE

## 2020-02-26 VITALS
HEIGHT: 68 IN | SYSTOLIC BLOOD PRESSURE: 110 MMHG | WEIGHT: 103 LBS | RESPIRATION RATE: 18 BRPM | OXYGEN SATURATION: 96 % | BODY MASS INDEX: 15.61 KG/M2 | HEART RATE: 66 BPM | DIASTOLIC BLOOD PRESSURE: 68 MMHG

## 2020-02-26 PROBLEM — I65.23 BILATERAL CAROTID ARTERY STENOSIS: Status: ACTIVE | Noted: 2020-02-26

## 2020-02-26 PROBLEM — R10.13 EPIGASTRIC PAIN: Status: ACTIVE | Noted: 2020-02-26

## 2020-02-26 PROCEDURE — 93308 TTE F-UP OR LMTD: CPT

## 2020-02-26 PROCEDURE — G8427 DOCREV CUR MEDS BY ELIG CLIN: HCPCS | Performed by: INTERNAL MEDICINE

## 2020-02-26 PROCEDURE — G8400 PT W/DXA NO RESULTS DOC: HCPCS | Performed by: INTERNAL MEDICINE

## 2020-02-26 PROCEDURE — 4040F PNEUMOC VAC/ADMIN/RCVD: CPT | Performed by: INTERNAL MEDICINE

## 2020-02-26 PROCEDURE — 1090F PRES/ABSN URINE INCON ASSESS: CPT | Performed by: INTERNAL MEDICINE

## 2020-02-26 PROCEDURE — G8484 FLU IMMUNIZE NO ADMIN: HCPCS | Performed by: INTERNAL MEDICINE

## 2020-02-26 PROCEDURE — G8419 CALC BMI OUT NRM PARAM NOF/U: HCPCS | Performed by: INTERNAL MEDICINE

## 2020-02-26 PROCEDURE — 99213 OFFICE O/P EST LOW 20 MIN: CPT | Performed by: INTERNAL MEDICINE

## 2020-02-26 PROCEDURE — 1036F TOBACCO NON-USER: CPT | Performed by: INTERNAL MEDICINE

## 2020-02-26 PROCEDURE — 3017F COLORECTAL CA SCREEN DOC REV: CPT | Performed by: INTERNAL MEDICINE

## 2020-02-26 PROCEDURE — 1123F ACP DISCUSS/DSCN MKR DOCD: CPT | Performed by: INTERNAL MEDICINE

## 2020-02-26 NOTE — PROGRESS NOTES
Kristi Wang, APRN - CNP   gabapentin (NEURONTIN) 300 MG capsule TAKE 1 CAPSULE BY MOUTH THREE TIMES DAILY Yes Herlene Runner, MD   linaCLOtide (LINZESS PO) Take by mouth daily Yes Historical Provider, MD   Elastic Bandages & Supports ([address] 20-30MMHG) [address] [facility] 1 each by Does not apply route daily Yes Tatyana Conde, APRN - CNP   levofloxacin (LEVAQUIN) 500 MG tablet Take 1 tablet by mouth daily Yes Terese Benitez MD   traZODone (DESYREL) 50 MG tablet TAKE 2 TABLETS BY MOUTH EVERY NIGHT Yes Herlene Runner, MD   tiZANidine (ZANAFLEX) 4 MG tablet TAKE 1 TABLET BY MOUTH THREE TIMES DAILY Yes Herlene Runner, MD   albuterol (PROVENTIL) (2.5 MG/3ML) 0.083% nebulizer solution USE 1 VIAL IN NEBULIZER EVERY 6 HOURS Yes Herlene Runner, MD   diclofenac sodium (VOLTAREN) 1 % GEL Apply 2 gms qid to hand Yes Herlene Runner, MD   DEXILANT 60 MG CPDR delayed release capsule TAKE 1 CAPSULE BY MOUTH DAILY Yes Herlene Runner, MD   hydrocortisone (WESTCORT) 0.2 % cream APPLY EXTERNALLY TO THE AFFECTED AREA TWICE DAILY Yes Herlene Runner, MD   sertraline (ZOLOFT) 100 MG tablet TAKE 1 TABLET BY MOUTH THREE TIMES DAILY Yes Herlene Runner, MD   ondansetron (ZOFRAN) 4 MG tablet Take 1 tablet by mouth every 4 hours as needed for Nausea Yes Rachel Villalta MD   fluticasone (FLOVENT HFA) 110 MCG/ACT inhaler INHALE 1 PUFF INTO THE LUNGS TWICE DAILY Yes Herlene Runner, MD   butalbital-acetaminophen-caffeine (FIORICET, ESGIC) -30 MG per tablet Take 1 tablet by mouth every 4 hours as needed for Headaches May take 2 tablets every 4 hours as needed for pain Yes Terese Benitez MD   Nutritional Supplements (BOOST CALORIE SMART) LIQD Take 1 Can by mouth See Admin Instructions EVERY 3 DAYS Yes Historical Provider, MD   PROAIR  (90 Base) MCG/ACT inhaler INHALE 2 PUFFS BY MOUTH EVERY 4 HOURS AS NEEDED FOR WHEEZING Yes Herlene Runner, MD        Allergies   Allergen Reactions    Beta Adrenergic Blockers Shortness Of Breath    Pheniramine      Other reaction(s): rapid heartbeat    Antihistamine [Diphenhydramine Hcl] Nausea And Vomiting    Aspirin Hives    Codeine Hives    Dilaudid [Hydromorphone] Nausea And Vomiting    Nsaids Hives and Nausea And Vomiting    Prednisone Other (See Comments)     GI upset    Zithromax [Azithromycin] Nausea And Vomiting       Past Medical History:   Diagnosis Date    Arthritis     fingers, hands    Asthma     Back pain     Bell's palsy     [facility]     Chronic pain     BACK SURGERY X4, neck, left arm    Colitis 2/14/2013    Depression     Elevated sed rate     Family history of breast cancer in first degree relative     Gastric ulcer     GERD (gastroesophageal reflux disease)     colitis    Glaucoma     Hypertension     Movement disorder     chronic back pain    Neuromuscular disorder (HCC)     sciatica rt. leg    Osteoarthritis     PONV (postoperative nausea and vomiting)     SEVERE       Past Surgical History:   Procedure Laterality Date    BACK SURGERY      x 4    COLONOSCOPY  10/16/12    polyp removed and biopsy sent    COLONOSCOPY  3/26/13    CYSTOSCOPY  1/13/16    urethral dilitation    FACIAL NERVE SURGERY      D/T Bell's Palsy    FINGER SURGERY Left 04/03/2018    Excision of Left Thumb Digital Mucous cyst & DIP Joint Arthrotomy & Debridement    FINGER SURGERY  04/2018    left thumb    FINGER SURGERY Left 04/03/2018    thumb surgery     HYSTERECTOMY      Partial    UPPER GASTROINTESTINAL ENDOSCOPY  10/15/12    UPPER GASTROINTESTINAL ENDOSCOPY  2/11/15    with EUS    VENTRAL HERNIA REPAIR N/A 4/15/2019    LAPAROSCOPIC REPAIR OF INCISIONAL HERNIA WITH MESH performed by Leslye Ibarra MD at [facility]       Social History     Tobacco Use    Smoking status: Former Smoker     Packs/day: 0.25     Years: 20.00     Pack years: 5.00     Types: Cigarettes     Last attempt to quit: 4/1/2016     Years since quitting: 3.9    Smokeless CMP:   Lab Results   Component Value Date     08/28/2019    K 3.9 08/28/2019     08/28/2019    CO2 30 08/28/2019    BUN 11 08/28/2019    CREATININE 0.67 08/28/2019    GFRAA >60 04/17/2019    GFRAA >60 03/26/2013    AGRATIO 1.4 04/16/2019    LABGLOM >60 04/17/2019    GLUCOSE 89 08/28/2019    PROT 7.5 04/16/2019    PROT 6.1 02/16/2013    CALCIUM 8.4 08/28/2019    BILITOT 0.6 04/16/2019    ALKPHOS 95 04/16/2019    AST 29 04/16/2019    ALT 21 04/16/2019     Lab Results   Component Value Date    CHOL 200 03/19/2019    TRIG 134 03/19/2019    HDL 59 03/19/2019    HDL 64 01/24/2011    LDLCALC 114 03/19/2019       Limited echo 2/26/20:  Summary   This is a limited study to assess pericardial effusion. Overall left ventricular function is normal.   Ejection fraction is visually estimated to be 55-60 %. There is a moderate pericardial effusion with more significant accumulation   anteriorly but no cardiac tamponade. Echo 8/22/19:  Left ventricle - abnormal septal motion, normal size, thickness and   function with EF of 55%     Mitral valve - mildly thickened, mild regurgitation     Tricuspid valve - mild regurgitation with PASP of 28mmHg     Pericardium - small to moderate circumferential pericardial effusion with   more significant accumulation anteriorly around RV    Carotid duplex 4/12/18:     [place] is 50-69% stenosis of the right internal carotid arteries.    There is <50% stenosis of the left internal carotid arteries.    Vertebral flow are antegrade bilaterally. Ted Nascimento 5/16/16  Summary       No EKG evidence for ischemia with lexiscan       Preserved LV systolic function       Myocardial perfusion imaging with no evidence for ischemia with lexiscan     Echo 12/23/14:    Summary  -Normal left ventricle size, wall thickness and systolic function with an estimated ejection fraction of 55%.   -Mild mitral prolapse and regurgitation is present.  -There is mild tricuspid regurgitation with RVSP estimated at 30 mmHg. -There is a small localized pericardial effusion noted.     Echo 3/31/14:  Summary  -Normal left ventricle size, wall thickness and systolic function with an estimated ejection fraction of 55%.  -No regional wall motion abnormalities are seen. -There is reversal of E/A inflow velocities across the mitral valve suggesting impaired left ventricular relaxation.  -There is mild tricuspid regurgitation with RVSP estimated at 32 mmHg. -There is a small localized pericardial effusion noted, appears unchanged from previous echocardiogram dated 2/15/2013.    3/26/14: Holter: NSR avg 86 min 61 max 146. Brief episodes of AT      ASSESSMENT/PLAN:  1. Pericardial effusion  ~ 3/2014 and 12/2014 echoes showed small localized pericardial effusions  ~ 8/22/19 Echo - small to moderate circumferential pericardial effusion with more significant accumulation anteriorly around RV  ~ 8/26/19 labs Sed rate- 9, ASA- negative, CRP- 0.5  ~ 2/26/20 limited Echo --moderate pericardial effusion with more significant accumulation anteriorly but no cardiac tamponade. Plan > repeat CRP, sed rate, LALI. Repeat limited echo in three months   Instructed to call for worsening shortness of breath    2. Cardiomyopathy, unspecified type ([address]  ~ 2006 Echo - EF 25% (suspected viral etiology)  ~ 2006 [facility] - normal coronary arteries  ~ 12/2014 Echo - normal LVEF  ~ 8/2019 Echo - LVEF 55%  ~ 2/26/20 limited echo - LVEF -- 55-60%  ~ has shortness of breath that occurs with epigastric pain, but EF restored and no evidence of fluid overload on exam    Plan > monitor for worsening shortness of breath     3. Bilateral carotid artery stenosis  ~ 4/12/18 duplex -- KRYSTAL 13-72%, LICA < 04% stenosis  ~ has left bruit on exam    Plan > repeat carotid duplex soon     4.  Epigastric discomfort  5.   Shortness of breath  ~ 2006 [facility] showed normal coronary arteries  ~ GI work up was negative   ~ epigastric pain occurs with activity and is associated with shortness of breath  ~ has point tenderness with palpation in epigastric area. Plan > patient to call office if symptoms worsen        Plan:  1. No change in medications  2. Check LALI, sed rate, CRP soon  3. Limited echo in three months  4. Call office for worsening chest discomfort or shortness of breath  5. Follow up in six months with Rosemarie Hill NP and Dr. Alon Ferraro in one year       Scribe's attestation: This note was scribed in the presence of Taylor Hays M.D. by Haydee Hernandez RN    Physician Attestation: The scribe's documentation has been prepared under my direction and personally reviewed by me in its entirety. I confirm that the note above accurately reflects all work, treatment, procedures, and medical decision making performed by me. An  electronic signature was used to authenticate this note. Rasheeda Ferraro MD, [facility] [place], [address]

## 2020-02-26 NOTE — LETTER
[address] Cardiology [facility]  [address] [address]  Phone: [phone]  Fax: [phone]    Varinder Watkins MD        2020     Mike Sapp MD  formerly [facility] [address]    Patient: Luz Aviles  MR Number: [id number]  YOB: 1955  Date of Visit: 2020    Dear Dr. Mike Sapp:    Below are the relevant portions of my assessment and plan of care. [address]    2020    Luz Aviles (:  1955) is a 71 y.o. female who is here for follow up on her history of pericardial effusion and to review results of echocardiogram done today. Referring Provider: Mike Sapp MD    HISTORY:  Ms. 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 Her EF has since normalized. She had a normal nuclear stress test prior to back surgery in . Surgery took 6 hours to complete and patient had no cardiovascular complications during or after surgery. [facility] Echo showed mild to moderate circumferential pericardial effusion. 19 inflammatory markers were negative. Today she states she developed epigastric discomfort starting in Sept-Oct 2019 and underwent extensive GI work up which was essentially normal.  She was instructed to take Miralax for continued discomfort, although it hasn't helped much. Epigastric discomfort is not related to eating, but occurs when active and is associated with shortness of breath and elevated heart rate. Pain and shortness of breath resolves with rest.  She had hernia surgery in 2018 and had mess inserted in lower abdomen with surgery. She denies palpitations, dizziness, or edema. Patient is compliant with medications and is tolerating them well without side effects.       REVIEW OF SYSTEMS: A complete review of systems was reviewed and is negative except as noted in the history of present illness. Prior to Visit Medications    Medication Sig Taking? Authorizing Provider   HYDROcodone-acetaminophen (NORCO) 7.5-325 MG per tablet Take 1 tablet by mouth every 6 hours as needed for Pain for up to 30 days.  Yes Isabela Yousif MD   nitroGLYCERIN (NITROSTAT) 0.4 MG SL tablet PLACE 1 TABLET UNDE THE TONGUE EVERY 5 MINUTES AS NEEDED FOR CHEST PAIN Yes JOAO Billy - CNP   gabapentin (NEURONTIN) 300 MG capsule TAKE 1 CAPSULE BY MOUTH THREE TIMES DAILY Yes Isabela Yousif MD   linaCLOtide (LINZESS PO) Take by mouth daily Yes Historical Provider, MD   Elastic Bandages & Supports ([address] 20-30MMHG) [address] 1 each by Does not apply route daily Yes JOAO Billy - CNP   levofloxacin (LEVAQUIN) 500 MG tablet Take 1 tablet by mouth daily Yes Leroy Barros MD   traZODone (DESYREL) 50 MG tablet TAKE 2 TABLETS BY MOUTH EVERY NIGHT Yes Isabela Yousif MD   tiZANidine (ZANAFLEX) 4 MG tablet TAKE 1 TABLET BY MOUTH THREE TIMES DAILY Yes Isabela Yousif MD   albuterol (PROVENTIL) (2.5 MG/3ML) 0.083% nebulizer solution USE 1 VIAL IN NEBULIZER EVERY 6 HOURS Yes Isabela Yousif MD   diclofenac sodium (VOLTAREN) 1 % GEL Apply 2 gms qid to hand Yes Isabela Yousif MD   DEXILANT 60 MG CPDR delayed release capsule TAKE 1 CAPSULE BY MOUTH DAILY Yes Isabela Yousif MD   hydrocortisone (WESTCORT) 0.2 % cream APPLY EXTERNALLY TO THE AFFECTED AREA TWICE DAILY Yes Isabela Yousif MD   sertraline (ZOLOFT) 100 MG tablet TAKE 1 TABLET BY MOUTH THREE TIMES DAILY Yes Isabela Yousif MD   ondansetron (ZOFRAN) 4 MG tablet Take 1 tablet by mouth every 4 hours as needed for Nausea Yes Rissa Lang MD   fluticasone (FLOVENT HFA) 110 MCG/ACT inhaler INHALE 1 PUFF INTO THE LUNGS TWICE DAILY Yes Isabela Yousif MD   butalbital-acetaminophen-caffeine (FIORICET, ESGIC) -40 MG per tablet Take 1 tablet by mouth every 4 hours as needed for Headaches May take 2 tablets every 4 hours as needed for pain Yes Heber Mccann MD   Nutritional Supplements (BOOST CALORIE SMART) LIQD Take 1 Can by mouth See Admin Instructions EVERY 3 DAYS Yes Historical Provider, MD   PROAIR  (90 Base) MCG/ACT inhaler INHALE 2 PUFFS BY MOUTH EVERY 4 HOURS AS NEEDED FOR WHEEZING Yes Carrie Sherman MD        Allergies   Allergen Reactions    Beta Adrenergic Blockers Shortness Of Breath    Pheniramine      Other reaction(s): rapid heartbeat    Antihistamine [Diphenhydramine Hcl] Nausea And Vomiting    Aspirin Hives    Codeine Hives    Dilaudid [Hydromorphone] Nausea And Vomiting    Nsaids Hives and Nausea And Vomiting    Prednisone Other (See Comments)     GI upset    Zithromax [Azithromycin] Nausea And Vomiting       Past Medical History:   Diagnosis Date    Arthritis     fingers, hands    Asthma     Back pain     Bell's palsy     CH[facility]congestive heart failure) ([facility])     Chronic pain     BACK SURGERY X4, neck, left arm    Colitis 2/14/2013    Depression     Elevated sed rate     Family history of breast cancer in first degree relative     Gastric ulcer     GERD (gastroesophageal reflux disease)     colitis    Glaucoma     Hypertension     Movement disorder     chronic back pain    Neuromuscular disorder (HCC)     sciatica rt. leg    Osteoarthritis     PONV (postoperative nausea and vomiting)     SEVERE       Past Surgical History:   Procedure Laterality Date    BACK SURGERY      x 4    COLONOSCOPY  10/16/12    polyp removed and biopsy sent    COLONOSCOPY  3/26/13    CYSTOSCOPY  1/13/16    urethral dilitation    FACIAL NERVE SURGERY      D/T Bell's Palsy    FINGER SURGERY Left 04/03/2018    Excision of Left Thumb Digital Mucous cyst & DIP Joint Arthrotomy & Debridement    FINGER SURGERY  04/2018    left thumb    FINGER SURGERY Left 04/03/2018    thumb surgery  HYSTERECTOMY      Partial    UPPER GASTROINTESTINAL ENDOSCOPY  10/15/12    UPPER GASTROINTESTINAL ENDOSCOPY  2/11/15    with EUS    VENTRAL HERNIA REPAIR N/A 4/15/2019    LAPAROSCOPIC REPAIR OF INCISIONAL HERNIA WITH MESH performed by Juventino Burrows MD at [address] [place] [address] History     Tobacco Use    Smoking status: Former Smoker     Packs/day: 0.25     Years: 20.00     Pack years: 5.00     Types: Cigarettes     Last attempt to quit: 4/1/2016     Years since quitting: 3.9    Smokeless tobacco: Never Used   Substance Use Topics    Alcohol use: No     Alcohol/week: 0.0 standard drinks        Family History   Problem Relation Age of Onset    Heart Disease Mother     High Blood Pressure Mother     Arthritis Mother     Breast Cancer Sister     High Blood Pressure Brother     Diabetes Neg Hx     Stroke Neg Hx        PHYSICAL EXAMINATION:  Vitals:    02/26/20 1426   BP: 110/68   Site: Left Upper Arm   Position: Sitting   Cuff Size: Medium Adult   Pulse: 66   Resp: 18   SpO2: 96%   Weight: 103 lb (46.7 kg)   Height: 5' 8\" (1.727 m)     Estimated body mass index is 15.66 kg/m² as calculated from the following:    Height as of this encounter: 5' 8\" (1.727 m). Weight as of this encounter: 103 lb (46.7 kg). General Appearance: No apparent distress, thin build  Eyes:  · Conjunctiva clear  · Pupils equal, round, reactive to light  ENT:  · External Ears and Nose unremarkable  · Oral mucosa is moist  Respiratory:  · Normal excursion and expansion without use of accessory muscles  · Resp Auscultation: Normal breath sounds without dullness  Cardiovascular:  · JVD is normal  · The carotid upstroke is normal in amplitude and contour without delay, has left bruit  · Apical impulse is not displaced  · Normal S1, S2. No S3.   No Murmur  · No edema  · Pedal Pulses: 2+ and equal   Abdomen:  · No masses or tenderness  · Liver/Spleen: No Abnormalities Noted  · Has point tenderness  Musculoskeletal: · Fingers without clubbing or cyanosis  · Normal Gait  Skin:  · No rash. · Normal skin turgor   · Color is pale  Neurologic/Psychiatric:  · Alert and oriented in all spheres  · Normal mood and affect  · Memory and mentation intact      I have reviewed all pertinent lab results and diagnostic testing. LABS:  CBC:   Lab Results   Component Value Date    WBC 5.3 08/28/2019    RBC 3.99 08/28/2019    HGB 12.7 08/28/2019    HCT 37.2 08/28/2019    MCV 93.2 08/28/2019    RDW 13.9 08/28/2019     08/28/2019     CMP:   Lab Results   Component Value Date     08/28/2019    K 3.9 08/28/2019     08/28/2019    CO2 30 08/28/2019    BUN 11 08/28/2019    CREATININE 0.67 08/28/2019    GFRAA >60 04/17/2019    GFRAA >60 03/26/2013    AGRATIO 1.4 04/16/2019    LABGLOM >60 04/17/2019    GLUCOSE 89 08/28/2019    PROT 7.5 04/16/2019    PROT 6.1 02/16/2013    CALCIUM 8.4 08/28/2019    BILITOT 0.6 04/16/2019    ALKPHOS 95 04/16/2019    AST 29 04/16/2019    ALT 21 04/16/2019     Lab Results   Component Value Date    CHOL 200 03/19/2019    TRIG 134 03/19/2019    HDL 59 03/19/2019    HDL 64 01/24/2011    LDLCALC 114 03/19/2019       Limited echo 2/26/20:  Summary   This is a limited study to assess pericardial effusion. Overall left ventricular function is normal.   Ejection fraction is visually estimated to be 55-60 %. There is a moderate pericardial effusion with more significant accumulation   anteriorly but no cardiac tamponade. Echo 8/22/19:  Left ventricle - abnormal septal motion, normal size, thickness and   function with EF of 55%     Mitral valve - mildly thickened, mild regurgitation     Tricuspid valve - mild regurgitation with PASP of 28mmHg     Pericardium - small to moderate circumferential pericardial effusion with   more significant accumulation anteriorly around RV    Carotid duplex 4/12/18:     Aida Distel is 50-69% stenosis of the right internal carotid arteries. ~ 8/2019 Echo - LVEF 55%  ~ 2/26/20 limited echo - LVEF -- 55-60%  ~ has shortness of breath that occurs with epigastric pain, but EF restored and no evidence of fluid overload on exam    Plan > monitor for worsening shortness of breath     3. Bilateral carotid artery stenosis  ~ 4/12/18 duplex -- KRYSTAL 45-39%, LICA < 46% stenosis  ~ has left bruit on exam    Plan > repeat carotid duplex soon     4.  Epigastric discomfort  5. Shortness of breath  ~ 2006 [facility] showed normal coronary arteries  ~ GI work up was negative   ~ epigastric pain occurs with activity and is associated with shortness of breath  ~ has point tenderness with palpation in epigastric area. Plan > patient to call office if symptoms worsen        Plan:  1. No change in medications  2. Check LALI, sed rate, CRP soon  3. Limited echo in three months  4. Call office for worsening chest discomfort or shortness of breath  5. Follow up in six months with Lenny Lawton NP and Dr. Ashley Walter in one year       Scribe's attestation: This note was scribed in the presence of Manjit Escamilla M.D. by Fabienne Fournier RN    Physician Attestation: The scribe's documentation has been prepared under my direction and personally reviewed by me in its entirety. I confirm that the note above accurately reflects all work, treatment, procedures, and medical decision making performed by me. An  electronic signature was used to authenticate this note. King Walter MD, Sharlene Glynn    If you have questions, please do not hesitate to call me. I look forward to following Chika Calderon along with you.     Sincerely,        Manjit Escamilla MD

## 2020-03-03 RX ORDER — HYDROCODONE BITARTRATE AND ACETAMINOPHEN 7.5; 325 MG/1; MG/1
1 TABLET ORAL EVERY 6 HOURS PRN
Qty: 120 TABLET | Refills: 0 | Status: SHIPPED | OUTPATIENT
Start: 2020-03-03 | End: 2020-04-02 | Stop reason: SDUPTHER

## 2020-03-03 NOTE — TELEPHONE ENCOUNTER
Medication and Quantity requested: HYDROcodone-acetaminophen (NORCO) 7.5-325 MG per tablet     Last Visit  2/3/20    Last Filled  2/3/20    Pharmacy and phone number updated in EPIC:  Yes, [facility]

## 2020-03-06 RX ORDER — BUTALBITAL, ACETAMINOPHEN AND CAFFEINE 50; 325; 40 MG/1; MG/1; MG/1
1 TABLET ORAL EVERY 4 HOURS PRN
Qty: 50 TABLET | Refills: 0 | Status: SHIPPED | OUTPATIENT
Start: 2020-03-06 | End: 2020-03-16

## 2020-03-16 RX ORDER — BUTALBITAL, ACETAMINOPHEN AND CAFFEINE 50; 325; 40 MG/1; MG/1; MG/1
1 TABLET ORAL EVERY 4 HOURS PRN
Qty: 50 TABLET | Refills: 0 | Status: SHIPPED | OUTPATIENT
Start: 2020-03-16 | End: 2020-03-24 | Stop reason: SDUPTHER

## 2020-03-24 RX ORDER — BUTALBITAL, ACETAMINOPHEN AND CAFFEINE 50; 325; 40 MG/1; MG/1; MG/1
1 TABLET ORAL EVERY 4 HOURS PRN
Qty: 50 TABLET | Refills: 0 | Status: SHIPPED | OUTPATIENT
Start: 2020-03-24 | End: 2020-04-03 | Stop reason: SDUPTHER

## 2020-04-02 RX ORDER — HYDROCODONE BITARTRATE AND ACETAMINOPHEN 7.5; 325 MG/1; MG/1
1 TABLET ORAL EVERY 6 HOURS PRN
Qty: 120 TABLET | Refills: 0 | Status: SHIPPED | OUTPATIENT
Start: 2020-04-02 | End: 2020-04-30 | Stop reason: SDUPTHER

## 2020-04-02 NOTE — TELEPHONE ENCOUNTER
Medication and Quantity requested:  HYDROcodone-acetaminophen (NORCO) 7.5-325 MG per tablet - qty 120        Last Visit  02/03/20 - Dr Kaylin Villegas and phone number updated in EPIC:  Yes    Pharmacy:  [facility]

## 2020-04-03 RX ORDER — BUTALBITAL, ACETAMINOPHEN AND CAFFEINE 50; 325; 40 MG/1; MG/1; MG/1
1 TABLET ORAL EVERY 4 HOURS PRN
Qty: 50 TABLET | Refills: 0 | Status: SHIPPED | OUTPATIENT
Start: 2020-04-03 | End: 2020-04-13 | Stop reason: SDUPTHER

## 2020-04-13 RX ORDER — BUTALBITAL, ACETAMINOPHEN AND CAFFEINE 50; 325; 40 MG/1; MG/1; MG/1
1 TABLET ORAL EVERY 4 HOURS PRN
Qty: 50 TABLET | Refills: 0 | Status: SHIPPED | OUTPATIENT
Start: 2020-04-13 | End: 2020-05-12 | Stop reason: SDUPTHER

## 2020-04-14 ENCOUNTER — TELEPHONE (OUTPATIENT)
Dept: FAMILY MEDICINE CLINIC | Age: 65
End: 2020-04-14

## 2020-04-14 NOTE — TELEPHONE ENCOUNTER
I thought Eden Khan told me he had a video visit scheduled with 1 of his specialists this week. If he is able to do it, she could schedule it on his device. If not we could do a phone visit.

## 2020-04-16 ENCOUNTER — VIRTUAL VISIT (OUTPATIENT)
Dept: FAMILY MEDICINE CLINIC | Age: 65
End: 2020-04-16
Payer: MEDICARE

## 2020-04-16 PROCEDURE — 99422 OL DIG E/M SVC 11-20 MIN: CPT | Performed by: FAMILY MEDICINE

## 2020-04-16 NOTE — PROGRESS NOTES
Juventino Santamraia is a 76 y.o. female evaluated via telephone on 4/16/2020. Consent:  She and/or health care decision maker is aware that that she may receive a bill for this telephone service, depending on her insurance coverage, and has provided verbal consent to proceed: Yes      Documentation:  I communicated with the patient and/or health care decision maker about chronic pain and asthma. Details of this discussion including any medical advice provided: Patient states her pain level is perhaps a little worse due to the up-and-down weather we have had. She continues to take hydrocodone 4 times per day. OARRS checked and there are no inconsistencies. Her last prescription was 4/2. She also states she has had some issues with sinus, cough, and wheezing and has been using her nebulizer which she states is helped. She states she has not run a fever. She states the cough is nonproductive. She denies any other systemic symptoms. We discussed that antibiotics at this time would not be appropriate as this is more likely due to allergy/viral.  We also discussed the possibility of prednisone should her wheezing/shortness of breath get worse although would like to avoid prednisone in this coronavirus climate so we do not reduce her immune response. She will return to the office when it opens after the coronavirus crisis. I affirm this is a Patient Initiated Episode with an Established Patient who has not had a related appointment within my department in the past 7 days or scheduled within the next 24 hours.     Total Time: minutes: 11-20 minutes    Note: not billable if this call serves to triage the patient into an appointment for the relevant concern      Abel Lewis

## 2020-04-21 ENCOUNTER — VIRTUAL VISIT (OUTPATIENT)
Dept: ENT CLINIC | Age: 65
End: 2020-04-21
Payer: MEDICARE

## 2020-04-21 PROCEDURE — 3017F COLORECTAL CA SCREEN DOC REV: CPT | Performed by: OTOLARYNGOLOGY

## 2020-04-21 PROCEDURE — 1123F ACP DISCUSS/DSCN MKR DOCD: CPT | Performed by: OTOLARYNGOLOGY

## 2020-04-21 PROCEDURE — G8427 DOCREV CUR MEDS BY ELIG CLIN: HCPCS | Performed by: OTOLARYNGOLOGY

## 2020-04-21 PROCEDURE — 1036F TOBACCO NON-USER: CPT | Performed by: OTOLARYNGOLOGY

## 2020-04-21 PROCEDURE — 1090F PRES/ABSN URINE INCON ASSESS: CPT | Performed by: OTOLARYNGOLOGY

## 2020-04-21 PROCEDURE — G8400 PT W/DXA NO RESULTS DOC: HCPCS | Performed by: OTOLARYNGOLOGY

## 2020-04-21 PROCEDURE — 99441 PR PHYS/QHP TELEPHONE EVALUATION 5-10 MIN: CPT | Performed by: OTOLARYNGOLOGY

## 2020-04-21 PROCEDURE — 4040F PNEUMOC VAC/ADMIN/RCVD: CPT | Performed by: OTOLARYNGOLOGY

## 2020-04-21 PROCEDURE — G8419 CALC BMI OUT NRM PARAM NOF/U: HCPCS | Performed by: OTOLARYNGOLOGY

## 2020-04-21 RX ORDER — METHYLPREDNISOLONE 4 MG/1
4 TABLET ORAL SEE ADMIN INSTRUCTIONS
Qty: 1 KIT | Refills: 0 | Status: SHIPPED | OUTPATIENT
Start: 2020-04-21 | End: 2020-07-28

## 2020-04-21 RX ORDER — BUTALBITAL, ACETAMINOPHEN AND CAFFEINE 50; 325; 40 MG/1; MG/1; MG/1
1 TABLET ORAL EVERY 4 HOURS PRN
Qty: 50 TABLET | Refills: 1 | Status: SHIPPED | OUTPATIENT
Start: 2020-04-21 | End: 2020-05-23 | Stop reason: SDUPTHER

## 2020-04-21 RX ORDER — LEVOFLOXACIN 500 MG/1
500 TABLET, FILM COATED ORAL DAILY
Qty: 10 TABLET | Refills: 0 | Status: SHIPPED | OUTPATIENT
Start: 2020-04-21 | End: 2020-07-28

## 2020-04-21 NOTE — PROGRESS NOTES
Patient authorized charges for phone consultation from her home. Past 2 weeks sinus congestion with nasal obstruction but no fever or sore throat. Has allergy history this time year. Discussed need for therapy to include Levaquin and Medrol Dosepak for allergy induced sinus infection. Will hydrate and take Fioricet for migraine now worse. Will call if no better in 7 days. Time of consult 10 minutes.

## 2020-04-24 RX ORDER — SERTRALINE HYDROCHLORIDE 100 MG/1
TABLET, FILM COATED ORAL
Qty: 270 TABLET | Refills: 3 | Status: SHIPPED | OUTPATIENT
Start: 2020-04-24 | End: 2021-05-04

## 2020-04-30 RX ORDER — HYDROCODONE BITARTRATE AND ACETAMINOPHEN 7.5; 325 MG/1; MG/1
1 TABLET ORAL EVERY 6 HOURS PRN
Qty: 120 TABLET | Refills: 0 | Status: SHIPPED | OUTPATIENT
Start: 2020-04-30 | End: 2020-05-29 | Stop reason: SDUPTHER

## 2020-05-12 RX ORDER — BUTALBITAL, ACETAMINOPHEN AND CAFFEINE 50; 325; 40 MG/1; MG/1; MG/1
1 TABLET ORAL EVERY 4 HOURS PRN
Qty: 50 TABLET | Refills: 0 | Status: SHIPPED | OUTPATIENT
Start: 2020-05-12 | End: 2020-05-14

## 2020-05-14 ENCOUNTER — HOSPITAL ENCOUNTER (OUTPATIENT)
Dept: GENERAL RADIOLOGY | Age: 65
Discharge: HOME | End: 2020-05-14
Payer: MEDICARE

## 2020-05-14 ENCOUNTER — HOSPITAL ENCOUNTER (OUTPATIENT)
Age: 65
Discharge: HOME | End: 2020-05-14
Payer: MEDICARE

## 2020-05-14 ENCOUNTER — OFFICE VISIT (OUTPATIENT)
Dept: ENT CLINIC | Age: 65
End: 2020-05-14
Payer: MEDICARE

## 2020-05-14 VITALS — TEMPERATURE: 97.4 F

## 2020-05-14 PROCEDURE — G8428 CUR MEDS NOT DOCUMENT: HCPCS | Performed by: OTOLARYNGOLOGY

## 2020-05-14 PROCEDURE — 96372 THER/PROPH/DIAG INJ SC/IM: CPT | Performed by: OTOLARYNGOLOGY

## 2020-05-14 PROCEDURE — 1036F TOBACCO NON-USER: CPT | Performed by: OTOLARYNGOLOGY

## 2020-05-14 PROCEDURE — 3017F COLORECTAL CA SCREEN DOC REV: CPT | Performed by: OTOLARYNGOLOGY

## 2020-05-14 PROCEDURE — G8419 CALC BMI OUT NRM PARAM NOF/U: HCPCS | Performed by: OTOLARYNGOLOGY

## 2020-05-14 PROCEDURE — 99213 OFFICE O/P EST LOW 20 MIN: CPT | Performed by: OTOLARYNGOLOGY

## 2020-05-14 PROCEDURE — 1123F ACP DISCUSS/DSCN MKR DOCD: CPT | Performed by: OTOLARYNGOLOGY

## 2020-05-14 PROCEDURE — 1090F PRES/ABSN URINE INCON ASSESS: CPT | Performed by: OTOLARYNGOLOGY

## 2020-05-14 PROCEDURE — 4040F PNEUMOC VAC/ADMIN/RCVD: CPT | Performed by: OTOLARYNGOLOGY

## 2020-05-14 PROCEDURE — 71046 X-RAY EXAM CHEST 2 VIEWS: CPT

## 2020-05-14 PROCEDURE — G8400 PT W/DXA NO RESULTS DOC: HCPCS | Performed by: OTOLARYNGOLOGY

## 2020-05-14 RX ORDER — METHYLPREDNISOLONE ACETATE 40 MG/ML
40 INJECTION, SUSPENSION INTRA-ARTICULAR; INTRALESIONAL; INTRAMUSCULAR; SOFT TISSUE ONCE
Status: COMPLETED | OUTPATIENT
Start: 2020-05-14 | End: 2020-05-14

## 2020-05-14 RX ORDER — DOXYCYCLINE 100 MG/1
100 TABLET ORAL 2 TIMES DAILY
Qty: 20 TABLET | Refills: 0 | Status: SHIPPED | OUTPATIENT
Start: 2020-05-14 | End: 2020-05-24

## 2020-05-14 RX ADMIN — METHYLPREDNISOLONE ACETATE 40 MG: 40 INJECTION, SUSPENSION INTRA-ARTICULAR; INTRALESIONAL; INTRAMUSCULAR; SOFT TISSUE at 15:14

## 2020-05-14 NOTE — PROGRESS NOTES
Patient was unable to take the Medrol due to it causing upset stomach. As a result, she continues to have congestion with cough somewhat productive of yellow phlegm. She has not had any difficulty breathing through her nose and has had no fever. She does use her inhaler and has been on this and ask as well. She has had a chronic history of bronchitis in the past.  Currently, she appears in no obvious respiratory distress. No stridor is noted and her voice is normal.  Ear examination reveals normal-appearing tympanic membranes and ear canals. Oral examination remains unremarkable. The nasal mucosa is somewhat edematous but I see no purulent drainage and there is no obstruction present. The neck is free of adenopathy, mass, thyroid enlargement. The chest reveals congestion with rhonchi but no rales. Minimal wheezing is apparent. This is all generalized. As a result, I do feel that the primary problem is 1 of an acute bronchitis on top of chronic bronchitis. We will continue all of her current medications and will hydrate better as well as continue Mucinex. We will start her on doxycycline monohydrate and she will receive a Depo-Medrol injection. I do feel that a chest x-ray is indicated at this time.   I reviewed one that she had last November and that was found to be entirely normal.

## 2020-05-23 RX ORDER — BUTALBITAL, ACETAMINOPHEN AND CAFFEINE 50; 325; 40 MG/1; MG/1; MG/1
1 TABLET ORAL EVERY 4 HOURS PRN
Qty: 50 TABLET | Refills: 1 | Status: SHIPPED | OUTPATIENT
Start: 2020-05-23 | End: 2020-06-12 | Stop reason: SDUPTHER

## 2020-05-26 ENCOUNTER — HOSPITAL ENCOUNTER (OUTPATIENT)
Dept: NON INVASIVE DIAGNOSTICS | Age: 65
Discharge: HOME | End: 2020-05-26
Payer: MEDICARE

## 2020-05-26 PROCEDURE — 93308 TTE F-UP OR LMTD: CPT

## 2020-05-29 RX ORDER — HYDROCODONE BITARTRATE AND ACETAMINOPHEN 7.5; 325 MG/1; MG/1
1 TABLET ORAL EVERY 6 HOURS PRN
Qty: 120 TABLET | Refills: 0 | Status: SHIPPED | OUTPATIENT
Start: 2020-05-29 | End: 2020-06-29 | Stop reason: SDUPTHER

## 2020-06-08 ENCOUNTER — TELEPHONE (OUTPATIENT)
Dept: CARDIOLOGY CLINIC | Age: 65
End: 2020-06-08

## 2020-06-08 NOTE — TELEPHONE ENCOUNTER
----- Message from Luis Braun RN sent at 6/5/2020  8:08 PM EDT -----  Per Dr. Coronado Me, patient needs an NP visit the week of 6/8/20 to evaluate chest pain and shortness of breath.

## 2020-06-12 RX ORDER — BUTALBITAL, ACETAMINOPHEN AND CAFFEINE 50; 325; 40 MG/1; MG/1; MG/1
1 TABLET ORAL EVERY 4 HOURS PRN
Qty: 50 TABLET | Refills: 1 | Status: SHIPPED | OUTPATIENT
Start: 2020-06-12 | End: 2020-07-06 | Stop reason: SDUPTHER

## 2020-06-29 RX ORDER — HYDROCODONE BITARTRATE AND ACETAMINOPHEN 7.5; 325 MG/1; MG/1
1 TABLET ORAL EVERY 6 HOURS PRN
Qty: 120 TABLET | Refills: 0 | Status: SHIPPED | OUTPATIENT
Start: 2020-06-29 | End: 2020-07-28 | Stop reason: SDUPTHER

## 2020-06-29 NOTE — TELEPHONE ENCOUNTER
Medication and Quantity requested: HYDROcodone-acetaminophen (NORCO) 7.5-325 MG per tablet-QTY.  120 tablets         Last Visit  4/16/2020    Pharmacy and phone number updated in EPIC:  yes  [place] Fax: [phone]

## 2020-07-06 RX ORDER — BUTALBITAL, ACETAMINOPHEN AND CAFFEINE 50; 325; 40 MG/1; MG/1; MG/1
1 TABLET ORAL EVERY 4 HOURS PRN
Qty: 50 TABLET | Refills: 0 | OUTPATIENT
Start: 2020-07-06 | End: 2020-07-23 | Stop reason: SDUPTHER

## 2020-07-06 RX ORDER — BUTALBITAL, ACETAMINOPHEN AND CAFFEINE 50; 325; 40 MG/1; MG/1; MG/1
1 TABLET ORAL EVERY 4 HOURS PRN
Qty: 50 TABLET | Refills: 1 | OUTPATIENT
Start: 2020-07-06

## 2020-07-06 NOTE — TELEPHONE ENCOUNTER
She does not take both pain meds unless severe when she would take Norco and usually takes milder Fioricet. Call pharmacy and inform. Otherwise, tell them to call patient since they are apparently in charge now.

## 2020-07-10 RX ORDER — FLUTICASONE PROPIONATE 110 UG/1
AEROSOL, METERED RESPIRATORY (INHALATION)
Qty: 12 G | Refills: 5 | Status: SHIPPED | OUTPATIENT
Start: 2020-07-10

## 2020-07-22 RX ORDER — DEXLANSOPRAZOLE 60 MG/1
CAPSULE, DELAYED RELEASE ORAL
Qty: 30 CAPSULE | Refills: 0 | Status: SHIPPED | OUTPATIENT
Start: 2020-07-22 | End: 2020-08-21

## 2020-07-22 NOTE — TELEPHONE ENCOUNTER
Medication:   Requested Prescriptions     Pending Prescriptions Disp Refills    DEXILANT 60 MG CPDR delayed release capsule [Pharmacy Med Name: DEXILANT 60MG CAP(FORMERLY KAPIDEX)] 30 capsule 11     Sig: TAKE 1 CAPSULE BY MOUTH DAILY        Last Filled:  7/23/19 #30, 11 RF     Patient Phone Number: [phone] (home)     Last appt: 4/16/20 back pain, asthma   Next appt: Visit date not found    Last OARRS:   RX Monitoring 4/16/2020   Attestation -   Periodic Controlled Substance Monitoring No signs of potential drug abuse or diversion identified.

## 2020-07-23 RX ORDER — BUTALBITAL, ACETAMINOPHEN AND CAFFEINE 50; 325; 40 MG/1; MG/1; MG/1
1 TABLET ORAL EVERY 4 HOURS PRN
Qty: 50 TABLET | Refills: 0 | Status: SHIPPED | OUTPATIENT
Start: 2020-07-23 | End: 2020-12-01 | Stop reason: SDUPTHER

## 2020-07-28 ENCOUNTER — VIRTUAL VISIT (OUTPATIENT)
Dept: FAMILY MEDICINE CLINIC | Age: 65
End: 2020-07-28
Payer: MEDICARE

## 2020-07-28 PROCEDURE — 99422 OL DIG E/M SVC 11-20 MIN: CPT | Performed by: FAMILY MEDICINE

## 2020-07-28 RX ORDER — HYDROCODONE BITARTRATE AND ACETAMINOPHEN 7.5; 325 MG/1; MG/1
1 TABLET ORAL EVERY 6 HOURS PRN
Qty: 120 TABLET | Refills: 0 | Status: SHIPPED | OUTPATIENT
Start: 2020-07-28 | End: 2020-08-27 | Stop reason: SDUPTHER

## 2020-07-28 ASSESSMENT — PATIENT HEALTH QUESTIONNAIRE - PHQ9
SUM OF ALL RESPONSES TO PHQ QUESTIONS 1-9: 2
1. LITTLE INTEREST OR PLEASURE IN DOING THINGS: 1
2. FEELING DOWN, DEPRESSED OR HOPELESS: 1
SUM OF ALL RESPONSES TO PHQ9 QUESTIONS 1 & 2: 2
SUM OF ALL RESPONSES TO PHQ QUESTIONS 1-9: 2

## 2020-07-29 NOTE — PROGRESS NOTES
Sean Parnell is a 76 y.o. female evaluated via telephone on 7/28/2020. Consent:  She and/or health care decision maker is aware that that she may receive a bill for this telephone service, depending on her insurance coverage, and has provided verbal consent to proceed: Yes      Documentation:  I communicated with the patient and/or health care decision maker about chronic pain syndrome and depression  Details of this discussion including any medical advice provided: Patient states her chronic pain remains the same if not worse. She states he needs the same amount of hydrocodone. She states with the COVID crisis and the state of the world as well as her pain levels she continues to feel depressed. She remains on sertraline which does help. She is also on trazodone. OARRS report reviewed and no inconsistencies noted   The patient understands the risks of dependency/addiction with hydrocodone and will take as little as possible and discontinue as soon as possible  A prescription for hydrocodone was also sent to the pharmacy. We discussed that she is due for an in person visit and should occur on at least the next appointment. I affirm this is a Patient Initiated Episode with a Patient who has not had a related appointment within my department in the past 7 days or scheduled within the next 24 hours.     Patient identification was verified at the start of the visit: Yes    Total Time: minutes: 11-20 minutes    Note: not billable if this call serves to triage the patient into an appointment for the relevant concern      Pineda Robb

## 2020-07-30 RX ORDER — GABAPENTIN 300 MG/1
CAPSULE ORAL
Qty: 90 CAPSULE | Refills: 5 | Status: SHIPPED | OUTPATIENT
Start: 2020-07-30 | End: 2021-02-02 | Stop reason: SDUPTHER

## 2020-08-14 RX ORDER — TIZANIDINE 4 MG/1
TABLET ORAL
Qty: 90 TABLET | Refills: 5 | Status: SHIPPED | OUTPATIENT
Start: 2020-08-14 | End: 2021-04-05

## 2020-08-14 NOTE — TELEPHONE ENCOUNTER
Medication:   Requested Prescriptions     Pending Prescriptions Disp Refills    tiZANidine (ZANAFLEX) 4 MG tablet [Pharmacy Med Name: TIZANIDINE 4MG TABLETS] 90 tablet 5     Sig: TAKE 1 TABLET BY MOUTH THREE TIMES DAILY        Last Filled:  10/21/19 #90, 5 RF     Patient Phone Number: [phone] (home)     Last appt: 7/28/20 med check - VV   Next appt: Visit date not found    Last OARRS:   RX Monitoring 7/28/2020   Attestation -   Periodic Controlled Substance Monitoring No signs of potential drug abuse or diversion identified.

## 2020-08-18 ENCOUNTER — OFFICE VISIT (OUTPATIENT)
Dept: ENT CLINIC | Age: 65
End: 2020-08-18
Payer: MEDICARE

## 2020-08-18 VITALS
SYSTOLIC BLOOD PRESSURE: 106 MMHG | WEIGHT: 103 LBS | TEMPERATURE: 97.7 F | DIASTOLIC BLOOD PRESSURE: 66 MMHG | BODY MASS INDEX: 15.61 KG/M2 | HEART RATE: 79 BPM | HEIGHT: 68 IN

## 2020-08-18 PROCEDURE — 4040F PNEUMOC VAC/ADMIN/RCVD: CPT | Performed by: OTOLARYNGOLOGY

## 2020-08-18 PROCEDURE — 99213 OFFICE O/P EST LOW 20 MIN: CPT | Performed by: OTOLARYNGOLOGY

## 2020-08-18 PROCEDURE — 1090F PRES/ABSN URINE INCON ASSESS: CPT | Performed by: OTOLARYNGOLOGY

## 2020-08-18 PROCEDURE — 1036F TOBACCO NON-USER: CPT | Performed by: OTOLARYNGOLOGY

## 2020-08-18 PROCEDURE — G8427 DOCREV CUR MEDS BY ELIG CLIN: HCPCS | Performed by: OTOLARYNGOLOGY

## 2020-08-18 PROCEDURE — 96372 THER/PROPH/DIAG INJ SC/IM: CPT | Performed by: OTOLARYNGOLOGY

## 2020-08-18 PROCEDURE — 3017F COLORECTAL CA SCREEN DOC REV: CPT | Performed by: OTOLARYNGOLOGY

## 2020-08-18 PROCEDURE — G8400 PT W/DXA NO RESULTS DOC: HCPCS | Performed by: OTOLARYNGOLOGY

## 2020-08-18 PROCEDURE — G8419 CALC BMI OUT NRM PARAM NOF/U: HCPCS | Performed by: OTOLARYNGOLOGY

## 2020-08-18 PROCEDURE — 1123F ACP DISCUSS/DSCN MKR DOCD: CPT | Performed by: OTOLARYNGOLOGY

## 2020-08-18 RX ORDER — PREDNISOLONE ACETATE 10 MG/ML
1 SUSPENSION/ DROPS OPHTHALMIC 2 TIMES DAILY
Qty: 5 ML | Refills: 1 | Status: SHIPPED | OUTPATIENT
Start: 2020-08-18 | End: 2021-03-17

## 2020-08-18 RX ORDER — MONTELUKAST SODIUM 10 MG/1
10 TABLET ORAL NIGHTLY
Qty: 30 TABLET | Refills: 1 | Status: SHIPPED | OUTPATIENT
Start: 2020-08-18 | End: 2020-11-12 | Stop reason: SDUPTHER

## 2020-08-18 RX ORDER — BUTALBITAL, ACETAMINOPHEN AND CAFFEINE 50; 325; 40 MG/1; MG/1; MG/1
1 TABLET ORAL EVERY 4 HOURS PRN
Qty: 50 TABLET | Refills: 3 | Status: SHIPPED | OUTPATIENT
Start: 2020-08-18 | End: 2020-09-22

## 2020-08-18 RX ORDER — METHYLPREDNISOLONE ACETATE 40 MG/ML
40 INJECTION, SUSPENSION INTRA-ARTICULAR; INTRALESIONAL; INTRAMUSCULAR; SOFT TISSUE ONCE
Status: COMPLETED | OUTPATIENT
Start: 2020-08-18 | End: 2020-08-18

## 2020-08-18 RX ADMIN — METHYLPREDNISOLONE ACETATE 40 MG: 40 INJECTION, SUSPENSION INTRA-ARTICULAR; INTRALESIONAL; INTRAMUSCULAR; SOFT TISSUE at 11:07

## 2020-08-18 NOTE — PROGRESS NOTES
Over the course the past couple of weeks, the patient has been noticing increase in sinus congestion postnasal drainage without it being purulent in nature and with no fever. She is also had itchiness in her eyes and some sneezing. There is been no fever. She has been using Flonase nasal spray and over-the-counter eyedrops. Currently, she appears in no obvious acute distress. Ear examination reveals normal-appearing tympanic membranes and ear canals bilaterally. Oral examination is unremarkable. The neck is free of adenopathy, mass, thyroid enlargement. The eyes do exhibit what appears to be allergic conjunctivitis bilaterally. Nasal mucosa treated with cotton pledgets  impregnated with Afrin and lidocaine placed in middle meatuses against turbinates. Pledgets left in place for five minutes and removed enabling enhanced visualization. The exam revealed positive edema of mucosa. it was negative for erythema indicative of infection. There was not evidence of deviation of the septum which was not significant. There were not polyps present. .There were not other masses present. The turbinates were not enlarged beyond normal.  Findings are consistent with allergic sinusitis and allergic conjunctivitis. We will start her on Pred forte drops for her eyes as well as Singulair and give her a Depo-Medrol injection. Refill of her Fioricet for the migraine will be also administered.

## 2020-08-21 RX ORDER — DEXLANSOPRAZOLE 60 MG/1
CAPSULE, DELAYED RELEASE ORAL
Qty: 90 CAPSULE | Refills: 3 | Status: SHIPPED | OUTPATIENT
Start: 2020-08-21 | End: 2021-08-05

## 2020-08-21 NOTE — TELEPHONE ENCOUNTER
Medication:   Requested Prescriptions     Pending Prescriptions Disp Refills    DEXILANT 60 MG CPDR delayed release capsule [Pharmacy Med Name: DEXILANT 60MG CAP(FORMERLY KAPIDEX)] 30 capsule 0     Sig: TAKE 1 CAPSULE BY MOUTH DAILY        Last Filled:  7/22/20 #30, 0 RF     Patient Phone Number: [phone] (home)     Last appt: 7/28/20 med check - VV   Next appt: Visit date not found

## 2020-08-27 RX ORDER — HYDROCODONE BITARTRATE AND ACETAMINOPHEN 7.5; 325 MG/1; MG/1
1 TABLET ORAL EVERY 6 HOURS PRN
Qty: 120 TABLET | Refills: 0 | Status: SHIPPED | OUTPATIENT
Start: 2020-08-27 | End: 2020-09-25 | Stop reason: SDUPTHER

## 2020-08-27 NOTE — TELEPHONE ENCOUNTER
Medication and Quantity requested: Pilar Argueta #120     Last Visit  7-28-20,    Pharmacy and phone number updated in [facility]:  [facility] Shaikh

## 2020-09-22 RX ORDER — BUTALBITAL, ACETAMINOPHEN AND CAFFEINE 50; 325; 40 MG/1; MG/1; MG/1
1 TABLET ORAL EVERY 4 HOURS PRN
Qty: 50 TABLET | Refills: 3 | Status: SHIPPED | OUTPATIENT
Start: 2020-09-22 | End: 2020-10-26

## 2020-09-25 RX ORDER — HYDROCODONE BITARTRATE AND ACETAMINOPHEN 7.5; 325 MG/1; MG/1
1 TABLET ORAL EVERY 6 HOURS PRN
Qty: 120 TABLET | Refills: 0 | Status: SHIPPED | OUTPATIENT
Start: 2020-09-25 | End: 2020-10-23 | Stop reason: SDUPTHER

## 2020-10-13 ENCOUNTER — OFFICE VISIT (OUTPATIENT)
Dept: FAMILY MEDICINE CLINIC | Age: 65
End: 2020-10-13
Payer: MEDICARE

## 2020-10-13 VITALS
OXYGEN SATURATION: 99 % | SYSTOLIC BLOOD PRESSURE: 140 MMHG | DIASTOLIC BLOOD PRESSURE: 92 MMHG | HEART RATE: 62 BPM | WEIGHT: 105.6 LBS | BODY MASS INDEX: 16.06 KG/M2

## 2020-10-13 PROCEDURE — 99214 OFFICE O/P EST MOD 30 MIN: CPT | Performed by: FAMILY MEDICINE

## 2020-10-13 PROCEDURE — 90732 PPSV23 VACC 2 YRS+ SUBQ/IM: CPT | Performed by: FAMILY MEDICINE

## 2020-10-13 PROCEDURE — 1123F ACP DISCUSS/DSCN MKR DOCD: CPT | Performed by: FAMILY MEDICINE

## 2020-10-13 PROCEDURE — G0009 ADMIN PNEUMOCOCCAL VACCINE: HCPCS | Performed by: FAMILY MEDICINE

## 2020-10-13 PROCEDURE — 4040F PNEUMOC VAC/ADMIN/RCVD: CPT | Performed by: FAMILY MEDICINE

## 2020-10-13 PROCEDURE — 3017F COLORECTAL CA SCREEN DOC REV: CPT | Performed by: FAMILY MEDICINE

## 2020-10-13 PROCEDURE — G8427 DOCREV CUR MEDS BY ELIG CLIN: HCPCS | Performed by: FAMILY MEDICINE

## 2020-10-13 PROCEDURE — 1036F TOBACCO NON-USER: CPT | Performed by: FAMILY MEDICINE

## 2020-10-13 PROCEDURE — G8400 PT W/DXA NO RESULTS DOC: HCPCS | Performed by: FAMILY MEDICINE

## 2020-10-13 PROCEDURE — 1090F PRES/ABSN URINE INCON ASSESS: CPT | Performed by: FAMILY MEDICINE

## 2020-10-13 PROCEDURE — G8419 CALC BMI OUT NRM PARAM NOF/U: HCPCS | Performed by: FAMILY MEDICINE

## 2020-10-13 PROCEDURE — G8482 FLU IMMUNIZE ORDER/ADMIN: HCPCS | Performed by: FAMILY MEDICINE

## 2020-10-13 ASSESSMENT — ENCOUNTER SYMPTOMS
RESPIRATORY NEGATIVE: 1
GASTROINTESTINAL NEGATIVE: 1
BACK PAIN: 1
SINUS PRESSURE: 1

## 2020-10-13 NOTE — PROGRESS NOTES
Subjective:      Patient ID: Carmen Uribe is a 76 y.o. female. HPI  Chronic Pain: unchanged, trying to stay busy with ADL's and gardening. Tries to walk 3-4 miles per week  Depression: remains on Sertraline and doing ok despite Covid  Sinus Problems: sees Dr Teresa Cotton every few months and gets an injection  Hypertension: BP is up when she is in pain. On no medication. Review of Systems   Constitutional: Negative. HENT: Positive for congestion and sinus pressure. Respiratory: Negative. Cardiovascular: Negative. Gastrointestinal: Negative. Genitourinary: Negative. Musculoskeletal: Positive for back pain. Objective:   Physical Exam  Constitutional:       General: She is not in acute distress. Neck:      Musculoskeletal: Neck supple. Thyroid: No thyromegaly. Vascular: No carotid bruit. Cardiovascular:      Rate and Rhythm: Normal rate and regular rhythm. Pulmonary:      Effort: Pulmonary effort is normal.      Breath sounds: Normal breath sounds. No wheezing or rales. Lymphadenopathy:      Cervical: No cervical adenopathy. Skin:     General: Skin is warm and dry. Neurological:      Mental Status: She is alert and oriented to person, place, and time. Psychiatric:         Behavior: Behavior normal.         Thought Content: Thought content normal.         Judgment: Judgment normal.         Assessment/Plan:    Madelin Mann was seen today for medication check. Diagnoses and all orders for this visit:    Chronic pain syndrome  OARRS report reviewed and no inconsistencies noted   The patient understands the risks of dependency/addiction with hydrocodone and will take as little as possible and discontinue as soon as possible     Depression, unspecified depression type  Generally stable on sertraline  Essential hypertension  BP has been slightly increased   The patient is not on medication  She will monitor BP and has been given parameters.   She will return if BP is increased Return 3 months   Encounter for screening mammogram for malignant neoplasm of breast  -     NATALYA DIGITAL SCREENING AUGMENTED BILATERAL;  Future    Post-menopausal  -     DEXA BONE DENSITY AXIAL SKELETON; Future    Need for pneumococcal vaccination  -     PNEUMOVAX 23 subcutaneous/IM (Pneumococcal polysaccharide vaccine 23-valent >= 3yo)         Spenser Ellison MD

## 2020-10-23 RX ORDER — TRAZODONE HYDROCHLORIDE 50 MG/1
TABLET ORAL
Qty: 60 TABLET | Refills: 11 | Status: SHIPPED | OUTPATIENT
Start: 2020-10-23 | End: 2021-09-15

## 2020-10-23 RX ORDER — HYDROCODONE BITARTRATE AND ACETAMINOPHEN 7.5; 325 MG/1; MG/1
1 TABLET ORAL EVERY 6 HOURS PRN
Qty: 120 TABLET | Refills: 0 | Status: SHIPPED | OUTPATIENT
Start: 2020-10-23 | End: 2020-11-23 | Stop reason: SDUPTHER

## 2020-10-23 NOTE — TELEPHONE ENCOUNTER
Medication and Quantity requested: HYDROcodone-acetaminophen (NORCO) 7.5-325 MG per tablet         Last Visit  10/13/20    Pharmacy and phone number updated in EPIC:  Yes  [facility]

## 2020-10-26 RX ORDER — BUTALBITAL, ACETAMINOPHEN AND CAFFEINE 50; 325; 40 MG/1; MG/1; MG/1
1 TABLET ORAL EVERY 4 HOURS PRN
Qty: 50 TABLET | Refills: 3 | Status: SHIPPED | OUTPATIENT
Start: 2020-10-26 | Stop reason: SDUPTHER

## 2020-11-12 RX ORDER — MONTELUKAST SODIUM 10 MG/1
10 TABLET ORAL NIGHTLY
Qty: 30 TABLET | Refills: 1 | Status: SHIPPED | OUTPATIENT
Start: 2020-11-12 | End: 2020-11-12

## 2020-11-12 RX ORDER — MONTELUKAST SODIUM 10 MG/1
TABLET ORAL
Qty: 90 TABLET | Refills: 3 | Status: SHIPPED | OUTPATIENT
Start: 2020-11-12

## 2020-11-12 NOTE — TELEPHONE ENCOUNTER
Medication:   Requested Prescriptions     Pending Prescriptions Disp Refills    montelukast (SINGULAIR) 10 MG tablet [Pharmacy Med Name: MONTELUKAST 10MG TABLETS] 90 tablet      Sig: TAKE 1 TABLET BY MOUTH EVERY NIGHT        Last Filled:  11/12/2020 #30 1 refill    Patient Phone Number: [phone] (home)     Last appt: 8/18/2020   Next appt: Visit date not found    Last OARRS:   RX Monitoring 7/28/2020   Attestation -   Periodic Controlled Substance Monitoring No signs of potential drug abuse or diversion identified.

## 2020-11-12 NOTE — TELEPHONE ENCOUNTER
Medication:   Requested Prescriptions     Pending Prescriptions Disp Refills    montelukast (SINGULAIR) 10 MG tablet 30 tablet 1     Sig: Take 1 tablet by mouth nightly        Last Filled:      Patient Phone Number: [phone] (home)     Last appt: 8/18/2020   Next appt: Visit date not found    Last OARRS:   RX Monitoring 7/28/2020   Attestation -   Periodic Controlled Substance Monitoring No signs of potential drug abuse or diversion identified.

## 2020-11-19 ENCOUNTER — HOSPITAL ENCOUNTER (EMERGENCY)
Age: 65
Discharge: HOME | End: 2020-11-19
Attending: EMERGENCY MEDICINE
Payer: MEDICARE

## 2020-11-19 ENCOUNTER — APPOINTMENT (OUTPATIENT)
Dept: CT IMAGING | Age: 65
End: 2020-11-19
Payer: MEDICARE

## 2020-11-19 VITALS
HEART RATE: 100 BPM | BODY MASS INDEX: 16.22 KG/M2 | HEIGHT: 68 IN | TEMPERATURE: 96 F | RESPIRATION RATE: 16 BRPM | SYSTOLIC BLOOD PRESSURE: 171 MMHG | WEIGHT: 107 LBS | OXYGEN SATURATION: 96 % | DIASTOLIC BLOOD PRESSURE: 83 MMHG

## 2020-11-19 LAB
A/G RATIO: 1.5 (ref 1.1–2.2)
ALBUMIN SERPL-MCNC: 4.9 G/DL (ref 3.4–5)
ALP BLD-CCNC: 130 U/L (ref 40–129)
ALT SERPL-CCNC: 35 U/L (ref 10–40)
ANION GAP SERPL CALCULATED.3IONS-SCNC: 18 MMOL/L (ref 3–16)
AST SERPL-CCNC: 32 U/L (ref 15–37)
BACTERIA: ABNORMAL /HPF
BASOPHILS ABSOLUTE: 0 K/UL (ref 0–0.2)
BASOPHILS RELATIVE PERCENT: 0.5 %
BILIRUB SERPL-MCNC: 0.3 MG/DL (ref 0–1)
BILIRUBIN URINE: NEGATIVE
BLOOD, URINE: ABNORMAL
BUN BLDV-MCNC: 15 MG/DL (ref 7–20)
CALCIUM SERPL-MCNC: 10.5 MG/DL (ref 8.3–10.6)
CHLORIDE BLD-SCNC: 99 MMOL/L (ref 99–110)
CLARITY: CLEAR
CO2: 24 MMOL/L (ref 21–32)
COLOR: YELLOW
CREAT SERPL-MCNC: 0.6 MG/DL (ref 0.6–1.2)
EOSINOPHILS ABSOLUTE: 0 K/UL (ref 0–0.6)
EOSINOPHILS RELATIVE PERCENT: 0.1 %
EPITHELIAL CELLS, UA: 1 /HPF (ref 0–5)
GFR AFRICAN AMERICAN: >60
GFR NON-AFRICAN AMERICAN: >60
GLOBULIN: 3.2 G/DL
GLUCOSE BLD-MCNC: 148 MG/DL (ref 70–99)
GLUCOSE URINE: NEGATIVE MG/DL
HCT VFR BLD CALC: 45.2 % (ref 36–48)
HEMOGLOBIN: 15.2 G/DL (ref 12–16)
HYALINE CASTS: 3 /LPF (ref 0–8)
KETONES, URINE: NEGATIVE MG/DL
LACTIC ACID: 3.9 MMOL/L (ref 0.4–2)
LACTIC ACID: 4 MMOL/L (ref 0.4–2)
LEUKOCYTE ESTERASE, URINE: NEGATIVE
LIPASE: 22 U/L (ref 13–60)
LYMPHOCYTES ABSOLUTE: 1.2 K/UL (ref 1–5.1)
LYMPHOCYTES RELATIVE PERCENT: 13.1 %
MCH RBC QN AUTO: 32.2 PG (ref 26–34)
MCHC RBC AUTO-ENTMCNC: 33.7 G/DL (ref 31–36)
MCV RBC AUTO: 95.4 FL (ref 80–100)
MICROSCOPIC EXAMINATION: YES
MONOCYTES ABSOLUTE: 0.3 K/UL (ref 0–1.3)
MONOCYTES RELATIVE PERCENT: 3.6 %
NEUTROPHILS ABSOLUTE: 7.6 K/UL (ref 1.7–7.7)
NEUTROPHILS RELATIVE PERCENT: 82.7 %
NITRITE, URINE: POSITIVE
PDW BLD-RTO: 13.3 % (ref 12.4–15.4)
PH UA: 7.5 (ref 5–8)
PLATELET # BLD: 262 K/UL (ref 135–450)
PMV BLD AUTO: 8.3 FL (ref 5–10.5)
POTASSIUM SERPL-SCNC: 3.5 MMOL/L (ref 3.5–5.1)
PROTEIN UA: 100 MG/DL
RBC # BLD: 4.73 M/UL (ref 4–5.2)
RBC UA: 5 /HPF (ref 0–4)
SODIUM BLD-SCNC: 141 MMOL/L (ref 136–145)
SPECIFIC GRAVITY UA: >1.03 (ref 1–1.03)
TOTAL PROTEIN: 8.1 G/DL (ref 6.4–8.2)
URINE REFLEX TO CULTURE: ABNORMAL
URINE TYPE: ABNORMAL
UROBILINOGEN, URINE: 0.2 E.U./DL
WBC # BLD: 9.2 K/UL (ref 4–11)
WBC UA: 8 /HPF (ref 0–5)

## 2020-11-19 PROCEDURE — 2580000003 HC RX 258: Performed by: PHYSICIAN ASSISTANT

## 2020-11-19 PROCEDURE — 80053 COMPREHEN METABOLIC PANEL: CPT

## 2020-11-19 PROCEDURE — 74177 CT ABD & PELVIS W/CONTRAST: CPT

## 2020-11-19 PROCEDURE — 81001 URINALYSIS AUTO W/SCOPE: CPT

## 2020-11-19 PROCEDURE — 83605 ASSAY OF LACTIC ACID: CPT

## 2020-11-19 PROCEDURE — 93005 ELECTROCARDIOGRAM TRACING: CPT | Performed by: EMERGENCY MEDICINE

## 2020-11-19 PROCEDURE — 99283 EMERGENCY DEPT VISIT LOW MDM: CPT

## 2020-11-19 PROCEDURE — 96375 TX/PRO/DX INJ NEW DRUG ADDON: CPT

## 2020-11-19 PROCEDURE — 85025 COMPLETE CBC W/AUTO DIFF WBC: CPT

## 2020-11-19 PROCEDURE — 83690 ASSAY OF LIPASE: CPT

## 2020-11-19 PROCEDURE — 6360000002 HC RX W HCPCS: Performed by: PHYSICIAN ASSISTANT

## 2020-11-19 PROCEDURE — 96374 THER/PROPH/DIAG INJ IV PUSH: CPT

## 2020-11-19 PROCEDURE — 6360000004 HC RX CONTRAST MEDICATION: Performed by: PHYSICIAN ASSISTANT

## 2020-11-19 RX ORDER — HYDROMORPHONE HYDROCHLORIDE 1 MG/ML
1 INJECTION, SOLUTION INTRAMUSCULAR; INTRAVENOUS; SUBCUTANEOUS ONCE
Status: COMPLETED | OUTPATIENT
Start: 2020-11-19 | End: 2020-11-19

## 2020-11-19 RX ORDER — DICYCLOMINE HYDROCHLORIDE 10 MG/1
10 CAPSULE ORAL EVERY 6 HOURS PRN
Qty: 20 CAPSULE | Refills: 0 | Status: SHIPPED | OUTPATIENT
Start: 2020-11-19 | End: 2021-10-12 | Stop reason: ALTCHOICE

## 2020-11-19 RX ORDER — MORPHINE SULFATE 4 MG/ML
4 INJECTION, SOLUTION INTRAMUSCULAR; INTRAVENOUS ONCE
Status: COMPLETED | OUTPATIENT
Start: 2020-11-19 | End: 2020-11-19

## 2020-11-19 RX ORDER — 0.9 % SODIUM CHLORIDE 0.9 %
1000 INTRAVENOUS SOLUTION INTRAVENOUS ONCE
Status: COMPLETED | OUTPATIENT
Start: 2020-11-19 | End: 2020-11-19

## 2020-11-19 RX ORDER — ONDANSETRON 2 MG/ML
4 INJECTION INTRAMUSCULAR; INTRAVENOUS ONCE
Status: COMPLETED | OUTPATIENT
Start: 2020-11-19 | End: 2020-11-19

## 2020-11-19 RX ORDER — CIPROFLOXACIN 500 MG/1
500 TABLET, FILM COATED ORAL 2 TIMES DAILY
Qty: 20 TABLET | Refills: 0 | Status: SHIPPED | OUTPATIENT
Start: 2020-11-19 | End: 2020-11-29

## 2020-11-19 RX ORDER — ONDANSETRON 4 MG/1
4 TABLET, FILM COATED ORAL EVERY 6 HOURS PRN
Qty: 30 TABLET | Refills: 0 | Status: SHIPPED | OUTPATIENT
Start: 2020-11-19

## 2020-11-19 RX ORDER — ONDANSETRON 4 MG/1
4 TABLET, ORALLY DISINTEGRATING ORAL EVERY 8 HOURS PRN
Qty: 20 TABLET | Refills: 0 | Status: SHIPPED | OUTPATIENT
Start: 2020-11-19 | End: 2020-11-26

## 2020-11-19 RX ADMIN — ONDANSETRON 4 MG: 2 INJECTION INTRAMUSCULAR; INTRAVENOUS at 18:16

## 2020-11-19 RX ADMIN — HYDROMORPHONE HYDROCHLORIDE 1 MG: 1 INJECTION, SOLUTION INTRAMUSCULAR; INTRAVENOUS; SUBCUTANEOUS at 19:28

## 2020-11-19 RX ADMIN — SODIUM CHLORIDE 1000 ML: 9 INJECTION, SOLUTION INTRAVENOUS at 18:16

## 2020-11-19 RX ADMIN — SODIUM CHLORIDE 1000 ML: 9 INJECTION, SOLUTION INTRAVENOUS at 19:27

## 2020-11-19 RX ADMIN — IOPAMIDOL 75 ML: 755 INJECTION, SOLUTION INTRAVENOUS at 18:38

## 2020-11-19 RX ADMIN — MORPHINE SULFATE 4 MG: 4 INJECTION, SOLUTION INTRAMUSCULAR; INTRAVENOUS at 18:16

## 2020-11-19 RX ADMIN — Medication 1 G: at 20:10

## 2020-11-19 ASSESSMENT — ENCOUNTER SYMPTOMS
NAUSEA: 1
ABDOMINAL PAIN: 1
WHEEZING: 0
VOMITING: 1
SHORTNESS OF BREATH: 0
RHINORRHEA: 0
DIARRHEA: 1
COUGH: 0

## 2020-11-19 ASSESSMENT — PAIN SCALES - GENERAL
PAINLEVEL_OUTOF10: 7
PAINLEVEL_OUTOF10: 10
PAINLEVEL_OUTOF10: 7

## 2020-11-19 ASSESSMENT — PAIN DESCRIPTION - PAIN TYPE: TYPE: ACUTE PAIN

## 2020-11-19 NOTE — ED PROVIDER NOTES
[facility]        Pt Name: Kingston Da Silva  MRN: [id number]  [place] 1955  Date of evaluation: 11/19/2020  Provider: Amanda Jauregui PA-C  PCP: Cheng Wood MD     I have seen and evaluated this patient with my supervising physician Alec Gibbons MD.    [facility]       Chief Complaint   Patient presents with    Abdominal Pain     Pt in by EMS from home. Per pt n/v since this morning with mid-upper abdominal pain. HISTORY OF PRESENT ILLNESS   (Location, Timing/Onset, Context/Setting, Quality, Duration, Modifying Factors, Severity, Associated Signs and Symptoms)  Note limiting factors. Kingston Da Silva is a 76 y.o. female who presents for evaluation of severe upper abdominal pain nausea vomiting and diarrhea that began this morning upon wakening. Patient states that she has not been able to keep anything down. Denies any fevers or chills. States that she has a slight runny nose. No significant cough congestion. No chest pain shortness of breath. No known sick contacts with similar symptoms or known Covid exposures. No urinary complaints. She has history of hysterectomy and hernia repair. No other abdominal surgeries. She has no other complaints or concerns at this time. Nursing Notes were all reviewed and agreed with or any disagreements were addressed in the HPI. REVIEW OF SYSTEMS    (2-9 systems for level 4, 10 or more for level 5)     Review of Systems   Constitutional: Negative for appetite change, chills and fever. HENT: Negative for congestion and rhinorrhea. Respiratory: Negative for cough, shortness of breath and wheezing. Cardiovascular: Negative for chest pain. Gastrointestinal: Positive for abdominal pain, diarrhea, nausea and vomiting. Genitourinary: Negative for difficulty urinating, dysuria and hematuria. Musculoskeletal: Negative for neck pain and neck stiffness. Skin: Negative for rash. Neurological: Negative for headaches. Positives and Pertinent negatives as per HPI. Except as noted above in the ROS, all other systems were reviewed and negative.        PAST MEDICAL HISTORY     Past Medical History:   Diagnosis Date    Arthritis     fingers, hands    Asthma     Back pain     Bell's palsy     CHF (congestive heart failure) (HCC)     Chronic pain     BACK SURGERY X4, neck, left arm    Colitis 2/14/2013    Depression     Elevated sed rate     Family history of breast cancer in first degree relative     Gastric ulcer     GERD (gastroesophageal reflux disease)     colitis    Glaucoma     Hypertension     Movement disorder     chronic back pain    Neuromuscular disorder (HCC)     sciatica rt. leg    Osteoarthritis     PONV (postoperative nausea and vomiting)     SEVERE         SURGICAL HISTORY     Past Surgical History:   Procedure Laterality Date    BACK SURGERY      x 4    COLONOSCOPY  10/16/12    polyp removed and biopsy sent    COLONOSCOPY  3/26/13    CYSTOSCOPY  1/13/16    urethral dilitation    FACIAL NERVE SURGERY      D/T Bell's Palsy    FINGER SURGERY Left 04/03/2018    Excision of Left Thumb Digital Mucous cyst & DIP Joint Arthrotomy & Debridement    FINGER SURGERY  04/2018    left thumb    FINGER SURGERY Left 04/03/2018    thumb surgery     HYSTERECTOMY      Partial    UPPER GASTROINTESTINAL ENDOSCOPY  10/15/12    UPPER GASTROINTESTINAL ENDOSCOPY  2/11/15    with EUS    VENTRAL HERNIA REPAIR N/A 4/15/2019    LAPAROSCOPIC REPAIR OF INCISIONAL HERNIA WITH MESH performed by Nita Butler MD at [address] [place] [address]       Previous Medications    ALBUTEROL (PROVENTIL) (2.5 MG/3ML) 0.083% NEBULIZER SOLUTION    USE 1 VIAL IN NEBULIZER EVERY 6 HOURS    BUTALBITAL-ACETAMINOPHEN-CAFFEINE (FIORICET, ESGIC) -40 MG PER TABLET    Take 1 tablet by mouth every 4 hours as needed for Migraine May take 2 tablets Dilaudid [hydromorphone]; Nsaids; Prednisone; and Zithromax [azithromycin]    FAMILYHISTORY       Family History   Problem Relation Age of Onset    Heart Disease Mother     High Blood Pressure Mother     Arthritis Mother     Breast Cancer Sister     High Blood Pressure Brother     Diabetes Neg Hx     Stroke Neg Hx           SOCIAL HISTORY       Social History     Tobacco Use    Smoking status: Former Smoker     Packs/day: 0.25     Years: 20.00     Pack years: 5.00     Types: Cigarettes     Last attempt to quit: 2016     Years since quittin.6    Smokeless tobacco: Never Used   Substance Use Topics    Alcohol use: No     Alcohol/week: 0.0 standard drinks    Drug use: No       SCREENINGS             PHYSICAL EXAM    (up to 7 for level 4, 8 or more for level 5)     ED Triage Vitals [20 1729]   BP Temp Temp src Pulse Resp SpO2 Height Weight   (!) 165/104 96 °F (35.6 °C) -- 120 23 -- 5' 8\" (1.727 m) 107 lb (48.5 kg)       Physical Exam  Vitals signs and nursing note reviewed. Constitutional:       General: She is not in acute distress. Appearance: She is well-developed. She is ill-appearing. She is not toxic-appearing or diaphoretic. HENT:      Head: Normocephalic and atraumatic. Right Ear: External ear normal.      Left Ear: External ear normal.      Nose: Nose normal.   Eyes:      General:         Right eye: No discharge. Left eye: No discharge. Neck:      Musculoskeletal: Normal range of motion and neck supple. Cardiovascular:      Rate and Rhythm: Regular rhythm. Tachycardia present. Heart sounds: Normal heart sounds. Pulmonary:      Effort: Pulmonary effort is normal. No respiratory distress. Breath sounds: Normal breath sounds. Chest:      Chest wall: No tenderness. Abdominal:      General: Abdomen is flat. There is no distension. Palpations: Abdomen is soft. Tenderness: There is abdominal tenderness.  There is no right CVA tenderness, left CVA tenderness, guarding or rebound. Musculoskeletal: Normal range of motion. Skin:     General: Skin is warm and dry. Neurological:      Mental Status: She is alert and oriented to person, place, and time.    Psychiatric:         Behavior: Behavior normal.         DIAGNOSTIC RESULTS   LABS:    Labs Reviewed   COMPREHENSIVE METABOLIC PANEL - Abnormal; Notable for the following components:       Result Value    Anion Gap 18 (*)     Glucose 148 (*)     Alkaline Phosphatase 130 (*)     All other components within normal limits    Narrative:     Performed at:  [facility] [address], [place], [address]   Phone [phone]   URINE RT REFLEX TO CULTURE - Abnormal; Notable for the following components:    Blood, Urine TRACE (*)     Protein,  (*)     Nitrite, Urine POSITIVE (*)     All other components within normal limits    Narrative:     Performed at:  [facility] [address], [place], [address]   Phone [phone]   LACTIC ACID, PLASMA - Abnormal; Notable for the following components:    Lactic Acid 3.9 (*)     All other components within normal limits    Narrative:     Performed at:  [facility] [address], [place], [address]   Phone [phone]   MICROSCOPIC URINALYSIS - Abnormal; Notable for the following components:    Bacteria, UA 4+ (*)     WBC, UA 8 (*)     RBC, UA 5 (*)     All other components within normal limits    Narrative:     Performed at:  [facility] [address], [place], [facility] [address]   Phone [phone]   LACTIC ACID, PLASMA - Abnormal; Notable for the following components:    Lactic Acid 4.0 (*)     All other components within normal limits    Narrative:     Jessy BACON tel. [phone],  Chemistry results called to and read back by Salvador Marley RN, 11/19/2020  20:43, by Kyaw Evangelista  Performed at:  [facility]  [address] [facility]  [place], [address]   Phone [phone]   CBC WITH AUTO DIFFERENTIAL    Narrative:     Performed at:  [facility]  [address] [facility],  [place], [address]   Phone [phone]   LIPASE    Narrative:     Performed at:  [facility]  [address] [facility],  [place], [address]   Phone [phone]       All other labs were within normal range or not returned as of this dictation. EKG: All EKG's are interpreted by the Emergency Department Physician in the absence of a cardiologist.  Please see their note for interpretation of EKG. RADIOLOGY:   Non-plain film images such as CT, Ultrasound and MRI are read by the radiologist. Plain radiographic images are visualized and preliminarily interpreted by the ED Provider with the below findings:        Interpretation per the Radiologist below, if available at the time of this note:    CT ABDOMEN PELVIS W IV CONTRAST Additional Contrast? None   Final Result   No acute finding in the abdomen or pelvis. Anterior and posterior lumbar fusion surgery from L3 through S1. No results found.         PROCEDURES   Unless otherwise noted below, none     Procedures    CRITICAL CARE TIME   N/A    CONSULTS:  None      EMERGENCY DEPARTMENT COURSE and DIFFERENTIAL DIAGNOSIS/MDM:   Vitals:    Vitals:    11/19/20 1945 11/19/20 2015 11/19/20 2030 11/19/20 2045   BP: (!) 141/72 (!) 144/81 (!) 152/83 (!) 171/83   Pulse: 94 94 96 100   Resp: 16 16 15 16   Temp:       SpO2: 97% 97% 96% 96%   Weight:       Height:           Patient was given the following medications:  Medications   0.9 % sodium chloride bolus (0 mLs Intravenous Stopped 11/19/20 1929)   ondansetron (ZOFRAN) injection 4 mg (4 mg Intravenous Given 11/19/20 1816)   morphine injection 4 mg (4 mg Intravenous Given 11/19/20 1816)   iopamidol (ISOVUE-370) 76 % injection 75 mL (75 mLs Intravenous Given 11/19/20 1838)   0.9 % sodium chloride bolus (0 mLs Intravenous Stopped 11/19/20 2108)   HYDROmorphone HCl PF (DILAUDID) injection 1 mg (1 mg Intravenous Given 11/19/20 1928)   cefTRIAXone (ROCEPHIN) 1 g in sterile water 10 mL IV syringe (1 g Intravenous Given 11/19/20 2010)           Patient presents for evaluation of abdominal pain nausea vomiting diarrhea. On exam, patient is ill-appearing, actively vomiting but no acute distress and nontoxic. She is hypertensive and tachycardic. Vitals otherwise stable and she is afebrile. Lungs are clear to auscultation bilaterally. She has upper abdominal tenderness epigastric and right upper quadrant. Negative Meredith sign. No peritoneal signs. No CVA tenderness. She was given IV fluid resuscitation, morphine and Zofran for symptomatic relief and will be reevaluated. CBC and CMP are unremarkable. No leukocytosis. Lactic acid was elevated at 3.9. Lipase 22. Urinalysis positive for nitrites, 4+ bacteria and a white blood cells. Given dose of Rocephin in the ED. She has had persistent pain given second dose of pain medication which provide significant symptomatic improvement. After fluid resuscitation she still had persistent elevated lactate but had complete resolution of symptoms and requesting discharge home. Risk and benefits were discussed with the patient. We used shared decision making. Should be treated apparently for suspected urinary tract infection, sent home with Cipro, Bentyl and Zofran. I see nothing that would suggest an acute abdomen at this time. Based on history, physical exam, risk factors, and tests my suspicion for bowel obstruction, incarcerated hernia, acute pancreatitis, intra-abdominal abscess, perforated viscus, diverticulitis, cholecystitis, appendicitis, PID, ovarian torsion, ectopic pregnancy and tubo-ovarian abscess is very low. There is no evidence of peritonitis, sepsis or toxicity at this time.  I feel the patient can be managed as an outpatient with follow-up with her family doctor in 24-48 hours. Instructions have been given for the patient to return to the ED for worsening of the pain, high fevers, intractable vomiting, or bleeding. She is agreeable to this plan and stable for discharge at this time. FINAL IMPRESSION      1. Generalized abdominal pain    2. Acute cystitis without hematuria          DISPOSITION/PLAN   DISPOSITION Decision To Discharge 11/19/2020 09:03:33 PM      PATIENT REFERREDTO:  Connor Redmond MD  [address] [place] [address]  [phone]            DISCHARGE MEDICATIONS:  New Prescriptions    CIPROFLOXACIN (CIPRO) 500 MG TABLET    Take 1 tablet by mouth 2 times daily for 10 days    DICYCLOMINE (BENTYL) 10 MG CAPSULE    Take 1 capsule by mouth every 6 hours as needed (Bowel spasms)    ONDANSETRON (ZOFRAN-ODT) 4 MG DISINTEGRATING TABLET    Place 1 tablet under the tongue every 8 hours as needed for Nausea or Vomiting May Sub regular tablet (non-ODT) if insurance does not cover ODT.        DISCONTINUED MEDICATIONS:  Discontinued Medications    No medications on file              (Please note that portions of this note were completed with a voice recognition program.  Efforts were made to edit the dictations but occasionally words are mis-transcribed.)    Lesly Greenberg PA-C (electronically signed)           Israel [place], [place]  11/19/20 9978

## 2020-11-19 NOTE — TELEPHONE ENCOUNTER
Medication and Quantity requested: ondansetron (ZOFRAN) 4 MG tablet          Last Visit  10/13/20    Pharmacy and phone number updated in [facility]:  Yes  Ingrid

## 2020-11-20 LAB
EKG ATRIAL RATE: 69 BPM
EKG DIAGNOSIS: NORMAL
EKG P AXIS: 81 DEGREES
EKG P-R INTERVAL: 118 MS
EKG Q-T INTERVAL: 410 MS
EKG QRS DURATION: 78 MS
EKG QTC CALCULATION (BAZETT): 439 MS
EKG R AXIS: 101 DEGREES
EKG T AXIS: 87 DEGREES
EKG VENTRICULAR RATE: 69 BPM

## 2020-11-20 PROCEDURE — 93010 ELECTROCARDIOGRAM REPORT: CPT | Performed by: INTERNAL MEDICINE

## 2020-11-20 NOTE — ED PROVIDER NOTES
ODT.       FINAL IMPRESSION  1. Generalized abdominal pain    2. Acute cystitis without hematuria        Blood pressure (!) 171/83, pulse 100, temperature 96 °F (35.6 °C), resp. rate 16, height 5' 8\" (1.727 m), weight 107 lb (48.5 kg), SpO2 96 %, not currently breastfeeding. For further details of [facility] emergency department encounter, please see documentation by advanced practice provider, Brady Arshad.        Abdias Spence MD  11/19/20 9944

## 2020-11-23 RX ORDER — HYDROCODONE BITARTRATE AND ACETAMINOPHEN 7.5; 325 MG/1; MG/1
1 TABLET ORAL EVERY 6 HOURS PRN
Qty: 120 TABLET | Refills: 0 | Status: SHIPPED | OUTPATIENT
Start: 2020-11-23 | End: 2020-12-21 | Stop reason: SDUPTHER

## 2020-11-23 NOTE — TELEPHONE ENCOUNTER
Medication and Quantity requested: Norco,#120     Last Visit  10-13-20, [address] and phone number updated in [facility]:  Yes,Ingrid

## 2020-12-01 RX ORDER — BUTALBITAL, ACETAMINOPHEN AND CAFFEINE 50; 325; 40 MG/1; MG/1; MG/1
1 TABLET ORAL EVERY 4 HOURS PRN
Qty: 50 TABLET | Refills: 0 | Status: SHIPPED | OUTPATIENT
Start: 2020-12-01 | End: 2021-02-19

## 2020-12-01 NOTE — TELEPHONE ENCOUNTER
Medication:   Requested Prescriptions     Pending Prescriptions Disp Refills    butalbital-acetaminophen-caffeine (FIORICET, ESGIC) -40 MG per tablet 50 tablet 0     Sig: Take 1 tablet by mouth every 4 hours as needed for Migraine May take 2 tablets every 4 hours as needed for pain     Last Filled:  7/23/20    Last appt: 8/18/2020   Next appt: Visit date not found    Last Lipid:   Lab Results   Component Value Date    CHOL 200 03/19/2019    TRIG 134 03/19/2019    HDL 59 03/19/2019    HDL 64 01/24/2011    [address] 114 03/19/2019

## 2020-12-11 ENCOUNTER — TELEPHONE (OUTPATIENT)
Dept: ENT CLINIC | Age: 65
End: 2020-12-11

## 2020-12-11 DIAGNOSIS — G43.101 MIGRAINE WITH AURA AND WITH STATUS MIGRAINOSUS, NOT INTRACTABLE: Primary | ICD-10-CM

## 2020-12-11 RX ORDER — BUTALBITAL, ACETAMINOPHEN AND CAFFEINE 50; 325; 40 MG/1; MG/1; MG/1
1 TABLET ORAL EVERY 4 HOURS PRN
Qty: 100 TABLET | Refills: 2 | Status: ON HOLD | OUTPATIENT
Start: 2020-12-11 | End: 2021-02-17 | Stop reason: HOSPADM

## 2020-12-21 RX ORDER — HYDROCODONE BITARTRATE AND ACETAMINOPHEN 7.5; 325 MG/1; MG/1
1 TABLET ORAL EVERY 6 HOURS PRN
Qty: 120 TABLET | Refills: 0 | Status: SHIPPED | OUTPATIENT
Start: 2020-12-21 | End: 2021-01-19 | Stop reason: SDUPTHER

## 2020-12-31 RX ORDER — BUTALBITAL, ACETAMINOPHEN AND CAFFEINE 50; 325; 40 MG/1; MG/1; MG/1
1 TABLET ORAL EVERY 4 HOURS PRN
Qty: 50 TABLET | Refills: 3 | Status: ON HOLD | OUTPATIENT
Start: 2020-12-31 | End: 2021-02-17 | Stop reason: HOSPADM

## 2021-01-19 ENCOUNTER — TELEPHONE (OUTPATIENT)
Dept: FAMILY MEDICINE CLINIC | Age: 66
End: 2021-01-19

## 2021-01-19 DIAGNOSIS — G89.4 CHRONIC PAIN SYNDROME: ICD-10-CM

## 2021-01-19 RX ORDER — HYDROCODONE BITARTRATE AND ACETAMINOPHEN 7.5; 325 MG/1; MG/1
1 TABLET ORAL EVERY 6 HOURS PRN
Qty: 120 TABLET | Refills: 0 | Status: SHIPPED | OUTPATIENT
Start: 2021-01-19 | End: 2021-02-18 | Stop reason: SDUPTHER

## 2021-01-19 NOTE — TELEPHONE ENCOUNTER
Medication and Quantity requested: HYDROcodone-acetaminophen (NORCO) 7.5-325 MG per tablet-QTY.  120         Last Visit  10/13/20    Pharmacy and phone number updated in [facility]:  yes  Ingrid

## 2021-01-28 ENCOUNTER — OFFICE VISIT (OUTPATIENT)
Dept: ENT CLINIC | Age: 66
End: 2021-01-28
Payer: MEDICARE

## 2021-01-28 VITALS
TEMPERATURE: 98.9 F | BODY MASS INDEX: 16.06 KG/M2 | HEIGHT: 68 IN | DIASTOLIC BLOOD PRESSURE: 77 MMHG | SYSTOLIC BLOOD PRESSURE: 153 MMHG | WEIGHT: 106 LBS | HEART RATE: 76 BPM

## 2021-01-28 DIAGNOSIS — G43.101 MIGRAINE WITH AURA AND WITH STATUS MIGRAINOSUS, NOT INTRACTABLE: ICD-10-CM

## 2021-01-28 DIAGNOSIS — J30.9 ALLERGIC SINUSITIS: Primary | ICD-10-CM

## 2021-01-28 DIAGNOSIS — J32.9 RECURRENT SINUSITIS: ICD-10-CM

## 2021-01-28 PROCEDURE — 1123F ACP DISCUSS/DSCN MKR DOCD: CPT | Performed by: OTOLARYNGOLOGY

## 2021-01-28 PROCEDURE — 96372 THER/PROPH/DIAG INJ SC/IM: CPT | Performed by: OTOLARYNGOLOGY

## 2021-01-28 PROCEDURE — G8427 DOCREV CUR MEDS BY ELIG CLIN: HCPCS | Performed by: OTOLARYNGOLOGY

## 2021-01-28 PROCEDURE — 4040F PNEUMOC VAC/ADMIN/RCVD: CPT | Performed by: OTOLARYNGOLOGY

## 2021-01-28 PROCEDURE — 99213 OFFICE O/P EST LOW 20 MIN: CPT | Performed by: OTOLARYNGOLOGY

## 2021-01-28 PROCEDURE — G8400 PT W/DXA NO RESULTS DOC: HCPCS | Performed by: OTOLARYNGOLOGY

## 2021-01-28 PROCEDURE — G8419 CALC BMI OUT NRM PARAM NOF/U: HCPCS | Performed by: OTOLARYNGOLOGY

## 2021-01-28 PROCEDURE — 3017F COLORECTAL CA SCREEN DOC REV: CPT | Performed by: OTOLARYNGOLOGY

## 2021-01-28 PROCEDURE — G8482 FLU IMMUNIZE ORDER/ADMIN: HCPCS | Performed by: OTOLARYNGOLOGY

## 2021-01-28 PROCEDURE — 1090F PRES/ABSN URINE INCON ASSESS: CPT | Performed by: OTOLARYNGOLOGY

## 2021-01-28 PROCEDURE — 1036F TOBACCO NON-USER: CPT | Performed by: OTOLARYNGOLOGY

## 2021-01-28 RX ORDER — BUTALBITAL, ACETAMINOPHEN AND CAFFEINE 50; 325; 40 MG/1; MG/1; MG/1
1 TABLET ORAL EVERY 4 HOURS PRN
Qty: 50 TABLET | Refills: 3 | Status: ON HOLD | OUTPATIENT
Start: 2021-01-28 | End: 2021-02-17 | Stop reason: HOSPADM

## 2021-01-28 RX ORDER — DOXYCYCLINE HYCLATE 100 MG
100 TABLET ORAL 2 TIMES DAILY
Qty: 20 TABLET | Refills: 0 | Status: SHIPPED | OUTPATIENT
Start: 2021-01-28 | End: 2021-02-07

## 2021-01-28 RX ORDER — METHYLPREDNISOLONE ACETATE 40 MG/ML
40 INJECTION, SUSPENSION INTRA-ARTICULAR; INTRALESIONAL; INTRAMUSCULAR; SOFT TISSUE ONCE
Status: COMPLETED | OUTPATIENT
Start: 2021-01-28 | End: 2021-01-28

## 2021-01-28 RX ADMIN — METHYLPREDNISOLONE ACETATE 40 MG: 40 INJECTION, SUSPENSION INTRA-ARTICULAR; INTRALESIONAL; INTRAMUSCULAR; SOFT TISSUE at 09:07

## 2021-01-28 NOTE — PROGRESS NOTES
Has been noticing over the past couple of days some pressure and pain in her right ear which when her sinuses do start to bother seems to occur after she has had the Bell's palsy years ago. No actual loss of hearing or other ear symptoms have been noted. She has been noticing some increase in sinus congestion and postnasal drainage which does occur at this time of the year as well. No fevers been noted. Currently, she appears in no obvious acute distress. There is obvious presence of some mild remnant of peripheral facial palsy on the right side. Tympanic membranes on both sides do, however, appear normal with no evidence of fluid or inflammation. Ear canals are clear on both sides. She has noted some itching on the right so it is suggested that she use some hydrocortisone cream at night to resolve that issue. Oral examination is unremarkable. The neck is free of any adenopathy, mass, thyroid enlargement. Nasal mucosa treated with cotton pledgets  impregnated with Afrin and lidocaine placed in middle meatuses against turbinates. Pledgets left in place for five minutes and removed enabling enhanced visualization. The exam revealed positive edema of mucosa. it was positive for erythema indicative of infection. There was not evidence of deviation of the septum which was not significant. There were not polyps present. .There were not other masses present. The turbinates were not enlarged beyond normal.  Findings are those more of sinus infection with associated eustachian tube dysfunction on the right side as well has allergy. We will give her Depo-Medrol injection and start her on doxycycline she will continue Fioricet. Will contact the office in 1 week if no significant improvement occurs.

## 2021-02-01 ENCOUNTER — OFFICE VISIT (OUTPATIENT)
Dept: FAMILY MEDICINE CLINIC | Age: 66
End: 2021-02-01
Payer: MEDICARE

## 2021-02-01 VITALS
BODY MASS INDEX: 15.66 KG/M2 | DIASTOLIC BLOOD PRESSURE: 86 MMHG | WEIGHT: 103 LBS | OXYGEN SATURATION: 99 % | SYSTOLIC BLOOD PRESSURE: 130 MMHG | HEART RATE: 86 BPM

## 2021-02-01 DIAGNOSIS — I10 ESSENTIAL HYPERTENSION: Primary | ICD-10-CM

## 2021-02-01 DIAGNOSIS — J32.8 OTHER CHRONIC SINUSITIS: ICD-10-CM

## 2021-02-01 DIAGNOSIS — F32.A DEPRESSION, UNSPECIFIED DEPRESSION TYPE: ICD-10-CM

## 2021-02-01 DIAGNOSIS — G89.4 CHRONIC PAIN SYNDROME: ICD-10-CM

## 2021-02-01 PROCEDURE — 1090F PRES/ABSN URINE INCON ASSESS: CPT | Performed by: FAMILY MEDICINE

## 2021-02-01 PROCEDURE — G8419 CALC BMI OUT NRM PARAM NOF/U: HCPCS | Performed by: FAMILY MEDICINE

## 2021-02-01 PROCEDURE — 3017F COLORECTAL CA SCREEN DOC REV: CPT | Performed by: FAMILY MEDICINE

## 2021-02-01 PROCEDURE — 4040F PNEUMOC VAC/ADMIN/RCVD: CPT | Performed by: FAMILY MEDICINE

## 2021-02-01 PROCEDURE — 1036F TOBACCO NON-USER: CPT | Performed by: FAMILY MEDICINE

## 2021-02-01 PROCEDURE — G8400 PT W/DXA NO RESULTS DOC: HCPCS | Performed by: FAMILY MEDICINE

## 2021-02-01 PROCEDURE — 1123F ACP DISCUSS/DSCN MKR DOCD: CPT | Performed by: FAMILY MEDICINE

## 2021-02-01 PROCEDURE — G8482 FLU IMMUNIZE ORDER/ADMIN: HCPCS | Performed by: FAMILY MEDICINE

## 2021-02-01 PROCEDURE — 99214 OFFICE O/P EST MOD 30 MIN: CPT | Performed by: FAMILY MEDICINE

## 2021-02-01 PROCEDURE — G8427 DOCREV CUR MEDS BY ELIG CLIN: HCPCS | Performed by: FAMILY MEDICINE

## 2021-02-01 ASSESSMENT — ENCOUNTER SYMPTOMS
GASTROINTESTINAL NEGATIVE: 1
SORE THROAT: 0
COUGH: 1
BACK PAIN: 1
SINUS PAIN: 1
VOICE CHANGE: 0
SINUS PRESSURE: 1

## 2021-02-01 NOTE — PROGRESS NOTES
Renetta Mccann (:  1955) is a 76 y.o. female,Established patient, here for evaluation of the following chief complaint(s):  Hypertension      ASSESSMENT/PLAN:  1. Essential hypertension  2. Chronic pain syndrome  3. Other chronic sinusitis  4. Depression, unspecified depression type      No follow-ups on file. SUBJECTIVE/OBJECTIVE:  HPI  Chronic Pain: Pain varies but always there. Worse today. Not walking in this weather Depression: remains on Sertraline and doing ok   Hypertension: BP is up when she is in pain. On no medication. Sinusitis: recently saw Dr Garret Manzano last week and on antibiotics and also received an injection    Review of Systems   Constitutional: Positive for chills and fatigue. Negative for activity change, appetite change, diaphoresis and fever. HENT: Positive for congestion, postnasal drip, sinus pressure and sinus pain. Negative for sore throat and voice change. Respiratory: Positive for cough. Cardiovascular: Negative. Gastrointestinal: Negative. Genitourinary: Negative. Musculoskeletal: Positive for arthralgias, back pain and myalgias. Neurological: Negative. Psychiatric/Behavioral: Positive for dysphoric mood. Physical Exam  Constitutional:       General: She is not in acute distress. Neck:      Musculoskeletal: Neck supple. Thyroid: No thyromegaly. Vascular: No carotid bruit. Cardiovascular:      Rate and Rhythm: Normal rate and regular rhythm. Pulmonary:      Effort: Pulmonary effort is normal.      Breath sounds: Normal breath sounds. No wheezing or rales. Lymphadenopathy:      Cervical: No cervical adenopathy. Skin:     General: Skin is warm and dry. Neurological:      Mental Status: She is alert and oriented to person, place, and time. Psychiatric:         Behavior: Behavior normal.         Thought Content:  Thought content normal.         Judgment: Judgment normal.         /86        An electronic signature was used to authenticate this note.     --Shankar Brooks MD

## 2021-02-02 RX ORDER — GABAPENTIN 300 MG/1
CAPSULE ORAL
Qty: 90 CAPSULE | Refills: 5 | Status: SHIPPED | OUTPATIENT
Start: 2021-02-02 | End: 2021-08-02

## 2021-02-02 NOTE — TELEPHONE ENCOUNTER
Medication and Quantity requested: gabapentin 300mg #90    Last Visit  02.01.21    Pharmacy and phone number updated in [facility]:  yes

## 2021-02-08 DIAGNOSIS — J45.20 MILD INTERMITTENT ASTHMA WITHOUT COMPLICATION: ICD-10-CM

## 2021-02-08 RX ORDER — ALBUTEROL SULFATE 90 UG/1
AEROSOL, METERED RESPIRATORY (INHALATION)
Qty: 8.5 G | Refills: 11 | Status: SHIPPED | OUTPATIENT
Start: 2021-02-08 | End: 2021-09-01

## 2021-02-08 NOTE — TELEPHONE ENCOUNTER
Medication and Quantity requested: PROAIR  (90 Base) MCG/ACT inhaler       Last Visit  2/1/21    Pharmacy and phone number updated in [facility]:  Yes, Ingrid

## 2021-02-14 ENCOUNTER — APPOINTMENT (OUTPATIENT)
Dept: GENERAL RADIOLOGY | Age: 66
DRG: 313 | End: 2021-02-14
Payer: MEDICARE

## 2021-02-14 ENCOUNTER — HOSPITAL ENCOUNTER (OUTPATIENT)
Age: 66
Setting detail: OBSERVATION
Discharge: HOME HEALTH | DRG: 313 | End: 2021-02-17
Attending: EMERGENCY MEDICINE | Admitting: FAMILY MEDICINE
Payer: MEDICARE

## 2021-02-14 DIAGNOSIS — R00.0 TACHYCARDIA: ICD-10-CM

## 2021-02-14 DIAGNOSIS — Z20.822 SUSPECTED COVID-19 VIRUS INFECTION: ICD-10-CM

## 2021-02-14 DIAGNOSIS — R07.9 CHEST PAIN, UNSPECIFIED TYPE: Primary | ICD-10-CM

## 2021-02-14 LAB
A/G RATIO: 1.7 (ref 1.1–2.2)
ALBUMIN SERPL-MCNC: 4.5 G/DL (ref 3.4–5)
ALP BLD-CCNC: 100 U/L (ref 40–129)
ALT SERPL-CCNC: 18 U/L (ref 10–40)
ANION GAP SERPL CALCULATED.3IONS-SCNC: 13 MMOL/L (ref 3–16)
APTT: 31 SEC (ref 24.2–36.2)
AST SERPL-CCNC: 23 U/L (ref 15–37)
BASE EXCESS VENOUS: 6.4 MMOL/L (ref -3–3)
BASOPHILS ABSOLUTE: 0 K/UL (ref 0–0.2)
BASOPHILS RELATIVE PERCENT: 0.3 %
BILIRUB SERPL-MCNC: 0.3 MG/DL (ref 0–1)
BILIRUBIN URINE: NEGATIVE
BLOOD, URINE: ABNORMAL
BUN BLDV-MCNC: 18 MG/DL (ref 7–20)
CALCIUM SERPL-MCNC: 9.4 MG/DL (ref 8.3–10.6)
CARBOXYHEMOGLOBIN: 3.3 % (ref 0–1.5)
CHLORIDE BLD-SCNC: 101 MMOL/L (ref 99–110)
CLARITY: ABNORMAL
CO2: 27 MMOL/L (ref 21–32)
COLOR: YELLOW
CREAT SERPL-MCNC: 0.5 MG/DL (ref 0.6–1.2)
D DIMER: 214 NG/ML DDU (ref 0–229)
EOSINOPHILS ABSOLUTE: 0 K/UL (ref 0–0.6)
EOSINOPHILS RELATIVE PERCENT: 0 %
EPITHELIAL CELLS, UA: 2 /HPF (ref 0–5)
GFR AFRICAN AMERICAN: >60
GFR NON-AFRICAN AMERICAN: >60
GLOBULIN: 2.6 G/DL
GLUCOSE BLD-MCNC: 111 MG/DL (ref 70–99)
GLUCOSE URINE: NEGATIVE MG/DL
HCO3 VENOUS: 31 MMOL/L (ref 23–29)
HCT VFR BLD CALC: 39.3 % (ref 36–48)
HEMOGLOBIN, VEN, REDUCED: 22 %
HEMOGLOBIN: 13.2 G/DL (ref 12–16)
HYALINE CASTS: 3 /LPF (ref 0–8)
INR BLD: 0.99 (ref 0.86–1.14)
KETONES, URINE: 15 MG/DL
LACTIC ACID: 2.3 MMOL/L (ref 0.4–2)
LEUKOCYTE ESTERASE, URINE: NEGATIVE
LIPASE: 18 U/L (ref 13–60)
LYMPHOCYTES ABSOLUTE: 0.7 K/UL (ref 1–5.1)
LYMPHOCYTES RELATIVE PERCENT: 7 %
MCH RBC QN AUTO: 31.7 PG (ref 26–34)
MCHC RBC AUTO-ENTMCNC: 33.5 G/DL (ref 31–36)
MCV RBC AUTO: 94.6 FL (ref 80–100)
METHEMOGLOBIN VENOUS: 0.6 %
MICROSCOPIC EXAMINATION: YES
MONOCYTES ABSOLUTE: 0.3 K/UL (ref 0–1.3)
MONOCYTES RELATIVE PERCENT: 3.5 %
NEUTROPHILS ABSOLUTE: 8.8 K/UL (ref 1.7–7.7)
NEUTROPHILS RELATIVE PERCENT: 89.2 %
NITRITE, URINE: NEGATIVE
O2 CONTENT, VEN: 14 VOL %
O2 SAT, VEN: 78 %
O2 THERAPY: ABNORMAL
PCO2, VEN: 43.6 MMHG (ref 40–50)
PDW BLD-RTO: 13.1 % (ref 12.4–15.4)
PH UA: 7.5 (ref 5–8)
PH VENOUS: 7.46 (ref 7.35–7.45)
PLATELET # BLD: 195 K/UL (ref 135–450)
PMV BLD AUTO: 8 FL (ref 5–10.5)
PO2, VEN: 39.1 MMHG (ref 25–40)
POTASSIUM SERPL-SCNC: 4 MMOL/L (ref 3.5–5.1)
PRO-BNP: 2532 PG/ML (ref 0–124)
PROCALCITONIN: 0.05 NG/ML (ref 0–0.15)
PROTEIN UA: >=300 MG/DL
PROTHROMBIN TIME: 11.5 SEC (ref 10–13.2)
RAPID INFLUENZA  B AGN: NEGATIVE
RAPID INFLUENZA A AGN: NEGATIVE
RBC # BLD: 4.16 M/UL (ref 4–5.2)
RBC UA: 9 /HPF (ref 0–4)
SODIUM BLD-SCNC: 141 MMOL/L (ref 136–145)
SPECIFIC GRAVITY UA: 1.02 (ref 1–1.03)
TCO2 CALC VENOUS: 73 MMOL/L
TOTAL PROTEIN: 7.1 G/DL (ref 6.4–8.2)
TROPONIN: 0.02 NG/ML
TROPONIN: 0.03 NG/ML
TROPONIN: <0.01 NG/ML
URINE REFLEX TO CULTURE: ABNORMAL
URINE TYPE: ABNORMAL
UROBILINOGEN, URINE: 0.2 E.U./DL
WBC # BLD: 9.8 K/UL (ref 4–11)
WBC UA: 2 /HPF (ref 0–5)

## 2021-02-14 PROCEDURE — 83690 ASSAY OF LIPASE: CPT

## 2021-02-14 PROCEDURE — G0378 HOSPITAL OBSERVATION PER HR: HCPCS

## 2021-02-14 PROCEDURE — 85379 FIBRIN DEGRADATION QUANT: CPT

## 2021-02-14 PROCEDURE — 80053 COMPREHEN METABOLIC PANEL: CPT

## 2021-02-14 PROCEDURE — 84484 ASSAY OF TROPONIN QUANT: CPT

## 2021-02-14 PROCEDURE — 71045 X-RAY EXAM CHEST 1 VIEW: CPT

## 2021-02-14 PROCEDURE — 6360000002 HC RX W HCPCS: Performed by: PHYSICIAN ASSISTANT

## 2021-02-14 PROCEDURE — 99284 EMERGENCY DEPT VISIT MOD MDM: CPT

## 2021-02-14 PROCEDURE — 96376 TX/PRO/DX INJ SAME DRUG ADON: CPT

## 2021-02-14 PROCEDURE — U0003 INFECTIOUS AGENT DETECTION BY NUCLEIC ACID (DNA OR RNA); SEVERE ACUTE RESPIRATORY SYNDROME CORONAVIRUS 2 (SARS-COV-2) (CORONAVIRUS DISEASE [COVID-19]), AMPLIFIED PROBE TECHNIQUE, MAKING USE OF HIGH THROUGHPUT TECHNOLOGIES AS DESCRIBED BY CMS-2020-01-R: HCPCS

## 2021-02-14 PROCEDURE — 83880 ASSAY OF NATRIURETIC PEPTIDE: CPT

## 2021-02-14 PROCEDURE — 84145 PROCALCITONIN (PCT): CPT

## 2021-02-14 PROCEDURE — 82803 BLOOD GASES ANY COMBINATION: CPT

## 2021-02-14 PROCEDURE — 96361 HYDRATE IV INFUSION ADD-ON: CPT

## 2021-02-14 PROCEDURE — 36415 COLL VENOUS BLD VENIPUNCTURE: CPT

## 2021-02-14 PROCEDURE — 87804 INFLUENZA ASSAY W/OPTIC: CPT

## 2021-02-14 PROCEDURE — 87040 BLOOD CULTURE FOR BACTERIA: CPT

## 2021-02-14 PROCEDURE — 96375 TX/PRO/DX INJ NEW DRUG ADDON: CPT

## 2021-02-14 PROCEDURE — 85025 COMPLETE CBC W/AUTO DIFF WBC: CPT

## 2021-02-14 PROCEDURE — 96374 THER/PROPH/DIAG INJ IV PUSH: CPT

## 2021-02-14 PROCEDURE — 85610 PROTHROMBIN TIME: CPT

## 2021-02-14 PROCEDURE — 85730 THROMBOPLASTIN TIME PARTIAL: CPT

## 2021-02-14 PROCEDURE — 81001 URINALYSIS AUTO W/SCOPE: CPT

## 2021-02-14 PROCEDURE — 2580000003 HC RX 258: Performed by: PHYSICIAN ASSISTANT

## 2021-02-14 PROCEDURE — 93005 ELECTROCARDIOGRAM TRACING: CPT | Performed by: PHYSICIAN ASSISTANT

## 2021-02-14 PROCEDURE — 93005 ELECTROCARDIOGRAM TRACING: CPT | Performed by: EMERGENCY MEDICINE

## 2021-02-14 PROCEDURE — 83605 ASSAY OF LACTIC ACID: CPT

## 2021-02-14 RX ORDER — GABAPENTIN 300 MG/1
300 CAPSULE ORAL NIGHTLY
Status: DISCONTINUED | OUTPATIENT
Start: 2021-02-14 | End: 2021-02-17 | Stop reason: HOSPADM

## 2021-02-14 RX ORDER — HYDROCODONE BITARTRATE AND ACETAMINOPHEN 7.5; 325 MG/1; MG/1
1 TABLET ORAL EVERY 6 HOURS PRN
Status: DISCONTINUED | OUTPATIENT
Start: 2021-02-14 | End: 2021-02-17 | Stop reason: HOSPADM

## 2021-02-14 RX ORDER — 0.9 % SODIUM CHLORIDE 0.9 %
1000 INTRAVENOUS SOLUTION INTRAVENOUS ONCE
Status: COMPLETED | OUTPATIENT
Start: 2021-02-14 | End: 2021-02-14

## 2021-02-14 RX ORDER — PROMETHAZINE HYDROCHLORIDE 25 MG/ML
12.5 INJECTION, SOLUTION INTRAMUSCULAR; INTRAVENOUS EVERY 6 HOURS PRN
Status: DISCONTINUED | OUTPATIENT
Start: 2021-02-14 | End: 2021-02-17 | Stop reason: HOSPADM

## 2021-02-14 RX ORDER — FENTANYL CITRATE 50 UG/ML
25 INJECTION, SOLUTION INTRAMUSCULAR; INTRAVENOUS ONCE
Status: COMPLETED | OUTPATIENT
Start: 2021-02-14 | End: 2021-02-14

## 2021-02-14 RX ORDER — SERTRALINE HYDROCHLORIDE 25 MG/1
25 TABLET, FILM COATED ORAL DAILY
Status: DISCONTINUED | OUTPATIENT
Start: 2021-02-15 | End: 2021-02-17 | Stop reason: HOSPADM

## 2021-02-14 RX ORDER — LIDOCAINE 4 G/G
1 PATCH TOPICAL DAILY
Status: DISCONTINUED | OUTPATIENT
Start: 2021-02-15 | End: 2021-02-17 | Stop reason: HOSPADM

## 2021-02-14 RX ORDER — MONTELUKAST SODIUM 10 MG/1
10 TABLET ORAL NIGHTLY
Status: DISCONTINUED | OUTPATIENT
Start: 2021-02-14 | End: 2021-02-17 | Stop reason: HOSPADM

## 2021-02-14 RX ORDER — HYDROCODONE BITARTRATE AND ACETAMINOPHEN 5; 325 MG/1; MG/1
2 TABLET ORAL ONCE
Status: DISCONTINUED | OUTPATIENT
Start: 2021-02-14 | End: 2021-02-14

## 2021-02-14 RX ORDER — ONDANSETRON 2 MG/ML
4 INJECTION INTRAMUSCULAR; INTRAVENOUS ONCE
Status: COMPLETED | OUTPATIENT
Start: 2021-02-14 | End: 2021-02-14

## 2021-02-14 RX ORDER — PREDNISOLONE ACETATE 10 MG/ML
1 SUSPENSION/ DROPS OPHTHALMIC 2 TIMES DAILY
Status: DISCONTINUED | OUTPATIENT
Start: 2021-02-14 | End: 2021-02-15

## 2021-02-14 RX ORDER — NITROGLYCERIN 0.4 MG/1
0.4 TABLET SUBLINGUAL EVERY 5 MIN PRN
Status: DISCONTINUED | OUTPATIENT
Start: 2021-02-14 | End: 2021-02-17 | Stop reason: HOSPADM

## 2021-02-14 RX ORDER — ALBUTEROL SULFATE 90 UG/1
1 AEROSOL, METERED RESPIRATORY (INHALATION) EVERY 4 HOURS PRN
Status: DISCONTINUED | OUTPATIENT
Start: 2021-02-14 | End: 2021-02-17 | Stop reason: HOSPADM

## 2021-02-14 RX ORDER — ONDANSETRON 2 MG/ML
4 INJECTION INTRAMUSCULAR; INTRAVENOUS EVERY 6 HOURS PRN
Status: DISCONTINUED | OUTPATIENT
Start: 2021-02-14 | End: 2021-02-17 | Stop reason: HOSPADM

## 2021-02-14 RX ADMIN — ONDANSETRON 4 MG: 2 INJECTION INTRAMUSCULAR; INTRAVENOUS at 17:27

## 2021-02-14 RX ADMIN — SODIUM CHLORIDE 1000 ML: 9 INJECTION, SOLUTION INTRAVENOUS at 19:05

## 2021-02-14 RX ADMIN — ONDANSETRON 4 MG: 2 INJECTION INTRAMUSCULAR; INTRAVENOUS at 23:08

## 2021-02-14 RX ADMIN — FENTANYL CITRATE 25 MCG: 50 INJECTION, SOLUTION INTRAMUSCULAR; INTRAVENOUS at 17:27

## 2021-02-14 ASSESSMENT — ENCOUNTER SYMPTOMS
VOMITING: 0
SHORTNESS OF BREATH: 1
ABDOMINAL PAIN: 0
CHEST TIGHTNESS: 1
RHINORRHEA: 0
COUGH: 0
DIARRHEA: 0
WHEEZING: 0
NAUSEA: 1

## 2021-02-14 ASSESSMENT — PAIN DESCRIPTION - LOCATION: LOCATION: CHEST;BACK

## 2021-02-14 ASSESSMENT — PAIN SCALES - GENERAL
PAINLEVEL_OUTOF10: 10
PAINLEVEL_OUTOF10: 10
PAINLEVEL_OUTOF10: 9

## 2021-02-14 NOTE — ED PROVIDER NOTES
[address]        Pt Name: Clark Fry  MRN: [id number]  [place] 1955  Date of evaluation: 2/14/2021  Provider: Yosvany Viera PA-C  PCP: Varsha Winters MD     I have seen and evaluated this patient with my supervising physician Kvng Lane MD.    [facility]       Chief Complaint   Patient presents with    Chest Pain     Pt arrived via [facility] squad from home for c/o CP and flu-like symptoms. Patient reports HTN at home today, pain under left breast. Squad reports pt was in and out of consciousness en route. Pt c/o N/V/D for the past several days. HISTORY OF PRESENT ILLNESS   (Location, Timing/Onset, Context/Setting, Quality, Duration, Modifying Factors, Severity, Associated Signs and Symptoms)  Note limiting factors. Clark Fry is a 76 y.o. female who presents for evaluation of chest pain, generalized weakness, fatigue and malaise that started yesterday. States that she has also had nausea. No vomiting diarrhea, despite triage note. She was given Zofran via squad in route. She reports chills, no documented fevers. Some congestion but no significant cough. No shortness of breath. No known sick contacts with similar symptoms or known Covid exposures. States that she has not been able to keep anything down including her meds. She does report a history of a pericardial effusion denies any other significant cardiac or pulmonary disease. She is not anticoagulated. No history of blood clots. No leg pain or swelling. No recent travel hospitalizations or operations. She has no other complaints or concerns at this time. Nursing Notes were all reviewed and agreed with or any disagreements were addressed in the HPI. REVIEW OF SYSTEMS    (2-9 systems for level 4, 10 or more for level 5)     Review of Systems   Constitutional: Positive for chills and fatigue. Negative for appetite change and fever. HENT: Positive for congestion. Negative for rhinorrhea. Eyes: Negative for visual disturbance. Respiratory: Positive for chest tightness and shortness of breath. Negative for cough and wheezing. Cardiovascular: Positive for chest pain. Gastrointestinal: Positive for nausea. Negative for abdominal pain, diarrhea and vomiting. Genitourinary: Negative for difficulty urinating, dysuria and hematuria. Musculoskeletal: Negative for neck pain and neck stiffness. Skin: Negative for rash. Neurological: Positive for weakness. Negative for dizziness, syncope, light-headedness and headaches. Positives and Pertinent negatives as per HPI. Except as noted above in the ROS, all other systems were reviewed and negative.        PAST MEDICAL HISTORY     Past Medical History:   Diagnosis Date    Arthritis     fingers, hands    Asthma     Back pain     Bell's palsy     CHF (congestive heart failure) (HCC)     Chronic pain     BACK SURGERY X4, neck, left arm    Colitis 2/14/2013    Depression     Elevated sed rate     Family history of breast cancer in first degree relative     Gastric ulcer     GERD (gastroesophageal reflux disease)     colitis    Glaucoma     Hypertension     Movement disorder     chronic back pain    Neuromuscular disorder (HCC)     sciatica rt. leg    Osteoarthritis     PONV (postoperative nausea and vomiting)     SEVERE         SURGICAL HISTORY     Past Surgical History:   Procedure Laterality Date    BACK SURGERY      x 4    COLONOSCOPY  10/16/12    polyp removed and biopsy sent    COLONOSCOPY  3/26/13    CYSTOSCOPY  1/13/16    urethral dilitation    FACIAL NERVE SURGERY      D/T Bell's Palsy    FINGER SURGERY Left 04/03/2018    Excision of Left Thumb Digital Mucous cyst & DIP Joint Arthrotomy & Debridement    FINGER SURGERY  04/2018    left thumb    FINGER SURGERY Left 04/03/2018    thumb surgery     HYSTERECTOMY      Partial  UPPER GASTROINTESTINAL ENDOSCOPY  10/15/12    UPPER GASTROINTESTINAL ENDOSCOPY  2/11/15    with EUS    VENTRAL HERNIA REPAIR N/A 4/15/2019    LAPAROSCOPIC REPAIR OF INCISIONAL HERNIA WITH MESH performed by Giles Allen MD at [facility]       Current Discharge Medication List      CONTINUE these medications which have NOT CHANGED    Details   albuterol sulfate HFA (PROAIR HFA) 108 (90 Base) MCG/ACT inhaler INHALE 2 PUFFS BY MOUTH EVERY 4 HOURS AS NEEDED FOR WHEEZING  Qty: 8.5 g, Refills: 11    Associated Diagnoses: Mild intermittent asthma without complication      gabapentin (NEURONTIN) 300 MG capsule TAKE 1 CAPSULE BY MOUTH THREE TIMES DAILY  Qty: 90 capsule, Refills: 5      HYDROcodone-acetaminophen (NORCO) 7.5-325 MG per tablet Take 1 tablet by mouth every 6 hours as needed for Pain for up to 30 days. Qty: 120 tablet, Refills: 0    Comments: Reduce doses taken as pain becomes manageable  Associated Diagnoses: Chronic pain syndrome      ondansetron (ZOFRAN) 4 MG tablet Take 1 tablet by mouth every 6 hours as needed for Nausea  Qty: 30 tablet, Refills: 0      dicyclomine (BENTYL) 10 MG capsule Take 1 capsule by mouth every 6 hours as needed (Bowel spasms)  Qty: 20 capsule, Refills: 0      montelukast (SINGULAIR) 10 MG tablet TAKE 1 TABLET BY MOUTH EVERY NIGHT  Qty: 90 tablet, Refills: 3    Comments: **Patient requests 90 days supply**  Associated Diagnoses: Allergic sinusitis;  Allergic conjunctivitis of both eyes and rhinitis      traZODone (DESYREL) 50 MG tablet TAKE 2 TABLETS BY MOUTH EVERY NIGHT  Qty: 60 tablet, Refills: 11    Associated Diagnoses: Primary insomnia      DEXILANT 60 MG CPDR delayed release capsule TAKE 1 CAPSULE BY MOUTH DAILY  Qty: 90 capsule, Refills: 3    Associated Diagnoses: Gastroesophageal reflux disease without esophagitis      tiZANidine (ZANAFLEX) 4 MG tablet TAKE 1 TABLET BY MOUTH THREE TIMES DAILY  Qty: 90 tablet, Refills: 5 Associated Diagnoses: Chronic pain syndrome      diclofenac sodium (VOLTAREN) 1 % GEL APPLY 2 GRAMS TOPICALLY TO HAND FOUR TIMES DAILY  Qty: 500 g, Refills: 3    Associated Diagnoses: Primary osteoarthritis involving multiple joints      fluticasone (FLOVENT HFA) 110 MCG/ACT inhaler INHALE 1 PUFF INTO THE LUNGS TWICE DAILY  Qty: 12 g, Refills: 5    Associated Diagnoses: Moderate persistent asthma with acute exacerbation      sertraline (ZOLOFT) 100 MG tablet TAKE 1 TABLET BY MOUTH THREE TIMES DAILY  Qty: 270 tablet, Refills: 3    Associated Diagnoses: Depression, unspecified depression type      nitroGLYCERIN (NITROSTAT) 0.4 MG SL tablet PLACE 1 TABLET UNDE THE TONGUE EVERY 5 MINUTES AS NEEDED FOR CHEST PAIN  Qty: 25 tablet, Refills: 2      albuterol (PROVENTIL) (2.5 MG/3ML) 0.083% nebulizer solution USE 1 VIAL IN NEBULIZER EVERY 6 HOURS  Qty: 180 vial, Refills: 11    Associated Diagnoses:  Moderate persistent asthma with acute exacerbation; Cough      hydrocortisone (WESTCORT) 0.2 % cream APPLY EXTERNALLY TO THE AFFECTED AREA TWICE DAILY  Qty: 30 g, Refills: 5    Associated Diagnoses: Dyshidrosis      Nutritional Supplements (BOOST CALORIE SMART) LIQD Take 1 Can by mouth See Admin Instructions EVERY 3 DAYS      !! butalbital-acetaminophen-caffeine (FIORICET, ESGIC) -40 MG per tablet Take 1 tablet by mouth every 4 hours as needed for Headaches or Migraine May take 2 tablets every 4 hours as needed for pain  Qty: 50 tablet, Refills: 3    Associated Diagnoses: Migraine with aura and with status migrainosus, not intractable      !! butalbital-acetaminophen-caffeine (FIORICET, ESGIC) -40 MG per tablet Take 1 tablet by mouth every 4 hours as needed for Headaches May take 2 tablets every 4 hours as needed for pain  Qty: 50 tablet, Refills: 3    Associated Diagnoses: Migraine with aura and with status migrainosus, not intractable !! butalbital-acetaminophen-caffeine (FIORICET, ESGIC) -40 MG per tablet Take 1 tablet by mouth every 4 hours as needed for Migraine May take 2 tablets every 4 hours as needed for pain  Qty: 100 tablet, Refills: 2    Associated Diagnoses: Migraine with aura and with status migrainosus, not intractable      !! butalbital-acetaminophen-caffeine (FIORICET, ESGIC) -40 MG per tablet Take 1 tablet by mouth every 4 hours as needed for Migraine May take 2 tablets every 4 hours as needed for pain  Qty: 50 tablet, Refills: 0    Associated Diagnoses: Migraine with aura and with status migrainosus, not intractable      prednisoLONE acetate (PRED FORTE) 1 % ophthalmic suspension Place 1 drop into both eyes 2 times daily  Qty: 5 mL, Refills: 1    Associated Diagnoses: Allergic conjunctivitis of both eyes and rhinitis      linaCLOtide (LINZESS PO) Take by mouth daily      Elastic Bandages & Supports (TRUFORM SUPPORT SOCK 20-30MMHG) MISC 1 each by Does not apply route daily  Qty: 2 each, Refills: 2       !! - Potential duplicate medications found. Please discuss with provider.             ALLERGIES     Beta adrenergic blockers, Pheniramine, Antihistamine [diphenhydramine hcl], Aspirin, Codeine, Dilaudid [hydromorphone], Nsaids, Prednisone, and Zithromax [azithromycin]    FAMILYHISTORY       Family History   Problem Relation Age of Onset    Heart Disease Mother     High Blood Pressure Mother     Arthritis Mother     Breast Cancer Sister     High Blood Pressure Brother     Diabetes Neg Hx     Stroke Neg Hx           SOCIAL HISTORY       Social History     Tobacco Use    Smoking status: Former Smoker     Packs/day: 0.25     Years: 20.00     Pack years: 5.00     Types: Cigarettes     Quit date: 2016     Years since quittin.8    Smokeless tobacco: Never Used   Substance Use Topics    Alcohol use: No     Alcohol/week: 0.0 standard drinks    Drug use: No       SCREENINGS             PHYSICAL EXAM COMPREHENSIVE METABOLIC PANEL - Abnormal; Notable for the following components:    Glucose 111 (*)     CREATININE 0.5 (*)     All other components within normal limits    Narrative:     Performed at:  [facility] [address]   Phone [phone]   URINE RT REFLEX TO CULTURE - Abnormal; Notable for the following components:    Clarity, UA CLOUDY (*)     Ketones, Urine 15 (*)     Blood, Urine SMALL (*)     Protein, UA >=300 (*)     All other components within normal limits    Narrative:     Performed at:  [facility] [address]   Phone [phone]  - Abnormal; Notable for the following components:    Pro-BNP 2,532 (*)     All other components within normal limits    Narrative:     Performed at:  [facility] [address]   Phone [phone]   TROPONIN - Abnormal; Notable for the following components:    Troponin 0.02 (*)     All other components within normal limits    Narrative:     Performed at:  [facility] [address]   Phone [phone]   BLOOD GAS, VENOUS - Abnormal; Notable for the following components:    pH, Anshul 7.461 (*)     HCO3, Venous 31.0 (*)     Base Excess, Anshul 6.4 (*)     Carboxyhemoglobin 3.3 (*)     All other components within normal limits    Narrative:     Performed at:  [facility] [address]   Phone [phone]   LACTIC ACID, PLASMA - Abnormal; Notable for the following components:    Lactic Acid 2.3 (*)     All other components within normal limits    Narrative:     Performed at:  [facility] [address]   Phone [phone] - Abnormal; Notable for the following components: RBC, UA 9 (*)     All other components within normal limits    Narrative:     Performed at:  [facility] [address] [employer], [address]   Phone [phone]   TROPONIN - Abnormal; Notable for the following components:    Troponin 0.03 (*)     All other components within normal limits    Narrative:     Performed at:  [facility] [address] [employer], [address]   Phone [phone]   RAPID INFLUENZA A/B ANTIGENS    Narrative:     Performed at:  [facility] [address] [facility] [address], [address]   Phone [phone]   CULTURE, BLOOD 1   CULTURE, BLOOD 2   LIPASE    Narrative:     Performed at:  [facility] [address] [facility] [address], [address]   Phone [phone]   PROTIME-INR    Narrative:     Performed at:  [facility] [address] [facility] [employer], [address]   Phone [phone]   APTT    Narrative:     Performed at:  [facility] [address] [facility] [employer], [address]   Phone [phone]   PROCALCITONIN    Narrative:     Performed at:  [facility] [address] [employer], [address]   Phone [phone]   D-DIMER, QUANTITATIVE    Narrative:     Performed at:  [facility] [address] [facility] [employer], [employer]   Phone [phone]   TROPONIN       All other labs were within normal range or not returned as of this dictation. EKG: All EKG's are interpreted by the Emergency Department Physician in the absence of a cardiologist.  Please see their note for interpretation of EKG.       RADIOLOGY:   Non-plain film images such as CT, Ultrasound and MRI are read by the radiologist. Plain radiographic images are visualized and preliminarily interpreted by the ED Provider with the below findings: Interpretation per the Radiologist below, if available at the time of this note:    XR CHEST PORTABLE   Final Result   No acute cardiopulmonary disease. NM MYOCARDIAL SPECT REST EXERCISE OR RX    (Results Pending)     No results found.         PROCEDURES   Unless otherwise noted below, none     Procedures    CRITICAL CARE TIME   N/A    CONSULTS:  IP CONSULT TO HOSPITALIST      EMERGENCY DEPARTMENT COURSE and DIFFERENTIAL DIAGNOSIS/MDM:   Vitals:    Vitals:    02/14/21 1800 02/14/21 1830 02/14/21 1930 02/14/21 2050   BP: 113/64 (!) 121/54 106/63 135/78   Pulse: 100 109 90 98   Resp: 20 28 27 22   Temp:    99.3 °F (37.4 °C)   TempSrc:    Oral   SpO2: 96% 97% 98% 99%   Weight:       Height:           Patient was given the following medications:  Medications   albuterol sulfate  (90 Base) MCG/ACT inhaler 1 puff (has no administration in time range)   gabapentin (NEURONTIN) capsule 300 mg (has no administration in time range)   HYDROcodone-acetaminophen (NORCO) 7.5-325 MG per tablet 1 tablet (has no administration in time range)   montelukast (SINGULAIR) tablet 10 mg (has no administration in time range)   prednisoLONE acetate (PRED FORTE) 1 % ophthalmic suspension 1 drop (has no administration in time range)   sertraline (ZOLOFT) tablet 25 mg (has no administration in time range)   ondansetron (ZOFRAN) injection 4 mg (4 mg Intravenous Given 2/14/21 1727)   fentaNYL (SUBLIMAZE) injection 25 mcg (25 mcg Intravenous Given 2/14/21 1727)   0.9 % sodium chloride bolus (0 mLs Intravenous Stopped 2/14/21 2027) Patient presents for evaluation of generalized weakness fatigue chest pain shortness of breath nausea and chills. On exam, she looks like she does not feel well, tachypneic, tachycardic but in no acute distress and nontoxic. Vitals otherwise stable and she is afebrile. Satting 100% on room air. Lungs are clear to auscultation bilaterally with no wheezing rales or rhonchi. No obvious friction rub or murmur. Please see attending note for EKG interpretation. CBC and CMP are unremarkable, aside from lymphopenia. No leukocytosis. Covid swab is negative. Urinalysis is positive for ketonuria and proteinuria likely secondary to dehydration. She was given fluid resuscitation in the ED. Rapid flu negative. Troponin bumped to 0.03. Lipase 18. Chest x-ray shows no acute process. I do believe patient warrants admission for further evaluation management of suspected Covid and also for cardiology consultation and ACS rule out regarding elevated troponin persistent tachycardia. Hospitalist resume care the patient at this time. Patient was informed and agreeable. She is stable for admission. Critical Care  There was a high probability of life-threatening clinical deterioration in the patient's condition requiring my urgent intervention. Total critical care time with the patient was 33 minutes excluding separately reportable procedures. Critical care required due to patients presentation, comorbidities and concern for acute life-threatening disease process and decompensation. FINAL IMPRESSION      1. Chest pain, unspecified type    2. Tachycardia    3. Suspected COVID-19 virus infection          DISPOSITION/PLAN   DISPOSITION Admitted 02/14/2021 07:32:08 PM      PATIENT REFERREDTO:  No follow-up provider specified.     DISCHARGE MEDICATIONS:  Current Discharge Medication List          DISCONTINUED MEDICATIONS:  Current Discharge Medication List (Please note that portions of this note were completed with a voice recognition program.  Efforts were made to edit the dictations but occasionally words are mis-transcribed.)    Polo Viera PA-C (electronically signed)           [place], [place]  02/14/21 2116

## 2021-02-14 NOTE — ED NOTES
Bed: 11  Expected date:   Expected time:   Means of arrival:   Comments:  [facility] [address], RN  09/32/73 6434

## 2021-02-15 LAB
EKG ATRIAL RATE: 106 BPM
EKG ATRIAL RATE: 113 BPM
EKG DIAGNOSIS: NORMAL
EKG DIAGNOSIS: NORMAL
EKG P AXIS: 85 DEGREES
EKG P-R INTERVAL: 116 MS
EKG P-R INTERVAL: 122 MS
EKG Q-T INTERVAL: 368 MS
EKG Q-T INTERVAL: 378 MS
EKG QRS DURATION: 66 MS
EKG QRS DURATION: 76 MS
EKG QTC CALCULATION (BAZETT): 502 MS
EKG QTC CALCULATION (BAZETT): 504 MS
EKG R AXIS: 147 DEGREES
EKG R AXIS: 66 DEGREES
EKG T AXIS: 146 DEGREES
EKG T AXIS: 75 DEGREES
EKG VENTRICULAR RATE: 106 BPM
EKG VENTRICULAR RATE: 113 BPM
LV EF: 45 %
LVEF MODALITY: NORMAL
SARS-COV-2, PCR: NOT DETECTED

## 2021-02-15 PROCEDURE — G0378 HOSPITAL OBSERVATION PER HR: HCPCS

## 2021-02-15 PROCEDURE — 6360000002 HC RX W HCPCS: Performed by: INTERNAL MEDICINE

## 2021-02-15 PROCEDURE — 93010 ELECTROCARDIOGRAM REPORT: CPT | Performed by: INTERNAL MEDICINE

## 2021-02-15 PROCEDURE — 6370000000 HC RX 637 (ALT 250 FOR IP): Performed by: NURSE PRACTITIONER

## 2021-02-15 PROCEDURE — 96376 TX/PRO/DX INJ SAME DRUG ADON: CPT

## 2021-02-15 PROCEDURE — 6370000000 HC RX 637 (ALT 250 FOR IP): Performed by: FAMILY MEDICINE

## 2021-02-15 PROCEDURE — 99223 1ST HOSP IP/OBS HIGH 75: CPT | Performed by: INTERNAL MEDICINE

## 2021-02-15 PROCEDURE — 2580000003 HC RX 258: Performed by: INTERNAL MEDICINE

## 2021-02-15 PROCEDURE — 6360000002 HC RX W HCPCS: Performed by: PHYSICIAN ASSISTANT

## 2021-02-15 PROCEDURE — 93306 TTE W/DOPPLER COMPLETE: CPT

## 2021-02-15 PROCEDURE — 96375 TX/PRO/DX INJ NEW DRUG ADDON: CPT

## 2021-02-15 RX ORDER — MORPHINE SULFATE 2 MG/ML
1 INJECTION, SOLUTION INTRAMUSCULAR; INTRAVENOUS EVERY 4 HOURS PRN
Status: DISCONTINUED | OUTPATIENT
Start: 2021-02-15 | End: 2021-02-17 | Stop reason: HOSPADM

## 2021-02-15 RX ORDER — SODIUM CHLORIDE 0.9 % (FLUSH) 0.9 %
10 SYRINGE (ML) INJECTION PRN
Status: DISCONTINUED | OUTPATIENT
Start: 2021-02-15 | End: 2021-02-17 | Stop reason: HOSPADM

## 2021-02-15 RX ORDER — DOBUTAMINE HYDROCHLORIDE 200 MG/100ML
10 INJECTION INTRAVENOUS CONTINUOUS
Status: DISCONTINUED | OUTPATIENT
Start: 2021-02-15 | End: 2021-02-17 | Stop reason: HOSPADM

## 2021-02-15 RX ORDER — SODIUM CHLORIDE 0.9 % (FLUSH) 0.9 %
10 SYRINGE (ML) INJECTION 2 TIMES DAILY
Status: DISCONTINUED | OUTPATIENT
Start: 2021-02-15 | End: 2021-02-17 | Stop reason: HOSPADM

## 2021-02-15 RX ORDER — TRAZODONE HYDROCHLORIDE 50 MG/1
100 TABLET ORAL NIGHTLY PRN
Status: DISCONTINUED | OUTPATIENT
Start: 2021-02-15 | End: 2021-02-17 | Stop reason: HOSPADM

## 2021-02-15 RX ADMIN — MORPHINE SULFATE 1 MG: 2 INJECTION, SOLUTION INTRAMUSCULAR; INTRAVENOUS at 15:15

## 2021-02-15 RX ADMIN — MONTELUKAST SODIUM 10 MG: 10 TABLET, FILM COATED ORAL at 20:14

## 2021-02-15 RX ADMIN — Medication 10 ML: at 15:34

## 2021-02-15 RX ADMIN — Medication 10 ML: at 15:14

## 2021-02-15 RX ADMIN — GABAPENTIN 300 MG: 300 CAPSULE ORAL at 20:14

## 2021-02-15 RX ADMIN — ONDANSETRON 4 MG: 2 INJECTION INTRAMUSCULAR; INTRAVENOUS at 07:32

## 2021-02-15 RX ADMIN — MORPHINE SULFATE 1 MG: 2 INJECTION, SOLUTION INTRAMUSCULAR; INTRAVENOUS at 10:34

## 2021-02-15 RX ADMIN — TRAZODONE HYDROCHLORIDE 100 MG: 50 TABLET ORAL at 21:40

## 2021-02-15 RX ADMIN — HYDROCODONE BITARTRATE AND ACETAMINOPHEN 1 TABLET: 7.5; 325 TABLET ORAL at 20:14

## 2021-02-15 RX ADMIN — Medication 10 ML: at 10:34

## 2021-02-15 RX ADMIN — ONDANSETRON 4 MG: 2 INJECTION INTRAMUSCULAR; INTRAVENOUS at 15:33

## 2021-02-15 RX ADMIN — HYDROCODONE BITARTRATE AND ACETAMINOPHEN 1 TABLET: 7.5; 325 TABLET ORAL at 02:30

## 2021-02-15 RX ADMIN — Medication 10 ML: at 20:15

## 2021-02-15 RX ADMIN — Medication 10 ML: at 15:33

## 2021-02-15 ASSESSMENT — PAIN DESCRIPTION - DESCRIPTORS
DESCRIPTORS: DISCOMFORT;ACHING
DESCRIPTORS: DISCOMFORT
DESCRIPTORS: ACHING

## 2021-02-15 ASSESSMENT — PAIN SCALES - GENERAL
PAINLEVEL_OUTOF10: 7
PAINLEVEL_OUTOF10: 7
PAINLEVEL_OUTOF10: 6

## 2021-02-15 ASSESSMENT — PAIN DESCRIPTION - ORIENTATION
ORIENTATION: LEFT
ORIENTATION: LEFT

## 2021-02-15 ASSESSMENT — PAIN DESCRIPTION - PAIN TYPE
TYPE: CHRONIC PAIN
TYPE: ACUTE PAIN
TYPE: CHRONIC PAIN

## 2021-02-15 ASSESSMENT — PAIN DESCRIPTION - ONSET
ONSET: ON-GOING
ONSET: UNABLE TO TELL

## 2021-02-15 ASSESSMENT — PAIN DESCRIPTION - PROGRESSION
CLINICAL_PROGRESSION: GRADUALLY WORSENING
CLINICAL_PROGRESSION: GRADUALLY IMPROVING
CLINICAL_PROGRESSION: GRADUALLY IMPROVING
CLINICAL_PROGRESSION: GRADUALLY WORSENING
CLINICAL_PROGRESSION: GRADUALLY IMPROVING
CLINICAL_PROGRESSION: GRADUALLY IMPROVING

## 2021-02-15 ASSESSMENT — PAIN DESCRIPTION - FREQUENCY: FREQUENCY: INTERMITTENT

## 2021-02-15 ASSESSMENT — PAIN DESCRIPTION - LOCATION
LOCATION: BACK
LOCATION: CHEST;BACK;ABDOMEN
LOCATION: CHEST;BACK
LOCATION: BACK

## 2021-02-15 NOTE — PROGRESS NOTES
VSS, intermittent tachycardia with activity. N continues, refused all po meds, requested order for IV pain meds. MD was informed via PS. MD did not order morphine d/t HX of allergies. Pt confirms no HX of allergy to morphine and has had many times. Placed call to pharmacy, spoke with Bernardino Whiteside, confirming pt has had PRN morphine at [facility] in past of 10-2- times. Informed MD and request pt revisit pt request for IV morphine. MD states will order,

## 2021-02-15 NOTE — ED NOTES
Report called to Deborah Houston RN on 5T. Patient to be transported to floor via stretcher.       Krista Wesley RN  02/14/21 6930

## 2021-02-15 NOTE — PROGRESS NOTES
[facility] PROGRESS NOTE    2/15/2021 8:54 AM        Name: Shane Bucio . Admitted: 2/14/2021  Primary Care Provider: Merry Muñoz MD (Tel: [phone])    Brief Course: Patient is a 58-year-old  female with history of cardiomyopathy, CHF, hypertension, chronic pain, colitis, depression, asthma, arthritis, postoperative nausea and vomiting presented to ED with complaints of chest pain, nausea and fatigue. Initial work-up in ED revealed mildly elevated troponin level, none acute ST-T wave changes on EKG. Admitted with chest pain to rule out ACS. CC: Chest pain  Subjective: Patient seen at bedside. Currently denies chest pain. Undergoing 2D echocardiogram at bedside during my visit. Reports being unable to take oral meds due to severe nausea. Requesting for IV pain medications for chronic pain.     Reviewed interval ancillary notes    Current Medications      sodium chloride flush 0.9 % injection 10 mL, PRN      sodium chloride flush 0.9 % injection 10 mL, BID      DOBUTamine (DOBUTREX) 500 mg in dextrose 5 % 250 mL infusion, Continuous      albuterol sulfate  (90 Base) MCG/ACT inhaler 1 puff, Q4H PRN      gabapentin (NEURONTIN) capsule 300 mg, Nightly      HYDROcodone-acetaminophen (NORCO) 7.5-325 MG per tablet 1 tablet, Q6H PRN      montelukast (SINGULAIR) tablet 10 mg, Nightly      prednisoLONE acetate (PRED FORTE) 1 % ophthalmic suspension 1 drop, BID      sertraline (ZOLOFT) tablet 25 mg, Daily      ondansetron (ZOFRAN) injection 4 mg, Q6H PRN      promethazine (PHENERGAN) injection 12.5 mg, Q6H PRN      nitroGLYCERIN (NITROSTAT) SL tablet 0.4 mg, Q5 Min PRN      lidocaine 4 % external patch 1 patch, Daily        Objective:  /73   Pulse 69   Temp 98 °F (36.7 °C) (Oral)   Resp 18   Ht 5' 8\" (1.727 m)   Wt 107 lb (48.5 kg)   SpO2 97%   BMI 16.27 kg/m²   No Zoloft  Postop nausea and vomiting: Antiemetics prn.     Diet: Diet NPO Effective Now Exceptions are: Sips of Water with Meds  Diet NPO, After Midnight Exceptions are: Sips of Water with Meds  Code:Prior  DVT PPX: Lovenox subcu daily  Disposition: Pending cardiac work-up/evaluation, PT/OT      Viridiana Fierro MD   2/15/2021 8:54 AM

## 2021-02-15 NOTE — ED NOTES
Patient in today with c/o CP below her left breast. States the pain radiates through to her back, but reports she does have chronic back pain issues. Also with c/o flu-like symptoms for the past 2-3 days. States she has been nauseous and vomiting with a few episodes of diarrhea. Patient from home, states she is independent with ambulation. A&O x4.       Princess Gomes RN  02/14/21 1958

## 2021-02-15 NOTE — CONSULTS
[address] [facility]  [phone]        Reason for Consultation/Chief Complaint: \" Chest pain. \"    History of Present Illness:  Leonor Aleman is a 76 y.o. patient who presented to the hospital with complaints of 3 day history of nausea, vomiting and diarrhea with associated severe abdominal pain. On the 2nd day, she had a feeling of chest heaviness like someone was sitting on her. Which has been constant. She felt like her heart was racing as well. Has seen GI and had endoscopy. 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  Her EF has since normalized.  She had a normal nuclear stress test prior to back surgery in 2016. Surgery took 6 hours to complete and patient had no cardiovascular complications during or after surgery.       She was seen by Lalo Elder NP in August 2019. Echo showed mild to moderate circumferential pericardial effusion. 8/26/19 inflammatory markers were negative. Past Medical History:   has a past medical history of Arthritis, Asthma, Back pain, Bell's palsy, CHF (congestive heart failure) ([address] Chronic pain, Colitis, Depression, Elevated sed rate, Family history of breast cancer in first degree relative, Gastric ulcer, GERD (gastroesophageal reflux disease), Glaucoma, Hypertension, Movement disorder, Neuromuscular disorder ([address] Osteoarthritis, and PONV (postoperative nausea and vomiting). Surgical History:   has a past surgical history that includes Hysterectomy; Facial nerve surgery; Upper gastrointestinal endoscopy (10/15/12); Colonoscopy (10/16/12); back surgery; Colonoscopy (3/26/13); Upper gastrointestinal endoscopy (2/11/15); Cystocopy (1/13/16); Finger surgery (Left, 04/03/2018); Finger surgery (04/2018); Finger surgery (Left, 04/03/2018); and ventral hernia repair (N/A, 4/15/2019). Social History:   reports that she quit smoking about 4 years ago.  Her smoking use included cigarettes. She has a 5.00 pack-year smoking history. She has never used smokeless tobacco. She reports that she does not drink alcohol or use drugs. Family History:  family history includes Arthritis in her mother; Breast Cancer in her sister; Heart Disease in her mother; High Blood Pressure in her brother and mother. Home Medications:  Were reviewed and are listed in nursing record. and/or listed below  Prior to Admission medications    Medication Sig Start Date End Date Taking? Authorizing Provider   albuterol sulfate HFA (PROAIR HFA) 108 (90 Base) MCG/ACT inhaler INHALE 2 PUFFS BY MOUTH EVERY 4 HOURS AS NEEDED FOR WHEEZING 2/8/21  Yes Marcia Veronica MD   gabapentin (NEURONTIN) 300 MG capsule TAKE 1 CAPSULE BY MOUTH THREE TIMES DAILY 2/2/21 7/2/21 Yes Marcia Veronica MD   HYDROcodone-acetaminophen (NORCO) 7.5-325 MG per tablet Take 1 tablet by mouth every 6 hours as needed for Pain for up to 30 days.  1/19/21 2/18/21 Yes Marcia Veronica MD   ondansetron [facility]) 4 MG tablet Take 1 tablet by mouth every 6 hours as needed for Nausea 11/19/20  Yes Marcia Veronica MD   dicyclomine (BENTYL) 10 MG capsule Take 1 capsule by mouth every 6 hours as needed (Bowel spasms) 11/19/20 2/14/21 Yes Adrinane Clancy MD   montelukast (SINGULAIR) 10 MG tablet TAKE 1 TABLET BY MOUTH EVERY NIGHT 11/12/20  Yes Holly Álvarez MD   traZODone (DESYREL) 50 MG tablet TAKE 2 TABLETS BY MOUTH EVERY NIGHT 10/23/20  Yes Marcia Vreonica MD   DEXILANT 60 MG CPDR delayed release capsule TAKE 1 CAPSULE BY MOUTH DAILY 8/21/20  Yes Marcia Veronica MD   tiZANidine (ZANAFLEX) 4 MG tablet TAKE 1 TABLET BY MOUTH THREE TIMES DAILY 8/14/20  Yes Marcia Veronica MD   diclofenac sodium (VOLTAREN) 1 % GEL APPLY 2 GRAMS TOPICALLY TO HAND FOUR TIMES DAILY 8/10/20  Yes Marcia Veronica MD   fluticasone [facility] HFA) 110 MCG/ACT inhaler INHALE 1 PUFF INTO THE LUNGS TWICE DAILY 7/10/20  Yes Marcia Veronica MD   sertraline (ZOLOFT) 100 MG tablet TAKE 1 TABLET BY MOUTH THREE TIMES DAILY 4/24/20  Yes Max Garner MD   nitroGLYCERIN (NITROSTAT) 0.4 MG SL tablet PLACE 1 TABLET UNDE THE TONGUE EVERY 5 MINUTES AS NEEDED FOR CHEST PAIN 1/30/20  Yes JOAO Borjas CNP   albuterol (PROVENTIL) (2.5 MG/3ML) 0.083% nebulizer solution USE 1 VIAL IN NEBULIZER EVERY 6 HOURS 8/26/19  Yes Max Garner MD   hydrocortisone (WESTCORT) 0.2 % cream APPLY EXTERNALLY TO THE AFFECTED AREA TWICE DAILY 6/6/19  Yes Max Garner MD   Nutritional Supplements (BOOST CALORIE SMART) LIQD Take 1 Can by mouth See Admin Instructions EVERY 3 DAYS   Yes Historical Provider, MD   butalbital-acetaminophen-caffeine (FIORICET, ESGIC) -40 MG per tablet Take 1 tablet by mouth every 4 hours as needed for Headaches or Migraine May take 2 tablets every 4 hours as needed for pain 1/28/21   MD ayan Alvaradoalbital-acetaminophen-caffeine ([place], [place]) -26 MG per tablet Take 1 tablet by mouth every 4 hours as needed for Headaches May take 2 tablets every 4 hours as needed for pain 12/31/20   MD ayan Alvaradoalbital-acetaminophen-caffeine ([place], [place]) -61 MG per tablet Take 1 tablet by mouth every 4 hours as needed for Migraine May take 2 tablets every 4 hours as needed for pain 12/11/20   MD ayan Alvaradoalbital-acetaminophen-caffeine ([place], [place]) -31 MG per tablet Take 1 tablet by mouth every 4 hours as needed for Migraine May take 2 tablets every 4 hours as needed for pain 12/1/20   MD ayan Alvaradoalbital-acetaminophen-caffeine (FIORICET, ESGIC) -40 MG per tablet TAKE 1 TABLET BY MOUTH EVERY 4 HOURS AS NEEDED FOR HEADACHES MAY TAKE 2 TABLETS EVERY 4 HOURS AS NEEDED FOR PAIN 10/26/20   Gonzalez Kendall MD   prednisoLONE acetate (PRED FORTE) 1 % ophthalmic suspension Place 1 drop into both eyes 2 times daily 8/18/20   Gonzalez Kendall MD   linaCLOtide (LINZESS PO) Take by mouth daily    Historical Provider, MD   Elastic Bandages & Supports (TRUFORM SUPPORT SOCK 20-30MMHG) MISC 1 each by Does not apply route daily 11/15/19   JOAO Trejo CNP        Allergies:  Beta adrenergic blockers, Pheniramine, Antihistamine [diphenhydramine hcl], Aspirin, Codeine, Dilaudid [hydromorphone], Nsaids, Prednisone, and Zithromax [azithromycin]     Review of Systems:   A complete review of systems has been reviewed and updated today and is negative except as noted in the history of present illness.       Physical Examination:    Vitals:    02/15/21 1539   BP: (!) 154/75   Pulse: 68   Resp: 16   Temp: 97.6 °F (36.4 °C)   SpO2:     Weight: 107 lb (48.5 kg)         General Appearance:  Alert, cooperative, no distress, appears stated age   Head:  Normocephalic, without obvious abnormality, atraumatic   Eyes:  EOMI, conjunctiva/corneas clear       Nose: Nares normal   Throat: Lips normal   Neck: Supple, symmetrical, trachea midline,  no carotid bruit or JVD       Lungs:   Clear to auscultation bilaterally, respirations unlabored   Chest Wall:  No tenderness or deformity   Heart:  Regular rate and rhythm, S1, S2 normal, no significant murmur, rub or gallop   Abdomen:   Soft, ++ tenderness in the epigastric, bowel sounds active all four quadrants,  no masses, no organomegaly           Extremities: Extremities normal, atraumatic, no cyanosis or edema   Pulses: 2+ and symmetric   Skin: Skin color, texture, turgor normal, no rashes or lesions   Pysch: Normal mood and affect   Neurologic: Normal gross motor and sensory exam.         Labs  CBC:   Lab Results   Component Value Date    WBC 9.8 02/14/2021    RBC 4.16 02/14/2021    HGB 13.2 02/14/2021    HCT 39.3 02/14/2021    MCV 94.6 02/14/2021    RDW 13.1 02/14/2021     02/14/2021     CMP:    Lab Results   Component Value Date     02/14/2021    K 4.0 02/14/2021     02/14/2021    CO2 27 02/14/2021    BUN 18 02/14/2021    CREATININE 0.5 02/14/2021    GFRAA >60 02/14/2021 GFRAA >60 03/26/2013    AGRATIO 1.7 02/14/2021    LABGLOM >60 02/14/2021    GLUCOSE 111 02/14/2021    PROT 7.1 02/14/2021    PROT 6.1 02/16/2013    CALCIUM 9.4 02/14/2021    BILITOT 0.3 02/14/2021    ALKPHOS 100 02/14/2021    AST 23 02/14/2021    ALT 18 02/14/2021     LIPIDS: No components found for: TOTAL CHOLESTEROL,  HDL,  LDL,  TRIGLYCERIDES  PT/INR:  No results found for: PTINR  Lab Results   Component Value Date    CKTOTAL 95 06/06/2017    CKMB 4.61 02/17/2013    TROPONINI <0.01 02/14/2021       EKG:  I have reviewed EKG with the following interpretation:  Impression:    14-FEB-2021 17:00:36 [facility] ROUTINE RECORD  Sinus tachycardia  Possible Left atrial enlargement  Cannot rule out Anterior infarct , age undetermined    Imaging/Procedures:   Echo 2/15/2021:  Summary   Normal left ventricular cavity size and wall thickness. Mild-to-moderately   reduced global systolic function with an ejection fraction estimated at 45%. The mid inferoseptum and apical septum appear hypokinetic. There is mild-to-moderate mitral regurgitation. Probably at least moderate pericardial effusion with larger measurment   anteriorly. Correlate clinically for tamponade. The inferior vena cava Is mildly dilated (2.13 cm) with respiratory collapse   of about 50%. Limited echo 5/26/2020:  Summary   -Limited exam per Dr. Jim Coates. -Mild-to-moderately reduced global systolic function with an ejection   fraction estimated at 40-45%. -Global hypokinesis with marked septal hypokinesis noted.   -Aortic valve appears sclerotic but opens adequately. No evidence of aortic   valve regurgitation.   -Thickened mitral valve without evidence of stenosis. There is   mild-to-moderate mitral regurgitation noted with mild mitral valve prolapse   of both leaflets.    -Mild-to-moderate pericardial effusion noted mostly anteriorly (previously   described in 8/2019    Echo 8/22/2019:  Summary     Left ventricle - abnormal septal motion, normal size, thickness and   function with EF of 55%     Mitral valve - mildly thickened, mild regurgitation     Tricuspid valve - mild regurgitation with PASP of 28mmHg     Pericardium - small to moderate circumferential pericardial effusion with   more significant accumulation anteriorly around RV    Carotid duplex 4/12/18:       There is 50-69% stenosis of the right internal carotid arteries.    There is <50% stenosis of the left internal carotid arteries.    Vertebral flow are antegrade bilaterally. Myoview stress test 5/16/2016:  Summary       No EKG evidence for ischemia with lexiscan       Preserved LV systolic function       Myocardial perfusion imaging with no evidence for ischemia with lexiscan     Echo 12/23/14:    Summary  -Normal left ventricle size, wall thickness and systolic function with an estimated ejection fraction of 55%. -Mild mitral prolapse and regurgitation is present.  -There is mild tricuspid regurgitation with RVSP estimated at 30 mmHg. -There is a small localized pericardial effusion noted.     Echo 3/31/14:  Summary  -Normal left ventricle size, wall thickness and systolic function with an estimated ejection fraction of 55%.  -No regional wall motion abnormalities are seen. -There is reversal of E/A inflow velocities across the mitral valve suggesting impaired left ventricular relaxation.  -There is mild tricuspid regurgitation with RVSP estimated at 32 mmHg. -There is a small localized pericardial effusion noted, appears unchanged from previous echocardiogram dated 2/15/2013.    3/26/14: Holter: NSR avg 86 min 61 max 146. Brief episodes of AT    2006 Echo - EF 25% (suspected viral etiology)    2006 [facility] - normal coronary arteries    Assessment/Plan:  Principal Problem:    Chest pain  Plan: Atypical.    Active Problems:    Pericardial effusion  Plan: Chronic. No signs of cardiac tamponade. Check ESR and CRP levels.       Cardiomyopathy [facility])  Plan: Echo today reviewed by myself and an additional partner, LVEF appears to be preserved in the range of 50 to 55%. Pericardial effusion is mild to moderate with no evidence of cardiac tamponade. Bilateral carotid artery stenosis  Plan: Moderate to severe previously. Needs updated carotid duplex on an inpatient or outpatient basis. May proceed with Myoview stress test.      Thank you for allowing us to participate in the care of Alejandra . Further evaluation will be based upon the patient's clinical course and testing results. All questions and concerns were addressed to the patient/family. Alternatives to my treatment were discussed. The note was completed using EMR. Every effort was made to ensure accuracy; however, inadvertent computerized transcription errors may be present.     Chalo Krause M.D.

## 2021-02-15 NOTE — ED PROVIDER NOTES
[address] [facility] Emergency Department      Pt Name: Clark Fry  MRN: [id number]  [place] 1955  Date of evaluation: 2/14/2021  Provider: Jessika Dean MD  I independently performed a history and physical on Clark Fry. All diagnostic, treatment, and disposition decisions were made by myself in conjunction with the advanced practice provider. HPI: Calrk Fry presented with   Chief Complaint   Patient presents with    Chest Pain     Pt arrived via [facility] squad from home for c/o CP and flu-like symptoms. Patient reports HTN at home today, pain under left breast. Squad reports pt was in and out of consciousness en route. Pt c/o N/V/D for the past several days. Clark Fry has a past medical history of Arthritis, Asthma, Back pain, Bell's palsy, CHF (congestive heart failure) ([facility] Chronic pain, Colitis (2/14/2013), Depression, Elevated sed rate, Family history of breast cancer in first degree relative, Gastric ulcer, GERD (gastroesophageal reflux disease), Glaucoma, Hypertension, Movement disorder, Neuromuscular disorder ([address] Osteoarthritis, and PONV (postoperative nausea and vomiting). She has a past surgical history that includes Hysterectomy; Facial nerve surgery; Upper gastrointestinal endoscopy (10/15/12); Colonoscopy (10/16/12); back surgery; Colonoscopy (3/26/13); Upper gastrointestinal endoscopy (2/11/15); Cystocopy (1/13/16); Finger surgery (Left, 04/03/2018); Finger surgery (04/2018); Finger surgery (Left, 04/03/2018); and ventral hernia repair (N/A, 4/15/2019). No current facility-administered medications on file prior to encounter.       Current Outpatient Medications on File Prior to Encounter   Medication Sig Dispense Refill    albuterol sulfate HFA (PROAIR HFA) 108 (90 Base) MCG/ACT inhaler INHALE 2 PUFFS BY MOUTH EVERY 4 HOURS AS NEEDED FOR WHEEZING 8.5 g 11    gabapentin (NEURONTIN) 300 MG capsule TAKE 1 CAPSULE BY MOUTH THREE TIMES DAILY 90 capsule 5    HYDROcodone-acetaminophen (NORCO) 7.5-325 MG per tablet Take 1 tablet by mouth every 6 hours as needed for Pain for up to 30 days.  120 tablet 0    ondansetron (ZOFRAN) 4 MG tablet Take 1 tablet by mouth every 6 hours as needed for Nausea 30 tablet 0    dicyclomine (BENTYL) 10 MG capsule Take 1 capsule by mouth every 6 hours as needed (Bowel spasms) 20 capsule 0    montelukast (SINGULAIR) 10 MG tablet TAKE 1 TABLET BY MOUTH EVERY NIGHT 90 tablet 3    traZODone (DESYREL) 50 MG tablet TAKE 2 TABLETS BY MOUTH EVERY NIGHT 60 tablet 11    DEXILANT 60 MG CPDR delayed release capsule TAKE 1 CAPSULE BY MOUTH DAILY 90 capsule 3    tiZANidine (ZANAFLEX) 4 MG tablet TAKE 1 TABLET BY MOUTH THREE TIMES DAILY 90 tablet 5    diclofenac sodium (VOLTAREN) 1 % GEL APPLY 2 GRAMS TOPICALLY TO HAND FOUR TIMES DAILY 500 g 3    fluticasone (FLOVENT HFA) 110 MCG/ACT inhaler INHALE 1 PUFF INTO THE LUNGS TWICE DAILY 12 g 5    sertraline (ZOLOFT) 100 MG tablet TAKE 1 TABLET BY MOUTH THREE TIMES DAILY 270 tablet 3    nitroGLYCERIN (NITROSTAT) 0.4 MG SL tablet PLACE 1 TABLET UNDE THE TONGUE EVERY 5 MINUTES AS NEEDED FOR CHEST PAIN 25 tablet 2    albuterol (PROVENTIL) (2.5 MG/3ML) 0.083% nebulizer solution USE 1 VIAL IN NEBULIZER EVERY 6 HOURS 180 vial 11    hydrocortisone (WESTCORT) 0.2 % cream APPLY EXTERNALLY TO THE AFFECTED AREA TWICE DAILY 30 g 5    Nutritional Supplements (BOOST CALORIE SMART) LIQD Take 1 Can by mouth See Admin Instructions EVERY 3 DAYS      butalbital-acetaminophen-caffeine (FIORICET, ESGIC) -40 MG per tablet Take 1 tablet by mouth every 4 hours as needed for Headaches or Migraine May take 2 tablets every 4 hours as needed for pain 50 tablet 3    butalbital-acetaminophen-caffeine (FIORICET, ESGIC) -40 MG per tablet Take 1 tablet by mouth every 4 hours as needed for Headaches May take 2 tablets every 4 hours as needed for pain 50 tablet 3    components within normal limits    Narrative:     Performed at:  [facility] [address],  [place], [address]   Phone [phone]   MICROSCOPIC URINALYSIS - Abnormal; Notable for the following components:    RBC, UA 9 (*)     All other components within normal limits    Narrative:     Performed at:  [facility] [address],  [place], [address]   Phone [phone]   TROPONIN - Abnormal; Notable for the following components:    Troponin 0.03 (*)     All other components within normal limits    Narrative:     Performed at:  [facility] [address],  [place], [address]   Phone [phone]   RAPID INFLUENZA A/B ANTIGENS    Narrative:     Performed at:  [facility] [address],  [place], [address]   Phone [phone]   CULTURE, BLOOD 1   CULTURE, BLOOD 2   LIPASE    Narrative:     Performed at:  [facility] [address],  [place], [address]   Phone [phone]   PROTIME-INR    Narrative:     Performed at:  [facility] [address],  [place], [address]   Phone [phone]   APTT    Narrative:     Performed at:  [facility] [address],  [place], [address]   Phone [phone]   PROCALCITONIN    Narrative:     Performed at:  [facility] [address],  [place], [address]   Phone [phone]   D-DIMER, QUANTITATIVE    Narrative:     Performed at:  [facility] [address],  [place], [address]   Phone [phone]   TROPONIN    Narrative:     Performed at:  [facility] [address],  [place], [address]   Phone [phone]:     Plain x-rays were viewed by me:   XR CHEST PORTABLE   Final Result   No acute cardiopulmonary disease. NM MYOCARDIAL SPECT REST EXERCISE OR RX    (Results Pending)     EKG:  Read by me in the absence of a cardiologist shows: Sinus tachycardia, rate 106, intervals normal, axis VI 6 degrees, nonspecific ST-T wave abnormality, delayed R wave progression, faster heart rate when compared to November 2020    FINAL IMPRESSION:    1. Chest pain, unspecified type    2. Tachycardia    3.  Suspected COVID-19 virus infection       DISPOSITION Admitted 02/14/2021 07:32:08 PM         Arnie Mcbride MD  02/15/21 0447

## 2021-02-15 NOTE — PROGRESS NOTES
4 Eyes Skin Assessment     The patient is being assess for  Admission    I agree that 2 RN's have performed a thorough Head to Toe Skin Assessment on the patient. ALL assessment sites listed below have been assessed. Areas assessed by both nurses:   [x]   Head, Face, and Ears   [x]   Shoulders, Back, and Chest  [x]   Arms, Elbows, and Hands   [x]   Coccyx, Sacrum, and IschIum  [x]   Legs, Feet, and Heels        Does the Patient have Skin Breakdown?   No         Adriel Prevention initiated:  No   Wound Care Orders initiated:  No      [facility] nurse consulted for Pressure Injury (Stage 3,4, Unstageable, DTI, NWPT, and Complex wounds), New and Established Ostomies:  NA      Nurse 1 eSignature: Electronically signed by Joellen No RN on 2/14/21 at 9:59 PM EST    **SHARE this note so that the co-signing nurse is able to place an eSignature**    Nurse 2 eSignature: Electronically signed by Jacqui Watson RN on 2/14/21 at 9:59 PM EST

## 2021-02-15 NOTE — PROGRESS NOTES
Pt refusing prednisolone acetate ophthalmic drops, stated were discontinued 2 years ago per eye MD. Will discontinue per pt request.

## 2021-02-15 NOTE — PROGRESS NOTES
Nelly morphine well, denies itching/swelling, no hives/rash noted. Pt states getting immediate relief, pain 7/10 on 1-10 pain scale at this time.

## 2021-02-15 NOTE — H&P
HOSPITALISTS HISTORY AND PHYSICAL    [id number]    Patient Information:  Sharif Porter is a 76 y.o. female [id number]  PCP:  Kizzy Davidson MD (Tel: [phone] )    Chief complaint:    Chief Complaint   Patient presents with    Chest Pain     Pt arrived via [facility] squad from home for c/o CP and flu-like symptoms. Patient reports HTN at home today, pain under left breast. Squad reports pt was in and out of consciousness en route. Pt c/o N/V/D for the past several days. *    History of Present Illness:  Sean Mcqueen is a 76 y.o. female   With h/o cardiomyopathy, MI CHF, HTN presents with c/o chest pain , nausea and fatigue . Initial ER work up revealed mildly elevated troponin. EKG with non acute St.t changes, normal chest xray  The pt has no recent stress test.  She has allergies to ASA . She is pain free at this time   REVIEW OF SYSTEMS:   Constitutional: Negative for fever,chills or night sweats  ENT: Negative for rhinorrhea, epistaxis, hoarseness, sore throat. Respiratory: Negative for shortness of breath,wheezing  Cardiovascular: +Ve for chest pain, palpitations   Gastrointestinal: +Ve for nausea, -vevomiting, diarrhea  Genitourinary: Negative for polyuria, dysuria   Hematologic/Lymphatic: Negative for bleeding tendency, easy bruising  Musculoskeletal: Negative for myalgias and arthralgias  Neurologic: Negative for confusion,dysarthria. Skin: Negative for itching,rash  Psychiatric: Negative for depression,anxiety, agitation. Endocrine: Negative for polydipsia,polyuria,heat /cold intolerance.     Past Medical History:   has a past medical history of Arthritis, Asthma, Back pain, Bell's palsy, CHF (congestive heart failure) ([facility] [address] Chronic pain, Colitis, Depression, Elevated sed rate, Family history of breast cancer in first degree relative, Gastric ulcer, GERD (gastroesophageal reflux disease), Glaucoma, Hypertension, Movement disorder, Neuromuscular disorder ([facility] [address] Osteoarthritis, and PONV (postoperative nausea and vomiting). Past Surgical History:   has a past surgical history that includes Hysterectomy; Facial nerve surgery; Upper gastrointestinal endoscopy (10/15/12); Colonoscopy (10/16/12); back surgery; Colonoscopy (3/26/13); Upper gastrointestinal endoscopy (2/11/15); Cystocopy (1/13/16); Finger surgery (Left, 04/03/2018); Finger surgery (04/2018); Finger surgery (Left, 04/03/2018); and ventral hernia repair (N/A, 4/15/2019). Medications:  No current facility-administered medications on file prior to encounter. Current Outpatient Medications on File Prior to Encounter   Medication Sig Dispense Refill    albuterol sulfate HFA (PROAIR HFA) 108 (90 Base) MCG/ACT inhaler INHALE 2 PUFFS BY MOUTH EVERY 4 HOURS AS NEEDED FOR WHEEZING 8.5 g 11    gabapentin (NEURONTIN) 300 MG capsule TAKE 1 CAPSULE BY MOUTH THREE TIMES DAILY 90 capsule 5    HYDROcodone-acetaminophen (NORCO) 7.5-325 MG per tablet Take 1 tablet by mouth every 6 hours as needed for Pain for up to 30 days.  120 tablet 0    ondansetron (ZOFRAN) 4 MG tablet Take 1 tablet by mouth every 6 hours as needed for Nausea 30 tablet 0    dicyclomine (BENTYL) 10 MG capsule Take 1 capsule by mouth every 6 hours as needed (Bowel spasms) 20 capsule 0    montelukast (SINGULAIR) 10 MG tablet TAKE 1 TABLET BY MOUTH EVERY NIGHT 90 tablet 3    traZODone (DESYREL) 50 MG tablet TAKE 2 TABLETS BY MOUTH EVERY NIGHT 60 tablet 11    DEXILANT 60 MG CPDR delayed release capsule TAKE 1 CAPSULE BY MOUTH DAILY 90 capsule 3    tiZANidine (ZANAFLEX) 4 MG tablet TAKE 1 TABLET BY MOUTH THREE TIMES DAILY 90 tablet 5    diclofenac sodium (VOLTAREN) 1 % GEL APPLY 2 GRAMS TOPICALLY TO HAND FOUR TIMES DAILY 500 g 3    fluticasone (FLOVENT HFA) 110 MCG/ACT inhaler INHALE 1 PUFF INTO THE LUNGS TWICE DAILY 12 g 5    sertraline (ZOLOFT) 100 MG tablet TAKE 1 TABLET BY MOUTH THREE TIMES DAILY 270 tablet 3    nitroGLYCERIN (NITROSTAT) 0.4 MG SL tablet PLACE 1 TABLET UNDE THE TONGUE EVERY 5 MINUTES AS NEEDED FOR CHEST PAIN 25 tablet 2    albuterol (PROVENTIL) (2.5 MG/3ML) 0.083% nebulizer solution USE 1 VIAL IN NEBULIZER EVERY 6 HOURS 180 vial 11    hydrocortisone (WESTCORT) 0.2 % cream APPLY EXTERNALLY TO THE AFFECTED AREA TWICE DAILY 30 g 5    Nutritional Supplements (BOOST CALORIE SMART) LIQD Take 1 Can by mouth See Admin Instructions EVERY 3 DAYS      butalbital-acetaminophen-caffeine (FIORICET, ESGIC) -40 MG per tablet Take 1 tablet by mouth every 4 hours as needed for Headaches or Migraine May take 2 tablets every 4 hours as needed for pain 50 tablet 3    butalbital-acetaminophen-caffeine (FIORICET, ESGIC) -40 MG per tablet Take 1 tablet by mouth every 4 hours as needed for Headaches May take 2 tablets every 4 hours as needed for pain 50 tablet 3    butalbital-acetaminophen-caffeine (FIORICET, ESGIC) -40 MG per tablet Take 1 tablet by mouth every 4 hours as needed for Migraine May take 2 tablets every 4 hours as needed for pain 100 tablet 2    butalbital-acetaminophen-caffeine (FIORICET, ESGIC) -40 MG per tablet Take 1 tablet by mouth every 4 hours as needed for Migraine May take 2 tablets every 4 hours as needed for pain 50 tablet 0    [DISCONTINUED] butalbital-acetaminophen-caffeine (FIORICET, ESGIC) -40 MG per tablet TAKE 1 TABLET BY MOUTH EVERY 4 HOURS AS NEEDED FOR HEADACHES MAY TAKE 2 TABLETS EVERY 4 HOURS AS NEEDED FOR PAIN 50 tablet 3    prednisoLONE acetate (PRED FORTE) 1 % ophthalmic suspension Place 1 drop into both eyes 2 times daily 5 mL 1    linaCLOtide (LINZESS PO) Take by mouth daily      Elastic Bandages & Supports (TRUFORM SUPPORT SOCK 20-30MMHG) MISC 1 each by Does not apply route daily 2 each 2     Current Facility-Administered Medications   Medication Dose Route Frequency Provider Last Rate Last Admin    albuterol sulfate  (90 Base) MCG/ACT inhaler 1 puff  1 puff Inhalation Q4H PRN Kvng Lane MD        gabapentin (NEURONTIN) capsule 300 mg  300 mg Oral Nightly Kvng Lane MD        HYDROcodone-acetaminophen (NORCO) 7.5-325 MG per tablet 1 tablet  1 tablet Oral Q6H PRN Kvng Lane MD   1 tablet at 02/15/21 0230    montelukast (SINGULAIR) tablet 10 mg  10 mg Oral Nightly Kvng Lane MD        prednisoLONE acetate (PRED FORTE) 1 % ophthalmic suspension 1 drop  1 drop Both Eyes BID Kvng Lane MD        sertraline (ZOLOFT) tablet 25 mg  25 mg Oral Daily Kvng Lane MD        ondansetron [facility]) injection 4 mg  4 mg Intravenous Q6H PRN Harini Mccall PA-C   4 mg at 02/14/21 2308    promethazine (PHENERGAN) injection 12.5 mg  12.5 mg Intravenous Q6H PRN Harini Mccall PA-C        nitroGLYCERIN (NITROSTAT) SL tablet 0.4 mg  0.4 mg Sublingual Q5 Min PRN Harini Mccall PA-C        lidocaine 4 % external patch 1 patch  1 patch Transdermal Daily Harini Mccall PA-C           Allergies: Allergies   Allergen Reactions    Beta Adrenergic Blockers Shortness Of Breath    Pheniramine      Other reaction(s): rapid heartbeat    Antihistamine [Diphenhydramine Hcl] Nausea And Vomiting    Aspirin Hives    Codeine Hives    Dilaudid [Hydromorphone] Nausea And Vomiting    Nsaids Hives and Nausea And Vomiting    Prednisone Other (See Comments)     GI upset    Zithromax [Azithromycin] Nausea And Vomiting        Social History:   reports that she quit smoking about 4 years ago. Her smoking use included cigarettes. She has a 5.00 pack-year smoking history. She has never used smokeless tobacco. She reports that she does not drink alcohol or use drugs. Family History:  family history includes Arthritis in her mother; Breast Cancer in her sister;  Heart Disease in her mother; High Blood Pressure in her brother and mother. ,     Physical Exam:  BP (!) 166/91   Pulse 84   Temp 98.1 °F (36.7 °C) (Oral)   Resp 18   Ht 5' 8\" (1.727 m)   Wt 107 lb (48.5 kg)   SpO2 97%   BMI 16.27 kg/m²     General appearance:  Appears comfortable. Well nourished  Eyes: Sclera clear, pupils equal  ENT: Moist mucus membranes, no thrush. Trachea midline. Cardiovascular: Regular rhythm, normal S1, S2. No murmur, gallop, rub. No edema in lower extremities  Respiratory: Clear to auscultation bilaterally, no wheeze, good inspiratory effort  Gastrointestinal: Abdomen soft, non-tender, not distended, normal bowel sounds  Musculoskeletal: No cyanosis in digits, neck supple  Neurology: Cranial nerves grossly intact. Alert and oriented in time, place and person. No speech or motor deficits  Psychiatry: Appropriate affect. Not agitated  Skin: Warm, dry, normal turgor, no rash    Labs:  CBC:   Lab Results   Component Value Date    WBC 9.8 02/14/2021    RBC 4.16 02/14/2021    HGB 13.2 02/14/2021    HCT 39.3 02/14/2021    MCV 94.6 02/14/2021    MCH 31.7 02/14/2021    MCHC 33.5 02/14/2021    RDW 13.1 02/14/2021     02/14/2021    MPV 8.0 02/14/2021     BMP:    Lab Results   Component Value Date     02/14/2021    K 4.0 02/14/2021     02/14/2021    CO2 27 02/14/2021    BUN 18 02/14/2021    CREATININE 0.5 02/14/2021    CALCIUM 9.4 02/14/2021    GFRAA >60 02/14/2021    GFRAA >60 03/26/2013    LABGLOM >60 02/14/2021    GLUCOSE 111 02/14/2021       Chest Xray:   EKG:         Problem List  Active Problems:    Chest pain  Resolved Problems:    * No resolved hospital problems. *        Assessment/Plan:       1. Chest pain admit to r/out ACS  Cont to trend troponin  EKG showed Sinus tachycardia with no acute St/T changes  Chest xray is unremarkable  ASA allergies  On nitro  Stress test ordered   COVID testing pending  Admit as obs. I anticipate hospitalization spanning less than two midnights for investigation and treatment of the above medically necessary diagnoses.       Kvng Lane MD    [id number]

## 2021-02-15 NOTE — PROGRESS NOTES
Pt arrived to unit. Vital signs stable. Head-to-toe and 4 Eyes skin assessment completed. Pt oriented to room and use of call light. Pt is resting comfortably in bed. Call light within reach. Bed alarm on.

## 2021-02-16 LAB
ANION GAP SERPL CALCULATED.3IONS-SCNC: 9 MMOL/L (ref 3–16)
BASOPHILS ABSOLUTE: 0 K/UL (ref 0–0.2)
BASOPHILS RELATIVE PERCENT: 0.7 %
BUN BLDV-MCNC: 17 MG/DL (ref 7–20)
C-REACTIVE PROTEIN: <3 MG/L (ref 0–5.1)
CALCIUM SERPL-MCNC: 9.2 MG/DL (ref 8.3–10.6)
CHLORIDE BLD-SCNC: 103 MMOL/L (ref 99–110)
CO2: 28 MMOL/L (ref 21–32)
CREAT SERPL-MCNC: 0.6 MG/DL (ref 0.6–1.2)
EOSINOPHILS ABSOLUTE: 0.1 K/UL (ref 0–0.6)
EOSINOPHILS RELATIVE PERCENT: 1.7 %
GFR AFRICAN AMERICAN: >60
GFR NON-AFRICAN AMERICAN: >60
GLUCOSE BLD-MCNC: 90 MG/DL (ref 70–99)
HCT VFR BLD CALC: 40.8 % (ref 36–48)
HEMOGLOBIN: 13.5 G/DL (ref 12–16)
LV EF: 47 %
LVEF MODALITY: NORMAL
LYMPHOCYTES ABSOLUTE: 2.4 K/UL (ref 1–5.1)
LYMPHOCYTES RELATIVE PERCENT: 36.4 %
MAGNESIUM: 1.8 MG/DL (ref 1.8–2.4)
MCH RBC QN AUTO: 31.2 PG (ref 26–34)
MCHC RBC AUTO-ENTMCNC: 33.1 G/DL (ref 31–36)
MCV RBC AUTO: 94.1 FL (ref 80–100)
MONOCYTES ABSOLUTE: 0.6 K/UL (ref 0–1.3)
MONOCYTES RELATIVE PERCENT: 8.4 %
NEUTROPHILS ABSOLUTE: 3.5 K/UL (ref 1.7–7.7)
NEUTROPHILS RELATIVE PERCENT: 52.8 %
PDW BLD-RTO: 13.4 % (ref 12.4–15.4)
PLATELET # BLD: 176 K/UL (ref 135–450)
PMV BLD AUTO: 8.1 FL (ref 5–10.5)
POTASSIUM REFLEX MAGNESIUM: 3.7 MMOL/L (ref 3.5–5.1)
RBC # BLD: 4.34 M/UL (ref 4–5.2)
SEDIMENTATION RATE, ERYTHROCYTE: 9 MM/HR (ref 0–30)
SODIUM BLD-SCNC: 140 MMOL/L (ref 136–145)
WBC # BLD: 6.7 K/UL (ref 4–11)

## 2021-02-16 PROCEDURE — 2580000003 HC RX 258: Performed by: INTERNAL MEDICINE

## 2021-02-16 PROCEDURE — 80048 BASIC METABOLIC PNL TOTAL CA: CPT

## 2021-02-16 PROCEDURE — 85025 COMPLETE CBC W/AUTO DIFF WBC: CPT

## 2021-02-16 PROCEDURE — 96376 TX/PRO/DX INJ SAME DRUG ADON: CPT

## 2021-02-16 PROCEDURE — 83735 ASSAY OF MAGNESIUM: CPT

## 2021-02-16 PROCEDURE — 78452 HT MUSCLE IMAGE SPECT MULT: CPT

## 2021-02-16 PROCEDURE — 6370000000 HC RX 637 (ALT 250 FOR IP): Performed by: FAMILY MEDICINE

## 2021-02-16 PROCEDURE — G0378 HOSPITAL OBSERVATION PER HR: HCPCS

## 2021-02-16 PROCEDURE — 99233 SBSQ HOSP IP/OBS HIGH 50: CPT | Performed by: NURSE PRACTITIONER

## 2021-02-16 PROCEDURE — 93005 ELECTROCARDIOGRAM TRACING: CPT | Performed by: NURSE PRACTITIONER

## 2021-02-16 PROCEDURE — A9502 TC99M TETROFOSMIN: HCPCS | Performed by: INTERNAL MEDICINE

## 2021-02-16 PROCEDURE — 3430000000 HC RX DIAGNOSTIC RADIOPHARMACEUTICAL: Performed by: INTERNAL MEDICINE

## 2021-02-16 PROCEDURE — 6360000002 HC RX W HCPCS: Performed by: INTERNAL MEDICINE

## 2021-02-16 PROCEDURE — 93880 EXTRACRANIAL BILAT STUDY: CPT

## 2021-02-16 PROCEDURE — 6360000002 HC RX W HCPCS: Performed by: PHYSICIAN ASSISTANT

## 2021-02-16 PROCEDURE — 86140 C-REACTIVE PROTEIN: CPT

## 2021-02-16 PROCEDURE — 36415 COLL VENOUS BLD VENIPUNCTURE: CPT

## 2021-02-16 PROCEDURE — 93017 CV STRESS TEST TRACING ONLY: CPT | Performed by: INTERNAL MEDICINE

## 2021-02-16 PROCEDURE — 6370000000 HC RX 637 (ALT 250 FOR IP): Performed by: NURSE PRACTITIONER

## 2021-02-16 PROCEDURE — 85652 RBC SED RATE AUTOMATED: CPT

## 2021-02-16 RX ORDER — AMINOPHYLLINE DIHYDRATE 25 MG/ML
100 INJECTION, SOLUTION INTRAVENOUS ONCE
Status: COMPLETED | OUTPATIENT
Start: 2021-02-16 | End: 2021-02-16

## 2021-02-16 RX ORDER — POTASSIUM CHLORIDE 20 MEQ/1
20 TABLET, EXTENDED RELEASE ORAL ONCE
Status: COMPLETED | OUTPATIENT
Start: 2021-02-16 | End: 2021-02-16

## 2021-02-16 RX ADMIN — TETROFOSMIN 10 MILLICURIE: 1.38 INJECTION, POWDER, LYOPHILIZED, FOR SOLUTION INTRAVENOUS at 09:16

## 2021-02-16 RX ADMIN — MORPHINE SULFATE 1 MG: 2 INJECTION, SOLUTION INTRAMUSCULAR; INTRAVENOUS at 13:25

## 2021-02-16 RX ADMIN — ONDANSETRON 4 MG: 2 INJECTION INTRAMUSCULAR; INTRAVENOUS at 05:27

## 2021-02-16 RX ADMIN — GABAPENTIN 300 MG: 300 CAPSULE ORAL at 22:41

## 2021-02-16 RX ADMIN — Medication 10 ML: at 08:10

## 2021-02-16 RX ADMIN — HYDROCODONE BITARTRATE AND ACETAMINOPHEN 1 TABLET: 7.5; 325 TABLET ORAL at 04:59

## 2021-02-16 RX ADMIN — Medication 10 ML: at 22:41

## 2021-02-16 RX ADMIN — POTASSIUM CHLORIDE 20 MEQ: 1500 TABLET, EXTENDED RELEASE ORAL at 17:48

## 2021-02-16 RX ADMIN — MORPHINE SULFATE 1 MG: 2 INJECTION, SOLUTION INTRAMUSCULAR; INTRAVENOUS at 00:41

## 2021-02-16 RX ADMIN — Medication 10 ML: at 13:25

## 2021-02-16 RX ADMIN — Medication 10 ML: at 00:42

## 2021-02-16 RX ADMIN — REGADENOSON 0.4 MG: 0.08 INJECTION, SOLUTION INTRAVENOUS at 09:56

## 2021-02-16 RX ADMIN — HYDROCODONE BITARTRATE AND ACETAMINOPHEN 1 TABLET: 7.5; 325 TABLET ORAL at 22:41

## 2021-02-16 RX ADMIN — HYDROCODONE BITARTRATE AND ACETAMINOPHEN 1 TABLET: 7.5; 325 TABLET ORAL at 16:04

## 2021-02-16 RX ADMIN — MORPHINE SULFATE 1 MG: 2 INJECTION, SOLUTION INTRAMUSCULAR; INTRAVENOUS at 08:10

## 2021-02-16 RX ADMIN — TETROFOSMIN 30 MILLICURIE: 1.38 INJECTION, POWDER, LYOPHILIZED, FOR SOLUTION INTRAVENOUS at 10:12

## 2021-02-16 RX ADMIN — AMINOPHYLLINE 100 MG: 25 INJECTION, SOLUTION INTRAVENOUS at 10:12

## 2021-02-16 RX ADMIN — MONTELUKAST SODIUM 10 MG: 10 TABLET, FILM COATED ORAL at 22:41

## 2021-02-16 ASSESSMENT — PAIN DESCRIPTION - FREQUENCY
FREQUENCY: CONTINUOUS
FREQUENCY: CONTINUOUS

## 2021-02-16 ASSESSMENT — PAIN DESCRIPTION - ORIENTATION
ORIENTATION: LOWER
ORIENTATION: LOWER

## 2021-02-16 ASSESSMENT — PAIN DESCRIPTION - PROGRESSION
CLINICAL_PROGRESSION: NOT CHANGED
CLINICAL_PROGRESSION: NOT CHANGED
CLINICAL_PROGRESSION: GRADUALLY WORSENING

## 2021-02-16 ASSESSMENT — PAIN DESCRIPTION - DESCRIPTORS
DESCRIPTORS: ACHING
DESCRIPTORS: ACHING

## 2021-02-16 ASSESSMENT — PAIN SCALES - GENERAL
PAINLEVEL_OUTOF10: 8
PAINLEVEL_OUTOF10: 10

## 2021-02-16 ASSESSMENT — PAIN DESCRIPTION - PAIN TYPE
TYPE: CHRONIC PAIN

## 2021-02-16 ASSESSMENT — PAIN DESCRIPTION - ONSET
ONSET: ON-GOING
ONSET: ON-GOING

## 2021-02-16 ASSESSMENT — PAIN DESCRIPTION - LOCATION
LOCATION: BACK

## 2021-02-16 NOTE — PROGRESS NOTES
[facility]    2/16/2021 10:29 AM        Name: Clare Armando . Admitted: 2/14/2021  Primary Care Provider: Karolynn Mortimer, MD (Tel: [phone])    Brief Course: Patient is a 59-year-old  female with history of cardiomyopathy, CHF, hypertension, chronic pain, colitis, depression, asthma, arthritis, postoperative nausea and vomiting presented to ED with complaints of chest pain, nausea and fatigue. Initial work-up in ED revealed mildly elevated troponin level, none acute ST-T wave changes on EKG. Admitted with chest pain to rule out ACS. CC: Chest pain  Subjective: Patient is n.p.o. today morning for nuclear stress test.  Patient has ongoing chronic pain for which she is receiving morphine as needed. Denies chest pain, SOB or palpitations.     Reviewed interval ancillary notes    Current Medications      sodium chloride flush 0.9 % injection 10 mL, PRN      sodium chloride flush 0.9 % injection 10 mL, BID      DOBUTamine (DOBUTREX) 500 mg in dextrose 5 % 250 mL infusion, Continuous      perflutren lipid microspheres (DEFINITY) injection 1.65 mg, ONCE PRN      morphine (PF) injection 1 mg, Q4H PRN      traZODone (DESYREL) tablet 100 mg, Nightly PRN      albuterol sulfate  (90 Base) MCG/ACT inhaler 1 puff, Q4H PRN      gabapentin (NEURONTIN) capsule 300 mg, Nightly      HYDROcodone-acetaminophen (NORCO) 7.5-325 MG per tablet 1 tablet, Q6H PRN      montelukast (SINGULAIR) tablet 10 mg, Nightly      sertraline (ZOLOFT) tablet 25 mg, Daily      ondansetron (ZOFRAN) injection 4 mg, Q6H PRN      promethazine (PHENERGAN) injection 12.5 mg, Q6H PRN      nitroGLYCERIN (NITROSTAT) SL tablet 0.4 mg, Q5 Min PRN      lidocaine 4 % external patch 1 patch, Daily        Objective:  BP (!) 142/73   Pulse 78   Temp 98.2 °F (36.8 °C) (Oral)   Resp 16   Ht 5' 8\" (1.727 m)   Wt 107 lb (48.5 kg)   SpO2 99%   BMI 16.27 kg/m²     Intake/Output Summary (Last 24 hours) at 2/16/2021 1029  Last data filed at 2/15/2021 2005  Gross per 24 hour   Intake 120 ml   Output    Net 120 ml      Wt Readings from Last 3 Encounters:   02/14/21 107 lb (48.5 kg)   02/01/21 103 lb (46.7 kg)   01/28/21 106 lb (48.1 kg)       General appearance: Frail white female, appears comfortable  Cardiovascular: Regular rhythm, normal S1, S2. No murmur. No edema in lower extremities  Respiratory: Clear to auscultation bilaterally, no wheeze or crackles. GI: Abdomen soft, no tenderness, not distended, normal bowel sounds  Musculoskeletal: No cyanosis in digits, neck supple  Neurology: CN 2-12 grossly intact. No speech or motor deficits  Psych: Normal affect. Alert and oriented in time, place and person  Skin: Warm, dry, normal turgor  Extremity exam shows brisk capillary refill. Peripheral pulses are palpable in lower extremities     Labs and Tests:  CBC:   Recent Labs     02/14/21  1648 02/16/21  0449   WBC 9.8 6.7   HGB 13.2 13.5    176     BMP:    Recent Labs     02/14/21  1648 02/16/21  0449    140   K 4.0 3.7    103   CO2 27 28   BUN 18 17   CREATININE 0.5* 0.6   GLUCOSE 111* 90     Hepatic:   Recent Labs     02/14/21  1648   AST 23   ALT 18   BILITOT 0.3   ALKPHOS 100     XR CHEST PORTABLE   Final Result   No acute cardiopulmonary disease. NM Cardiac Stress Test Nuclear Imaging    (Results Pending)   VL DUP CAROTID BILATERAL    (Results Pending)       Problem List  Principal Problem:    Chest pain  Active Problems:    Pericardial effusion    Cardiomyopathy ([facility] [address]    Bilateral carotid artery stenosis  Resolved Problems:    * No resolved hospital problems. *     2D echocardiogram 2/15   Summary   Normal left ventricular cavity size and wall thickness. Mild-to-moderately   reduced global systolic function with an ejection fraction estimated at 45%.    The mid inferoseptum and apical septum appear hypokinetic. There is mild-to-moderate mitral regurgitation. Probably at least moderate pericardial effusion with larger measurment   anteriorly. Correlate clinically for tamponade. The inferior vena cava Is mildly dilated (2.13 cm) with respiratory collapse   of about 50%. Assessment & Plan:     Chest pain rule out ACS:  Initial troponin level 0.03, trended down to 0.01. Chest pain currently resolved. Likely atypical as per cardiology. EKG showed no acute ST-T wave changes. Patient is allergic to aspirin. On nitro. Patient underwent nuclear stress test on 2/16 morning which was abnormal.  Awaiting further recommendations from cardiology. Moderate pericardial effusion:  2D echocardiogram on 2/15 showed LVEF of 45%, moderate pericardial effusion. As per Dr. Jaime Bernstein from cardiology, estimated LVEF of 50 to 55%, mild to moderate pericardial effusion with no signs of tamponade. No indication for further intervention as per Dr. Jaime Bernstein from cardiology. Bilateral carotid artery stenosis:  Bilateral carotid artery duplex ordered today as per cardiology recommendations. Pending    Other comorbidities:  Asthma: Stable respiratory status, Singulair and albuterol inhaler as needed  Cardiomyopathy, CHF: Euvolemic. 2D echo from 2/15 shows LVEF of 45%  Chronic pain: Norco every 6 hrs as needed   Depression: Resumed on Zoloft  Postop nausea and vomiting: Antiemetics prn.     Diet: Diet NPO, After Midnight Exceptions are: Sips of Water with Meds  Code:Full Code  DVT PPX: Lovenox subcu daily  Disposition: Pending cardiac work-up/evaluation, PT/OT      Poncho Canada MD   2/16/2021 10:29 AM

## 2021-02-16 NOTE — PROGRESS NOTES
Instructed on Lexiscan Stress Test Procedure including possible side effects/ adverse reactions. Patient verbalizes  understanding and denies having any questions . See [address] [place] [address] Cardiology

## 2021-02-16 NOTE — PROGRESS NOTES
A/O x4. BP elevated this afternoon, K. Ellin Schlatter NP notified during cardiology follow-up at pt bedside. Stress test, B carotid study and 12 lead EKG performed today. Diet advanced to cardiac, pt tolerating PO intake well, no n/v today. IV morphine for pain during NPO status, Norco taken and mala well after eating pudding cup and turkey sand which at 1515. Pt currently eating dinner without difficulty. Pt continues to call appropriately for nurse assistance, CGA to/from bathroom d/t unsteady gait. Pt pleasant and cooperative and appreciative of care. Call light within reach. No s/s of distress noted at this time.

## 2021-02-16 NOTE — PROGRESS NOTES
1205 Pt returned to unit, a/o x4. Assisted to BR. VSS. Denies n/v at this time. Call light within reach. No s/s of distress noted at this time.

## 2021-02-16 NOTE — PROGRESS NOTES
[facility]   Cardiology Progress Note     Date: 2/16/2021  Admit Date: 2/14/2021       Chief Complaint:   Chief Complaint   Patient presents with    Chest Pain     Pt arrived via [facility] squad from home for c/o CP and flu-like symptoms. Patient reports HTN at home today, pain under left breast. Squad reports pt was in and out of consciousness en route. Pt c/o N/V/D for the past several days. History of Present Illness: History obtained from patient and medical record. Julito Flores is a 76 y.o. female presented to the hospital with complaints of 3 day history of nausea, vomiting and diarrhea with associated severe abdominal pain. On the 2nd day, she had a feeling of chest heaviness like someone was sitting on her. Which has been constant. She felt like her heart was racing as well. Has seen GI and had endoscopy.      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  Her EF has since normalized.  She had a normal nuclear stress test prior to back surgery in 2016.  Surgery took 6 hours to complete and patient had no cardiovascular complications during or after surgery.      Interval Hx: Today, she is continuing to complain of LUQ/ epigastric discomfort. She reports having upper and lower scoped in past. She did trow up this AM. HR and BP elevated at time of visit. Pt has chronic pain and reports she had not had her pain medication in a while. Usually her BP at home is around 110/63. She checks it weekly. Patient seen and examined. Clinical notes reviewed. Telemetry reviewed. No new complaints today. No major events overnight. Denies having chest pain, palpitations, shortness of breath, orthopnea/PND, cough, or dizziness at the time of this visit.       Past Medical History:  Past Medical History:   Diagnosis Date    Arthritis     fingers, hands    Asthma     Back pain     Bell's palsy  CHF (congestive heart failure) (HCC)     Chronic pain     BACK SURGERY X4, neck, left arm    Colitis 2/14/2013    Depression     Elevated sed rate     Family history of breast cancer in first degree relative     Gastric ulcer     GERD (gastroesophageal reflux disease)     colitis    Glaucoma     Hypertension     Movement disorder     chronic back pain    Neuromuscular disorder (HCC)     sciatica rt. leg    Osteoarthritis     PONV (postoperative nausea and vomiting)     SEVERE        Past Surgical History:    has a past surgical history that includes Hysterectomy; Facial nerve surgery; Upper gastrointestinal endoscopy (10/15/12); Colonoscopy (10/16/12); back surgery; Colonoscopy (3/26/13); Upper gastrointestinal endoscopy (2/11/15); Cystocopy (1/13/16); Finger surgery (Left, 04/03/2018); Finger surgery (04/2018); Finger surgery (Left, 04/03/2018); and ventral hernia repair (N/A, 4/15/2019). Social History:  Reviewed. reports that she quit smoking about 4 years ago. Her smoking use included cigarettes. She has a 5.00 pack-year smoking history. She has never used smokeless tobacco. She reports that she does not drink alcohol or use drugs. Allergies: Allergies   Allergen Reactions    Beta Adrenergic Blockers Shortness Of Breath    Pheniramine      Other reaction(s): rapid heartbeat    Antihistamine [Diphenhydramine Hcl] Nausea And Vomiting    Aspirin Hives    Codeine Hives    Dilaudid [Hydromorphone] Nausea And Vomiting    Nsaids Hives and Nausea And Vomiting    Prednisone Other (See Comments)     GI upset    Zithromax [Azithromycin] Nausea And Vomiting       Family History:  Reviewed. family history includes Arthritis in her mother; Breast Cancer in her sister; Heart Disease in her mother; High Blood Pressure in her brother and mother. Denies family history of sudden cardiac death, arrhythmia, premature CAD    Home Meds:  Prior to Visit Medications    Medication Sig Taking? Authorizing Provider   albuterol sulfate HFA (PROAIR HFA) 108 (90 Base) MCG/ACT inhaler INHALE 2 PUFFS BY MOUTH EVERY 4 HOURS AS NEEDED FOR WHEEZING Yes Rachael Jolly MD   gabapentin (NEURONTIN) 300 MG capsule TAKE 1 CAPSULE BY MOUTH THREE TIMES DAILY Yes Rachael Jolly MD   HYDROcodone-acetaminophen (NORCO) 7.5-325 MG per tablet Take 1 tablet by mouth every 6 hours as needed for Pain for up to 30 days.  Yes Rachael Jolly MD   ondansetron (ZOFRAN) 4 MG tablet Take 1 tablet by mouth every 6 hours as needed for Nausea Yes Rachael Jolly MD   dicyclomine (BENTYL) 10 MG capsule Take 1 capsule by mouth every 6 hours as needed (Bowel spasms) Yes Jacqui Arellano MD   montelukast (SINGULAIR) 10 MG tablet TAKE 1 TABLET BY MOUTH EVERY NIGHT Yes Elle Kennedy MD   traZODone (DESYREL) 50 MG tablet TAKE 2 TABLETS BY MOUTH EVERY NIGHT Yes Rachael Jolly MD   DEXILANT 60 MG CPDR delayed release capsule TAKE 1 CAPSULE BY MOUTH DAILY Yes Racahel Jolly MD   tiZANidine (ZANAFLEX) 4 MG tablet TAKE 1 TABLET BY MOUTH THREE TIMES DAILY Yes Rachael Jolly MD   diclofenac sodium (VOLTAREN) 1 % GEL APPLY 2 GRAMS TOPICALLY TO HAND FOUR TIMES DAILY Yes Rachael Jolly MD   fluticasone (FLOVENT HFA) 110 MCG/ACT inhaler INHALE 1 PUFF INTO THE LUNGS TWICE DAILY Yes Rachael Jolly MD   sertraline (ZOLOFT) 100 MG tablet TAKE 1 TABLET BY MOUTH THREE TIMES DAILY Yes Rachael Jolly MD   nitroGLYCERIN (NITROSTAT) 0.4 MG SL tablet PLACE 1 TABLET UNDE THE TONGUE EVERY 5 MINUTES AS NEEDED FOR CHEST PAIN Yes Jose Wiley APRN - CNP   albuterol (PROVENTIL) (2.5 MG/3ML) 0.083% nebulizer solution USE 1 VIAL IN NEBULIZER EVERY 6 HOURS Yes Rachael Jolly MD   hydrocortisone (WESTCORT) 0.2 % cream APPLY EXTERNALLY TO THE AFFECTED AREA TWICE DAILY Yes Rachael Jolly MD   Nutritional Supplements (BOOST CALORIE SMART) LIQD Take 1 Can by mouth See Admin Instructions EVERY 3 DAYS Yes Historical Provider, MD   butalbital-acetaminophen-caffeine (Raymondo Pinch) -40 MG per tablet Take 1 tablet by mouth every 4 hours as needed for Headaches or Migraine May take 2 tablets every 4 hours as needed for pain  Masoud Lomax MD   butalbital-acetaminophen-caffeine (FIORICET, ESGIC) -89 MG per tablet Take 1 tablet by mouth every 4 hours as needed for Headaches May take 2 tablets every 4 hours as needed for pain  Masoud Lomax MD   butalbital-acetaminophen-caffeine (FIORICET, ESGIC) -29 MG per tablet Take 1 tablet by mouth every 4 hours as needed for Migraine May take 2 tablets every 4 hours as needed for pain  MD ayan Looalbital-acetaminophen-caffeine (FIORICET, ESGIC) -08 MG per tablet Take 1 tablet by mouth every 4 hours as needed for Migraine May take 2 tablets every 4 hours as needed for pain  MD ayan Looalbital-acetaminophen-caffeine (FIORICET, ESGIC) -40 MG per tablet TAKE 1 TABLET BY MOUTH EVERY 4 HOURS AS NEEDED FOR HEADACHES MAY TAKE 2 TABLETS EVERY 4 HOURS AS NEEDED FOR PAIN  Masoud Lomax MD   prednisoLONE acetate (PRED FORTE) 1 % ophthalmic suspension Place 1 drop into both eyes 2 times daily  Masoud Lomax MD   linaCLOtide (LINZESS PO) Take by mouth daily  Historical Provider, MD   Elastic Bandages & Supports ([address] [place] [address] 20-30MM) MISC 1 each by Does not apply route daily  Justin Monteiro, APRN - CNP        Scheduled Meds:   potassium chloride  20 mEq Oral Once    sodium chloride flush  10 mL Intravenous BID    gabapentin  300 mg Oral Nightly    montelukast  10 mg Oral Nightly    sertraline  25 mg Oral Daily    lidocaine  1 patch Transdermal Daily     Continuous Infusions:   DOBUTamine       PRN Meds:sodium chloride flush, perflutren lipid microspheres, morphine, traZODone, albuterol sulfate HFA, HYDROcodone-acetaminophen, ondansetron, promethazine, nitroGLYCERIN     Review of Systems:  · Constitutional: Negative for fever, night sweats, chills, weight changes  · Skin: Negative for rash, pruritus, bleeding, blood clots, or bruising    · HEENT: Negative for vision changes, ringing in the ears, dysphagia, or swollen lymph nodes  · Respiratory: Reviewed in HPI  · Cardiovascular: Reviewed in HPI  · Gastrointestinal: Negative for abdominal pain, N/V/D, constipation, or black/tarry stools  · Genito-Urinary: Negative for dysuria, incontinence, or hematuria  · Musculoskeletal: Negative for joint swelling, muscle pain, or injuries  · Neurological/Psych: Negative for confusion, seizures, headaches, or TIA-like symptoms. No anxiety or depression. Physical Examination:  Vitals:    02/16/21 1558   BP: (!) 176/92   Pulse: 94   Resp:    Temp:    SpO2:       No intake/output data recorded. Wt Readings from Last 3 Encounters:   02/14/21 107 lb (48.5 kg)   02/01/21 103 lb (46.7 kg)   01/28/21 106 lb (48.1 kg)       Intake/Output Summary (Last 24 hours) at 2/16/2021 1649  Last data filed at 2/15/2021 2005  Gross per 24 hour   Intake 120 ml   Output    Net 120 ml       Telemetry: Personally Reviewed  - Sinus rhythm and PVCs  · Constitutional: Cooperative and in no apparent distress, and appears thin   · Skin: Warm and pink; no pallor, cyanosis, clubbing, or bruising   · HEENT: Symmetric and normocephalic. PERRL, EOM intact. Conjunctiva pink with clear sclera. Mucus membranes moist.   · Cardiovascular: Regular rate and rhythm. S1/S2 present without murmurs, no rubs or gallops. Peripheral pulses 2+, capillary refill < 3 seconds. No elevation of JVP. No peripheral edema  · Respiratory: Respirations symmetric and unlabored. Lungs clear to auscultation bilaterally, no wheezing, crackles, or rhonchi  · Gastrointestinal: Abdomen soft and round. Bowel sounds normoactive without tenderness or masses. · Musculoskeletal: Bilateral upper and lower extremity strength 5/5 with full ROM  · Neurologic/Psych: Awake and orientated to person, place and time.  Calm affect, appropriate mood    Pertinent labs, diagnostic, device, and imaging results reviewed as a part of this visit    Labs:    BMP:   Recent Labs     02/14/21  1648 02/16/21  0449    140   K 4.0 3.7    103   CO2 27 28   BUN 18 17   CREATININE 0.5* 0.6     Estimated Creatinine Clearance: 71 mL/min (based on SCr of 0.6 mg/dL). CBC:   Recent Labs     02/14/21  1648 02/16/21  0449   WBC 9.8 6.7   HGB 13.2 13.5   HCT 39.3 40.8   MCV 94.6 94.1    176     Thyroid:   Lab Results   Component Value Date    TSH 0.34 09/04/2018    W3WLQFF 4.7 09/04/2018     Lipids:   Lab Results   Component Value Date    CHOL 200 03/19/2019    HDL 59 03/19/2019    HDL 64 01/24/2011    TRIG 134 03/19/2019     LFTS:   Lab Results   Component Value Date    ALT 18 02/14/2021    AST 23 02/14/2021    ALKPHOS 100 02/14/2021    PROT 7.1 02/14/2021    PROT 6.1 02/16/2013    AGRATIO 1.7 02/14/2021    BILITOT 0.3 02/14/2021     Cardiac Enzymes:   Lab Results   Component Value Date    CKTOTAL 95 06/06/2017    CKMB 4.61 02/17/2013    TROPONINI <0.01 02/14/2021    TROPONINI 0.03 02/14/2021    TROPONINI 0.02 02/14/2021     Coags:   Lab Results   Component Value Date    PROTIME 11.5 02/14/2021    INR 0.99 02/14/2021        Carotid duplex 4/12/18:       There is 50-69% stenosis of the right internal carotid arteries.    There is <50% stenosis of the left internal carotid arteries.    Vertebral flow are antegrade bilaterally. Echo: 5/2020  Summary   -Limited exam per Dr. Kai Frazier. -Mild-to-moderately reduced global systolic function with an ejection   fraction estimated at 40-45%. -Global hypokinesis with marked septal hypokinesis noted.   -Aortic valve appears sclerotic but opens adequately. No evidence of aortic   valve regurgitation.   -Thickened mitral valve without evidence of stenosis. There is   mild-to-moderate mitral regurgitation noted with mild mitral valve prolapse   of both leaflets.    -Mild-to-moderate pericardial effusion noted mostly anteriorly (previously   described in 8/2019    ECHO:  2/2021  Summary   Normal left ventricular cavity size and wall thickness. Mild-to-moderately   reduced global systolic function with an ejection fraction estimated at 45%. The mid inferoseptum and apical septum appear hypokinetic. There is mild-to-moderate mitral regurgitation. Probably at least moderate pericardial effusion with larger measurment   anteriorly. Correlate clinically for tamponade. The inferior vena cava Is mildly dilated (2.13 cm) with respiratory collapse   of about 50%. Stress Test: 2/2021  Summary    The overall quality of the study is fair. Subdiaphragmatic attenuation is    present.        Left ventricular cavity is noted to be normal on the rest studies. Right    ventricle is normal.        There is a small perfusion defect of moderate severity of the apical    inferior wall. The defect is present at rest and stress, consistent with    scar. There is a corresponding wall motion abnormality.        Sum stress score of 2. TID 1.02. No visual TID.        Left ventricular ejection fraction was mildly decreased at 47%.        Low-moderate risk scan.       3/26/14: Holter: NSR avg 86 min 61 max 146.  Brief episodes of AT    Cath: 2006  Normal coronary arteries     Problem List:   Patient Active Problem List    Diagnosis Date Noted    Pericardial effusion 05/06/2013     Priority: High    Cardiomyopathy ([address] 05/06/2013     Priority: High    Chest pain 02/14/2021    Bilateral carotid artery stenosis 02/26/2020    Epigastric pain 02/26/2020    PONV (postoperative nausea and vomiting) 04/16/2019    Incisional hernia, without obstruction or gangrene     Intractable cluster headache syndrome 01/07/2019    Mucous cyst of finger 03/14/2018    Mild intermittent asthma without complication 19/27/6208    Acute bronchitis 05/09/2017    Allergic sinusitis 05/09/2017    Chronic pain syndrome 12/16/2016    Facet arthropathy 12/16/2016   [facility] spinal stenosis 12/16/2016    Failed back syndrome 12/16/2016    Depression 12/16/2016    Primary insomnia 12/16/2016    History of MI (myocardial infarction) 05/10/2016    Diarrhea 01/12/2016    Neuritis 04/28/2015    Pyelonephritis 01/04/2015    Choledocholithiasis 01/04/2015    Elevated sed rate 12/23/2014    Allergic rhinitis 10/28/2014    Gastroenteritis 03/10/2014    Hypokalemia 03/10/2014    Vertigo, labyrinthine 12/09/2013    Facial nerve paralysis 12/09/2013    IBS (irritable bowel syndrome) 04/04/2013    Abdominal pain, generalized 03/25/2013    Colitis 02/15/2013    Family history of breast cancer in first degree relative     Chronic pain     Glaucoma         Assessment and Plan:     Chest pain   ~ atypical, sounds to be more in the epigastric/ LUQ area described as a pressure   ~ stress test today consistent with scar that is consistent with wall motion abnormalities    ~ would look further into GI causes (discussed with Dr. Clarissa Pereira)     Pericardial effusion   ~ chronic  ~ repeat echo with mild to moderate effusion without sign of tamponade (per Dr. Clarissa Pereira)   ~ Sed rate and CRP were both negative   ~ check LALI  ~ pt should be referred to rheumatology as OP to evaluate     Cardiomyopathy   ~ repeat echo appears to have preserved LVEF at 50-55% (per Dr. Clarissa Pereira)    Carotid stenosis   ~ previously mod-severe   ~ repeat pending     PVCs  ~ some bigeminy at times   ~ replace K, check mag  ~ denies palliations      Multiple medical conditions with risk of decompensation. All pertinent information and plan of care discussed with the physician. All questions and concerns were addressed to the patient. Alternatives to my treatment were discussed. I have discussed the above stated plan with patient and the nurse. The patient verbalized understanding and agreed with the plan. Thank you for allowing to us to participate in the care of Vazquezcm Humphreys.     Niels Chavez JOAO-CNP  [place]    Office: [phone]

## 2021-02-16 NOTE — PROGRESS NOTES
Message sent to [facility]'  Patient admitted with chest pain. Patient stated she takes trazodone 100 mg nightly at home and it is also in her home medication list but she has no order for one. Can she get something for sleep.

## 2021-02-16 NOTE — PROGRESS NOTES
VSS.PRN morphine given for pain per pt request. Assessment complete. Intermittent N/V continues if any po intake. NPO at this time. No s/s of distress noted at this time, call light within reach.

## 2021-02-16 NOTE — PROGRESS NOTES
Patients head to toe assessment completed. Vital signs WNL. Bed alarm engaged, call light within reach. Scheduled medications given per MAR. Patient complain of chronic back pain. Rated pain 7/10. PRN Norco given. Patient resting in bed. Will continue to monitor.

## 2021-02-16 NOTE — PROGRESS NOTES
PRN morphine given for pain per pt request. Pt requesting diet order/remove NPO status, MD Cardiology notified.

## 2021-02-17 VITALS
TEMPERATURE: 97.8 F | SYSTOLIC BLOOD PRESSURE: 136 MMHG | RESPIRATION RATE: 18 BRPM | HEIGHT: 68 IN | WEIGHT: 107 LBS | HEART RATE: 83 BPM | OXYGEN SATURATION: 98 % | BODY MASS INDEX: 16.22 KG/M2 | DIASTOLIC BLOOD PRESSURE: 81 MMHG

## 2021-02-17 PROBLEM — I25.119 CHEST PAIN DUE TO CAD (HCC): Status: ACTIVE | Noted: 2021-02-17

## 2021-02-17 PROBLEM — R07.9 CHEST PAIN: Status: RESOLVED | Noted: 2021-02-14 | Resolved: 2021-02-17

## 2021-02-17 PROBLEM — I25.119 CHEST PAIN DUE TO CAD (HCC): Status: RESOLVED | Noted: 2021-02-17 | Resolved: 2021-02-17

## 2021-02-17 LAB
ANION GAP SERPL CALCULATED.3IONS-SCNC: 10 MMOL/L (ref 3–16)
ANTI-NUCLEAR ANTIBODY (ANA): NEGATIVE
BASOPHILS ABSOLUTE: 0 K/UL (ref 0–0.2)
BASOPHILS RELATIVE PERCENT: 0.8 %
BUN BLDV-MCNC: 17 MG/DL (ref 7–20)
CALCIUM SERPL-MCNC: 9 MG/DL (ref 8.3–10.6)
CHLORIDE BLD-SCNC: 101 MMOL/L (ref 99–110)
CO2: 29 MMOL/L (ref 21–32)
CREAT SERPL-MCNC: 0.6 MG/DL (ref 0.6–1.2)
EKG ATRIAL RATE: 70 BPM
EKG DIAGNOSIS: NORMAL
EKG P AXIS: 78 DEGREES
EKG P-R INTERVAL: 118 MS
EKG Q-T INTERVAL: 420 MS
EKG QRS DURATION: 72 MS
EKG QTC CALCULATION (BAZETT): 453 MS
EKG R AXIS: 74 DEGREES
EKG T AXIS: 60 DEGREES
EKG VENTRICULAR RATE: 70 BPM
EOSINOPHILS ABSOLUTE: 0.2 K/UL (ref 0–0.6)
EOSINOPHILS RELATIVE PERCENT: 2.6 %
GFR AFRICAN AMERICAN: >60
GFR NON-AFRICAN AMERICAN: >60
GLUCOSE BLD-MCNC: 116 MG/DL (ref 70–99)
HCT VFR BLD CALC: 41.1 % (ref 36–48)
HEMOGLOBIN: 13.6 G/DL (ref 12–16)
LYMPHOCYTES ABSOLUTE: 2.4 K/UL (ref 1–5.1)
LYMPHOCYTES RELATIVE PERCENT: 40.6 %
MCH RBC QN AUTO: 31.5 PG (ref 26–34)
MCHC RBC AUTO-ENTMCNC: 33.1 G/DL (ref 31–36)
MCV RBC AUTO: 95.4 FL (ref 80–100)
MONOCYTES ABSOLUTE: 0.5 K/UL (ref 0–1.3)
MONOCYTES RELATIVE PERCENT: 8 %
NEUTROPHILS ABSOLUTE: 2.8 K/UL (ref 1.7–7.7)
NEUTROPHILS RELATIVE PERCENT: 48 %
PDW BLD-RTO: 13.2 % (ref 12.4–15.4)
PLATELET # BLD: 179 K/UL (ref 135–450)
PMV BLD AUTO: 7.5 FL (ref 5–10.5)
POTASSIUM REFLEX MAGNESIUM: 3.6 MMOL/L (ref 3.5–5.1)
RBC # BLD: 4.31 M/UL (ref 4–5.2)
SODIUM BLD-SCNC: 140 MMOL/L (ref 136–145)
WBC # BLD: 5.9 K/UL (ref 4–11)

## 2021-02-17 PROCEDURE — 97116 GAIT TRAINING THERAPY: CPT

## 2021-02-17 PROCEDURE — 1200000000 HC SEMI PRIVATE

## 2021-02-17 PROCEDURE — 97165 OT EVAL LOW COMPLEX 30 MIN: CPT

## 2021-02-17 PROCEDURE — 97161 PT EVAL LOW COMPLEX 20 MIN: CPT

## 2021-02-17 PROCEDURE — 6370000000 HC RX 637 (ALT 250 FOR IP): Performed by: NURSE PRACTITIONER

## 2021-02-17 PROCEDURE — G0378 HOSPITAL OBSERVATION PER HR: HCPCS

## 2021-02-17 PROCEDURE — 36415 COLL VENOUS BLD VENIPUNCTURE: CPT

## 2021-02-17 PROCEDURE — 93010 ELECTROCARDIOGRAM REPORT: CPT | Performed by: INTERNAL MEDICINE

## 2021-02-17 PROCEDURE — 96376 TX/PRO/DX INJ SAME DRUG ADON: CPT

## 2021-02-17 PROCEDURE — 2580000003 HC RX 258: Performed by: INTERNAL MEDICINE

## 2021-02-17 PROCEDURE — 6370000000 HC RX 637 (ALT 250 FOR IP): Performed by: FAMILY MEDICINE

## 2021-02-17 PROCEDURE — 86038 ANTINUCLEAR ANTIBODIES: CPT

## 2021-02-17 PROCEDURE — 97535 SELF CARE MNGMENT TRAINING: CPT

## 2021-02-17 PROCEDURE — 99232 SBSQ HOSP IP/OBS MODERATE 35: CPT | Performed by: NURSE PRACTITIONER

## 2021-02-17 PROCEDURE — 6360000002 HC RX W HCPCS: Performed by: INTERNAL MEDICINE

## 2021-02-17 PROCEDURE — 80048 BASIC METABOLIC PNL TOTAL CA: CPT

## 2021-02-17 PROCEDURE — 97530 THERAPEUTIC ACTIVITIES: CPT

## 2021-02-17 PROCEDURE — 85025 COMPLETE CBC W/AUTO DIFF WBC: CPT

## 2021-02-17 RX ORDER — LANOLIN ALCOHOL/MO/W.PET/CERES
200 CREAM (GRAM) TOPICAL 2 TIMES DAILY
Status: DISCONTINUED | OUTPATIENT
Start: 2021-02-17 | End: 2021-02-17 | Stop reason: HOSPADM

## 2021-02-17 RX ORDER — POTASSIUM CHLORIDE 20 MEQ/1
40 TABLET, EXTENDED RELEASE ORAL ONCE
Status: COMPLETED | OUTPATIENT
Start: 2021-02-17 | End: 2021-02-17

## 2021-02-17 RX ADMIN — Medication 200 MG: at 09:10

## 2021-02-17 RX ADMIN — POTASSIUM CHLORIDE 40 MEQ: 1500 TABLET, EXTENDED RELEASE ORAL at 09:11

## 2021-02-17 RX ADMIN — Medication 10 ML: at 09:36

## 2021-02-17 RX ADMIN — SERTRALINE HYDROCHLORIDE 25 MG: 25 TABLET ORAL at 09:11

## 2021-02-17 RX ADMIN — MORPHINE SULFATE 1 MG: 2 INJECTION, SOLUTION INTRAMUSCULAR; INTRAVENOUS at 11:24

## 2021-02-17 RX ADMIN — MORPHINE SULFATE 1 MG: 2 INJECTION, SOLUTION INTRAMUSCULAR; INTRAVENOUS at 00:06

## 2021-02-17 RX ADMIN — HYDROCODONE BITARTRATE AND ACETAMINOPHEN 1 TABLET: 7.5; 325 TABLET ORAL at 06:31

## 2021-02-17 RX ADMIN — TRAZODONE HYDROCHLORIDE 100 MG: 50 TABLET ORAL at 00:06

## 2021-02-17 ASSESSMENT — PAIN DESCRIPTION - PAIN TYPE
TYPE: CHRONIC PAIN
TYPE: CHRONIC PAIN

## 2021-02-17 ASSESSMENT — PAIN DESCRIPTION - LOCATION: LOCATION: BACK

## 2021-02-17 ASSESSMENT — PAIN SCALES - GENERAL
PAINLEVEL_OUTOF10: 8
PAINLEVEL_OUTOF10: 8

## 2021-02-17 NOTE — PROGRESS NOTES
Physical Therapy    Facility/Department: [address] ONCOLOGY  Initial Assessment    NAME: Julito Flores  : 1955  MRN: [id number]    Date of Service: 2021    Discharge Recommendations:Marielena Razo scored a 20/24 on the AM-PAC short mobility form. Current research shows that an AM-PAC score of 18 or greater is typically associated with a discharge to the patient's home setting. Based on the patient's AM-PAC score and their current functional mobility deficits, it is recommended that the patient have 2-3 sessions per week of Physical Therapy at d/c to increase the patient's independence. At this time, this patient demonstrates the endurance and safety to discharge home with (home services) and a follow up treatment frequency of 2-3x/wk. Please see assessment section for further patient specific details. If patient discharges prior to next session this note will serve as a discharge summary. Please see below for the latest assessment towards goals. HOME HEALTH CARE: LEVEL 3 SAFETY     - Initial home health evaluation to occur within 24-48 hours, in patient home   - Therapy evaluations in home within 24-48 hours of discharge; including DME and home safety   - Frontload therapy 5 days, then 3x a week   - Therapy to evaluate if patient has [address] needs for personal care   -  evaluation within 24-48 hours, includes evaluation of resources and insurance to determine [place], [place], LTC, and Medicaid options       PT Equipment Recommendations  Equipment Needed: No    Assessment   Body structures, Functions, Activity limitations: Decreased functional mobility ; Decreased ADL status; Decreased strength;Decreased endurance;Decreased balance  Assessment: Pt presents as slightly below her baseline function and would benefit from skilled PT services to promote safe return to PLOF.   Prognosis: Good  Decision Making: Low Complexity  PT Education: Goals;PT Role;Plan of Care  REQUIRES PT FOLLOW UP: Yes  Activity Tolerance  Activity Tolerance: Patient Tolerated treatment well       Patient Diagnosis(es): The primary encounter diagnosis was Chest pain, unspecified type. Diagnoses of Tachycardia and Suspected COVID-19 virus infection were also pertinent to this visit. has a past medical history of Arthritis, Asthma, Back pain, Bell's palsy, CHF (congestive heart failure) ([facility] [address] Chronic pain, Colitis, Depression, Elevated sed rate, Family history of breast cancer in first degree relative, Gastric ulcer, GERD (gastroesophageal reflux disease), Glaucoma, Hypertension, Movement disorder, Neuromuscular disorder ([facility] [address] Osteoarthritis, and PONV (postoperative nausea and vomiting). has a past surgical history that includes Hysterectomy; Facial nerve surgery; Upper gastrointestinal endoscopy (10/15/12); Colonoscopy (10/16/12); back surgery; Colonoscopy (3/26/13); Upper gastrointestinal endoscopy (2/11/15); Cystocopy (1/13/16); Finger surgery (Left, 04/03/2018); Finger surgery (04/2018); Finger surgery (Left, 04/03/2018); and ventral hernia repair (N/A, 4/15/2019). Restrictions  Restrictions/Precautions  Restrictions/Precautions: Fall Risk, Modified Diet(HIGH FALL RISK; DIET CARDIAC)  Required Braces or Orthoses?: No  Position Activity Restriction  Other position/activity restrictions: Michelle Tee is a 76 y.o. female who presents for evaluation of chest pain, generalized weakness, fatigue and malaise that started yesterday. States that she has also had nausea. No vomiting diarrhea, despite triage note. She was given Zofran via squad in route. She reports chills, no documented fevers. Some congestion but no significant cough. No shortness of breath. No known sick contacts with similar symptoms or known Covid exposures. States that she has not been able to keep anything down including her meds.   She does report a history of a pericardial effusion denies any other significant cardiac or pulmonary disease. She is not anticoagulated. No history of blood clots. No leg pain or swelling. No recent travel hospitalizations or operations. She has no other complaints or concerns at this time.   Vision/Hearing  Vision: Impaired  Vision Exceptions: Wears glasses at all times  Hearing: Exceptions to [facility]  Hearing Exceptions: Hard of hearing/hearing concerns(R ear)     Subjective  General  Chart Reviewed: Yes  Patient assessed for rehabilitation services?: Yes  Response To Previous Treatment: Not applicable  Family / Caregiver Present: No  Diagnosis: Chest Pain  Follows Commands: Within Functional Limits  General Comment  Comments: Pt supine in bed upon arrival.  Subjective  Subjective: Pt agreeable to PT/OT eval.  Pain Screening  Patient Currently in Pain: Yes  Pain Assessment  Pain Assessment: 0-10  Pain Level: 8  Vital Signs  Patient Currently in Pain: Yes       Orientation  Orientation  Overall Orientation Status: Within Normal Limits  Social/Functional History  Social/Functional History  Lives With: Spouse  Type of Home: House(condo)  Home Layout: One level  Home Access: Level entry  Bathroom Shower/Tub: Tub/Shower unit, Shower chair with back  Bathroom Toilet: Standard  Bathroom Equipment: Grab bars in shower  Home Equipment: Dorcas Cords  ADL Assistance: Needs assistance(spouse assist with footwear; pt able to complete remainder of ADLs independently)  Homemaking Assistance: Independent  Homemaking Responsibilities: Yes  Ambulation Assistance: Independent(use of cane when pain present)  Transfer Assistance: Independent  Active : Yes  Occupation: Retired  Leisure & Hobbies: spend time with , watch tv, puzzles, play board games  Additional Comments: Pt reports no falls in the last 6 months  Cognition        Objective     Observation/Palpation  Posture: Good    AROM RLE (degrees)  RLE AROM: WFL  AROM LLE (degrees)  LLE AROM : WFL  Strength RLE  Strength RLE: WFL  Strength LLE  Strength LLE: WFL  Tone RLE  RLE Tone: Normotonic  Tone LLE  LLE Tone: Normotonic  Motor Control  Gross Motor?: WFL  Sensation  Overall Sensation Status: WFL  Bed mobility  Supine to Sit: Modified independent  Sit to Supine: Unable to assess  Scooting: Modified independent  Transfers  Sit to Stand: Stand by assistance  Stand to sit: Stand by assistance  Ambulation  Ambulation?: Yes  More Ambulation?: Yes  Ambulation 1  Surface: level tile  Device: No Device  Assistance: Contact guard assistance;Minimal assistance  Quality of Gait: Pt ambulates with slow allie, mildly variable KANU, LOB to R requiring min A to correct. Distance: ~30 ft  Ambulation 2  Surface - 2: level tile  Device 2: (HHA on L)  Assistance 2: Contact guard assistance;Minimal assistance  Quality of Gait 2: Pt ambulates with gait similar to above, 1 LOB requiring min A  Distance: ~25ft  Ambulation 3  Surface - 3: level tile  Device 3: Rolling Walker  Assistance 3: Stand by assistance  Quality of Gait 3: Pt ambulates with improved overall stability, more consistent, if slightly narrowed KANU, good step length, no LOB. Distance: ~25 ft  Comments: Pt reporting feeling more supported and stable with RW this date compared to without AD  Stairs/Curb  Stairs?: No     Balance  Posture: Good  Sitting - Static: Good  Sitting - Dynamic: Good;-  Standing - Static: Fair;+  Standing - Dynamic: Fair;-(without AD, improves to Fair (+) with RW)        Plan   Plan  Times per week: 3-5x  Times per day: Daily  Current Treatment Recommendations: Strengthening, ROM, Balance Training, Functional Mobility Training, Transfer Training, Endurance Training, Gait Training, Stair training, Home Exercise Program, Safety Education & Training, Patient/Caregiver Education & Training  Safety Devices  Type of devices:  All fall risk precautions in place, Call light within reach, Chair alarm in place, Gait belt, Left in chair, Nurse notified  Restraints  Initially in place: No    G-Code OutComes Score                                                  AM-PAC Score  AM-PAC Inpatient Mobility Raw Score : 20 (02/17/21 1509)  AM-PAC Inpatient T-Scale Score : 47.67 (02/17/21 1509)  Mobility Inpatient CMS 0-100% Score: 35.83 (02/17/21 1509)  Mobility Inpatient CMS G-Code Modifier : CJ (02/17/21 1509)          Goals  Short term goals  Time Frame for Short term goals:  To be met prior to discharge  Short term goal 1: Pt will complete with sit to/from stand with mod I  Short term goal 2: Pt will ambulate 100 ft with LRAD And mod I       Therapy Time   Individual Concurrent Group Co-treatment   Time In 1312         Time Out 1350         Minutes 38         Timed Code Treatment Minutes: [address], PT   Garrett Edwards, PT, DPT, [phone]

## 2021-02-17 NOTE — PROGRESS NOTES
Discharge instructions provided, verbalized understanding. IV removed, no complications. Belongings packed and with pt. Denies further discharge needs.

## 2021-02-17 NOTE — PROGRESS NOTES
[address]   Cardiology Progress Note     Date: 2/17/2021  Admit Date: 2/14/2021       Chief Complaint:   Chief Complaint   Patient presents with    Chest Pain     Pt arrived via [facility] squad from home for c/o CP and flu-like symptoms. Patient reports HTN at home today, pain under left breast. Squad reports pt was in and out of consciousness en route. Pt c/o N/V/D for the past several days. History of Present Illness: History obtained from patient and medical record. Papa Pennington is a 76 y.o. female presented to the hospital with complaints of 3 day history of nausea, vomiting and diarrhea with associated severe abdominal pain. On the 2nd day, she had a feeling of chest heaviness like someone was sitting on her. Which has been constant. She felt like her heart was racing as well. Has seen GI and had endoscopy.      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  Her EF has since normalized.  She had a normal nuclear stress test prior to back surgery in 2016.  Surgery took 6 hours to complete and patient had no cardiovascular complications during or after surgery.      Interval Hx: Today, she feeling better. No chest pain. abd pain improving. Only complaint consists of her uncontrolled chronic pain. Patient seen and examined. Clinical notes reviewed. Telemetry reviewed. No new complaints today. No major events overnight. Denies having chest pain, palpitations, shortness of breath, orthopnea/PND, cough, or dizziness at the time of this visit.       Past Medical History:  Past Medical History:   Diagnosis Date    Arthritis     fingers, hands    Asthma     Back pain     Bell's palsy     [facility]congest[facility])     Chronic pain     BACK SURGERY X4, neck, left arm    Colitis 2/14/2013    Depression     Elevated sed rate     Family history of breast cancer in first degree relative     Gastric ulcer     GERD (gastroesophageal reflux disease)     colitis    Glaucoma     Hypertension     Movement disorder     chronic back pain    Neuromuscular disorder (HCC)     sciatica rt. leg    Osteoarthritis     PONV (postoperative nausea and vomiting)     SEVERE        Past Surgical History:    has a past surgical history that includes Hysterectomy; Facial nerve surgery; Upper gastrointestinal endoscopy (10/15/12); Colonoscopy (10/16/12); back surgery; Colonoscopy (3/26/13); Upper gastrointestinal endoscopy (2/11/15); Cystocopy (1/13/16); Finger surgery (Left, 04/03/2018); Finger surgery (04/2018); Finger surgery (Left, 04/03/2018); and ventral hernia repair (N/A, 4/15/2019). Social History:  Reviewed. reports that she quit smoking about 4 years ago. Her smoking use included cigarettes. She has a 5.00 pack-year smoking history. She has never used smokeless tobacco. She reports that she does not drink alcohol or use drugs. Allergies: Allergies   Allergen Reactions    Beta Adrenergic Blockers Shortness Of Breath    Pheniramine      Other reaction(s): rapid heartbeat    Antihistamine [Diphenhydramine Hcl] Nausea And Vomiting    Aspirin Hives    Codeine Hives    Dilaudid [Hydromorphone] Nausea And Vomiting    Nsaids Hives and Nausea And Vomiting    Prednisone Other (See Comments)     GI upset    Zithromax [Azithromycin] Nausea And Vomiting       Family History:  Reviewed. family history includes Arthritis in her mother; Breast Cancer in her sister; Heart Disease in her mother; High Blood Pressure in her brother and mother. Denies family history of sudden cardiac death, arrhythmia, premature CAD    Home Meds:  Prior to Visit Medications    Medication Sig Taking?  Authorizing Provider   albuterol sulfate HFA (PROAIR HFA) 108 (90 Base) MCG/ACT inhaler INHALE 2 PUFFS BY MOUTH EVERY 4 HOURS AS NEEDED FOR WHEEZING Yes Yumiko Moreno MD   gabapentin (NEURONTIN) 300 MG capsule TAKE 1 CAPSULE BY MOUTH THREE TIMES DAILY Yes Desi Rogel MD   HYDROcodone-acetaminophen ([employer]) 7.5-325 MG per tablet Take 1 tablet by mouth every 6 hours as needed for Pain for up to 30 days.  Yes Desi Rogel MD   ondansetron (ZOFRAN) 4 MG tablet Take 1 tablet by mouth every 6 hours as needed for Nausea Yes Desi Rogel MD   dicyclomine (BENTYL) 10 MG capsule Take 1 capsule by mouth every 6 hours as needed (Bowel spasms) Yes Andreea Porter MD   montelukast (SINGULAIR) 10 MG tablet TAKE 1 TABLET BY MOUTH EVERY NIGHT Yes Pecola Spurling, MD   traZODone (DESYREL) 50 MG tablet TAKE 2 TABLETS BY MOUTH EVERY NIGHT Yes Desi Rogel MD   DEXILANT 60 MG CPDR delayed release capsule TAKE 1 CAPSULE BY MOUTH DAILY Yes Desi Rogel MD   tiZANidine (ZANAFLEX) 4 MG tablet TAKE 1 TABLET BY MOUTH THREE TIMES DAILY Yes Desi Rogel MD   diclofenac sodium (VOLTAREN) 1 % GEL APPLY 2 GRAMS TOPICALLY TO HAND FOUR TIMES DAILY Yes Desi Rogel MD   fluticasone (FLOVENT HFA) 110 MCG/ACT inhaler INHALE 1 PUFF INTO THE LUNGS TWICE DAILY Yes Desi Rogel MD   sertraline (ZOLOFT) 100 MG tablet TAKE 1 TABLET BY MOUTH THREE TIMES DAILY Yes Desi Rogel MD   nitroGLYCERIN (NITROSTAT) 0.4 MG SL tablet PLACE 1 TABLET UNDE THE TONGUE EVERY 5 MINUTES AS NEEDED FOR CHEST PAIN Yes JOAO Johnson - CNP   albuterol (PROVENTIL) (2.5 MG/3ML) 0.083% nebulizer solution USE 1 VIAL IN NEBULIZER EVERY 6 HOURS Yes Desi Rogel MD   hydrocortisone (WESTCORT) 0.2 % cream APPLY EXTERNALLY TO THE AFFECTED AREA TWICE DAILY Yes Desi Rogel MD   Nutritional Supplements (BOOST CALORIE SMART) LIQD Take 1 Can by mouth See Admin Instructions EVERY 3 DAYS Yes Historical Provider, MD   butalbital-acetaminophen-caffeine (FIORICET, ESGIC) -40 MG per tablet Take 1 tablet by mouth every 4 hours as needed for Headaches or Migraine May take 2 tablets every 4 hours as needed for pain  Pecola Spurling, MD butalbital-acetaminophen-caffeine (FIORICET, ESGIC) -40 MG per tablet Take 1 tablet by mouth every 4 hours as needed for Headaches May take 2 tablets every 4 hours as needed for pain  Jesus Torres MD   butalbital-acetaminophen-caffeine (FIORICET, ESGIC) -68 MG per tablet Take 1 tablet by mouth every 4 hours as needed for Migraine May take 2 tablets every 4 hours as needed for pain  MD ayan Colealbital-acetaminophen-caffeine (FIORICET, ESGIC) -89 MG per tablet Take 1 tablet by mouth every 4 hours as needed for Migraine May take 2 tablets every 4 hours as needed for pain  Jesus Torres MD   butalbital-acetaminophen-caffeine (FIORICET, ESGIC) -40 MG per tablet TAKE 1 TABLET BY MOUTH EVERY 4 HOURS AS NEEDED FOR HEADACHES MAY TAKE 2 TABLETS EVERY 4 HOURS AS NEEDED FOR PAIN  Jesus Torres MD   prednisoLONE acetate (PRED FORTE) 1 % ophthalmic suspension Place 1 drop into both eyes 2 times daily  Jesus Torres MD   linaCLOtide (LINZESS PO) Take by mouth daily  Historical Provider, MD   Elastic Bandages & Supports ([address] [place] [address] 20-30MM) MISC 1 each by Does not apply route daily  Hermelinda Sylvester, APRN - CNP        Scheduled Meds:   magnesium oxide  200 mg Oral BID    sodium chloride flush  10 mL Intravenous BID    gabapentin  300 mg Oral Nightly    montelukast  10 mg Oral Nightly    sertraline  25 mg Oral Daily    lidocaine  1 patch Transdermal Daily     Continuous Infusions:   DOBUTamine       PRN Meds:sodium chloride flush, perflutren lipid microspheres, morphine, traZODone, albuterol sulfate HFA, HYDROcodone-acetaminophen, ondansetron, promethazine, nitroGLYCERIN     Review of Systems:  · Constitutional: Negative for fever, night sweats, chills, weight changes  · Skin: Negative for rash, pruritus, bleeding, blood clots, or bruising    · HEENT: Negative for vision changes, ringing in the ears, dysphagia, or swollen lymph nodes  · Respiratory: Reviewed in HPI  · Cardiovascular: Reviewed in HPI  · Gastrointestinal: Negative for abdominal pain, N/V/D, constipation, or black/tarry stools  · Genito-Urinary: Negative for dysuria, incontinence, or hematuria  · Musculoskeletal: Negative for joint swelling, muscle pain, or injuries  · Neurological/Psych: Negative for confusion, seizures, headaches, or TIA-like symptoms. No anxiety or depression. Physical Examination:  Vitals:    02/17/21 0909   BP: 130/82   Pulse: 85   Resp: 16   Temp: 98.2 °F (36.8 °C)   SpO2: 98%      No intake/output data recorded. Wt Readings from Last 3 Encounters:   02/14/21 107 lb (48.5 kg)   02/01/21 103 lb (46.7 kg)   01/28/21 106 lb (48.1 kg)     No intake or output data in the 24 hours ending 02/17/21 1007    Telemetry: Personally Reviewed  - Sinus rhythm with PVCs and PACs  · Constitutional: Cooperative and in no apparent distress, and appears thin   · Skin: Warm and pink; no pallor, cyanosis, clubbing, or bruising   · HEENT: Symmetric and normocephalic. PERRL, EOM intact. Conjunctiva pink with clear sclera. Mucus membranes moist.   · Cardiovascular: Regular rate and rhythm. S1/S2 present without murmurs, no rubs or gallops. Peripheral pulses 2+, capillary refill < 3 seconds. No elevation of JVP. No peripheral edema  · Respiratory: Respirations symmetric and unlabored. Lungs clear to auscultation bilaterally, no wheezing, crackles, or rhonchi  · Gastrointestinal: Abdomen soft and round. Bowel sounds normoactive without tenderness or masses. · Musculoskeletal: Bilateral upper and lower extremity strength 5/5 with full ROM  · Neurologic/Psych: Awake and orientated to person, place and time.  Calm affect, appropriate mood    Pertinent labs, diagnostic, device, and imaging results reviewed as a part of this visit    Labs:    BMP:   Recent Labs     02/14/21  1648 02/16/21  0449 02/16/21  1614 02/17/21  0426    140  --  140   K 4.0 3.7  --  3.6    103  --  101   CO2 27 28  --  29   BUN 18 17  --  17 CREATININE 0.5* 0.6  --  0.6   MG  --   --  1.80  --      Estimated Creatinine Clearance: 71 mL/min (based on SCr of 0.6 mg/dL). CBC:   Recent Labs     02/14/21  1648 02/16/21  0449 02/17/21  0426   WBC 9.8 6.7 5.9   HGB 13.2 13.5 13.6   HCT 39.3 40.8 41.1   MCV 94.6 94.1 95.4    176 179     Thyroid:   Lab Results   Component Value Date    TSH 0.34 09/04/2018    [id number] 4.7 09/04/2018     Lipids:   Lab Results   Component Value Date    CHOL 200 03/19/2019    HDL 59 03/19/2019    HDL 64 01/24/2011    TRIG 134 03/19/2019     LFTS:   Lab Results   Component Value Date    ALT 18 02/14/2021    AST 23 02/14/2021    ALKPHOS 100 02/14/2021    PROT 7.1 02/14/2021    PROT 6.1 02/16/2013    AGRATIO 1.7 02/14/2021    BILITOT 0.3 02/14/2021     Cardiac Enzymes:   Lab Results   Component Value Date    CKTOTAL 95 06/06/2017    CKMB 4.61 02/17/2013    TROPONINI <0.01 02/14/2021    TROPONINI 0.03 02/14/2021    TROPONINI 0.02 02/14/2021     Coags:   Lab Results   Component Value Date    PROTIME 11.5 02/14/2021    INR 0.99 02/14/2021        Carotid duplex 4/12/18:       There is 50-69% stenosis of the right internal carotid arteries.    There is <50% stenosis of the left internal carotid arteries.    Vertebral flow are antegrade bilaterally. Echo: 5/2020  Summary   -Limited exam per Dr. King Schmidt. -Mild-to-moderately reduced global systolic function with an ejection   fraction estimated at 40-45%. -Global hypokinesis with marked septal hypokinesis noted.   -Aortic valve appears sclerotic but opens adequately. No evidence of aortic   valve regurgitation.   -Thickened mitral valve without evidence of stenosis. There is   mild-to-moderate mitral regurgitation noted with mild mitral valve prolapse   of both leaflets. -Mild-to-moderate pericardial effusion noted mostly anteriorly (previously   described in 8/2019    ECHO:  2/2021  Summary   Normal left ventricular cavity size and wall thickness. Depression 12/16/2016    Primary insomnia 12/16/2016    History of MI (myocardial infarction) 05/10/2016    Diarrhea 01/12/2016    Neuritis 04/28/2015    Pyelonephritis 01/04/2015    Choledocholithiasis 01/04/2015    Elevated sed rate 12/23/2014    Allergic rhinitis 10/28/2014    Gastroenteritis 03/10/2014    Hypokalemia 03/10/2014    Vertigo, labyrinthine 12/09/2013    Facial nerve paralysis 12/09/2013    IBS (irritable bowel syndrome) 04/04/2013    Abdominal pain, generalized 03/25/2013    Colitis 02/15/2013    Family history of breast cancer in first degree relative     Chronic pain     Glaucoma         Assessment and Plan:     Chest pain   ~ atypical, sounds to be more in the epigastric/ LUQ area described as a pressure   ~ stress test today consistent with scar that is consistent with wall motion abnormalities    ~ would recommend further looking into GI causes (discussed with Dr. Junaid Jernigan)     Pericardial effusion   ~ chronic  ~ repeat echo with mild to moderate effusion without sign of tamponade (per Dr. Junaid Jernigan)   ~ Sed rate and CRP were both negative   ~ LALI pending   ~ pt should be referred to rheumatology as OP to evaluate     Cardiomyopathy   ~ repeat echo appears to have preserved LVEF at 50-55% (per Dr. Junaid Jernigan)    Carotid stenosis   ~ previously study with mod-severe stenosis   ~ repeat preliminary report shows < 50% stenosis bilateral ICA  ~ no stroke like symptoms     PVCs / PACs  ~ replace K and mag today   ~ denies palpations      Elevated BP   ~ ? From her uncontrolled chronic pain, pt reports she has not been taking pain medications in hospital on her normal schedule   ~ pt reports her BP usually well controlled at home around 110/63  ~ Continue home BP monitoring      Pt stable from a cardiac standpoint and is ok for discharge. Follow up in 1 month in office. Multiple medical conditions with risk of decompensation.    All pertinent information and plan of care discussed with the physician. All questions and concerns were addressed to the patient. Alternatives to my treatment were discussed. I have discussed the above stated plan with patient and the nurse. The patient verbalized understanding and agreed with the plan. Thank you for allowing to us to participate in the care of Michael Gutierrez.     Gato Mix APRN-CNP  [address]   Office: [phone]

## 2021-02-17 NOTE — PROGRESS NOTES
Occupational Therapy   Occupational Therapy Initial Assessment  Date: 2021   Patient Name: Luis A Carlisle  MRN: [id number]     : 1955    Date of Service: 2021    Discharge Recommendations:    Luis A Carlisle scored a 21/24 on the AM-PAC ADL Inpatient form. Current research shows that an AM-PAC score of 18 or greater is typically associated with a discharge to the patient's home setting. Based on the patient's AM-PAC score, and their current ADL deficits, it is recommended that the patient have 2-3 sessions per week of Occupational Therapy at d/c to increase the patient's independence. At this time, this patient demonstrates the endurance and safety to discharge home with home health OT services (home vs OP services) and a follow up treatment frequency of 2-3x/wk. Please see assessment section for further patient specific details. If patient discharges prior to next session this note will serve as a discharge summary. Please see below for the latest assessment towards goals. HOME HEALTH CARE: LEVEL 3 SAFETY     - Initial home health evaluation to occur within 24-48 hours, in patient home   - Therapy evaluations in home within 24-48 hours of discharge; including DME and home safety   - Frontload therapy 5 days, then 3x a week   - Therapy to evaluate if patient has [address] needs for personal care   -  evaluation within 24-48 hours, includes evaluation of resources and insurance to determine [place], [place], LTC, and Medicaid options       OT Equipment Recommendations  Equipment Needed: No  Other: pt has shower chair and RW at baseline    Assessment   Performance deficits / Impairments: Decreased functional mobility ; Decreased ADL status; Decreased endurance;Decreased balance;Decreased high-level IADLs  Assessment: Pt is currently functioning below occupational baseline and demo the deficits listed above, pt would benefit from continued skilled OT services to address these deficits and increase independence, safety, and ease with all occupational pursuits  Treatment Diagnosis: decreased ADL/IADL status, functional mobility, and functional transfers d/t Chest pain  Prognosis: Good  Decision Making: Low Complexity  OT Education: OT Role;Plan of Care;Transfer Training;Energy Conservation;Equipment  Patient Education: use of RW and shower chair for increased safety- pt verbalized understanding  Barriers to Learning: none  REQUIRES OT FOLLOW UP: Yes  Activity Tolerance  Activity Tolerance: Patient Tolerated treatment well  Activity Tolerance: pain in RLE limiting to standing tolerance  Safety Devices  Safety Devices in place: Yes  Type of devices: All fall risk precautions in place;Call light within reach; Chair alarm in place; Left in chair;Patient at risk for falls;Nurse notified           Patient Diagnosis(es): The primary encounter diagnosis was Chest pain, unspecified type. Diagnoses of Tachycardia and Suspected COVID-19 virus infection were also pertinent to this visit. has a past medical history of Arthritis, Asthma, Back pain, Bell's palsy, CHF (congestive heart failure) ([address] Chronic pain, Colitis, Depression, Elevated sed rate, Family history of breast cancer in first degree relative, Gastric ulcer, GERD (gastroesophageal reflux disease), Glaucoma, Hypertension, Movement disorder, Neuromuscular disorder ([address] Osteoarthritis, and PONV (postoperative nausea and vomiting). has a past surgical history that includes Hysterectomy; Facial nerve surgery; Upper gastrointestinal endoscopy (10/15/12); Colonoscopy (10/16/12); back surgery; Colonoscopy (3/26/13); Upper gastrointestinal endoscopy (2/11/15); Cystocopy (1/13/16); Finger surgery (Left, 04/03/2018); Finger surgery (04/2018); Finger surgery (Left, 04/03/2018); and ventral hernia repair (N/A, 4/15/2019).     Treatment Diagnosis: decreased ADL/IADL status, functional mobility, and functional transfers d/t Chest pain      Restrictions  Restrictions/Precautions  Restrictions/Precautions: Fall Risk, Modified Diet(HIGH FALL RISK; DIET CARDIAC)  Required Braces or Orthoses?: No  Position Activity Restriction  Other position/activity restrictions: Ramesh Godwin is a 76 y.o. female who presents for evaluation of chest pain, generalized weakness, fatigue and malaise that started yesterday. States that she has also had nausea. No vomiting diarrhea, despite triage note. She was given Zofran via squad in route. She reports chills, no documented fevers. Some congestion but no significant cough. No shortness of breath. No known sick contacts with similar symptoms or known Covid exposures. States that she has not been able to keep anything down including her meds. She does report a history of a pericardial effusion denies any other significant cardiac or pulmonary disease. She is not anticoagulated. No history of blood clots. No leg pain or swelling. No recent travel hospitalizations or operations. She has no other complaints or concerns at this time. Subjective   General  Chart Reviewed: Yes  Patient assessed for rehabilitation services?: Yes  Additional Pertinent Hx: PMH:  Arthritis, Asthma, Back pain, Bell's palsy, CHF (congestive heart failure) ([facility] Chronic pain, Colitis (2/14/2013), Depression, Elevated sed rate, Family history of breast cancer in first degree relative, Gastric ulcer, GERD (gastroesophageal reflux disease), Glaucoma, Hypertension, Movement disorder, Neuromuscular disorder ([address] Osteoarthritis, and PONV (postoperative nausea and vomiting). Family / Caregiver Present: No  Referring Practitioner: Ancelmo Mcwilliams MD  Diagnosis: Chest pain  Subjective  Subjective: Pt supine in bed upon arrival, pleasant and agreeable to OT/PT evaluation.   Patient Currently in Pain: Yes  Pain Assessment  Pain Assessment: 0-10  Pain Level: 8  Pain Type: Chronic pain  Pain Location: Back  Pre Treatment Pain Screening  Intervention List: Patient able to continue with treatment  Comments / Details: pain medication administered prior to the start of therapy  Vital Signs  Patient Currently in Pain: Yes    Social/Functional History  Social/Functional History  Lives With: Spouse  Type of Home: House(condo)  Home Layout: One level  Home Access: Level entry  Bathroom Shower/Tub: Tub/Shower unit, Shower chair with back  Bathroom Toilet: Standard  Bathroom Equipment: Grab bars in [facility]: Kapil Gómez  ADL Assistance: Needs assistance(spouse assist with footwear; pt able to complete remainder of ADLs independently)  [address]: [facility] Responsibilities: Yes  Ambulation Assistance: Independent(use of cane when pain present)  Transfer Assistance: Independent  Active : Yes  Occupation: Retired  Leisure & Hobbies: spend time with , watch tv, puzzles, play board games  Additional Comments: Pt reports no falls in the last 6 months       Objective   Vision: Impaired  Vision Exceptions: Wears glasses at all times  Hearing: Exceptions to [facility]  Hearing Exceptions: Hard of hearing/hearing concerns(on R side)    Orientation  Overall Orientation Status: Within Normal Limits  Observation/Palpation  Posture: Good  Balance  Sitting Balance: Supervision  Standing Balance: Contact guard assistance  Standing Balance  Time: 10 minutes  Activity: functional mobility, functional transfer, ADL completion  Comment: 2 LOB with no device requiring min(A) to correct, 1 LOB with HHA requiring min(A) to correct, no LOB with use of RW  Functional Mobility  Functional - Mobility Device: Other(no device, HHA, use of RW)  Activity: To/from bathroom; Other  Assist Level: Contact guard assistance  Functional Mobility Comments: pt ambulated 30' in room with no device and 2 LOB present requiring min(A) to correct, 22' with HHA with 1 LOB present requiring min(A) to correct, 22' with use of RW and no LOB. Pt educated to use RW at home to increase stability and safety while ambulating.  Pt also reported that spouse will be present  Toilet Transfers  Toilet - Technique: Ambulating  Equipment Used: Standard toilet  Toilet Transfer: Contact guard assistance  Toilet Transfers Comments: use of  HR  ADL  Grooming: Contact guard assistance(hand hygiene completed in stance at sink)  UE Dressing: Setup(to delia undershirt and shirt this date)  LE Dressing: Setup(to doff/delia socks and shoes)  Toileting: Contact guard assistance(steadying assistance during clothing management)  Tone RUE  RUE Tone: Normotonic  Tone LUE  LUE Tone: Normotonic  Coordination  Movements Are Fluid And Coordinated: Yes  Bed mobility  Supine to Sit: Modified independent  Sit to Supine: Unable to assess  Scooting: Modified independent  Transfers  Sit to stand: Contact guard assistance  Stand to sit: Contact guard assistance  Cognition  Overall Cognitive Status: WFL  Perception  Overall Perceptual Status: WFL  Sensation  Overall Sensation Status: WFL  LUE AROM (degrees)  LUE AROM : WFL  Left Hand AROM (degrees)  Left Hand AROM: WFL  RUE AROM (degrees)  RUE AROM : WFL  Right Hand AROM (degrees)  Right Hand AROM: WFL             Plan   Plan  Times per week: 3-5x/wk  Times per day: Daily  Current Treatment Recommendations: Strengthening, Balance Training, Functional Mobility Training, Endurance Training, Safety Education & Training, Patient/Caregiver Education & Training, Equipment Evaluation, Education, & procurement, Home Management Training    G-Code     OutComes Score                                                  AM-PAC Score        AM-[facility] Inpatient Daily Activity Raw Score: 21 (02/17/21 1444)  AM-PAC Inpatient ADL T-Scale Score : 44.27 (02/17/21 1444)  ADL Inpatient CMS 0-100% Score: 32.79 (02/17/21 1444)  ADL Inpatient CMS G-Code Modifier : Hardik Nicholas (02/17/21 1444)    Goals  Short term goals  Time Frame for Short term goals: d/c  Short term goal 1: Pt will complete functional mobility to<>from bathroom with LRAD at MOD I level  Short term goal 2: Pt will complete toileting at MOD I level  Short term goal 3: Pt will complete UB/LB ADLs at MOD I level  Short term goal 4: Pt will complete functional transfers at MOD I level  Long term goals  Time Frame for Long term goals : LTG=STG  Patient Goals   Patient goals : to return home       Therapy Time   Individual Concurrent Group Co-treatment   Time In 1312         Time Out 1350         Minutes 38         Timed Code Treatment Minutes: 12 [facility], [address] [place] [id number]

## 2021-02-17 NOTE — DISCHARGE INSTR - COC
Continuity of Care Form    Patient Name: Julito Flores   :  1955  MRN:  [id number]    Admit date:  2021  Discharge date:  2021    Code Status Order: Full Code   Advance Directives:   [address] [facility] Documentation     Date/Time Healthcare Directive Type of Healthcare Directive Copy in [address] [facility] [address] Agent's Name Healthcare Agent's Phone Number    21  No, patient does not have an advance directive for healthcare treatment  Adebayo Randall    21  Yes, patient has an advance directive for healthcare treatment      [address] [place] [address]          Admitting Physician:  Clara Younger MD  PCP: Yumiko Moreno MD    Discharging Nurse: [facility] Unit/Room#: 5RG-7729/6295-58  Discharging Unit Phone Number: [phone]    Emergency Contact:   Extended Emergency Contact Information  Primary Emergency Contact: [facility]  Address: [address] Phone: [phone]  Work Phone: [phone]  Mobile Phone: [phone]  Relation: Other  Secondary Emergency Contact: [address] [place] [address] Phone: [phone]  Relation: Brother/Sister    Past Surgical History:  Past Surgical History:   Procedure Laterality Date    BACK SURGERY      x 4    COLONOSCOPY  10/16/12    polyp removed and biopsy sent    COLONOSCOPY  3/26/13    CYSTOSCOPY  16    urethral dilitation    FACIAL NERVE SURGERY      D/T Bell's Palsy    FINGER SURGERY Left 2018    Excision of Left Thumb Digital Mucous cyst & DIP Joint Arthrotomy & Debridement    FINGER SURGERY  2018    left thumb    FINGER SURGERY Left 2018    thumb surgery     HYSTERECTOMY      Partial    UPPER GASTROINTESTINAL ENDOSCOPY  10/15/12    UPPER GASTROINTESTINAL ENDOSCOPY  2/11/15    with EUS    VENTRAL HERNIA REPAIR N/A 4/15/2019 LAPAROSCOPIC REPAIR OF INCISIONAL HERNIA WITH MESH performed by Mohamud Muñoz MD at [facility]       Immunization History:   Immunization History   Administered Date(s) Administered    Influenza Virus Vaccine 10/28/2014, 09/18/2015, 10/01/2018, 10/08/2019    Influenza, High Dose (Fluzone 65 yrs and older) 09/09/2020    Influenza, Intradermal, Preservative free 11/12/2013    Influenza, Intradermal, Quadrivalent, Preservative Free 11/14/2017    Pneumococcal Polysaccharide ([id number]) 10/13/2020       Active Problems:  Patient Active Problem List   Diagnosis Code    Glaucoma H40.9    Chronic pain G89.29    Family history of breast cancer in first degree relative Z80.3    Colitis K52.9    Abdominal pain, generalized R10.84    IBS (irritable bowel syndrome) K58.9    Pericardial effusion I31.3    Cardiomyopathy (HCC) I42.9    Vertigo, labyrinthine H81.09    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    Gastroenteritis K52.9    Hypokalemia E87.6    Allergic rhinitis J30.9    Elevated sed rate R70.0    Pyelonephritis N12    Choledocholithiasis K80.50    Neuritis M79.2    Diarrhea R19.7    History of MI (myocardial infarction) I25.2    Chronic pain syndrome G89.4    Facet arthropathy M47.819    Central spinal stenosis M48.00    Failed back syndrome M96.1    Depression F32.9    Primary insomnia F51.01    Acute bronchitis J20.9    Allergic sinusitis J30.9    Mild intermittent asthma without complication Y77.76    Mucous cyst of finger M67.449    Intractable cluster headache syndrome G44.001    Incisional hernia, without obstruction or gangrene K43.2    PONV (postoperative nausea and vomiting) R11.2, Z98.890    Bilateral carotid artery stenosis I65.23    Epigastric pain R10.13       Isolation/Infection:   Isolation          No Isolation        Patient Infection Status     Infection Onset Added Last Indicated Last Indicated By Review Planned Expiration Resolved Resolved By    None active Resolved    COVID-19 Rule Out 02/14/21 02/14/21 02/14/21 COVID-19 (Ordered)   02/15/21 Rule-Out Test Resulted          Nurse Assessment:  Last Vital Signs: /81   Pulse 83   Temp 97.8 °F (36.6 °C) (Oral)   Resp 18   Ht 5' 8\" (1.727 m)   Wt 107 lb (48.5 kg)   SpO2 98%   BMI 16.27 kg/m²     Last documented pain score (0-10 scale): Pain Level: 8  Last Weight:   Wt Readings from Last 1 Encounters:   02/14/21 107 lb (48.5 kg)     Mental Status:  oriented and alert    IV Access:  - None    Nursing Mobility/ADLs:  Walking   Assisted  Transfer  Assisted  Bathing  Independent  Dressing  Independent  Toileting  Assisted  Feeding  Independent  Med Admin  Independent  Med Delivery   whole    Wound Care Documentation and Therapy:  Incision 04/03/18 Finger (Comment which one) Left (Active)   Number of days: 1051        Elimination:  Continence:   · Bowel: Yes  · Bladder: Yes  Urinary Catheter: None   Colostomy/Ileostomy/Ileal Conduit: No       Date of Last BM: 02/13/2021  No intake or output data in the 24 hours ending 02/17/21 1414  No intake/output data recorded. Safety Concerns: At Risk for Falls    Impairments/Disabilities:      None    Nutrition Therapy:  Current Nutrition Therapy:   - Oral Diet:  Cardiac    Routes of Feeding: Oral  Liquids: No Restrictions  Daily Fluid Restriction: no  Last Modified Barium Swallow with Video (Video Swallowing Test): not done    Treatments at the Time of Hospital Discharge:   Respiratory Treatments:   Oxygen Therapy:  is not on home oxygen therapy.   Ventilator:    - No ventilator support    Rehab Therapies: SN,PT,OT  Weight Bearing Status/Restrictions: No weight bearing restirctions  Other Medical Equipment (for information only, NOT a DME order):  cane and walker  Other Treatments:     Patient's personal belongings (please select all that are sent with patient):  None    RN SIGNATURE:  Electronically signed by Maryanne Kelly RN on 2/17/21 at 2:42 PM EST CASE MANAGEMENT/SOCIAL WORK SECTION    Inpatient Status Date:     Readmission Risk Assessment Score:  Readmission Risk              Risk of Unplanned Readmission:        14           Discharging to Facility/ [address] [place]       Phone -[phone]               Dialysis Facility (if applicable)   · Name:  · Address:  · Dialysis Schedule:  · Phone:  · Fax:    / signature: Dalton Levine RN, BSN  [phone]       PHYSICIAN SECTION    Prognosis: {Prognosis:9540217297}    Condition at Discharge: [facility] Patient Condition:[id number]}    Rehab Potential (if transferring to Rehab): {Prognosis:0140255496}    Recommended Labs or Other Treatments After Discharge: ***    Physician Certification: I certify the above information and transfer of Lesly Weiss  is necessary for the continuing treatment of the diagnosis listed and that she requires {Admit to Appropriate Level of Care:31571} for {GREATER/LESS:609408378} 30 days.      Update Admission H&P: {CHP DME Changes in [facility]ADS:487887098}    PHYSICIAN SIGNATURE:  {Esignature:[id number]}

## 2021-02-17 NOTE — DISCHARGE SUMMARY
[facility]    Patient Demographics    India Beckett  Date of Birth. 1955  MRN. [id number]     Primary care provider. Indio Amaro MD  (Tel: [phone])    Admit date: 2/14/2021    Discharge date (blank if same as Note Date): Note Date: 2/17/2021     Reason for Hospitalization. Chief Complaint   Patient presents with    Chest Pain     Pt arrived via [facility] squad from home for c/o CP and flu-like symptoms. Patient reports HTN at home today, pain under left breast. Squad reports pt was in and out of consciousness en route. Pt c/o N/V/D for the past several days. Significant Findings. Principal Problem:    Chest pain  Active Problems:    Pericardial effusion    Cardiomyopathy (HCC)    Bilateral carotid artery stenosis    Chest pain due to CAD [facility])  Resolved Problems:    * No resolved hospital problems. *       Problems and results from this hospitalization that need follow up. 1. None    Significant test results and incidental findings. VL DUP CAROTID BILATERAL         NM Cardiac Stress Test Nuclear Imaging   Final Result      XR CHEST PORTABLE   Final Result   No acute cardiopulmonary disease. Invasive procedures and treatments. 1. None     Problem-based Hospital Course. Patient is a 79-year-old  female with history of cardiomyopathy, CHF, hypertension, chronic pain, colitis, depression, asthma, arthritis, postoperative nausea and vomiting presented to ED with complaints of chest pain, nausea and fatigue. Initial work-up in ED revealed mildly elevated troponin level, none acute ST-T wave changes on EKG. Admitted with chest pain to rule out ACS. Chest pain rule out ACS:Initial troponin level 0.03, trended down to 0.01. Chest pain currently resolved. Likely atypical as per cardiology. EKG showed no acute ST-T wave changes. Patient is allergic to aspirin. On nitro.  Patient underwent nuclear stress test on 2/16 morning which was abnormal, but no need for further evaluation as per cardiology. Discussed with patient about getting GI consult to assess the need for EGD prior to discharge. Patient reports that she follows up with GI at , with [facility] Padilla. She would like to be discharged and follow-up with GI as outpatient, especially since she is able to tolerate p.o. diet and denies nausea vomiting or ongoing chest pain. Patient reports that her last EGD and colonoscopy were a year ago which were normal.  Moderate pericardial effusion: 2D echocardiogram on 2/15 showed LVEF of 45%, moderate pericardial effusion. As per Dr. Ana Luisa Tam from cardiology, estimated LVEF of 50 to 55%, mild to moderate pericardial effusion with no signs of tamponade. No indication for further intervention. ESR and CRP normal range. LALI is pending. Cardiology service recommends outpatient rheumatology follow-up. Patient provided with contact information.   Bilateral carotid artery stenosis:Bilateral carotid artery duplex ordered today as per cardiology recommendations. Less than 50% bilateral stenosis. No neurological deficits of evidence of stroke. Follow-up as outpatient.   Other comorbidities:  Asthma: Stable respiratory status, Singulair and albuterol inhaler as needed  Cardiomyopathy, CHF: Euvolemic. 2D echo from 2/15 shows LVEF of 45%  Chronic pain: Norco every 6 hrs as needed. Follow-up with PCP for pain management. Depression: Resumed on Zoloft  Postop nausea and vomiting: Antiemetics prn. 2D echocardiogram 2/15   Summary   Normal left ventricular cavity size and wall thickness. Mild-to-moderately   reduced global systolic function with an ejection fraction estimated at 45%.   The mid inferoseptum and apical septum appear hypokinetic.   There is mild-to-moderate mitral regurgitation.   Probably at least moderate pericardial effusion with larger measurment   anteriorly.  Correlate clinically for HFA  INHALE 2 PUFFS BY MOUTH EVERY 4 HOURS AS NEEDED FOR WHEEZING     Boost Calorie Smart Liqd     * butalbital-acetaminophen-caffeine -40 MG per tablet  Commonly known as: FIORICET, ESGIC  Take 1 tablet by mouth every 4 hours as needed for Migraine May take 2 tablets every 4 hours as needed for pain     * butalbital-acetaminophen-caffeine -40 MG per tablet  Commonly known as: FIORICET, ESGIC  Take 1 tablet by mouth every 4 hours as needed for Migraine May take 2 tablets every 4 hours as needed for pain     * butalbital-acetaminophen-caffeine -40 MG per tablet  Commonly known as: FIORICET, ESGIC  Take 1 tablet by mouth every 4 hours as needed for Headaches May take 2 tablets every 4 hours as needed for pain     * butalbital-acetaminophen-caffeine -40 MG per tablet  Commonly known as: FIORICET, ESGIC  Take 1 tablet by mouth every 4 hours as needed for Headaches or Migraine May take 2 tablets every 4 hours as needed for pain     Dexilant 60 MG Cpdr delayed release capsule  Generic drug: dexlansoprazole  TAKE 1 CAPSULE BY MOUTH DAILY     diclofenac sodium 1 % Gel  Commonly known as: VOLTAREN  APPLY 2 GRAMS TOPICALLY TO HAND FOUR TIMES DAILY     dicyclomine 10 MG capsule  Commonly known as: BENTYL  Take 1 capsule by mouth every 6 hours as needed (Bowel spasms)     fluticasone 110 MCG/ACT inhaler  Commonly known as: Flovent HFA  INHALE 1 PUFF INTO THE LUNGS TWICE DAILY     gabapentin 300 MG capsule  Commonly known as: NEURONTIN  TAKE 1 CAPSULE BY MOUTH THREE TIMES DAILY     HYDROcodone-acetaminophen 7.5-325 MG per tablet  Commonly known as: Norco  Take 1 tablet by mouth every 6 hours as needed for Pain for up to 30 days.      hydrocortisone 0.2 % cream  Commonly known as: WESTCORT  APPLY EXTERNALLY TO THE AFFECTED AREA TWICE DAILY     LINZESS PO     montelukast 10 MG tablet  Commonly known as: SINGULAIR  TAKE 1 TABLET BY MOUTH EVERY NIGHT     nitroGLYCERIN 0.4 MG SL tablet  Commonly known as: NITROSTAT  PLACE 1 TABLET UNDE THE TONGUE EVERY 5 MINUTES AS NEEDED FOR CHEST PAIN     ondansetron 4 MG tablet  Commonly known as: Zofran  Take 1 tablet by mouth every 6 hours as needed for Nausea     prednisoLONE acetate 1 % ophthalmic suspension  Commonly known as: PRED FORTE  Place 1 drop into both eyes 2 times daily     sertraline 100 MG tablet  Commonly known as: ZOLOFT  TAKE 1 TABLET BY MOUTH THREE TIMES DAILY     tiZANidine 4 MG tablet  Commonly known as: ZANAFLEX  TAKE 1 TABLET BY MOUTH THREE TIMES DAILY     traZODone 50 MG tablet  Commonly known as: DESYREL  TAKE 2 TABLETS BY MOUTH EVERY NIGHT     TruForm Support Sock 20-30mmHg Misc  1 each by Does not apply route daily         * This list has 6 medication(s) that are the same as other medications prescribed for you. Read the directions carefully, and ask your doctor or other care provider to review them with you. Discharge recommendations given to patient. Follow Up. PCP in 1 week, follow-up with Dr. John Daigle in 2 weeks,  GI clinic, [facility] in 1-2 weeks, CHF clinic in a week  Disposition. home  Activity. activity as tolerated  Diet: DIET CARDIAC;      Spent 45 minutes in discharge process.     Signed:  Sharlene Fregoso MD     2/17/2021 12:43 PM

## 2021-02-17 NOTE — CARE COORDINATION
Patient discharged 2/17/2021 to home with [facility]. All discharge needs met per case management.     Kayode Moreno RN, BSN  [phone]

## 2021-02-18 ENCOUNTER — TELEPHONE (OUTPATIENT)
Dept: FAMILY MEDICINE CLINIC | Age: 66
End: 2021-02-18

## 2021-02-18 DIAGNOSIS — G89.4 CHRONIC PAIN SYNDROME: ICD-10-CM

## 2021-02-18 LAB
BLOOD CULTURE, ROUTINE: NORMAL
CULTURE, BLOOD 2: NORMAL

## 2021-02-18 RX ORDER — HYDROCODONE BITARTRATE AND ACETAMINOPHEN 7.5; 325 MG/1; MG/1
1 TABLET ORAL EVERY 6 HOURS PRN
Qty: 120 TABLET | Refills: 0 | Status: SHIPPED | OUTPATIENT
Start: 2021-02-18 | End: 2021-03-16 | Stop reason: SDUPTHER

## 2021-02-18 NOTE — TELEPHONE ENCOUNTER
[facility] Transitions Initial Follow Up Call    Outreach made within 2 business days of discharge: Yes    Patient: Dio Medrano Patient : 1955   MRN: [id number]  Reason for Admission: There are no discharge diagnoses documented for the most recent discharge. Discharge Date: 21       Spoke with: Nick Yuen    Discharge department/facility: [facility] Interactive Patient Contact:  Was patient able to fill all prescriptions: No: pt states she didn't take anything cause she was sick  Was patient instructed to bring all medications to the follow-up visit: Yes  Is patient taking all medications as directed in the discharge summary?  Yes  Does patient understand their discharge instructions: per pt she has some questions but will wait until tomorrow  Does patient have questions or concerns that need addressed prior to 7-14 day follow up office visit: per pt she is going to bring her discharge paper work in to go over her questions    Scheduled appointment with PCP within 7-14 days    Follow Up  Future Appointments   Date Time Provider Pa Mar   2021  8:45 AM Jorge Goodman MD [facility]   3/17/2021 10:00 AM JOAO Baltazar - CNP [facility]       Sushma Gastelum

## 2021-02-18 NOTE — TELEPHONE ENCOUNTER
Medication and Quantity requested:  HYDROcodone-acetaminophen (NORCO) 7.5-325 MG per tablet - qty 120    PT NEEDS SATURDAY 2/20        Last Visit  02/01/21 - Dr Tucker Souza and phone number updated in [facility]:  Yes    Ingrid

## 2021-02-19 ENCOUNTER — TELEPHONE (OUTPATIENT)
Dept: FAMILY MEDICINE CLINIC | Age: 66
End: 2021-02-19

## 2021-02-19 ENCOUNTER — OFFICE VISIT (OUTPATIENT)
Dept: FAMILY MEDICINE CLINIC | Age: 66
End: 2021-02-19
Payer: MEDICARE

## 2021-02-19 VITALS
DIASTOLIC BLOOD PRESSURE: 72 MMHG | HEART RATE: 107 BPM | SYSTOLIC BLOOD PRESSURE: 108 MMHG | WEIGHT: 104 LBS | BODY MASS INDEX: 15.81 KG/M2 | OXYGEN SATURATION: 99 %

## 2021-02-19 DIAGNOSIS — G89.4 CHRONIC PAIN SYNDROME: ICD-10-CM

## 2021-02-19 DIAGNOSIS — R07.89 OTHER CHEST PAIN: ICD-10-CM

## 2021-02-19 DIAGNOSIS — I10 ESSENTIAL HYPERTENSION: ICD-10-CM

## 2021-02-19 DIAGNOSIS — J45.20 MILD INTERMITTENT ASTHMA WITHOUT COMPLICATION: ICD-10-CM

## 2021-02-19 DIAGNOSIS — M15.9 PRIMARY OSTEOARTHRITIS INVOLVING MULTIPLE JOINTS: ICD-10-CM

## 2021-02-19 DIAGNOSIS — Z09 HOSPITAL DISCHARGE FOLLOW-UP: Primary | ICD-10-CM

## 2021-02-19 PROCEDURE — 99496 TRANSJ CARE MGMT HIGH F2F 7D: CPT | Performed by: FAMILY MEDICINE

## 2021-02-19 PROCEDURE — 1111F DSCHRG MED/CURRENT MED MERGE: CPT | Performed by: FAMILY MEDICINE

## 2021-02-19 RX ORDER — ALBUTEROL SULFATE 2.5 MG/3ML
SOLUTION RESPIRATORY (INHALATION)
Qty: 180 VIAL | Refills: 11 | Status: SHIPPED | OUTPATIENT
Start: 2021-02-19 | End: 2022-06-20

## 2021-02-19 NOTE — TELEPHONE ENCOUNTER
Eligibility is based on age which I think they are giving the Covid to people above 72 so therefore she would be eligible. It is okay to get the Covid vaccine anytime she wants.

## 2021-02-19 NOTE — TELEPHONE ENCOUNTER
Pt would like to know if she is eligible to get the covid vaccination. Pt did not know if it would be ok due to just getting out of the hospital    Please call back and advise.

## 2021-02-19 NOTE — PROGRESS NOTES
Post-Discharge Transitional Care Management Services or Hospital Follow Up      Angi Juarez   YOB: 1955    Date of Office Visit:  2/19/2021  Date of Hospital Admission: 2/14/21  Date of Hospital Discharge: 2/17/21  Risk of hospital readmission (high >=14%.  Medium >=10%) :Readmission Risk Score: 14      Care management risk score Rising risk (score 2-5) and Complex Care (Scores >=6): 2     Non face to face  following discharge, date last encounter closed (first attempt may have been earlier): 2/18/2021  2:09 PM    Call initiated 2 business days of discharge: Yes    Patient Active Problem List   Diagnosis    Glaucoma    Chronic pain    Family history of breast cancer in first degree relative    Colitis    Abdominal pain, generalized    IBS (irritable bowel syndrome)    Pericardial effusion    Cardiomyopathy ([address]    Vertigo, labyrinthine    Facial nerve paralysis    Gastroenteritis    Hypokalemia    Allergic rhinitis    Elevated sed rate    Pyelonephritis    Choledocholithiasis    Neuritis    Diarrhea    History of MI (myocardial infarction)    Chronic pain syndrome    Facet arthropathy    Central spinal stenosis    Failed back syndrome    Depression    Primary insomnia    Acute bronchitis    Allergic sinusitis    Mild intermittent asthma without complication    Mucous cyst of finger    Intractable cluster headache syndrome    Incisional hernia, without obstruction or gangrene    PONV (postoperative nausea and vomiting)    Bilateral carotid artery stenosis    Epigastric pain       Allergies   Allergen Reactions    Beta Adrenergic Blockers Shortness Of Breath    Pheniramine      Other reaction(s): rapid heartbeat    Antihistamine [Diphenhydramine Hcl] Nausea And Vomiting    Aspirin Hives    Codeine Hives    Dilaudid [Hydromorphone] Nausea And Vomiting    Nsaids Hives and Nausea And Vomiting    Prednisone Other (See Comments)     GI upset    Zithromax [Azithromycin] Nausea And Vomiting       Medications listed as ordered at the time of discharge from hospital   Alee Manning   Home Medication Instructions MAXIMUS:    Printed on:02/19/21 9061   Medication Information                      albuterol (PROVENTIL) (2.5 MG/3ML) 0.083% nebulizer solution  USE 1 VIAL IN NEBULIZER EVERY 6 HOURS             albuterol sulfate HFA (PROAIR HFA) 108 (90 Base) MCG/ACT inhaler  INHALE 2 PUFFS BY MOUTH EVERY 4 HOURS AS NEEDED FOR WHEEZING             DEXILANT 60 MG CPDR delayed release capsule  TAKE 1 CAPSULE BY MOUTH DAILY             diclofenac sodium (VOLTAREN) 1 % GEL  APPLY 2 GRAMS TOPICALLY TO HAND FOUR TIMES DAILY             dicyclomine (BENTYL) 10 MG capsule  Take 1 capsule by mouth every 6 hours as needed (Bowel spasms)             Elastic Bandages & Supports (TRUFORM SUPPORT SOCK 20-30MMHG) MISC  1 each by Does not apply route daily             fluticasone (FLOVENT HFA) 110 MCG/ACT inhaler  INHALE 1 PUFF INTO THE LUNGS TWICE DAILY             gabapentin (NEURONTIN) 300 MG capsule  TAKE 1 CAPSULE BY MOUTH THREE TIMES DAILY             HYDROcodone-acetaminophen (NORCO) 7.5-325 MG per tablet  Take 1 tablet by mouth every 6 hours as needed for Pain for up to 30 days.              hydrocortisone (WESTCORT) 0.2 % cream  APPLY EXTERNALLY TO THE AFFECTED AREA TWICE DAILY             linaCLOtide (LINZESS PO)  Take by mouth daily             montelukast (SINGULAIR) 10 MG tablet  TAKE 1 TABLET BY MOUTH EVERY NIGHT             nitroGLYCERIN (NITROSTAT) 0.4 MG SL tablet  PLACE 1 TABLET UNDE THE TONGUE EVERY 5 MINUTES AS NEEDED FOR CHEST PAIN             Nutritional Supplements (BOOST CALORIE SMART) LIQD  Take 1 Can by mouth See Admin Instructions EVERY day             ondansetron (ZOFRAN) 4 MG tablet  Take 1 tablet by mouth every 6 hours as needed for Nausea             prednisoLONE acetate (PRED FORTE) 1 % ophthalmic suspension  Place 1 drop into both eyes 2 times daily sertraline (ZOLOFT) 100 MG tablet  TAKE 1 TABLET BY MOUTH THREE TIMES DAILY             tiZANidine (ZANAFLEX) 4 MG tablet  TAKE 1 TABLET BY MOUTH THREE TIMES DAILY             traZODone (DESYREL) 50 MG tablet  TAKE 2 TABLETS BY MOUTH EVERY NIGHT                   Medications marked \"taking\" at this time  Outpatient Medications Marked as Taking for the 2/19/21 encounter (Office Visit) with Varsha Winters MD   Medication Sig Dispense Refill    albuterol (PROVENTIL) (2.5 MG/3ML) 0.083% nebulizer solution USE 1 VIAL IN NEBULIZER EVERY 6 HOURS 180 vial 11    HYDROcodone-acetaminophen (NORCO) 7.5-325 MG per tablet Take 1 tablet by mouth every 6 hours as needed for Pain for up to 30 days.  120 tablet 0    albuterol sulfate HFA (PROAIR HFA) 108 (90 Base) MCG/ACT inhaler INHALE 2 PUFFS BY MOUTH EVERY 4 HOURS AS NEEDED FOR WHEEZING 8.5 g 11    gabapentin (NEURONTIN) 300 MG capsule TAKE 1 CAPSULE BY MOUTH THREE TIMES DAILY 90 capsule 5    ondansetron (ZOFRAN) 4 MG tablet Take 1 tablet by mouth every 6 hours as needed for Nausea 30 tablet 0    montelukast (SINGULAIR) 10 MG tablet TAKE 1 TABLET BY MOUTH EVERY NIGHT 90 tablet 3    traZODone (DESYREL) 50 MG tablet TAKE 2 TABLETS BY MOUTH EVERY NIGHT 60 tablet 11    DEXILANT 60 MG CPDR delayed release capsule TAKE 1 CAPSULE BY MOUTH DAILY 90 capsule 3    prednisoLONE acetate (PRED FORTE) 1 % ophthalmic suspension Place 1 drop into both eyes 2 times daily 5 mL 1    tiZANidine (ZANAFLEX) 4 MG tablet TAKE 1 TABLET BY MOUTH THREE TIMES DAILY 90 tablet 5    diclofenac sodium (VOLTAREN) 1 % GEL APPLY 2 GRAMS TOPICALLY TO HAND FOUR TIMES DAILY 500 g 3    fluticasone (FLOVENT HFA) 110 MCG/ACT inhaler INHALE 1 PUFF INTO THE LUNGS TWICE DAILY 12 g 5    sertraline (ZOLOFT) 100 MG tablet TAKE 1 TABLET BY MOUTH THREE TIMES DAILY 270 tablet 3    nitroGLYCERIN (NITROSTAT) 0.4 MG SL tablet PLACE 1 TABLET UNDE THE TONGUE EVERY 5 MINUTES AS NEEDED FOR CHEST PAIN 25 tablet 2    linaCLOtide (LINZESS PO) Take by mouth daily      Elastic Bandages & Supports (TRUFORM SUPPORT SOCK 20-30MMHG) MISC 1 each by Does not apply route daily 2 each 2    hydrocortisone (WESTCORT) 0.2 % cream APPLY EXTERNALLY TO THE AFFECTED AREA TWICE DAILY 30 g 5    Nutritional Supplements (BOOST CALORIE SMART) LIQD Take 1 Can by mouth See Admin Instructions EVERY day          Medications patient taking as of now reconciled against medications ordered at time of hospital discharge: Yes    Chief Complaint   Patient presents with   [address] from Hospital       History of Present illness - Follow up of Hospital diagnosis(es): Patient was brought to the emergency room with chest pain and flulike symptoms. It was reported on her discharge summary that the life squad says she was in and out of consciousness in route to the hospital.  She was seen in consultation by cardiology. She did have a stress test that was abnormal with low to moderate risk scan. She had a carotid ultrasound with less than 50% stenosis bilaterally. Echocardiogram revealed a moderate pericardial effusion as noted below:  Summary   Normal left ventricular cavity size and wall thickness. Mild-to-moderately   reduced global systolic function with an ejection fraction estimated at 45%. The mid inferoseptum and apical septum appear hypokinetic. There is mild-to-moderate mitral regurgitation. Probably at least moderate pericardial effusion with larger measurment   anteriorly. Correlate clinically for tamponade. The inferior vena cava Is mildly dilated (2.13 cm) with respiratory collapse   of about 50%. Inpatient course: Discharge summary reviewed- see chart. Interval history/Current status: She states she is still having some symptoms however not nearly as severe. She attributes a lot of her symptoms to her arthritis and chronic pain. A comprehensive review of systems was negative except for what was noted in the HPI.     Vitals: 02/19/21 0853   BP: 108/72   Pulse: 107   SpO2: 99%   Weight: 104 lb (47.2 kg)     Body mass index is 15.81 kg/m². Wt Readings from Last 3 Encounters:   02/19/21 104 lb (47.2 kg)   02/14/21 107 lb (48.5 kg)   02/01/21 103 lb (46.7 kg)     BP Readings from Last 3 Encounters:   02/19/21 108/72   02/17/21 136/81   02/01/21 130/86        Physical Exam:  Vitals:    02/19/21 0853   BP: 108/72   Pulse: 107   SpO2: 99%   Weight: 104 lb (47.2 kg)     Body mass index is 15.81 kg/m². General Appearance: alert and oriented, in no acute distress  Skin: warm and dry, no rash or erythema  Head: normocephalic and atraumatic  Eyes:conjunctivae normal  Neck: supple and non-tender without mass, no thyromegaly or thyroid nodules, no cervical lymphadenopathy  Pulmonary/Chest: clear to auscultation bilaterally- no wheezes, rales or rhonchi, normal air movement, no respiratory distress  Cardiovascular: normal rate, regular rhythm, normal S1 and S2, no murmurs, rubs, clicks, or gallops, no carotid bruits  Abdomen: soft, subjective tenderness in mid abdomen and left lower abdomen, non-distended, normal bowel sounds, no masses or organomegaly  Extremities: no cyanosis, clubbing or edema  Neurologic:  no cranial nerve deficit, speech normal    Assessment/Plan:  1. Hospital discharge follow-up  - MA DISCHARGE MEDS RECONCILED W/ CURRENT OUTPATIENT MED LIST    2. Other chest pain  Patient has follow-up with cardiology on 3/16. Should her pain get worse she will return to the ER. 3. Essential hypertension  Well-controlled on no treatment    4. Chronic pain syndrome  OARRS report reviewed and no inconsistencies noted   The patient understands the risks of dependency/addiction with hydrocodone and will take as little as possible and discontinue as soon as possible       5.  Mild intermittent asthma without complication  Currently stable  - albuterol (PROVENTIL) (2.5 MG/3ML) 0.083% nebulizer solution; USE 1 VIAL IN NEBULIZER EVERY 6 HOURS Dispense: 180 vial; Refill: 11    6.  Primary osteoarthritis involving multiple joints  - Juni Vargas MD, Rheumatology, [facility]        Medical Decision Making: high complexity

## 2021-03-15 ENCOUNTER — HOSPITAL ENCOUNTER (OUTPATIENT)
Dept: GENERAL RADIOLOGY | Age: 66
Discharge: HOME | End: 2021-03-15
Payer: MEDICARE

## 2021-03-15 ENCOUNTER — HOSPITAL ENCOUNTER (OUTPATIENT)
Age: 66
Discharge: HOME | End: 2021-03-15
Payer: MEDICARE

## 2021-03-15 DIAGNOSIS — R52 PAIN: ICD-10-CM

## 2021-03-15 PROCEDURE — 74019 RADEX ABDOMEN 2 VIEWS: CPT

## 2021-03-16 ENCOUNTER — NURSE TRIAGE (OUTPATIENT)
Dept: OTHER | Facility: CLINIC | Age: 66
End: 2021-03-16

## 2021-03-16 DIAGNOSIS — G89.4 CHRONIC PAIN SYNDROME: ICD-10-CM

## 2021-03-16 RX ORDER — HYDROCODONE BITARTRATE AND ACETAMINOPHEN 7.5; 325 MG/1; MG/1
1 TABLET ORAL EVERY 6 HOURS PRN
Qty: 120 TABLET | Refills: 0 | Status: SHIPPED | OUTPATIENT
Start: 2021-03-16 | End: 2021-04-16 | Stop reason: SDUPTHER

## 2021-03-16 NOTE — TELEPHONE ENCOUNTER
Medication and Quantity requested: HYDROcodone-acetaminophen (NORCO) 7.5-325 MG per tablet-QTY.  120 TABLETS         Last Visit  2/19/2021    Pharmacy and phone number updated in [facility]:  yes  Ingrid

## 2021-03-16 NOTE — TELEPHONE ENCOUNTER
Reason for Disposition   Bad or foul-smelling urine    Answer Assessment - Initial Assessment Questions  1. SYMPTOM: \"What's the main symptom you're concerned about? \" (e.g., frequency, incontinence)      Lower back pain    2. ONSET: \"When did the  symptoms  start? \"      Onset was about 3 weeks ago    3. PAIN: \"Is there any pain? \" If so, ask: \"How bad is it? \" (Scale: 1-10; mild, moderate, severe)      No pain or burning with urination    4. CAUSE: \"What do you think is causing the symptoms? \"      She thinks it is a UTI    5. OTHER SYMPTOMS: \"Do you have any other symptoms? \" (e.g., fever, flank pain, blood in urine, pain with urination)      Odorous urine, dark urine, urinary frequency, lower back pain (radiates down right leg) back pain (10/10) is chronic but today it is worse than normal    6. PREGNANCY: \"Is there any chance you are pregnant? \" \"When was your last menstrual period? \"      No    Protocols used: URINARY SYMPTOMS-ADULT-[place]    Call came in from Erwin Lopez at the [address] [facility]    See triage above:  Patient denies fever and reports that she is getting plenty of fluids. Encouraged continued use of fluids and suggested some cranberry juice (care advice)  Provided care advice. Recommended she be seen today. Patient will be going in to an Urgent Care this evening for treatment.

## 2021-03-17 ENCOUNTER — OFFICE VISIT (OUTPATIENT)
Dept: CARDIOLOGY CLINIC | Age: 66
End: 2021-03-17
Payer: MEDICARE

## 2021-03-17 DIAGNOSIS — I31.39 PERICARDIAL EFFUSION: Primary | ICD-10-CM

## 2021-03-17 DIAGNOSIS — I42.9 CARDIOMYOPATHY, UNSPECIFIED TYPE (HCC): ICD-10-CM

## 2021-03-17 DIAGNOSIS — I65.23 BILATERAL CAROTID ARTERY STENOSIS: ICD-10-CM

## 2021-03-17 PROCEDURE — 4040F PNEUMOC VAC/ADMIN/RCVD: CPT | Performed by: NURSE PRACTITIONER

## 2021-03-17 PROCEDURE — 3017F COLORECTAL CA SCREEN DOC REV: CPT | Performed by: NURSE PRACTITIONER

## 2021-03-17 PROCEDURE — 1036F TOBACCO NON-USER: CPT | Performed by: NURSE PRACTITIONER

## 2021-03-17 PROCEDURE — G8427 DOCREV CUR MEDS BY ELIG CLIN: HCPCS | Performed by: NURSE PRACTITIONER

## 2021-03-17 PROCEDURE — 1090F PRES/ABSN URINE INCON ASSESS: CPT | Performed by: NURSE PRACTITIONER

## 2021-03-17 PROCEDURE — 1123F ACP DISCUSS/DSCN MKR DOCD: CPT | Performed by: NURSE PRACTITIONER

## 2021-03-17 PROCEDURE — G8482 FLU IMMUNIZE ORDER/ADMIN: HCPCS | Performed by: NURSE PRACTITIONER

## 2021-03-17 PROCEDURE — 99214 OFFICE O/P EST MOD 30 MIN: CPT | Performed by: NURSE PRACTITIONER

## 2021-03-17 PROCEDURE — G8419 CALC BMI OUT NRM PARAM NOF/U: HCPCS | Performed by: NURSE PRACTITIONER

## 2021-03-17 PROCEDURE — G8400 PT W/DXA NO RESULTS DOC: HCPCS | Performed by: NURSE PRACTITIONER

## 2021-03-17 NOTE — PROGRESS NOTES
duplex ordered today as per cardiology recommendations. Less than 50% bilateral stenosis. No neurological deficits of evidence of stroke. Follow-up as outpatient.   Other comorbidities:  Asthma: Stable respiratory status, Singulair and albuterol inhaler as needed  Cardiomyopathy, CHF: Euvolemic.  2D echo from 2/15 shows LVEF of 45%  Chronic pain: Norco every 6 hrs as needed. Follow-up with PCP for pain management. Depression: Resumed on Zoloft  Postop nausea and vomiting: Antiemetics prn. Sabrina Parra is 76 y.o. female who presents today for a routine follow up after a recent hospitalization related to the above mentioned issues. 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  Her EF has since normalized.  She had a normal nuclear stress test prior to back surgery in 2016  in addition to what is mentioned above. Today, she denies significant chest pain. There is no SOB/SAEZ. The patient denies orthopnea/PND. Denies CP when laying back in bed. Denies swelling and her weight is stable. Has palpations a couple times a week that last a few min and then are self limiting. This has been going on for years, not worsening. When she stands quickly she becomes light headed and everything goes dark. Symptoms are not as bad if she changes positions slowly. Denies syncope. Pt saw GI last week at [facility]. Per pt she had xray that was ok and plans to have an ultrasound soon. She has persistent epigastric pain that wakes her up at night. She sees rheumatology next month to evaluate for OA and pericardial effusion. With regard to medication therapy the patient has been compliant with prescribed regimen. They have tolerated therapy to date.      Past Medical History:   Diagnosis Date    Arthritis     fingers, hands    Asthma     Back pain     Bell's palsy     CHF (congestive heart failure) (HCC)     Chronic pain     BACK SURGERY X4, neck, left arm    Colitis 2013    Depression     Elevated sed rate     Family history of breast cancer in first degree relative     Gastric ulcer     GERD (gastroesophageal reflux disease)     colitis    Glaucoma     Hypertension     Movement disorder     chronic back pain    Neuromuscular disorder (HCC)     sciatica rt. leg    Osteoarthritis     PONV (postoperative nausea and vomiting)     SEVERE     Social History:    Social History     Tobacco Use   Smoking Status Former Smoker    Packs/day: 0.25    Years: 20.00    Pack years: 5.00    Types: Cigarettes    Quit date: 2016    Years since quittin.9   Smokeless Tobacco Never Used     Current Medications:  Current Outpatient Medications   Medication Sig Dispense Refill    HYDROcodone-acetaminophen (NORCO) 7.5-325 MG per tablet Take 1 tablet by mouth every 6 hours as needed for Pain for up to 30 days.  120 tablet 0    albuterol (PROVENTIL) (2.5 MG/3ML) 0.083% nebulizer solution USE 1 VIAL IN NEBULIZER EVERY 6 HOURS 180 vial 11    albuterol sulfate HFA (PROAIR HFA) 108 (90 Base) MCG/ACT inhaler INHALE 2 PUFFS BY MOUTH EVERY 4 HOURS AS NEEDED FOR WHEEZING 8.5 g 11    gabapentin (NEURONTIN) 300 MG capsule TAKE 1 CAPSULE BY MOUTH THREE TIMES DAILY 90 capsule 5    ondansetron (ZOFRAN) 4 MG tablet Take 1 tablet by mouth every 6 hours as needed for Nausea 30 tablet 0    dicyclomine (BENTYL) 10 MG capsule Take 1 capsule by mouth every 6 hours as needed (Bowel spasms) 20 capsule 0    montelukast (SINGULAIR) 10 MG tablet TAKE 1 TABLET BY MOUTH EVERY NIGHT 90 tablet 3    traZODone (DESYREL) 50 MG tablet TAKE 2 TABLETS BY MOUTH EVERY NIGHT 60 tablet 11    DEXILANT 60 MG CPDR delayed release capsule TAKE 1 CAPSULE BY MOUTH DAILY 90 capsule 3    prednisoLONE acetate (PRED FORTE) 1 % ophthalmic suspension Place 1 drop into both eyes 2 times daily 5 mL 1    tiZANidine (ZANAFLEX) 4 MG tablet TAKE 1 TABLET BY MOUTH THREE TIMES DAILY 90 tablet 5    diclofenac sodium (VOLTAREN) 1 % GEL APPLY 2 GRAMS TOPICALLY TO HAND FOUR TIMES DAILY 500 g 3    fluticasone (FLOVENT HFA) 110 MCG/ACT inhaler INHALE 1 PUFF INTO THE LUNGS TWICE DAILY 12 g 5    sertraline (ZOLOFT) 100 MG tablet TAKE 1 TABLET BY MOUTH THREE TIMES DAILY 270 tablet 3    nitroGLYCERIN (NITROSTAT) 0.4 MG SL tablet PLACE 1 TABLET UNDE THE TONGUE EVERY 5 MINUTES AS NEEDED FOR CHEST PAIN 25 tablet 2    linaCLOtide (LINZESS PO) Take by mouth daily      Elastic Bandages & Supports (TRUFORM SUPPORT SOCK 20-30MMHG) MISC 1 each by Does not apply route daily 2 each 2    hydrocortisone (WESTCORT) 0.2 % cream APPLY EXTERNALLY TO THE AFFECTED AREA TWICE DAILY 30 g 5    Nutritional Supplements (BOOST CALORIE SMART) LIQD Take 1 Can by mouth See Admin Instructions EVERY day       No current facility-administered medications for this visit. REVIEW OF SYSTEMS:   CONSTITUTIONAL: No major weight gain or loss, night sweats, fever, fatigue, or weakness. HEENT: No new vision difficulties or ringing in the ears. RESPIRATORY: No new SOB, PND, orthopnea or cough. CARDIOVASCULAR: See HPI  GI: No N/V/D, constipation, or abdominal pain. No black/tarry stools  : No urinary urgency, incontinence, or hematuria. SKIN: No cyanosis or skin lesions. MUSCULOSKELETAL: No new muscle or joint pain. NEUROLOGICAL: No syncope or TIA-like symptoms.   PSYCHIATRIC: No anxiety, pain, insomnia or depression    Objective:   PHYSICAL EXAM:        Vitals:    03/17/21 0954 03/17/21 1015 03/17/21 1020   BP: 126/74 136/74 118/80   Site: Left Upper Arm     Position: Sitting Sitting Standing   Cuff Size: Medium Adult     Pulse: 101 88    SpO2: 98%     Weight: 106 lb (48.1 kg)     Height: 5' 8\" (1.727 m)        VITALS:  /80 (Position: Standing)   Pulse 88   Ht 5' 8\" (1.727 m)   Wt 106 lb (48.1 kg)   SpO2 98%   BMI 16.12 kg/m²     CONSTITUTIONAL: Cooperative, no apparent distress, and appears thin  NEUROLOGIC:  Awake and orientated to person, place, and time. PSYCH: Calm affect. SKIN: Warm and dry. HEENT: Sclera non-icteric, normocephalic, neck supple. RESPIRATORY:  No increased work of breathing and clear to auscultation, no crackles or wheezing. CARDIOVASCULAR:  Regular rate and rhythm without murmur. Normal S1 and S2. No gallops or rubs. Normal PMI. No elevation of JVP. Normal carotid pulses with no bruits. GI:  Normal bowel sounds. Non-distended. Non-tender to palpation. EXT: No edema. No calf tenderness. Pulses are present bilaterally. DATA:    Lab Results   Component Value Date    ALT 18 02/14/2021    AST 23 02/14/2021    ALKPHOS 100 02/14/2021    BILITOT 0.3 02/14/2021     Lab Results   Component Value Date    CREATININE 0.6 02/17/2021    BUN 17 02/17/2021     02/17/2021    K 3.6 02/17/2021     02/17/2021    CO2 29 02/17/2021     Lab Results   Component Value Date    TSH 0.34 09/04/2018    F2TZIXR 4.7 09/04/2018     Lab Results   Component Value Date    WBC 5.9 02/17/2021    HGB 13.6 02/17/2021    HCT 41.1 02/17/2021    MCV 95.4 02/17/2021     02/17/2021     No components found for: CHLPL  Lab Results   Component Value Date    TRIG 134 03/19/2019    TRIG 148 01/24/2011    TRIG 137 07/20/2010     Lab Results   Component Value Date    HDL 59 03/19/2019    HDL 64 (H) 01/24/2011    HDL 51 07/20/2010     Lab Results   Component Value Date    LDLCALC 114 (H) 03/19/2019    LDLCALC 139 (H) 01/24/2011    LDLCALC 169 (H) 07/20/2010     Lab Results   Component Value Date    LABVLDL 27 03/19/2019    LABVLDL 30 01/24/2011    LABVLDL 27 07/20/2010     Radiology Review:  Pertinent images / reports were reviewed as a part of this visit and reveals the following:    Last Echo: 2/15/21   Summary   Normal left ventricular cavity size and wall thickness.  Mild-to-moderately   reduced global systolic function with an ejection fraction estimated at 45%. The mid inferoseptum and apical septum appear hypokinetic. There is mild-to-moderate mitral regurgitation. Probably at least moderate pericardial effusion with larger measurment   anteriorly. Correlate clinically for tamponade. The inferior vena cava Is mildly dilated (2.13 cm) with respiratory collapse   of about 50%. Last Stress Test: 2/16/21  Summary    The overall quality of the study is fair. Subdiaphragmatic attenuation is    present.        Left ventricular cavity is noted to be normal on the rest studies. Right    ventricle is normal.        There is a small perfusion defect of moderate severity of the apical    inferior wall. The defect is present at rest and stress, consistent with    scar. There is a corresponding wall motion abnormality.        Sum stress score of 2. TID 1.02. No visual TID.        Left ventricular ejection fraction was mildly decreased at 47%.        Low-moderate risk scan. Last Angiogram: 2/2006   normal coronaries     Carotid US: 2/2021  There is <50% stenosis of the bilateral internal carotid arteries.    Vertebral flow are antegrade bilaterally. Assessment:     1. Pericardial effusion   ~ 3/2014 and 12/2014 echos with small localized pericardial effusion   ~ 8/2019 echo: small to mod pericardial effusion   ~ 2/2020 echo: moderate pericardial effusion with more sig accumulation anteriorly but no tamponade   ~ 2/2021 echo: repeat echo with mild to moderate effusion without sign of tamponade  ~ labs 2/2021: LALI negative, CRP and Sed rate not elevated   ~ Referral to rheumatology for chronic effusion     2. Cardiomyopathy   ~ stable  ~ 2006 Echo: EF 25%, suspected viral etiology ([address] 2006 with normal cors)  ~ 12/2014 LVEF recovered   ~ Last echo 2/2021: EF 45% but Dr. Jameson Albarran reviewed and states it is preserved with LVEF at 50-55%    3.  Carotid stenosis   ~ stable ; no stroke like symptoms   ~ 2011 duplex: 50-79% R ICA, 16-49% L ICA   ~ 2018 duplex: 50-69% R ICA, <50% L ICA   ~ 2/2021 duplex: <50% bilateral ICA     4. Chest pain / epigastric pain  ~ Myoview lexiscan 2/2021: consistent with scar  ~ denies further CP    5. Light headedness   ~ occurs with position change   ~ in office sitting /74, standing 118/80  ~ denies syncope     Patient  is stable since hospital discharge. Plan:     1. No medication changes   2. Change positions slowly, benja hose, ensure you are well hydrated   Monitor home BP  3. Follow up in 3 months with Dr. Lorenzo Bhat     I have addressed the patient's cardiac risk factors and adjusted pharmacologic treatment as needed. In addition, I have reinforced the need for patient directed risk factor modification. Further evaluation will be based upon the patient's clinical course and testing results. All questions and concerns were addressed to the patient. Alternatives to my treatment were discussed. The patient verbalizes understanding not to stop medications without discussing with us. The patient is not currently smoking. The risks related to smoking were reviewed with the patient. Recommend maintaining a smoke-free lifestyle. Discussed exercise: 30min of sustained cardiovascular exercise most days of the week   Discussed Low saturated fat / Cholesterol diet. Thank you for allowing us to participate in the care of Shane BucioJohann SHEPHERD-CNP  [facility]   Office: [phone]    Documentation of today's visit sent to PCP

## 2021-03-17 NOTE — PATIENT INSTRUCTIONS
Slow position change   Make sure you are well hydrated   Compression stockings     No medication changes     Monitor home blood pressure. Goal is less than 140/80 but to remain greater than 100/50.        Call with any concerns   Follow up with Dr. Dallas Rahman in 3 months

## 2021-03-19 VITALS
SYSTOLIC BLOOD PRESSURE: 118 MMHG | HEIGHT: 68 IN | WEIGHT: 106 LBS | OXYGEN SATURATION: 98 % | HEART RATE: 88 BPM | DIASTOLIC BLOOD PRESSURE: 80 MMHG | BODY MASS INDEX: 16.06 KG/M2

## 2021-04-02 DIAGNOSIS — G89.4 CHRONIC PAIN SYNDROME: ICD-10-CM

## 2021-04-05 RX ORDER — TIZANIDINE 4 MG/1
TABLET ORAL
Qty: 90 TABLET | Refills: 5 | Status: SHIPPED | OUTPATIENT
Start: 2021-04-05 | End: 2021-09-29

## 2021-04-05 NOTE — TELEPHONE ENCOUNTER
Medication:   Requested Prescriptions     Pending Prescriptions Disp Refills    tiZANidine (ZANAFLEX) 4 MG tablet [Pharmacy Med Name: TIZANIDINE 4MG TABLETS] 90 tablet 5     Sig: TAKE 1 TABLET BY MOUTH THREE TIMES DAILY        Last Filled:  8/14/20 #90, 5 RF     Patient Phone Number: [phone] (home)     Last appt: 2/19/21 follow up from hospital   Next appt: Visit date not found

## 2021-04-12 ENCOUNTER — OFFICE VISIT (OUTPATIENT)
Dept: RHEUMATOLOGY | Age: 66
End: 2021-04-12
Payer: MEDICARE

## 2021-04-12 VITALS
SYSTOLIC BLOOD PRESSURE: 142 MMHG | HEIGHT: 68 IN | HEART RATE: 87 BPM | RESPIRATION RATE: 16 BRPM | DIASTOLIC BLOOD PRESSURE: 80 MMHG | OXYGEN SATURATION: 99 % | WEIGHT: 107 LBS | BODY MASS INDEX: 16.22 KG/M2

## 2021-04-12 DIAGNOSIS — M25.522 PAIN OF BOTH ELBOWS: ICD-10-CM

## 2021-04-12 DIAGNOSIS — Z11.59 ENCOUNTER FOR SCREENING FOR OTHER VIRAL DISEASES: ICD-10-CM

## 2021-04-12 DIAGNOSIS — M25.521 PAIN OF BOTH ELBOWS: ICD-10-CM

## 2021-04-12 DIAGNOSIS — R53.83 OTHER FATIGUE: ICD-10-CM

## 2021-04-12 DIAGNOSIS — M15.9 PRIMARY OSTEOARTHRITIS INVOLVING MULTIPLE JOINTS: Primary | ICD-10-CM

## 2021-04-12 PROCEDURE — G8427 DOCREV CUR MEDS BY ELIG CLIN: HCPCS | Performed by: INTERNAL MEDICINE

## 2021-04-12 PROCEDURE — 1090F PRES/ABSN URINE INCON ASSESS: CPT | Performed by: INTERNAL MEDICINE

## 2021-04-12 PROCEDURE — 4040F PNEUMOC VAC/ADMIN/RCVD: CPT | Performed by: INTERNAL MEDICINE

## 2021-04-12 PROCEDURE — 3017F COLORECTAL CA SCREEN DOC REV: CPT | Performed by: INTERNAL MEDICINE

## 2021-04-12 PROCEDURE — 99215 OFFICE O/P EST HI 40 MIN: CPT | Performed by: INTERNAL MEDICINE

## 2021-04-12 PROCEDURE — G8400 PT W/DXA NO RESULTS DOC: HCPCS | Performed by: INTERNAL MEDICINE

## 2021-04-12 PROCEDURE — 1123F ACP DISCUSS/DSCN MKR DOCD: CPT | Performed by: INTERNAL MEDICINE

## 2021-04-12 PROCEDURE — 1036F TOBACCO NON-USER: CPT | Performed by: INTERNAL MEDICINE

## 2021-04-12 PROCEDURE — G8419 CALC BMI OUT NRM PARAM NOF/U: HCPCS | Performed by: INTERNAL MEDICINE

## 2021-04-12 NOTE — PROGRESS NOTES
2021  Patient Name: Brayan Beltre  : 1955  Medical Record: [id number]    MEDICATIONS  Current Outpatient Medications   Medication Sig Dispense Refill    Elastic Bandages & Supports (ELBOW BRACE) MISC Use as needed 2 each 0    tiZANidine (ZANAFLEX) 4 MG tablet TAKE 1 TABLET BY MOUTH THREE TIMES DAILY 90 tablet 5    HYDROcodone-acetaminophen (NORCO) 7.5-325 MG per tablet Take 1 tablet by mouth every 6 hours as needed for Pain for up to 30 days.  120 tablet 0    albuterol (PROVENTIL) (2.5 MG/3ML) 0.083% nebulizer solution USE 1 VIAL IN NEBULIZER EVERY 6 HOURS 180 vial 11    albuterol sulfate HFA (PROAIR HFA) 108 (90 Base) MCG/ACT inhaler INHALE 2 PUFFS BY MOUTH EVERY 4 HOURS AS NEEDED FOR WHEEZING 8.5 g 11    gabapentin (NEURONTIN) 300 MG capsule TAKE 1 CAPSULE BY MOUTH THREE TIMES DAILY 90 capsule 5    ondansetron (ZOFRAN) 4 MG tablet Take 1 tablet by mouth every 6 hours as needed for Nausea 30 tablet 0    montelukast (SINGULAIR) 10 MG tablet TAKE 1 TABLET BY MOUTH EVERY NIGHT 90 tablet 3    traZODone (DESYREL) 50 MG tablet TAKE 2 TABLETS BY MOUTH EVERY NIGHT 60 tablet 11    DEXILANT 60 MG CPDR delayed release capsule TAKE 1 CAPSULE BY MOUTH DAILY 90 capsule 3    diclofenac sodium (VOLTAREN) 1 % GEL APPLY 2 GRAMS TOPICALLY TO HAND FOUR TIMES DAILY 500 g 3    fluticasone (FLOVENT HFA) 110 MCG/ACT inhaler INHALE 1 PUFF INTO THE LUNGS TWICE DAILY 12 g 5    sertraline (ZOLOFT) 100 MG tablet TAKE 1 TABLET BY MOUTH THREE TIMES DAILY 270 tablet 3    nitroGLYCERIN (NITROSTAT) 0.4 MG SL tablet PLACE 1 TABLET UNDE THE TONGUE EVERY 5 MINUTES AS NEEDED FOR CHEST PAIN 25 tablet 2    linaCLOtide (LINZESS PO) Take by mouth daily      Elastic Bandages & Supports (TRUFORM SUPPORT SOCK 20-30MMHG) MISC 1 each by Does not apply route daily 2 each 2    hydrocortisone (WESTCORT) 0.2 % cream APPLY EXTERNALLY TO THE AFFECTED AREA TWICE DAILY 30 g 5    Nutritional Supplements (BOOST CALORIE SMART) LIQD Take 1 Can by mouth See Admin Instructions EVERY day      dicyclomine (BENTYL) 10 MG capsule Take 1 capsule by mouth every 6 hours as needed (Bowel spasms) 20 capsule 0     No current facility-administered medications for this visit. ALLERGIES  Allergies   Allergen Reactions    Beta Adrenergic Blockers Shortness Of Breath    Pheniramine      Other reaction(s): rapid heartbeat    Antihistamine [Diphenhydramine Hcl] Nausea And Vomiting    Aspirin Hives    Codeine Hives    Dilaudid [Hydromorphone] Nausea And Vomiting    Nsaids Hives and Nausea And Vomiting    Prednisone Other (See Comments)     GI upset    Zithromax [Azithromycin] Nausea And Vomiting         Comments  No specialty comments available. Background history:  Nina Dangelo is a 78 y.o. female who is being seen for follow up evaluation of  osteoarthritis. She has osteoarthritis involving multiple joints. She is a transfer from Dr. Román Brannon. Dr. Yoko Yoder notes were reviewed. She has worse pain in her hands, knees and left hip. Pain in the hands is mostly in the left [facility] joint. She she denies any swelling or stiffness. She has worse pain in the left knee and his difficulty going up and down the steps. She also has a low back pain and has had multiple epidural spinal injections in the past.  She sees a pain management physician and is currently on Norco and tizanidine. She cannot take oral NSAIDs. She uses Voltaren gel as needed. Interim history: She presents for follow-up of osteoarthritis. She was last seen 3 years ago. She has osteoarthritis involving multiple joints. She uses diclofenac gel as needed. She also takes Norco prescribed by pain specialist.  She has degenerative disc disease in the lumbar spine as well. She continues to have pain which is worse in her hands, lower back and elbows. She has swelling in the knuckles. She has morning stiffness lasting for 1 to 2 hours.   She has difficulty opening doorknobs and douglas.  She has tried oral steroids in the past without any benefit. She is unable to take oral NSAIDs due to GI upset. She reports worsening pain in bilateral elbows medially for past several months. She denies any swelling in the elbows. HPI  ROS    REVIEW OF SYSTEMS:   Constitutional: No unanticipated weight loss or fevers. No fatigue and malaise. Integumentary: No rash, photosensitivity, malar rash, livedo reticularis, alopecia and Raynaud's symptoms, sclerodactyly, skin tightening  Eyes: negative for dry eyes, visual disturbance and persistent redness, discharge from eyes   ENT: - No tinnitus, loss of hearing, vertigo, or recurrent ear infections.  - No history of nasal/oral ulcers. - No history of sicca symptoms. Cardiovascular: No history of pericarditis, chest pain or murmur or palpitations  Respiratory: No shortness of breath, cough or history of interstitial lung disease. No history of pleurisy. No history of tuberculosis or atypical infections. Gastrointestinal: No history of heart burn, dysphagia or esophageal dysmotility. No change in bowel habits or any inflammatory bowel disease. Genitourinary: No history renal disease, miscarriages. Hematologic/Lymphatic: No abnormal bruising or bleeding, blood clots or swollen lymph nodes. Musculoskeletal: Denies having any swollen joints, joint pains or stiffness   Neurological: No history seizure or focal weakness. No history of neuropathies, paresthesias or hyperesthesias, facial droop, diplopia  Psychiatric: No history of bipolar disease, anxiety, depression  Endocrine: Denies any thyroid / parathyroid disease and osteoporosis  Allergic/Immunologic: No nasal congestion or hives. I have reviewed patients Past medical History, Social History and Family History as mentioned in her chart and this remains unchanged from previous.     Past Medical History:   Diagnosis Date    Arthritis     fingers, hands    Asthma     Back pain     Bell's palsy  CHF (congestive heart failure) (HCC)     Chronic pain     BACK SURGERY X4, neck, left arm    Colitis 2013    Depression     Elevated sed rate     Family history of breast cancer in first degree relative     Gastric ulcer     GERD (gastroesophageal reflux disease)     colitis    Glaucoma     Hypertension     Movement disorder     chronic back pain    Neuromuscular disorder (HCC)     sciatica rt. leg    Osteoarthritis     PONV (postoperative nausea and vomiting)     SEVERE     Past Surgical History:   Procedure Laterality Date    BACK SURGERY      x 4    COLONOSCOPY  10/16/12    polyp removed and biopsy sent    COLONOSCOPY  3/26/13    CYSTOSCOPY  16    urethral dilitation    FACIAL NERVE SURGERY      D/T Bell's Palsy    FINGER SURGERY Left 2018    Excision of Left Thumb Digital Mucous cyst & DIP Joint Arthrotomy & Debridement    FINGER SURGERY  2018    left thumb    FINGER SURGERY Left 2018    thumb surgery     HYSTERECTOMY      Partial    UPPER GASTROINTESTINAL ENDOSCOPY  10/15/12    UPPER GASTROINTESTINAL ENDOSCOPY  2/11/15    with EUS    VENTRAL HERNIA REPAIR N/A 4/15/2019    LAPAROSCOPIC REPAIR OF INCISIONAL HERNIA WITH MESH performed by Peng Dong MD at [id number] History     Socioeconomic History    Marital status:      Spouse name: Not on file    Number of children: 0    Years of education: Not on file    Highest education level: Not on file   Occupational History    Not on file   Social Needs    Financial resource strain: Not on file    Food insecurity     Worry: Not on file     Inability: Not on file   [employer] needs     Medical: Not on file     Non-medical: Not on file   Tobacco Use    Smoking status: Former Smoker     Packs/day: 0.25     Years: 20.00     Pack years: 5.00     Types: Cigarettes     Quit date: 2016     Years since quittin.0    Smokeless tobacco: Never Used   Substance and Sexual Activity    Alcohol use: No     Alcohol/week: 0.0 standard drinks    Drug use: No    Sexual activity: Not on file   Lifestyle    Physical activity     Days per week: Not on file     Minutes per session: Not on file    Stress: Not on file   Relationships    Social connections     Talks on phone: Not on file     Gets together: Not on file     Attends Confucianist service: Not on file     Active member of club or organization: Not on file     Attends meetings of clubs or organizations: Not on file     Relationship status: Not on file    Intimate partner violence     Fear of current or ex partner: Not on file     Emotionally abused: Not on file     Physically abused: Not on file     Forced sexual activity: Not on file   Other Topics Concern    Not on file   Social History Narrative    Not on file     Family History   Problem Relation Age of Onset    Heart Disease Mother     High Blood Pressure Mother     Arthritis Mother     Breast Cancer Sister     High Blood Pressure Brother     Diabetes Neg Hx     Stroke Neg Hx          PHYSICAL EXAM   Vitals:    04/12/21 1257   BP: (!) 142/80   Site: Right Upper Arm   Position: Sitting   Cuff Size: Large Adult   Pulse: 87   Resp: 16   SpO2: 99%   Weight: 107 lb (48.5 kg)   Height: 5' 8\" (1.727 m)     Physical Exam  Constitutional:  Well developed, well nourished, no acute distress, non-toxic appearance   Musculoskeletal:    JOINT COUNT:  RIGHT Swell Tender ROM LEFT Swell Tender ROM   DIP2  0 + [place]    0  0 Heberden   DIP3  0 + [place]    0  0  FULL   DIP4  0  0  FULL    0 + [place]den   DIP5  0  0 [place]berden    0  0 [place]berden   PIP1  0  0  FULL    0  0  FULL   PIP2  0 0 Bony change    0 +  FULL   PIP3  0 + Bony change     0  +  Bony change    PIP4  0 + Bony change    0 + Bony change   PIP5  0  0 FULL    0  0  FULL   MCP1  0  0  FULL    0  0  FULL   MCP2  0  0  FULL    0  0  FULL   MCP3  0  0  FULL    0  0  FULL   MCP4  0  0  FULL    0  0  FULL   MCP5  0  0  FULL    0  0  FULL   Wrist  0  0  FULL    0  0 FULL    Elbow  0 ++  FULL    0 ++  FULL   Shouldr  0  0  FULL    0  0  FULL   Hip  0  0  FULL    0  0  FULL   Knee  0 + crepitus    0 + crepitus   Ankle  0 +  FULL    0 +  FULL   MTP1  0  0  FULL    0  0  FULL   MTP2  0  0 FULL     0  0  FULL   MTP3  0  0  FULL    0  0  FULL   MTP4  0  0  FULL    0  0  FULL   MTP5  0  0  FULL    0  0  FULL   IP1  0  0  FULL    0  0 FULL    IP2  0  0 FULL     0  0  FULL   IP3  0  0 FULL     0  0  FULL   IP4  0  0  FULL    0  0 FULL    IP5  0 0  FULL    0  0  FULL      Tenderness to palpation medial side of bilateral elbows  Ambulates without assistance, normal gait  Neck: Full ROM, no tenderness, supple   Back- no tenderness. Eyes:  PERRL, extra ocular movements intact, conjunctiva normal   HEENT:  Atraumatic, normocephalic, external ears normal, oropharynx moist, no pharyngeal exudates. Respiratory:  No respiratory distress  GI:  Soft, nondistended, normal bowel sounds, nontender, no organomegaly, no mass, no rebound, no guarding   :  No costovertebral angle tenderness   Integument:  Well hydrated, no rash or telangiectasias  Lymphatic:  No lymphadenopathy noted   Neurologic:   Alert & oriented x 3, CN 2-12 normal, no focal deficits noted. Sensations Intact. Muscle strength 5/5 proximally and distally in upper and lower extremities.    Psychiatric:  Speech and behavior appropriate           LABS AND IMAGING  Outside data reviewed and in HPI    Lab Results   Component Value Date    WBC 5.9 02/17/2021    RBC 4.31 02/17/2021    HGB 13.6 02/17/2021    HCT 41.1 02/17/2021     02/17/2021    MCV 95.4 02/17/2021    MCH 31.5 02/17/2021    MCHC 33.1 02/17/2021    RDW 13.2 02/17/2021    SEGSPCT 89.0 03/25/2013    LYMPHOPCT 40.6 02/17/2021    MONOPCT 8.0 02/17/2021    EOSPCT 3.3 07/20/2010    BASOPCT 0.8 02/17/2021    MONOSABS 0.5 02/17/2021    LYMPHSABS 2.4 02/17/2021    EOSABS 0.2 02/17/2021    BASOSABS 0.0 02/17/2021    DIFFTYPE Auto 03/25/2013 Chemistry        Component Value Date/Time     02/17/2021 0426    K 3.6 02/17/2021 0426     02/17/2021 0426    CO2 29 02/17/2021 0426    BUN 17 02/17/2021 0426    CREATININE 0.6 02/17/2021 0426        Component Value Date/Time    CALCIUM 9.0 02/17/2021 0426    ALKPHOS 100 02/14/2021 1648    AST 23 02/14/2021 1648    ALT 18 02/14/2021 1648    BILITOT 0.3 02/14/2021 1648          Lab Results   Component Value Date    SEDRATE 9 02/16/2021     Lab Results   Component Value Date    CRP <3.0 02/16/2021     Lab Results   Component Value Date    LALI Negative 02/17/2021     Lab Results   Component Value Date    RF <10.0 12/23/2014     Lab Results   Component Value Date    LALI Negative 02/17/2021    ANAINT see below 12/23/2014     Lab Results   Component Value Date    VITD25 24.6 07/08/2015       ASSESSMENT AND PLAN      Assessment/Plan:      ASSESSMENT:    1. Primary osteoarthritis involving multiple joints    2. Pain of both elbows    3. Other fatigue    4. Encounter for screening for other viral diseases         PLAN:   1. Primary osteoarthritis involving multiple joints  Continues to be symptomatic  Unable to take oral NSAIDs due to epigastric pain  Continue diclofenac gel as needed  Continue Norco prescribed by PCP  Continue gabapentin 10 mg 3 times a day  We will do work-up to completely rule out rheumatoid arthritis. Will also obtain bilateral hand x-rays  Refer to occupational therapy  Paraffin wax bath  - C-Reactive Protein; Future  - Sedimentation Rate; Future  - Rheumatoid Factor; Future  - CBC Auto Differential; Future  - Comprehensive Metabolic Panel; Future  - Cyclic Citrul Peptide Antibody, IgG; Future  - TSH WITH REFLEX TO FT4; Future  - Hepatitis B Core Antibody, IgM; Future  - Hepatitis B Surface Antigen; Future  - Hepatitis C Antibody; Future  - XR HAND RIGHT (MIN 3 VIEWS); Future  - XR HAND LEFT (MIN 3 VIEWS); Future  - [address]    2.  Pain of both elbows  Most likely golfers elbow. Will refer to physical therapy and provide with elbow brace  - [facility] [place]  - XR ELBOW LEFT (2 VIEWS); Future  - XR ELBOW RIGHT (2 VIEWS); Future  - Elastic Bandages & Supports (ELBOW BRACE) MISC; Use as needed  Dispense: 2 each; Refill: 0    3. Other fatigue  - TSH WITH REFLEX TO FT4; Future    4. Encounter for screening for other viral diseases   - Hepatitis B Surface Antigen; Future     The patient indicates understanding of these issues and agrees with the plan. Return in about 3 months (around 7/12/2021). The risks and benefits of my recommendations, as well as other treatment options, benefits and side effects were discussed with the patient. All questions were answered. ######################################################################    I thank you for giving me the opportunity to participate in [facility]. If you have any questions or concerns please feel free to contact me. I look forward to following  Brisa Villalobos along with you. Electronically signed by: Chris Kemp MD, 4/12/2021 1:27 PM    Documentation was done using voice recognition dragon software. Every effort was made to ensure accuracy; however, inadvertent unintentional computerized transcription errors may be present.

## 2021-04-12 NOTE — PROGRESS NOTES
2021  Patient Name: Nimesh Roberson  : 1955  Medical Record: [id number]      ASSESSMENT AND PLAN    Assessment/Plan:      ASSESSMENT:    No diagnosis found. PLAN:     There are no diagnoses linked to this encounter. The patient indicates understanding of these issues and agrees with the plan. No follow-ups on file. The risks and benefits of my recommendations, as well as other treatment options, benefits and side effects werediscussed with the patient. All questions were answered. I reviewed patient's history, referral documents and electronic medical records  Copy of consult note is being routedelectronically/faxed to referring physician         MEDICATIONS  Current Outpatient Medications   Medication Sig Dispense Refill    tiZANidine (ZANAFLEX) 4 MG tablet TAKE 1 TABLET BY MOUTH THREE TIMES DAILY 90 tablet 5    HYDROcodone-acetaminophen (NORCO) 7.5-325 MG per tablet Take 1 tablet by mouth every 6 hours as needed for Pain for up to 30 days.  120 tablet 0    albuterol (PROVENTIL) (2.5 MG/3ML) 0.083% nebulizer solution USE 1 VIAL IN NEBULIZER EVERY 6 HOURS 180 vial 11    albuterol sulfate HFA (PROAIR HFA) 108 (90 Base) MCG/ACT inhaler INHALE 2 PUFFS BY MOUTH EVERY 4 HOURS AS NEEDED FOR WHEEZING 8.5 g 11    gabapentin (NEURONTIN) 300 MG capsule TAKE 1 CAPSULE BY MOUTH THREE TIMES DAILY 90 capsule 5    ondansetron (ZOFRAN) 4 MG tablet Take 1 tablet by mouth every 6 hours as needed for Nausea 30 tablet 0    dicyclomine (BENTYL) 10 MG capsule Take 1 capsule by mouth every 6 hours as needed (Bowel spasms) 20 capsule 0    montelukast (SINGULAIR) 10 MG tablet TAKE 1 TABLET BY MOUTH EVERY NIGHT 90 tablet 3    traZODone (DESYREL) 50 MG tablet TAKE 2 TABLETS BY MOUTH EVERY NIGHT 60 tablet 11    DEXILANT 60 MG CPDR delayed release capsule TAKE 1 CAPSULE BY MOUTH DAILY 90 capsule 3    diclofenac sodium (VOLTAREN) 1 % GEL APPLY 2 GRAMS TOPICALLY TO HAND FOUR TIMES DAILY 500 g 3    fluticasone (FLOVENT HFA) 110 MCG/ACT inhaler INHALE 1 PUFF INTO THE LUNGS TWICE DAILY 12 g 5    sertraline (ZOLOFT) 100 MG tablet TAKE 1 TABLET BY MOUTH THREE TIMES DAILY 270 tablet 3    nitroGLYCERIN (NITROSTAT) 0.4 MG SL tablet PLACE 1 TABLET UNDE THE TONGUE EVERY 5 MINUTES AS NEEDED FOR CHEST PAIN 25 tablet 2    linaCLOtide (LINZESS PO) Take by mouth daily      Elastic Bandages & Supports (TRUFORM SUPPORT SOCK 20-30MMHG) MISC 1 each by Does not apply route daily 2 each 2    hydrocortisone (WESTCORT) 0.2 % cream APPLY EXTERNALLY TO THE AFFECTED AREA TWICE DAILY 30 g 5    Nutritional Supplements (BOOST CALORIE SMART) LIQD Take 1 Can by mouth See Admin Instructions EVERY day       No current facility-administered medications for this visit. ALLERGIES  Allergies   Allergen Reactions    Beta Adrenergic Blockers Shortness Of Breath    Pheniramine      Other reaction(s): rapid heartbeat    Antihistamine [Diphenhydramine Hcl] Nausea And Vomiting    Aspirin Hives    Codeine Hives    Dilaudid [Hydromorphone] Nausea And Vomiting    Nsaids Hives and Nausea And Vomiting    Prednisone Other (See Comments)     GI upset    Zithromax [Azithromycin] Nausea And Vomiting         Comments  No specialty comments available. REFERRING PHYSICIAN:    HISTORY OF PRESENT ILLNESS  Alley Martin is a 78 y.o. female who is being seen for follow up evaluation of  ***  HPI  Review of Systems    REVIEW OF SYSTEMS:   Constitutional: No unanticipated weight loss or fevers. No fatigue and malaise. Integumentary: No rash, photosensitivity, malar rash, livedo reticularis, alopecia and Raynaud's symptoms, sclerodactyly, skin tightening  Eyes: negative for visual disturbance and persistent redness, discharge from eyes   ENT: - No tinnitus, loss of hearing, vertigo, or recurrent ear infections.  - No history of nasal/oral ulcers.   - No history of dry eyes/dry mouth  Cardiovascular: No history of pericarditis, chest pain or murmur or palpitations  Respiratory: No shortness of breath, cough or history of interstitial lung disease. No history of pleurisy. No history of tuberculosis or atypical infections. Gastrointestinal: No history of heart burn, dysphagia or esophageal dysmotility. No change in bowel habits or any inflammatory bowel disease. Genitourinary: No history renal disease, miscarriages. Hematologic/Lymphatic: No abnormal bruising or bleeding, blood clots or swollen lymph nodes. Musculoskeletal: Denies having any swollen joints, joint pains or stiffness   Neurological: No history of headaches, seizure or focal weakness. No history of neuropathies, paresthesias or hyperesthesias, facial droop, diplopia  Psychiatric: No history of bipolar disease, anxiety, depression  Endocrine: Denies any polyuria, polydipsia and osteoporosis  Allergic/Immunologic: No nasal congestion or hives. I have reviewed patients Past medical History, Social History and Family History as mentioned in her chart and this remains unchanged fromprevious.     Past Medical History:   Diagnosis Date    Arthritis     fingers, hands    Asthma     Back pain     Bell's palsy     CHF (congestive heart failure) (HCC)     Chronic pain     BACK SURGERY X4, neck, left arm    Colitis 2/14/2013    Depression     Elevated sed rate     Family history of breast cancer in first degree relative     Gastric ulcer     GERD (gastroesophageal reflux disease)     colitis    Glaucoma     Hypertension     Movement disorder     chronic back pain    Neuromuscular disorder (HCC)     sciatica rt. leg    Osteoarthritis     PONV (postoperative nausea and vomiting)     SEVERE     Past Surgical History:   Procedure Laterality Date    BACK SURGERY      x 4    COLONOSCOPY  10/16/12    polyp removed and biopsy sent    COLONOSCOPY  3/26/13    CYSTOSCOPY  1/13/16    urethral dilitation    FACIAL NERVE SURGERY      D/T Bell's Palsy    FINGER SURGERY Left 04/03/2018 Excision of Left Thumb Digital Mucous cyst & DIP Joint Arthrotomy & Debridement    FINGER SURGERY  2018    left thumb    FINGER SURGERY Left 2018    thumb surgery     HYSTERECTOMY      Partial    UPPER GASTROINTESTINAL ENDOSCOPY  10/15/12    UPPER GASTROINTESTINAL ENDOSCOPY  2/11/15    with EUS    VENTRAL HERNIA REPAIR N/A 4/15/2019    LAPAROSCOPIC REPAIR OF INCISIONAL HERNIA WITH MESH performed by Margaret Hawley MD at [id number] History     Socioeconomic History    Marital status:      Spouse name: Not on file    Number of children: 0    Years of education: Not on file    Highest education level: Not on file   Occupational History    Not on file   Social Needs    Financial resource strain: Not on file    Food insecurity     Worry: Not on file     Inability: Not on file   [employer] needs     Medical: Not on file     Non-medical: Not on file   Tobacco Use    Smoking status: Former Smoker     Packs/day: 0.25     Years: 20.00     Pack years: 5.00     Types: Cigarettes     Quit date: 2016     Years since quittin.0    Smokeless tobacco: Never Used   Substance and Sexual Activity    Alcohol use: No     Alcohol/week: 0.0 standard drinks    Drug use: No    Sexual activity: Not on file   Lifestyle    Physical activity     Days per week: Not on file     Minutes per session: Not on file    Stress: Not on file   Relationships    Social connections     Talks on phone: Not on file     Gets together: Not on file     Attends [employer] service: Not on file     Active member of club or organization: Not on file     Attends meetings of clubs or organizations: Not on file     Relationship status: Not on file    Intimate partner violence     Fear of current or ex partner: Not on file     Emotionally abused: Not on file     Physically abused: Not on file     Forced sexual activity: Not on file   Other Topics Concern    Not on file   Social History Narrative    Not on file     Family History   Problem Relation Age of Onset    Heart Disease Mother     High Blood Pressure Mother     Arthritis Mother     Breast Cancer Sister     High Blood Pressure Brother     Diabetes Neg Hx     Stroke Neg Hx          PHYSICAL EXAM   There were no vitals filed for this visit. Physical Exam  Constitutional:  Well developed, well nourished, no acute distress, non-toxic appearance   Musculoskeletal:    Ambulates without assistance, normal gait  Neck: Full ROM, no tenderness,supple   Upper Extremities        Shoulder: Full ROM, no crepitus        Elbow:  Full ROM, no synovitis, no deformity        Wrist: Full ROM, no synovitis, no deformity, no ulnar deviation        Fingers: No sclerodactly, no active raynaud's, no ulcers. MCPs: No synovitis, no deformity             PIPs:  No synovitis, no deformity             DIPs: No synovitis, no deformity             Nails: No pitting, no telangiectasias. Lower Extremities        Hip: Full ROM, no tenderness to palpation        Knee: Full ROM, no erythema/swelling/laxity/crepitus. Patellanot ballotable. Ankle: Full ROM, no swelling or erythema        MTPs: No swelling or erythema  Back- no tenderness. Eyes:  PERRL, extra ocular movements intact, conjunctiva normal   HEENT:  Atraumatic, normocephalic, external ears normal, oropharynx moist, no pharyngeal exudates. Respiratory:  No respiratory distress  GI:  Soft, nondistended, normal bowel sounds, nontender, noorganomegaly, no mass, no rebound, no guarding   :  No costovertebral angle tenderness   Integument:  Well hydrated, no rash or telangiectasias  Lymphatic:  No lymphadenopathy noted   Neurologic:   Alert & oriented x 3, CN 2-12 normal, no focal deficits noted. Sensations Intact. Muscle strength 5/5 proximallyand distally in upper and lower extremities.    Psychiatric:  Speech and behavior appropriate           LABS AND IMAGING  Outside data reviewed and in HPI    Lab concerns please feel free to contact me. I look forward to following  Amy Huitron along with you. Electronically signed by: Kevyn Hunter MD, 4/11/2021 10:10 PM    Documentation was done using voice recognition dragon software. Every effort was made to ensure accuracy;however, inadvertent unintentional computerized transcription errors may be present.

## 2021-04-14 ENCOUNTER — HOSPITAL ENCOUNTER (OUTPATIENT)
Age: 66
Discharge: HOME | End: 2021-04-14
Payer: MEDICARE

## 2021-04-14 ENCOUNTER — HOSPITAL ENCOUNTER (OUTPATIENT)
Dept: GENERAL RADIOLOGY | Age: 66
Discharge: HOME | End: 2021-04-14
Payer: MEDICARE

## 2021-04-14 DIAGNOSIS — Z11.59 ENCOUNTER FOR SCREENING FOR OTHER VIRAL DISEASES: ICD-10-CM

## 2021-04-14 DIAGNOSIS — M15.9 PRIMARY OSTEOARTHRITIS INVOLVING MULTIPLE JOINTS: ICD-10-CM

## 2021-04-14 DIAGNOSIS — M25.521 PAIN OF BOTH ELBOWS: ICD-10-CM

## 2021-04-14 DIAGNOSIS — R53.83 OTHER FATIGUE: ICD-10-CM

## 2021-04-14 DIAGNOSIS — M25.522 PAIN OF BOTH ELBOWS: ICD-10-CM

## 2021-04-14 LAB
A/G RATIO: 2 (ref 1.1–2.2)
ALBUMIN SERPL-MCNC: 4.5 G/DL (ref 3.4–5)
ALP BLD-CCNC: 101 U/L (ref 40–129)
ALT SERPL-CCNC: 14 U/L (ref 10–40)
ANION GAP SERPL CALCULATED.3IONS-SCNC: 12 MMOL/L (ref 3–16)
AST SERPL-CCNC: 21 U/L (ref 15–37)
BASOPHILS ABSOLUTE: 0.1 K/UL (ref 0–0.2)
BASOPHILS RELATIVE PERCENT: 1.2 %
BILIRUB SERPL-MCNC: <0.2 MG/DL (ref 0–1)
BUN BLDV-MCNC: 13 MG/DL (ref 7–20)
C-REACTIVE PROTEIN: <3 MG/L (ref 0–5.1)
CALCIUM SERPL-MCNC: 9.4 MG/DL (ref 8.3–10.6)
CHLORIDE BLD-SCNC: 101 MMOL/L (ref 99–110)
CO2: 28 MMOL/L (ref 21–32)
CREAT SERPL-MCNC: 0.6 MG/DL (ref 0.6–1.2)
EOSINOPHILS ABSOLUTE: 0.2 K/UL (ref 0–0.6)
EOSINOPHILS RELATIVE PERCENT: 3.8 %
GFR AFRICAN AMERICAN: >60
GFR NON-AFRICAN AMERICAN: >60
GLOBULIN: 2.3 G/DL
GLUCOSE BLD-MCNC: 63 MG/DL (ref 70–99)
HCT VFR BLD CALC: 36.8 % (ref 36–48)
HEMOGLOBIN: 12.4 G/DL (ref 12–16)
HEPATITIS B CORE IGM ANTIBODY: NORMAL
HEPATITIS B SURFACE ANTIGEN INTERPRETATION: NORMAL
HEPATITIS C ANTIBODY INTERPRETATION: NORMAL
LYMPHOCYTES ABSOLUTE: 1.7 K/UL (ref 1–5.1)
LYMPHOCYTES RELATIVE PERCENT: 29.1 %
MCH RBC QN AUTO: 31.1 PG (ref 26–34)
MCHC RBC AUTO-ENTMCNC: 33.7 G/DL (ref 31–36)
MCV RBC AUTO: 92.2 FL (ref 80–100)
MONOCYTES ABSOLUTE: 0.5 K/UL (ref 0–1.3)
MONOCYTES RELATIVE PERCENT: 9.2 %
NEUTROPHILS ABSOLUTE: 3.3 K/UL (ref 1.7–7.7)
NEUTROPHILS RELATIVE PERCENT: 56.7 %
PDW BLD-RTO: 13 % (ref 12.4–15.4)
PLATELET # BLD: 189 K/UL (ref 135–450)
PMV BLD AUTO: 8.6 FL (ref 5–10.5)
POTASSIUM SERPL-SCNC: 4.6 MMOL/L (ref 3.5–5.1)
RBC # BLD: 3.99 M/UL (ref 4–5.2)
RHEUMATOID FACTOR: <10 IU/ML
SEDIMENTATION RATE, ERYTHROCYTE: 10 MM/HR (ref 0–30)
SODIUM BLD-SCNC: 141 MMOL/L (ref 136–145)
TOTAL PROTEIN: 6.8 G/DL (ref 6.4–8.2)
TSH REFLEX FT4: 0.49 UIU/ML (ref 0.27–4.2)
WBC # BLD: 5.9 K/UL (ref 4–11)

## 2021-04-14 PROCEDURE — 86431 RHEUMATOID FACTOR QUANT: CPT

## 2021-04-14 PROCEDURE — 80053 COMPREHEN METABOLIC PANEL: CPT

## 2021-04-14 PROCEDURE — 36415 COLL VENOUS BLD VENIPUNCTURE: CPT

## 2021-04-14 PROCEDURE — 73130 X-RAY EXAM OF HAND: CPT

## 2021-04-14 PROCEDURE — 84443 ASSAY THYROID STIM HORMONE: CPT

## 2021-04-14 PROCEDURE — 87340 HEPATITIS B SURFACE AG IA: CPT

## 2021-04-14 PROCEDURE — 85652 RBC SED RATE AUTOMATED: CPT

## 2021-04-14 PROCEDURE — 86803 HEPATITIS C AB TEST: CPT

## 2021-04-14 PROCEDURE — 86140 C-REACTIVE PROTEIN: CPT

## 2021-04-14 PROCEDURE — 86705 HEP B CORE ANTIBODY IGM: CPT

## 2021-04-14 PROCEDURE — 73080 X-RAY EXAM OF ELBOW: CPT

## 2021-04-14 PROCEDURE — 85025 COMPLETE CBC W/AUTO DIFF WBC: CPT

## 2021-04-14 PROCEDURE — 86200 CCP ANTIBODY: CPT

## 2021-04-14 NOTE — RESULT ENCOUNTER NOTE
Please call the patient and let her know that her hand and elbow x-ray show changes consistent with degenerative arthritis

## 2021-04-15 ENCOUNTER — TELEPHONE (OUTPATIENT)
Dept: INTERNAL MEDICINE CLINIC | Age: 66
End: 2021-04-15

## 2021-04-15 LAB — CYCLIC CITRULLINATED PEPTIDE ANTIBODY IGG: <0.5 U/ML (ref 0–2.9)

## 2021-04-15 NOTE — TELEPHONE ENCOUNTER
Pt was calling back to discuss X-ray results. I told her what the Result quick note said, but she would like to discuss more in depth.  Please call

## 2021-04-16 DIAGNOSIS — G89.4 CHRONIC PAIN SYNDROME: ICD-10-CM

## 2021-04-16 RX ORDER — HYDROCODONE BITARTRATE AND ACETAMINOPHEN 7.5; 325 MG/1; MG/1
1 TABLET ORAL EVERY 6 HOURS PRN
Qty: 120 TABLET | Refills: 0 | Status: SHIPPED | OUTPATIENT
Start: 2021-04-16 | End: 2021-05-17 | Stop reason: SDUPTHER

## 2021-04-23 ENCOUNTER — OFFICE VISIT (OUTPATIENT)
Dept: ENT CLINIC | Age: 66
End: 2021-04-23
Payer: MEDICARE

## 2021-04-23 VITALS — DIASTOLIC BLOOD PRESSURE: 67 MMHG | TEMPERATURE: 98 F | HEART RATE: 84 BPM | SYSTOLIC BLOOD PRESSURE: 106 MMHG

## 2021-04-23 DIAGNOSIS — J32.9 RECURRENT SINUSITIS: Primary | ICD-10-CM

## 2021-04-23 DIAGNOSIS — J34.89 NASAL VALVE COLLAPSE: ICD-10-CM

## 2021-04-23 DIAGNOSIS — J30.9 ALLERGIC RHINITIS, UNSPECIFIED SEASONALITY, UNSPECIFIED TRIGGER: ICD-10-CM

## 2021-04-23 DIAGNOSIS — J30.0 VASOMOTOR RHINITIS: ICD-10-CM

## 2021-04-23 PROCEDURE — 1123F ACP DISCUSS/DSCN MKR DOCD: CPT | Performed by: STUDENT IN AN ORGANIZED HEALTH CARE EDUCATION/TRAINING PROGRAM

## 2021-04-23 PROCEDURE — G8427 DOCREV CUR MEDS BY ELIG CLIN: HCPCS | Performed by: STUDENT IN AN ORGANIZED HEALTH CARE EDUCATION/TRAINING PROGRAM

## 2021-04-23 PROCEDURE — G8419 CALC BMI OUT NRM PARAM NOF/U: HCPCS | Performed by: STUDENT IN AN ORGANIZED HEALTH CARE EDUCATION/TRAINING PROGRAM

## 2021-04-23 PROCEDURE — 3017F COLORECTAL CA SCREEN DOC REV: CPT | Performed by: STUDENT IN AN ORGANIZED HEALTH CARE EDUCATION/TRAINING PROGRAM

## 2021-04-23 PROCEDURE — 1090F PRES/ABSN URINE INCON ASSESS: CPT | Performed by: STUDENT IN AN ORGANIZED HEALTH CARE EDUCATION/TRAINING PROGRAM

## 2021-04-23 PROCEDURE — 99214 OFFICE O/P EST MOD 30 MIN: CPT | Performed by: STUDENT IN AN ORGANIZED HEALTH CARE EDUCATION/TRAINING PROGRAM

## 2021-04-23 PROCEDURE — 4040F PNEUMOC VAC/ADMIN/RCVD: CPT | Performed by: STUDENT IN AN ORGANIZED HEALTH CARE EDUCATION/TRAINING PROGRAM

## 2021-04-23 PROCEDURE — G8400 PT W/DXA NO RESULTS DOC: HCPCS | Performed by: STUDENT IN AN ORGANIZED HEALTH CARE EDUCATION/TRAINING PROGRAM

## 2021-04-23 PROCEDURE — 1036F TOBACCO NON-USER: CPT | Performed by: STUDENT IN AN ORGANIZED HEALTH CARE EDUCATION/TRAINING PROGRAM

## 2021-04-23 RX ORDER — AMOXICILLIN AND CLAVULANATE POTASSIUM 875; 125 MG/1; MG/1
1 TABLET, FILM COATED ORAL 2 TIMES DAILY
Qty: 20 TABLET | Refills: 0 | Status: SHIPPED | OUTPATIENT
Start: 2021-04-23 | End: 2021-04-23 | Stop reason: ALTCHOICE

## 2021-04-23 RX ORDER — DOXYCYCLINE HYCLATE 100 MG
100 TABLET ORAL 2 TIMES DAILY
Qty: 20 TABLET | Refills: 0 | Status: SHIPPED | OUTPATIENT
Start: 2021-04-23 | End: 2021-05-03

## 2021-04-23 RX ORDER — IPRATROPIUM BROMIDE 42 UG/1
2 SPRAY, METERED NASAL 3 TIMES DAILY PRN
Qty: 1 BOTTLE | Refills: 3 | Status: SHIPPED | OUTPATIENT
Start: 2021-04-23

## 2021-04-23 NOTE — PROGRESS NOTES
[address] VISIT      Patient Name: Toño Martinez Record Number:  [id number]  Primary Care Physician:  Timm Collet, MD    ChiefComplaint     Chief Complaint   Patient presents with    Sinus Problem     sinuses are worse in the am and worse in pm        History of Present Illness     Collette Million is an 78 y.o. female previously seen for sinus and ear issues by Dr. Chanelle Szymanski, last seen 1/28/2021. Interval History:   Over the last 2 days she has had worsening head congestion, headaches, worsened nasal congestion, cough which has been worse at night. Feels like she has a sinus infection or upper respiratory infection. She took some breathing treatments which helped but her symptoms are still present. Her worst symptom is her head congestion. She has been using Ocean nasal spray, no other medicated nasal spray use. Uses Singulair on a daily basis. Unable to use oral or nasal histamine secondary to heart issues. Does not tolerate nasal saline lavages. Denies mucopurulent drainage but has significant watery rhinorrhea. History of 2 sinonasal surgeries by Dr. Chanelle Szymanski in the past many years ago. Does not tolerate oral steroids.     Past Medical History     Past Medical History:   Diagnosis Date    Arthritis     fingers, hands    Asthma     Back pain     Bell's palsy     CH[facility]congestive heart failure) ([facility])     Chronic pain     BACK SURGERY X4, neck, left arm    Colitis 2/14/2013    Depression     Elevated sed rate     Family history of breast cancer in first degree relative     Gastric ulcer     GERD (gastroesophageal reflux disease)     colitis    Glaucoma     Hypertension     Movement disorder     chronic back pain    Neuromuscular disorder ([facility])     sciatica rt. leg    Osteoarthritis     PONV (postoperative nausea and vomiting)     SEVERE       Past Surgical History     Past Surgical History:   Procedure Laterality Date    BACK SURGERY      x 4    COLONOSCOPY  10/16/12    polyp removed and biopsy sent    COLONOSCOPY  3/26/13    CYSTOSCOPY  16    urethral dilitation    FACIAL NERVE SURGERY      D/T Bell's Palsy    FINGER SURGERY Left 2018    Excision of Left Thumb Digital Mucous cyst & DIP Joint Arthrotomy & Debridement    FINGER SURGERY  2018    left thumb    FINGER SURGERY Left 2018    thumb surgery     HYSTERECTOMY      Partial    UPPER GASTROINTESTINAL ENDOSCOPY  10/15/12    UPPER GASTROINTESTINAL ENDOSCOPY  2/11/15    with EUS    VENTRAL HERNIA REPAIR N/A 4/15/2019    LAPAROSCOPIC REPAIR OF INCISIONAL HERNIA WITH MESH performed by Chaim Romberg, MD at [address]       Family History     Family History   Problem Relation Age of Onset    Heart Disease Mother     High Blood Pressure Mother     Arthritis Mother     Breast Cancer Sister     High Blood Pressure Brother     Diabetes Neg Hx     Stroke Neg Hx        Social History     Social History     Tobacco Use    Smoking status: Former Smoker     Packs/day: 0.25     Years: 20.00     Pack years: 5.00     Types: Cigarettes     Quit date: 2016     Years since quittin.0    Smokeless tobacco: Never Used   Substance Use Topics    Alcohol use: No     Alcohol/week: 0.0 standard drinks    Drug use: No        Allergies     Allergies   Allergen Reactions    Beta Adrenergic Blockers Shortness Of Breath    Pheniramine      Other reaction(s): rapid heartbeat    Antihistamine [Diphenhydramine Hcl] Nausea And Vomiting    Aspirin Hives    Codeine Hives    Dilaudid [Hydromorphone] Nausea And Vomiting    Nsaids Hives and Nausea And Vomiting    Prednisone Other (See Comments)     GI upset    Zithromax [Azithromycin] Nausea And Vomiting       Medications     Current Outpatient Medications   Medication Sig Dispense Refill    doxycycline hyclate (VIBRA-TABS) 100 MG tablet Take 1 tablet by mouth 2 times daily for 10 days 20 tablet 0    ipratropium (ATROVENT) 0.06 % nasal spray 2 sprays by Each Nostril route 3 times daily as needed for Rhinitis (runny nose) 1 Bottle 3    HYDROcodone-acetaminophen (NORCO) 7.5-325 MG per tablet Take 1 tablet by mouth every 6 hours as needed for Pain for up to 30 days.  120 tablet 0    Elastic Bandages & Supports (ELBOW BRACE) MISC Use as needed 2 each 0    tiZANidine (ZANAFLEX) 4 MG tablet TAKE 1 TABLET BY MOUTH THREE TIMES DAILY 90 tablet 5    albuterol (PROVENTIL) (2.5 MG/3ML) 0.083% nebulizer solution USE 1 VIAL IN NEBULIZER EVERY 6 HOURS 180 vial 11    albuterol sulfate HFA (PROAIR HFA) 108 (90 Base) MCG/ACT inhaler INHALE 2 PUFFS BY MOUTH EVERY 4 HOURS AS NEEDED FOR WHEEZING 8.5 g 11    gabapentin (NEURONTIN) 300 MG capsule TAKE 1 CAPSULE BY MOUTH THREE TIMES DAILY 90 capsule 5    ondansetron (ZOFRAN) 4 MG tablet Take 1 tablet by mouth every 6 hours as needed for Nausea 30 tablet 0    dicyclomine (BENTYL) 10 MG capsule Take 1 capsule by mouth every 6 hours as needed (Bowel spasms) 20 capsule 0    montelukast (SINGULAIR) 10 MG tablet TAKE 1 TABLET BY MOUTH EVERY NIGHT 90 tablet 3    traZODone (DESYREL) 50 MG tablet TAKE 2 TABLETS BY MOUTH EVERY NIGHT 60 tablet 11    DEXILANT 60 MG CPDR delayed release capsule TAKE 1 CAPSULE BY MOUTH DAILY 90 capsule 3    diclofenac sodium (VOLTAREN) 1 % GEL APPLY 2 GRAMS TOPICALLY TO HAND FOUR TIMES DAILY 500 g 3    fluticasone (FLOVENT HFA) 110 MCG/ACT inhaler INHALE 1 PUFF INTO THE LUNGS TWICE DAILY 12 g 5    sertraline (ZOLOFT) 100 MG tablet TAKE 1 TABLET BY MOUTH THREE TIMES DAILY 270 tablet 3    nitroGLYCERIN (NITROSTAT) 0.4 MG SL tablet PLACE 1 TABLET UNDE THE TONGUE EVERY 5 MINUTES AS NEEDED FOR CHEST PAIN 25 tablet 2    linaCLOtide (LINZESS PO) Take by mouth daily      Elastic Bandages & Supports (TRUFORM SUPPORT SOCK 20-30MMHG) MISC 1 each by Does not apply route daily 2 each 2    hydrocortisone (WESTCORT) 0.2 % cream APPLY

## 2021-05-04 DIAGNOSIS — F32.A DEPRESSION, UNSPECIFIED DEPRESSION TYPE: ICD-10-CM

## 2021-05-04 RX ORDER — SERTRALINE HYDROCHLORIDE 100 MG/1
TABLET, FILM COATED ORAL
Qty: 270 TABLET | Refills: 2 | Status: SHIPPED | OUTPATIENT
Start: 2021-05-04 | End: 2022-02-03

## 2021-05-04 NOTE — TELEPHONE ENCOUNTER
Medication:   Requested Prescriptions     Pending Prescriptions Disp Refills    sertraline (ZOLOFT) 100 MG tablet [Pharmacy Med Name: SERTRALINE 100MG TABLETS] 270 tablet 3     Sig: TAKE 1 TABLET BY MOUTH THREE TIMES DAILY        Last Filled:  4/24/20 #270, 3 RF     Patient Phone Number: [phone] (home)     Last appt: 2/19/21 hospital follow up   Next appt: Visit date not found    Last OARRS:   RX Monitoring 2/19/2021   Attestation -   Periodic Controlled Substance Monitoring No signs of potential drug abuse or diversion identified.

## 2021-05-17 DIAGNOSIS — G89.4 CHRONIC PAIN SYNDROME: ICD-10-CM

## 2021-05-17 RX ORDER — HYDROCODONE BITARTRATE AND ACETAMINOPHEN 7.5; 325 MG/1; MG/1
1 TABLET ORAL EVERY 6 HOURS PRN
Qty: 120 TABLET | Refills: 0 | Status: SHIPPED | OUTPATIENT
Start: 2021-05-17 | End: 2021-06-15 | Stop reason: SDUPTHER

## 2021-05-24 ENCOUNTER — OFFICE VISIT (OUTPATIENT)
Dept: FAMILY MEDICINE CLINIC | Age: 66
End: 2021-05-24
Payer: MEDICARE

## 2021-05-24 ENCOUNTER — NURSE TRIAGE (OUTPATIENT)
Dept: OTHER | Facility: CLINIC | Age: 66
End: 2021-05-24

## 2021-05-24 VITALS
SYSTOLIC BLOOD PRESSURE: 112 MMHG | OXYGEN SATURATION: 98 % | BODY MASS INDEX: 16.42 KG/M2 | DIASTOLIC BLOOD PRESSURE: 72 MMHG | HEART RATE: 84 BPM | WEIGHT: 108 LBS

## 2021-05-24 DIAGNOSIS — M79.661 RIGHT CALF PAIN: Primary | ICD-10-CM

## 2021-05-24 PROCEDURE — G8419 CALC BMI OUT NRM PARAM NOF/U: HCPCS | Performed by: FAMILY MEDICINE

## 2021-05-24 PROCEDURE — 1090F PRES/ABSN URINE INCON ASSESS: CPT | Performed by: FAMILY MEDICINE

## 2021-05-24 PROCEDURE — 4040F PNEUMOC VAC/ADMIN/RCVD: CPT | Performed by: FAMILY MEDICINE

## 2021-05-24 PROCEDURE — 1123F ACP DISCUSS/DSCN MKR DOCD: CPT | Performed by: FAMILY MEDICINE

## 2021-05-24 PROCEDURE — 3017F COLORECTAL CA SCREEN DOC REV: CPT | Performed by: FAMILY MEDICINE

## 2021-05-24 PROCEDURE — G8427 DOCREV CUR MEDS BY ELIG CLIN: HCPCS | Performed by: FAMILY MEDICINE

## 2021-05-24 PROCEDURE — 99213 OFFICE O/P EST LOW 20 MIN: CPT | Performed by: FAMILY MEDICINE

## 2021-05-24 PROCEDURE — G8400 PT W/DXA NO RESULTS DOC: HCPCS | Performed by: FAMILY MEDICINE

## 2021-05-24 PROCEDURE — 1036F TOBACCO NON-USER: CPT | Performed by: FAMILY MEDICINE

## 2021-05-24 SDOH — ECONOMIC STABILITY: TRANSPORTATION INSECURITY
IN THE PAST 12 MONTHS, HAS THE LACK OF TRANSPORTATION KEPT YOU FROM MEDICAL APPOINTMENTS OR FROM GETTING MEDICATIONS?: NO

## 2021-05-24 SDOH — ECONOMIC STABILITY: FOOD INSECURITY: WITHIN THE PAST 12 MONTHS, THE FOOD YOU BOUGHT JUST DIDN'T LAST AND YOU DIDN'T HAVE MONEY TO GET MORE.: NEVER TRUE

## 2021-05-24 SDOH — ECONOMIC STABILITY: INCOME INSECURITY: IN THE LAST 12 MONTHS, WAS THERE A TIME WHEN YOU WERE NOT ABLE TO PAY THE MORTGAGE OR RENT ON TIME?: NO

## 2021-05-24 ASSESSMENT — ENCOUNTER SYMPTOMS
GASTROINTESTINAL NEGATIVE: 1
BACK PAIN: 1
RESPIRATORY NEGATIVE: 1

## 2021-05-24 NOTE — TELEPHONE ENCOUNTER
Received call from Brittany Mei at [facility]) [address] with Red Flag Complaint. Brief description of triage: Right knee pain, worse for 2 days. Triage indicates for patient to be seen today. Advised [place] [facility] if unable to get appointment. Care advice provided, patient verbalizes understanding; denies any other questions or concerns; instructed to call back for any new or worsening symptoms. Writer provided warm transfer to [facility] for appointment scheduling. Attention Provider: Thank you for allowing me to participate in the care of your patient. The patient was connected to triage in response to information provided to the [facility]. Please do not respond through this encounter as the response is not directed to a shared pool. Reason for Disposition   SEVERE pain (e.g., excruciating, unable to walk)    Answer Assessment - Initial Assessment Questions  1. LOCATION and RADIATION: \"Where is the pain located? \"       Right knee    2. QUALITY: \"What does the pain feel like? \"  (e.g., sharp, dull, aching, burning)      Stabbing, burning    3. SEVERITY: \"How bad is the pain? \" \"What does it keep you from doing? \"   (Scale 1-10; or mild, moderate, severe)    -  MILD (1-3): doesn't interfere with normal activities     -  MODERATE (4-7): interferes with normal activities (e.g., work or school) or awakens from sleep, limping     -  SEVERE (8-10): excruciating pain, unable to do any normal activities, unable to walk      10/10    4. ONSET: \"When did the pain start? \" \"Does it come and go, or is it there all the time? \"      Worse the past 2 days    5. RECURRENT: \"Have you had this pain before? \" If so, ask: \"When, and what happened then? \"      Yes but not like this    6. SETTING: \"Has there been any recent work, exercise or other activity that involved that part of the body? \"       Had to walk a lot at hospital recently for 's surgery    7.  AGGRAVATING FACTORS: \"What makes the knee pain worse? \" (e.g., walking, climbing stairs, running)      Lying down and trying to stretch out leg    8. ASSOCIATED SYMPTOMS: \"Is there any swelling or redness of the knee? \"      Puffy, no redness    9. OTHER SYMPTOMS: \"Do you have any other symptoms? \" (e.g., chest pain, difficulty breathing, fever, calf pain)      No fever, no chest pain or difficulty breathing    10. PREGNANCY: \"Is there any chance you are pregnant? \" \"When was your last menstrual period? \"        N/A    Protocols used: KNEE PAIN-ADULT-[place]

## 2021-05-25 ENCOUNTER — HOSPITAL ENCOUNTER (OUTPATIENT)
Dept: VASCULAR LAB | Age: 66
Discharge: HOME | End: 2021-05-25
Payer: MEDICARE

## 2021-05-25 DIAGNOSIS — M79.661 RIGHT CALF PAIN: ICD-10-CM

## 2021-05-25 PROCEDURE — 93971 EXTREMITY STUDY: CPT

## 2021-06-15 DIAGNOSIS — G89.4 CHRONIC PAIN SYNDROME: ICD-10-CM

## 2021-06-15 RX ORDER — HYDROCODONE BITARTRATE AND ACETAMINOPHEN 7.5; 325 MG/1; MG/1
1 TABLET ORAL EVERY 6 HOURS PRN
Qty: 120 TABLET | Refills: 0 | Status: SHIPPED | OUTPATIENT
Start: 2021-06-15 | End: 2021-07-14 | Stop reason: SDUPTHER

## 2021-06-15 NOTE — TELEPHONE ENCOUNTER
Medication and Quantity requested: HYDROcodone-acetaminophen (NORCO) 7.5-325 MG per tablet    Qty 120    Last Visit  05/24/2021 [facility]      Pharmacy and phone number updated in EPIC:  yes

## 2021-06-21 ENCOUNTER — OFFICE VISIT (OUTPATIENT)
Dept: CARDIOLOGY CLINIC | Age: 66
End: 2021-06-21
Payer: MEDICARE

## 2021-06-21 DIAGNOSIS — R00.2 PALPITATION: ICD-10-CM

## 2021-06-21 DIAGNOSIS — R07.9 CHEST PAIN, UNSPECIFIED TYPE: ICD-10-CM

## 2021-06-21 DIAGNOSIS — R42 LIGHTHEADED: ICD-10-CM

## 2021-06-21 DIAGNOSIS — I42.9 CARDIOMYOPATHY, UNSPECIFIED TYPE (HCC): ICD-10-CM

## 2021-06-21 DIAGNOSIS — I65.23 BILATERAL CAROTID ARTERY STENOSIS: ICD-10-CM

## 2021-06-21 DIAGNOSIS — I31.39 PERICARDIAL EFFUSION: Primary | ICD-10-CM

## 2021-06-21 PROCEDURE — 1036F TOBACCO NON-USER: CPT | Performed by: NURSE PRACTITIONER

## 2021-06-21 PROCEDURE — G8427 DOCREV CUR MEDS BY ELIG CLIN: HCPCS | Performed by: NURSE PRACTITIONER

## 2021-06-21 PROCEDURE — 1090F PRES/ABSN URINE INCON ASSESS: CPT | Performed by: NURSE PRACTITIONER

## 2021-06-21 PROCEDURE — 4040F PNEUMOC VAC/ADMIN/RCVD: CPT | Performed by: NURSE PRACTITIONER

## 2021-06-21 PROCEDURE — 3017F COLORECTAL CA SCREEN DOC REV: CPT | Performed by: NURSE PRACTITIONER

## 2021-06-21 PROCEDURE — 1123F ACP DISCUSS/DSCN MKR DOCD: CPT | Performed by: NURSE PRACTITIONER

## 2021-06-21 PROCEDURE — G8419 CALC BMI OUT NRM PARAM NOF/U: HCPCS | Performed by: NURSE PRACTITIONER

## 2021-06-21 PROCEDURE — G8400 PT W/DXA NO RESULTS DOC: HCPCS | Performed by: NURSE PRACTITIONER

## 2021-06-21 PROCEDURE — 99214 OFFICE O/P EST MOD 30 MIN: CPT | Performed by: NURSE PRACTITIONER

## 2021-06-21 NOTE — PATIENT INSTRUCTIONS
If lightheadedness when you stands worsens call and let us know. Can consider Holter monitor to assess heart rhythm with complaints of palpations. Monitor home blood pressure. Goal is less than 140/80 but to remain greater than 100/50. Heart rate to remain >55 beats per minute. Create a log and bring to office visits.       Follow up in 6 months with Dr. Mamta Rodriguez

## 2021-06-21 NOTE — PROGRESS NOTES
[address]     Outpatient Follow Up Note      CHIEF COMPLAINT / HPI:  Follow Up secondary to NICM and chronic pericardial effusion     Subjective:   Alley Martin is 78 y.o. 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  Her EF has since normalized.  Pt was hospitalized with CP Feb '21, stress test without reversible ischemia. Continues to have discomfort left epigastric area. This has been constant. She wonders if her chronic back pain could have something to do with it. Has seen GI for this. Mid epigastric pain improved with miralax. But left epigastric pain is persistent. This feels better when she lays down. Uncomfortable when she pushes on it. Not sure what makes this discomfort worse. Does worsen after walking far distances. She gets SAEZ when walking far distances. This is not worsened. She is active with cooking and grocery shopping. She recently started swimming. She thinks she is regaining her stamina. Continues to have palpations that are stable, improved with deep breathing. Continues to have positional lightheadedness after sitting for long periods of time. Everything goes black if she stands too fast. Resolves within 30-60 sec. This is unchanged. Denies syncope. Denies swelling, orthopnea, and wt is stable. With regard to medication therapy the patient has been compliant with prescribed regimen. They have tolerated therapy to date.      Past Medical History:   Diagnosis Date    Arthritis     fingers, hands    Asthma     Back pain     Bell's palsy     [facility])     Chronic pain     BACK SURGERY X4, neck, left arm    Colitis 2/14/2013    Depression     Elevated sed rate     Family history of breast cancer in first degree relative     Gastric ulcer     GERD (gastroesophageal reflux disease)     colitis    Glaucoma     Hypertension     Movement disorder     chronic back pain    Neuromuscular disorder (HCC)     sciatica rt. leg    Osteoarthritis     PONV (postoperative nausea and vomiting)     SEVERE     Social History:    Social History     Tobacco Use   Smoking Status Former Smoker    Packs/day: 0.25    Years: 20.00    Pack years: 5.00    Types: Cigarettes    Quit date: 2016    Years since quittin.2   Smokeless Tobacco Never Used     Current Medications:  Current Outpatient Medications   Medication Sig Dispense Refill    HYDROcodone-acetaminophen (NORCO) 7.5-325 MG per tablet Take 1 tablet by mouth every 6 hours as needed for Pain for up to 30 days.  120 tablet 0    Probiotic Product (PROBIOTIC PO) Take by mouth      sertraline (ZOLOFT) 100 MG tablet TAKE 1 TABLET BY MOUTH THREE TIMES DAILY 270 tablet 2    ipratropium (ATROVENT) 0.06 % nasal spray 2 sprays by Each Nostril route 3 times daily as needed for Rhinitis (runny nose) 1 Bottle 3    Elastic Bandages & Supports (ELBOW BRACE) MISC Use as needed 2 each 0    tiZANidine (ZANAFLEX) 4 MG tablet TAKE 1 TABLET BY MOUTH THREE TIMES DAILY 90 tablet 5    albuterol (PROVENTIL) (2.5 MG/3ML) 0.083% nebulizer solution USE 1 VIAL IN NEBULIZER EVERY 6 HOURS 180 vial 11    albuterol sulfate HFA (PROAIR HFA) 108 (90 Base) MCG/ACT inhaler INHALE 2 PUFFS BY MOUTH EVERY 4 HOURS AS NEEDED FOR WHEEZING 8.5 g 11    gabapentin (NEURONTIN) 300 MG capsule TAKE 1 CAPSULE BY MOUTH THREE TIMES DAILY 90 capsule 5    ondansetron (ZOFRAN) 4 MG tablet Take 1 tablet by mouth every 6 hours as needed for Nausea 30 tablet 0    dicyclomine (BENTYL) 10 MG capsule Take 1 capsule by mouth every 6 hours as needed (Bowel spasms) 20 capsule 0    montelukast (SINGULAIR) 10 MG tablet TAKE 1 TABLET BY MOUTH EVERY NIGHT 90 tablet 3    traZODone (DESYREL) 50 MG tablet TAKE 2 TABLETS BY MOUTH EVERY NIGHT 60 tablet 11    DEXILANT 60 MG CPDR delayed release capsule TAKE 1 CAPSULE BY MOUTH DAILY 90 capsule 3    diclofenac sodium (VOLTAREN) 1 % GEL APPLY 2 GRAMS TOPICALLY TO HAND FOUR TIMES DAILY 500 g 3    fluticasone (FLOVENT HFA) 110 MCG/ACT inhaler INHALE 1 PUFF INTO THE LUNGS TWICE DAILY 12 g 5    nitroGLYCERIN (NITROSTAT) 0.4 MG SL tablet PLACE 1 TABLET UNDE THE TONGUE EVERY 5 MINUTES AS NEEDED FOR CHEST PAIN 25 tablet 2    linaCLOtide (LINZESS PO) Take by mouth daily      Elastic Bandages & Supports (TRUFORM SUPPORT SOCK 20-30MMHG) MISC 1 each by Does not apply route daily 2 each 2    hydrocortisone (WESTCORT) 0.2 % cream APPLY EXTERNALLY TO THE AFFECTED AREA TWICE DAILY 30 g 5    Nutritional Supplements (BOOST CALORIE SMART) LIQD Take 1 Can by mouth See Admin Instructions EVERY day       No current facility-administered medications for this visit. REVIEW OF SYSTEMS:    CONSTITUTIONAL: No major weight gain or loss, night sweats, fever, fatigue, or weakness. HEENT: No new vision difficulties or ringing in the ears. RESPIRATORY: No new SOB, PND, orthopnea or cough. CARDIOVASCULAR: See HPI  GI: No N/V/D, constipation, or abdominal pain. No black/tarry stools  : No urinary urgency, incontinence, or hematuria. SKIN: No cyanosis or skin lesions. MUSCULOSKELETAL: No new muscle or joint pain. NEUROLOGICAL: No syncope or TIA-like symptoms.   PSYCHIATRIC: No anxiety, pain, insomnia or depression    Objective:   PHYSICAL EXAM:    Wt Readings from Last 3 Encounters:   06/21/21 107 lb (48.5 kg)   05/24/21 108 lb (49 kg)   04/12/21 107 lb (48.5 kg)      BP Readings from Last 3 Encounters:   06/21/21 118/62   05/24/21 112/72   04/23/21 106/67     Pulse Readings from Last 3 Encounters:   06/21/21 78   05/24/21 84   04/23/21 84       Vitals:    06/21/21 1007 06/21/21 1023   BP: 118/62 (!) 102/58   Pulse: 78    SpO2: 97%    Weight: 107 lb (48.5 kg)    Height: 5' 8\" (1.727 m)       VITALS:  BP (!) 102/58   Pulse 78   Ht 5' 8\" (1.727 m)   Wt 107 lb (48.5 kg)   SpO2 97%   BMI 16.27 kg/m² CONSTITUTIONAL: Cooperative, no apparent distress, and appears thin   NEUROLOGIC:  Awake and orientated to person, place, and time. PSYCH: Calm affect. SKIN: Warm and dry. HEENT: Sclera non-icteric, normocephalic, neck supple. RESPIRATORY:  No increased work of breathing and clear to auscultation, no crackles or wheezing. CARDIOVASCULAR:  Regular rate and rhythm without murmur. Normal S1 and S2. No gallops or rubs. Normal PMI. No elevation of JVP. Normal carotid pulses with no bruits. GI:  Normal bowel sounds. Non-distended. Non-tender to palpation  EXT: No edema. No calf tenderness. Pulses are present bilaterally. DATA:    Lab Results   Component Value Date    ALT 14 04/14/2021    AST 21 04/14/2021    ALKPHOS 101 04/14/2021    BILITOT <0.2 04/14/2021     Lab Results   Component Value Date    CREATININE 0.6 04/14/2021    BUN 13 04/14/2021     04/14/2021    K 4.6 04/14/2021     04/14/2021    CO2 28 04/14/2021     Lab Results   Component Value Date    TSH 0.34 09/04/2018    R9QPSIN 4.7 09/04/2018     Lab Results   Component Value Date    WBC 5.9 04/14/2021    HGB 12.4 04/14/2021    HCT 36.8 04/14/2021    MCV 92.2 04/14/2021     04/14/2021     No components found for: CHLPL  Lab Results   Component Value Date    TRIG 134 03/19/2019    TRIG 148 01/24/2011    TRIG 137 07/20/2010     Lab Results   Component Value Date    HDL 59 03/19/2019    HDL 64 (H) 01/24/2011    HDL 51 07/20/2010     Lab Results   Component Value Date    LDLCALC 114 (H) 03/19/2019    LDLCALC 139 (H) 01/24/2011    LDLCALC 169 (H) 07/20/2010     Lab Results   Component Value Date    LABVLDL 27 03/19/2019    LABVLDL 30 01/24/2011    LABVLDL 27 07/20/2010        Lab Results   Component Value Date    PROBNP 2,532 02/14/2021     Radiology Review:  Pertinent images / reports were reviewed as a part of this visit and reveals the following:    Last Echo: 2/15/21   Summary   Normal left ventricular cavity size and wall thickness. Mild-to-moderately   reduced global systolic function with an ejection fraction estimated at 45%.   The mid inferoseptum and apical septum appear hypokinetic.   There is mild-to-moderate mitral regurgitation.   Probably at least moderate pericardial effusion with larger measurment   anteriorly. Correlate clinically for tamponade.   The inferior vena cava Is mildly dilated (2.13 cm) with respiratory collapse   of about 50%. Last Stress Test: 2/16/21  Summary    The overall quality of the study is fair. Subdiaphragmatic attenuation is    present.        Left ventricular cavity is noted to be normal on the rest studies. Right    ventricle is normal.        There is a small perfusion defect of moderate severity of the apical    inferior wall. The defect is present at rest and stress, consistent with    scar. There is a corresponding wall motion abnormality.        Sum stress score of 2. TID 1.02. No visual TID.        Left ventricular ejection fraction was mildly decreased at 47%.        Low-moderate risk scan. Last Angiogram: 2/2006   normal coronaries    Carotid US: 2/2021  There is <50% stenosis of the bilateral internal carotid arteries.    Vertebral flow are antegrade bilaterally. Assessment:     1.  Pericardial effusion   ~ 3/2014 and 12/2014 echos with small localized pericardial effusion   ~ 8/2019 echo: small to mod pericardial effusion   ~ 2/2020 echo: moderate pericardial effusion with more sig accumulation anteriorly but no tamponade   ~ 2/2021 echo: repeat echo with mild to moderate effusion without sign of tamponade  ~ labs 2/2021: LALI negative, CRP and Sed rate not elevated      2. Cardiomyopathy   ~ stable  ~ 2006 Echo: EF 25%, suspected viral etiology ([address] 2006 with normal cors)  ~ 12/2014 LVEF recovered   ~ Last echo 2/2021: EF 45% but Dr. Isreal Deleon reviewed and states it is preserved with LVEF at 50-55%     3. Carotid stenosis   ~ stable ; no stroke like symptoms   ~ 2011 duplex: 50-79% R ICA, 16-49% L ICA   ~ 2018 duplex: 50-69% R ICA, <50% L ICA   ~ 2/2021 duplex: <50% bilateral ICA      4. Chest pain / epigastric pain  ~ [facility] 2006 normal coronary arteries   ~ Myoview lexiscan 2/2021: consistent with scar  ~ mid epigastric resolved with laxative. Left epigastric discomfort persistent. Atypical. Discomfort with palpation to area. Pt also noted if she walks too far she notices discomfort. Not necessarily worsened since hospitalization in Feb but it is persistent. Does have chronic back pain that pt wonders if it is related.      5. Lightheadedness   ~ occurs with position change after sitting for long periods of time  ~ BP unchanged with sitting and standing (last OV had borderline orthostatic hypotension) recommend slow position changes and adequate hydration. ~ denies syncope   ~ symptoms are unchanged. If orthostatic BP recurrent/ worsen, can consider midodrine. 6. Palpations   ~ persistent   ~ Holter Monitor 2014: min HR 61, max . PACs. SVT, longest run of 6 with , Fastest 3 beats with . Symptoms were noted. ~ pt not interested in repeat Holter Monitoring     I had the opportunity to review the clinical symptoms and presentation of Shahzad Deutsch. Plan:     1. No change to medications   2. Would recommend cardiac CTA to evaluate coronary arteries given continued atypical complaints. However, there is a concern regarding administration of BB so a CT calcium score can also be considered. Pt wished to speak to [facility] and will call office back with a decision. 3. Continue home BP monitoring   4. Follow up with Dr. Zach Salter in 6 months     Overall the patient is stable from CV standpoint    I have addressed the patient's cardiac risk factors and adjusted pharmacologic treatment as needed. In addition, I have reinforced the need for patient directed risk factor modification. Further evaluation will be based upon the patient's clinical course and testing results.   All questions and concerns were addressed to the patient. Alternatives to my treatment were discussed. The patient verbalizes understanding not to stop medications without discussing with us. The patient is not currently smoking. The risks related to smoking were reviewed with the patient. Recommend maintaining a smoke-free lifestyle. Products available for smoking cessation were discussed. Patient is not on beta blocker. neg MI  Patient is not on an ACE/ARB/ARNI. no hx of systolic CHF. Patient is not on a statin. no known hx of CAD. Discussed exercise: 30min of sustained cardiovascular exercise most days of the week   Discussed Low saturated fat / Cholesterol diet. Thank you for allowing us to participate in the care of Aldona Pain. Rob Mabry APRN-CNP  [address]   Office: [phone]    Documentation of today's visit sent to PCP    NOTE:  This report was transcribed using voice recognition software. Every effort was made to ensure accuracy; however, inadvertent computerized transcription errors may be present.

## 2021-06-24 ENCOUNTER — OFFICE VISIT (OUTPATIENT)
Dept: ENT CLINIC | Age: 66
End: 2021-06-24
Payer: MEDICARE

## 2021-06-24 VITALS — HEART RATE: 85 BPM | DIASTOLIC BLOOD PRESSURE: 80 MMHG | TEMPERATURE: 97.5 F | SYSTOLIC BLOOD PRESSURE: 137 MMHG

## 2021-06-24 DIAGNOSIS — J20.9 ACUTE BRONCHITIS, UNSPECIFIED ORGANISM: Primary | ICD-10-CM

## 2021-06-24 DIAGNOSIS — J45.901 BRONCHITIS, ALLERGIC, UNSPECIFIED ASTHMA SEVERITY, WITH ACUTE EXACERBATION: ICD-10-CM

## 2021-06-24 DIAGNOSIS — G43.101 MIGRAINE WITH AURA AND WITH STATUS MIGRAINOSUS, NOT INTRACTABLE: ICD-10-CM

## 2021-06-24 PROCEDURE — 1123F ACP DISCUSS/DSCN MKR DOCD: CPT | Performed by: OTOLARYNGOLOGY

## 2021-06-24 PROCEDURE — 4040F PNEUMOC VAC/ADMIN/RCVD: CPT | Performed by: OTOLARYNGOLOGY

## 2021-06-24 PROCEDURE — 99213 OFFICE O/P EST LOW 20 MIN: CPT | Performed by: OTOLARYNGOLOGY

## 2021-06-24 PROCEDURE — G8400 PT W/DXA NO RESULTS DOC: HCPCS | Performed by: OTOLARYNGOLOGY

## 2021-06-24 PROCEDURE — G8427 DOCREV CUR MEDS BY ELIG CLIN: HCPCS | Performed by: OTOLARYNGOLOGY

## 2021-06-24 PROCEDURE — 1090F PRES/ABSN URINE INCON ASSESS: CPT | Performed by: OTOLARYNGOLOGY

## 2021-06-24 PROCEDURE — 3017F COLORECTAL CA SCREEN DOC REV: CPT | Performed by: OTOLARYNGOLOGY

## 2021-06-24 PROCEDURE — 96372 THER/PROPH/DIAG INJ SC/IM: CPT | Performed by: OTOLARYNGOLOGY

## 2021-06-24 PROCEDURE — G8419 CALC BMI OUT NRM PARAM NOF/U: HCPCS | Performed by: OTOLARYNGOLOGY

## 2021-06-24 PROCEDURE — 1036F TOBACCO NON-USER: CPT | Performed by: OTOLARYNGOLOGY

## 2021-06-24 RX ORDER — FLUTICASONE PROPIONATE 44 UG/1
1 AEROSOL, METERED RESPIRATORY (INHALATION) 2 TIMES DAILY
Qty: 1 INHALER | Refills: 3 | Status: SHIPPED | OUTPATIENT
Start: 2021-06-24

## 2021-06-24 RX ORDER — METHYLPREDNISOLONE ACETATE 40 MG/ML
40 INJECTION, SUSPENSION INTRA-ARTICULAR; INTRALESIONAL; INTRAMUSCULAR; SOFT TISSUE ONCE
Status: COMPLETED | OUTPATIENT
Start: 2021-06-24 | End: 2021-06-24

## 2021-06-24 RX ORDER — LEVOFLOXACIN 500 MG/1
500 TABLET, FILM COATED ORAL DAILY
Qty: 10 TABLET | Refills: 0 | Status: SHIPPED | OUTPATIENT
Start: 2021-06-24 | End: 2021-08-23

## 2021-06-24 RX ORDER — BUTALBITAL, ACETAMINOPHEN AND CAFFEINE 50; 325; 40 MG/1; MG/1; MG/1
1 TABLET ORAL EVERY 4 HOURS PRN
Qty: 100 TABLET | Refills: 1 | Status: SHIPPED | OUTPATIENT
Start: 2021-06-24 | End: 2021-09-02 | Stop reason: SDUPTHER

## 2021-06-24 RX ADMIN — METHYLPREDNISOLONE ACETATE 40 MG: 40 INJECTION, SUSPENSION INTRA-ARTICULAR; INTRALESIONAL; INTRAMUSCULAR; SOFT TISSUE at 08:30

## 2021-06-24 NOTE — PROGRESS NOTES
Over the past couple weeks on a progressive path, patient has been having an increase in congestion and coughing with some mucus production that has some color to it but no true shortness of breath except for occasional problem due to the coughing. No fevers been noted. She continues to have headaches but they are well controlled with medication. Currently, she appears in no obvious distress. Ear examination reveals some minimal cerumen present on the right side removed with a curette. The tympanic membranes both appear normal.  Oral examination is unremarkable. She is coughing a fair amount and chest congestion is present on all areas but no actual rales are noted and she has no stridor. Rhonchi are present throughout. Oxygen saturation is 98% with a pulse of 72. Nasal mucosa treated with cotton pledgets  impregnated with Afrin and lidocaine placed in middle meatuses against turbinates. Pledgets left in place for five minutes and removed enabling enhanced visualization. The exam revealed positive edema of mucosa. it was positive for erythema indicative of infection. There was not evidence of deviation of the septum which was not significant. There were not polyps present. .There were not other masses present. The turbinates were not enlarged beyond normal.  There is evidence of sinobronchitis on acute basis probably complicated by allergy. She has been using her albuterol approximately 3 times a day as an inhaler. She does have a inhalant ability at home. We will start her on Levaquin and since she is unable to take oral steroids without significant upset, we will give her a Depo-Medrol injection. We will also start her on Flovent inhaler as well as her current inhaler. She is asked to hydrate better and she will contact me on Wednesday to be sure she is improving.

## 2021-06-25 ENCOUNTER — TELEPHONE (OUTPATIENT)
Dept: CARDIOLOGY | Age: 66
End: 2021-06-25

## 2021-06-25 NOTE — TELEPHONE ENCOUNTER
I have left  for pt the last two days to return my call to follow up on our visit Monday. I have a couple questions for her.

## 2021-06-28 VITALS
OXYGEN SATURATION: 97 % | HEIGHT: 68 IN | DIASTOLIC BLOOD PRESSURE: 58 MMHG | BODY MASS INDEX: 16.22 KG/M2 | WEIGHT: 107 LBS | SYSTOLIC BLOOD PRESSURE: 102 MMHG | HEART RATE: 78 BPM

## 2021-07-14 ENCOUNTER — TELEPHONE (OUTPATIENT)
Dept: FAMILY MEDICINE CLINIC | Age: 66
End: 2021-07-14

## 2021-07-14 DIAGNOSIS — G89.4 CHRONIC PAIN SYNDROME: ICD-10-CM

## 2021-07-14 RX ORDER — HYDROCODONE BITARTRATE AND ACETAMINOPHEN 7.5; 325 MG/1; MG/1
1 TABLET ORAL EVERY 6 HOURS PRN
Qty: 120 TABLET | Refills: 0 | Status: SHIPPED | OUTPATIENT
Start: 2021-07-14 | End: 2021-08-12 | Stop reason: SDUPTHER

## 2021-07-14 NOTE — TELEPHONE ENCOUNTER
The patient calls in to request a refill of her    HYDROcodone-acetaminophen (NORCO) 7.5-325 MG per tablet

## 2021-07-15 ENCOUNTER — OFFICE VISIT (OUTPATIENT)
Dept: FAMILY MEDICINE CLINIC | Age: 66
End: 2021-07-15
Payer: MEDICARE

## 2021-07-15 VITALS
WEIGHT: 103.4 LBS | DIASTOLIC BLOOD PRESSURE: 78 MMHG | OXYGEN SATURATION: 98 % | SYSTOLIC BLOOD PRESSURE: 114 MMHG | HEART RATE: 110 BPM | BODY MASS INDEX: 15.72 KG/M2

## 2021-07-15 DIAGNOSIS — G89.4 CHRONIC PAIN SYNDROME: ICD-10-CM

## 2021-07-15 DIAGNOSIS — F33.0 MILD EPISODE OF RECURRENT MAJOR DEPRESSIVE DISORDER (HCC): Primary | ICD-10-CM

## 2021-07-15 PROCEDURE — 99213 OFFICE O/P EST LOW 20 MIN: CPT | Performed by: FAMILY MEDICINE

## 2021-07-15 PROCEDURE — 1123F ACP DISCUSS/DSCN MKR DOCD: CPT | Performed by: FAMILY MEDICINE

## 2021-07-15 PROCEDURE — 4040F PNEUMOC VAC/ADMIN/RCVD: CPT | Performed by: FAMILY MEDICINE

## 2021-07-15 PROCEDURE — 1036F TOBACCO NON-USER: CPT | Performed by: FAMILY MEDICINE

## 2021-07-15 PROCEDURE — G8427 DOCREV CUR MEDS BY ELIG CLIN: HCPCS | Performed by: FAMILY MEDICINE

## 2021-07-15 PROCEDURE — G8419 CALC BMI OUT NRM PARAM NOF/U: HCPCS | Performed by: FAMILY MEDICINE

## 2021-07-15 PROCEDURE — G8400 PT W/DXA NO RESULTS DOC: HCPCS | Performed by: FAMILY MEDICINE

## 2021-07-15 PROCEDURE — 3017F COLORECTAL CA SCREEN DOC REV: CPT | Performed by: FAMILY MEDICINE

## 2021-07-15 PROCEDURE — 1090F PRES/ABSN URINE INCON ASSESS: CPT | Performed by: FAMILY MEDICINE

## 2021-07-15 RX ORDER — BUPROPION HYDROCHLORIDE 150 MG/1
150 TABLET ORAL 2 TIMES DAILY
Qty: 60 TABLET | Refills: 0 | Status: SHIPPED | OUTPATIENT
Start: 2021-07-15 | End: 2021-08-11

## 2021-07-15 ASSESSMENT — ENCOUNTER SYMPTOMS
RESPIRATORY NEGATIVE: 1
GASTROINTESTINAL NEGATIVE: 1
BACK PAIN: 1

## 2021-07-15 NOTE — PROGRESS NOTES
Julianna Ibrahim (:  1955) is a 78 y.o. female,Established patient, here for evaluation of the following chief complaint(s):  Discuss Medications (zoloft not working, feeling depressed, in a funk for a couple weeks, feels empty) and Toe Pain (L little toe, thinks it's bent, sore, happened this morning)         ASSESSMENT/PLAN:  1. Mild episode of recurrent major depressive disorder (HCC)  add  -     buPROPion (WELLBUTRIN XL) 150 MG extended release tablet; Take 1 tablet by mouth 2 times daily, Disp-60 tablet, R-0Normal  Return 1 month for follow-up as well as AWV  2. Chronic pain syndrome  OARRS report reviewed and no inconsistencies noted   The patient understands the risks of dependency/addiction with hydrocodone and will take as little as possible and discontinue as soon as possible       Return in about 1 month (around 8/15/2021). Subjective   SUBJECTIVE/OBJECTIVE:  HPI  States for the last few weeks has felt more depressed and empty. Wants to sleep a lot and picking more fights with her  because he doesn't do as much as she thinks he should. .Feels depleted  Review of Systems   Constitutional: Negative. Respiratory: Negative. Cardiovascular: Negative. Gastrointestinal: Negative. Endocrine: Negative. Genitourinary: Negative. Musculoskeletal: Positive for arthralgias, back pain and myalgias. Psychiatric/Behavioral: Positive for dysphoric mood. Objective   Physical Exam  Constitutional:       General: She is not in acute distress. Neck:      Thyroid: No thyromegaly. Vascular: No carotid bruit. Cardiovascular:      Rate and Rhythm: Normal rate and regular rhythm. Pulmonary:      Effort: Pulmonary effort is normal.      Breath sounds: Normal breath sounds. No wheezing or rales. Musculoskeletal:      Cervical back: Neck supple. Lymphadenopathy:      Cervical: No cervical adenopathy. Skin:     General: Skin is warm and dry.    Neurological: Mental Status: She is alert and oriented to person, place, and time. Psychiatric:         Behavior: Behavior normal.         Thought Content: Thought content normal.         Judgment: Judgment normal.              An electronic signature was used to authenticate this note.     --Pradeep Bagley MD

## 2021-07-29 NOTE — PROGRESS NOTES
[address]   Cardiac Follow up    Referring Provider:  Nataliya Santacruz MD     Chief Complaint   Patient presents with    Irregular Heart Beat     L hand surgery with dr. Holland Lorenz on 4/3/18 at [address] [place] [address],[address]        History of Present Illness:  Ruth Katz is 60 y.o. female who presents today for cardiac clearance for carpal tunnel surgery. 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 Her EF has since normalized. She underwent back surgery in 2016 which took 6 hours to complete and had no cardiovascular complications during or after surgery. She had a normal nuclear stress test prior to this surgery. Today she states she will have left hand surgery on 4/3/18 by Dr. Gene Rendon. The left hand pain limits her ability to cook or do other daily activities. She denies exertional chest pain, shortness of breath. She continues to have back pain, but is able to walk and do most activities without limitations. Past Medical History:   has a past medical history of Arthritis; Asthma; Back pain; Bell's palsy; CHF (congestive heart failure) ([address] Chronic pain; Colitis; Depression; Elevated sed rate; Family history of breast cancer in first degree relative; Gastric ulcer; GERD (gastroesophageal reflux disease); Glaucoma; Hypertension; Movement disorder; Neuromuscular disorder ([address] Osteoarthritis; and PONV (postoperative nausea and vomiting). Surgical History:   has a past surgical history that includes Hysterectomy; Facial nerve surgery; Upper gastrointestinal endoscopy (10/15/12); Colonoscopy (10/16/12); back surgery; Colonoscopy (3/26/13); Upper gastrointestinal endoscopy (2/11/15); and Cystocopy (1/13/16). Social History:   reports that she quit smoking about 1 years ago. Her smoking use included Cigarettes. She has a 5.00 pack-year smoking history.  She has never used smokeless tobacco. She reports that she does Heart Failure Imiquimod Counseling:  I discussed with the patient the risks of imiquimod including but not limited to erythema, scaling, itching, weeping, crusting, and pain.  Patient understands that the inflammatory response to imiquimod is variable from person to person and was educated regarded proper titration schedule.  If flu-like symptoms develop, patient knows to discontinue the medication and contact us.

## 2021-08-02 RX ORDER — GABAPENTIN 300 MG/1
CAPSULE ORAL
Qty: 90 CAPSULE | Refills: 5 | Status: SHIPPED | OUTPATIENT
Start: 2021-08-02 | End: 2022-01-25

## 2021-08-02 NOTE — TELEPHONE ENCOUNTER
Medication:   Requested Prescriptions     Pending Prescriptions Disp Refills    gabapentin (NEURONTIN) 300 MG capsule [Pharmacy Med Name: GABAPENTIN 300MG CAPSULES] 90 capsule 5     Sig: TAKE 1 CAPSULE BY MOUTH THREE TIMES DAILY        Last Filled:  2/2/21 #90, 5 RF     Patient Phone Number: [phone] (home)     Last appt: 7/15/2021  Discuss medication   Next appt: 8/23/2021    Last OARRS:   RX Monitoring 7/15/2021   Attestation -   Periodic Controlled Substance Monitoring No signs of potential drug abuse or diversion identified.

## 2021-08-05 DIAGNOSIS — K21.9 GASTROESOPHAGEAL REFLUX DISEASE WITHOUT ESOPHAGITIS: ICD-10-CM

## 2021-08-05 RX ORDER — DEXLANSOPRAZOLE 60 MG/1
CAPSULE, DELAYED RELEASE ORAL
Qty: 90 CAPSULE | Refills: 3 | Status: SHIPPED | OUTPATIENT
Start: 2021-08-05 | End: 2022-08-15

## 2021-08-05 NOTE — TELEPHONE ENCOUNTER
Medication:   Requested Prescriptions     Pending Prescriptions Disp Refills    DEXILANT 60 MG CPDR delayed release capsule [Pharmacy Med Name: DEXILANT 60MG CAP(FORMERLY KAPIDEX)] 90 capsule 3     Sig: TAKE 1 CAPSULE BY MOUTH DAILY        Last Filled:  8/21/20 #90, 3 RF     Patient Phone Number: [phone] (home)     Last appt: 7/15/21 discuss medication, toe pain   Next appt: Visit date not found    Last OARRS:   RX Monitoring 7/15/2021   Attestation -   Periodic Controlled Substance Monitoring No signs of potential drug abuse or diversion identified.

## 2021-08-12 DIAGNOSIS — G89.4 CHRONIC PAIN SYNDROME: ICD-10-CM

## 2021-08-12 RX ORDER — HYDROCODONE BITARTRATE AND ACETAMINOPHEN 7.5; 325 MG/1; MG/1
1 TABLET ORAL EVERY 6 HOURS PRN
Qty: 120 TABLET | Refills: 0 | Status: SHIPPED | OUTPATIENT
Start: 2021-08-12 | End: 2021-09-13 | Stop reason: SDUPTHER

## 2021-08-12 NOTE — TELEPHONE ENCOUNTER
Medication and Quantity requested:    HYDROcodone-acetaminophen (NORCO) 7.5-325 MG per tablet - qty 120      Last Visit  07/15/21 Dr Cristina Nelson and phone number updated in [facility]:  Yes    Ingrid

## 2021-08-12 NOTE — TELEPHONE ENCOUNTER
Medication:   Requested Prescriptions     Pending Prescriptions Disp Refills    HYDROcodone-acetaminophen (NORCO) 7.5-325 MG per tablet 120 tablet 0     Sig: Take 1 tablet by mouth every 6 hours as needed for Pain for up to 30 days. Last Filled:  7/14/21 #120, 0 RF     Patient Phone Number: [phone] (home)     Last appt: 7/15/2021 discuss medication, toe pain   Next appt: 8/23/2021    Last OARRS:   RX Monitoring 7/15/2021   Attestation -   Periodic Controlled Substance Monitoring No signs of potential drug abuse or diversion identified.

## 2021-08-21 DIAGNOSIS — G43.101 MIGRAINE WITH AURA AND WITH STATUS MIGRAINOSUS, NOT INTRACTABLE: ICD-10-CM

## 2021-08-23 ENCOUNTER — OFFICE VISIT (OUTPATIENT)
Dept: FAMILY MEDICINE CLINIC | Age: 66
End: 2021-08-23
Payer: MEDICARE

## 2021-08-23 VITALS
SYSTOLIC BLOOD PRESSURE: 122 MMHG | WEIGHT: 104 LBS | BODY MASS INDEX: 15.81 KG/M2 | OXYGEN SATURATION: 98 % | DIASTOLIC BLOOD PRESSURE: 76 MMHG | HEART RATE: 90 BPM

## 2021-08-23 DIAGNOSIS — G89.4 CHRONIC PAIN SYNDROME: ICD-10-CM

## 2021-08-23 DIAGNOSIS — F33.0 MILD EPISODE OF RECURRENT MAJOR DEPRESSIVE DISORDER (HCC): ICD-10-CM

## 2021-08-23 DIAGNOSIS — Z00.00 ROUTINE GENERAL MEDICAL EXAMINATION AT A HEALTH CARE FACILITY: ICD-10-CM

## 2021-08-23 DIAGNOSIS — Z00.00 MEDICARE ANNUAL WELLNESS VISIT, SUBSEQUENT: Primary | ICD-10-CM

## 2021-08-23 DIAGNOSIS — J45.20 MILD INTERMITTENT ASTHMA WITHOUT COMPLICATION: ICD-10-CM

## 2021-08-23 DIAGNOSIS — J30.89 ALLERGIC RHINITIS DUE TO OTHER ALLERGIC TRIGGER, UNSPECIFIED SEASONALITY: ICD-10-CM

## 2021-08-23 PROCEDURE — 4040F PNEUMOC VAC/ADMIN/RCVD: CPT | Performed by: FAMILY MEDICINE

## 2021-08-23 PROCEDURE — G0439 PPPS, SUBSEQ VISIT: HCPCS | Performed by: FAMILY MEDICINE

## 2021-08-23 PROCEDURE — 1123F ACP DISCUSS/DSCN MKR DOCD: CPT | Performed by: FAMILY MEDICINE

## 2021-08-23 PROCEDURE — 3017F COLORECTAL CA SCREEN DOC REV: CPT | Performed by: FAMILY MEDICINE

## 2021-08-23 ASSESSMENT — PATIENT HEALTH QUESTIONNAIRE - PHQ9
SUM OF ALL RESPONSES TO PHQ QUESTIONS 1-9: 2
1. LITTLE INTEREST OR PLEASURE IN DOING THINGS: 1
SUM OF ALL RESPONSES TO PHQ QUESTIONS 1-9: 2
2. FEELING DOWN, DEPRESSED OR HOPELESS: 1
SUM OF ALL RESPONSES TO PHQ9 QUESTIONS 1 & 2: 2
SUM OF ALL RESPONSES TO PHQ QUESTIONS 1-9: 2

## 2021-08-23 ASSESSMENT — LIFESTYLE VARIABLES
HOW OFTEN DO YOU HAVE A DRINK CONTAINING ALCOHOL: NEVER
AUDIT-C TOTAL SCORE: INCOMPLETE
AUDIT TOTAL SCORE: INCOMPLETE
HOW OFTEN DO YOU HAVE A DRINK CONTAINING ALCOHOL: 0

## 2021-08-23 NOTE — PATIENT INSTRUCTIONS
Personalized Preventive Plan for Luis Felipe Cook - 8/23/2021  Medicare offers a range of preventive health benefits. Some of the tests and screenings are paid in full while other may be subject to a deductible, co-insurance, and/or copay. Some of these benefits include a comprehensive review of your medical history including lifestyle, illnesses that may run in your family, and various assessments and screenings as appropriate. After reviewing your medical record and screening and assessments performed today your provider may have ordered immunizations, labs, imaging, and/or referrals for you. A list of these orders (if applicable) as well as your Preventive Care list are included within your After Visit Summary for your review. Other Preventive Recommendations:    · A preventive eye exam performed by an eye specialist is recommended every 1-2 years to screen for glaucoma; cataracts, macular degeneration, and other eye disorders. · A preventive dental visit is recommended every 6 months. · Try to get at least 150 minutes of exercise per week or 10,000 steps per day on a pedometer . · Order or download the FREE \"Exercise & Physical Activity: Your Everyday Guide\" from [employer]. Call 2-184.172.8318 or search [employer] online. · You need 0629-8335 mg of calcium and 6991-9327 IU of vitamin D per day. It is possible to meet your calcium requirement with diet alone, but a vitamin D supplement is usually necessary to meet this goal.  · When exposed to the sun, use a sunscreen that protects against both UVA and UVB radiation with an SPF of 30 or greater. Reapply every 2 to 3 hours or after sweating, drying off with a towel, or swimming. · Always wear a seat belt when traveling in a car. Always wear a helmet when riding a bicycle or motorcycle.

## 2021-08-23 NOTE — PROGRESS NOTES
Medicare Annual Wellness Visit  Name: Deepika Ritter Date: 2021   MRN: [id number] Sex: Female   Age: 78 y.o. Ethnicity: Non- / Non    : 1955 Race: White (non-)      Kennedy Phalen is here for Medicare AWV (Awv)    Screenings for behavioral, psychosocial and functional/safety risks, and cognitive dysfunction are all negative except as indicated below. These results, as well as other patient data from the [address] [facility] [address] form, are documented in Flowsheets linked to this Encounter. Allergies   Allergen Reactions    Beta Adrenergic Blockers Shortness Of Breath    Pheniramine      Other reaction(s): rapid heartbeat    Antihistamine [Diphenhydramine Hcl] Nausea And Vomiting    Aspirin Hives    Codeine Hives    Dilaudid [Hydromorphone] Nausea And Vomiting    Nsaids Hives and Nausea And Vomiting    Prednisone Other (See Comments)     GI upset    Zithromax [Azithromycin] Nausea And Vomiting         Prior to Visit Medications    Medication Sig Taking? Authorizing Provider   HYDROcodone-acetaminophen (NORCO) 7.5-325 MG per tablet Take 1 tablet by mouth every 6 hours as needed for Pain for up to 30 days.  Yes Mindi Jung MD   buPROPion (WELLBUTRIN XL) 150 MG extended release tablet TAKE 1 TABLET BY MOUTH TWICE DAILY Yes Mindi Jung MD   DEXILANT 60 MG CPDR delayed release capsule TAKE 1 CAPSULE BY MOUTH DAILY Yes Mindi Jung MD   gabapentin (NEURONTIN) 300 MG capsule TAKE 1 CAPSULE BY MOUTH THREE TIMES DAILY Yes Mindi Jung MD   butalbital-acetaminophen-caffeine (FIORICET, ESGIC) -40 MG per tablet Take 1 tablet by mouth every 4 hours as needed for Migraine May take 2 tablets every 4 hours as needed for pain Yes Loree Martinez MD   fluticasone (FLOVENT HFA) 44 MCG/ACT inhaler Inhale 1 puff into the lungs 2 times daily Yes Loree Martinez MD   Probiotic Product (PROBIOTIC PO) Take by mouth Yes Historical Provider, MD   sertraline (ZOLOFT) 100 MG tablet TAKE 1 TABLET BY MOUTH THREE TIMES DAILY Yes Mindi Jung MD   ipratropium (ATROVENT) 0.06 % nasal spray 2 sprays by Each Nostril route 3 times daily as needed for Rhinitis (runny nose) Yes Hugo An, DO   Elastic Bandages & Supports (ELBOW BRACE) MISC Use as needed Yes Toby Gonzales MD   tiZANidine (ZANAFLEX) 4 MG tablet TAKE 1 TABLET BY MOUTH THREE TIMES DAILY Yes Mindi Jung MD   albuterol (PROVENTIL) (2.5 MG/3ML) 0.083% nebulizer solution USE 1 VIAL IN NEBULIZER EVERY 6 HOURS Yes Mindi Jung MD   albuterol sulfate HFA (PROAIR HFA) 108 (90 Base) MCG/ACT inhaler INHALE 2 PUFFS BY MOUTH EVERY 4 HOURS AS NEEDED FOR WHEEZING Yes Mindi Jung MD   ondansetron (ZOFRAN) 4 MG tablet Take 1 tablet by mouth every 6 hours as needed for Nausea Yes Mindi Jung MD   montelukast (SINGULAIR) 10 MG tablet TAKE 1 TABLET BY MOUTH EVERY NIGHT Yes Loree Martinez MD   traZODone (DESYREL) 50 MG tablet TAKE 2 TABLETS BY MOUTH EVERY NIGHT Yes Mindi Jung MD   diclofenac sodium (VOLTAREN) 1 % GEL APPLY 2 GRAMS TOPICALLY TO HAND FOUR TIMES DAILY Yes Mindi Jung MD   fluticasone (FLOVENT HFA) 110 MCG/ACT inhaler INHALE 1 PUFF INTO THE LUNGS TWICE DAILY Yes Mindi Jung MD   nitroGLYCERIN (NITROSTAT) 0.4 MG SL tablet PLACE 1 TABLET UNDE THE TONGUE EVERY 5 MINUTES AS NEEDED FOR CHEST PAIN Yes JOAO Rayo CNP   Elastic Bandages & Supports (TRUFORM SUPPORT SOCK 20-30MMHG) MISC 1 each by Does not apply route daily Yes JOAO Rayo CNP   hydrocortisone (WESTCORT) 0.2 % cream APPLY EXTERNALLY TO THE AFFECTED AREA TWICE DAILY Yes Mindi Jung MD   Nutritional Supplements (BOOST CALORIE SMART) LIQD Take 1 Can by mouth See Admin Instructions EVERY day Yes Historical Provider, MD   dicyclomine (BENTYL) 10 MG capsule Take 1 capsule by mouth every 6 hours as needed (Bowel spasms)  Patient not taking: Reported on 6/21/2021  Nahun Bolds, MD   butalbital-acetaminophen-caffeine ([facility]) -40 MG per tablet TAKE 1 TABLET BY MOUTH EVERY 4 HOURS AS NEEDED FOR HEADACHES MAY TAKE 2 TABLETS EVERY 4 HOURS AS NEEDED FOR PAIN  Nazario Lind MD         Past Medical History:   Diagnosis Date    Arthritis     fingers, hands    Asthma     Back pain     Bell's palsy     CHF (congestive heart failure) (HCC)     Chronic pain     BACK SURGERY X4, neck, left arm    Colitis 2/14/2013    Depression     Elevated sed rate     Family history of breast cancer in first degree relative     Gastric ulcer     GERD (gastroesophageal reflux disease)     colitis    Glaucoma     Hypertension     Movement disorder     chronic back pain    Neuromuscular disorder (HCC)     sciatica rt. leg    Osteoarthritis     PONV (postoperative nausea and vomiting)     SEVERE       Past Surgical History:   Procedure Laterality Date    BACK SURGERY      x 4    COLONOSCOPY  10/16/12    polyp removed and biopsy sent    COLONOSCOPY  3/26/13    CYSTOSCOPY  1/13/16    urethral dilitation    FACIAL NERVE SURGERY      D/T Bell's Palsy    FINGER SURGERY Left 04/03/2018    Excision of Left Thumb Digital Mucous cyst & DIP Joint Arthrotomy & Debridement    FINGER SURGERY  04/2018    left thumb    FINGER SURGERY Left 04/03/2018    thumb surgery     HYSTERECTOMY      Partial    UPPER GASTROINTESTINAL ENDOSCOPY  10/15/12    UPPER GASTROINTESTINAL ENDOSCOPY  2/11/15    with EUS    VENTRAL HERNIA REPAIR N/A 4/15/2019    LAPAROSCOPIC REPAIR OF INCISIONAL HERNIA WITH MESH performed by Allie Gillespie MD at [facility] OR         Family History   Problem Relation Age of Onset    Heart Disease Mother     High Blood Pressure Mother     Arthritis Mother     Breast Cancer Sister     High Blood Pressure Brother     Diabetes Neg Hx     Stroke Neg Hx        CareTeam (Including outside providers/suppliers regularly involved in providing care):   Patient Care Team:  Betty Barcenas MD as PCP - General  Betty Barcenas MD as PCP [facility] Empaneled Provider  JOAO Linder CNP as Nurse Practitioner (Nurse Practitioner)  Behzad Conte MD as Consulting Physician (Physical Medicine and Rehab)  Burton Burrows MD as Consulting Physician (Cardiology)  Rose Andre MD as Surgeon (General Surgery)    Wt Readings from Last 3 Encounters:   08/23/21 104 lb (47.2 kg)   07/15/21 103 lb 6.4 oz (46.9 kg)   06/21/21 107 lb (48.5 kg)     Vitals:    08/23/21 1313   BP: 122/76   Site: Left Upper Arm   Position: Sitting   Cuff Size: Large Adult   Pulse: 90   SpO2: 98%   Weight: 104 lb (47.2 kg)     Body mass index is 15.81 kg/m². Based upon direct observation of the patient, evaluation of cognition reveals recent and remote memory intact. Vitals:    08/23/21 1313   BP: 122/76   Site: Left Upper Arm   Position: Sitting   Cuff Size: Large Adult   Pulse: 90   SpO2: 98%   Weight: 104 lb (47.2 kg)     Body mass index is 15.81 kg/m².    General Appearance: alert and oriented, in no acute distress  Skin: warm and dry, no rash or erythema  Head: normocephalic and atraumatic  Eyes: pupils equal, round, and reactive to light, extraocular eye movements intact, conjunctivae normal  ENT: tympanic membrane, external ear and ear canal normal bilaterally, nose without deformity,   Neck: supple and non-tender without mass, no thyromegaly or thyroid nodules, no cervical lymphadenopathy  Pulmonary/Chest: clear to auscultation bilaterally- no wheezes, rales or rhonchi, normal air movement, no respiratory distress  Cardiovascular: normal rate, regular rhythm, normal S1 and S2, no murmurs, rubs, clicks, or gallops, no carotid bruits  Abdomen: soft, non-tender, non-distended, normal bowel sounds, no masses or organomegaly  Extremities: no cyanosis, clubbing or edema  Neurologic: reflexes normal and symmetric, no cranial nerve deficit, speech normal  GYN:Rectal: deferred to Gynecologist  Breast: deferred to Gynecologist     Patient's complete Health Risk Assessment and screening values have been reviewed and are found in Flowsheets. The following problems were reviewed today and where indicated follow up appointments were made and/or referrals ordered. Positive Risk Factor Screenings with Interventions:            General Health and ACP:  General  In general, how would you say your health is?: Fair  In the past 7 days, have you experienced any of the following?  New or Increased Pain, New or Increased Fatigue, Loneliness, Social Isolation, Stress or Anger?: (!) New or Increased Pain, New or Increased Fatigue  Do you get the social and emotional support that you need?: Yes  Do you have a Living Will?: (!) No  Advance Directives     Power of  Living Will ACP-Advance Directive ACP-Power of     Not on File Not on File Filed Not on File      General Health Risk Interventions:  · chronic pain    Health Habits/Nutrition:  Health Habits/Nutrition  Do you exercise for at least 20 minutes 2-3 times per week?: Yes  Have you lost any weight without trying in the past 3 months?: No  Do you eat only one meal per day?: No  Have you seen the dentist within the past year?: (!) No     Health Habits/Nutrition Interventions:  · no formal exercise    Hearing/Vision:  No exam data present  Hearing/Vision  Do you or your family notice any trouble with your hearing that hasn't been managed with hearing aids?: (!) Yes  Do you have difficulty driving, watching TV, or doing any of your daily activities because of your eyesight?: (!) Yes  Have you had an eye exam within the past year?: Yes  Hearing/Vision Interventions:  · no new issues      Personalized Preventive Plan   Current Health Maintenance Status  Immunization History   Administered Date(s) Administered    COVID-19, Li Peter, PF, 30mcg/0.3mL 02/27/2021, 03/18/2021    Influenza Virus Vaccine 10/28/2014, 09/18/2015, 10/01/2018, 10/08/2019    Influenza, High Dose (Fluzone 65 yrs and older) 09/09/2020    Influenza, Intradermal, Preservative free 11/12/2013    Influenza, Intradermal, Quadrivalent, Preservative Free 11/14/2017    Pneumococcal Polysaccharide ([id number]) 10/13/2020        Health Maintenance   Topic Date Due    DTaP/Tdap/Td vaccine (1 - Tdap) Never done    Shingles Vaccine (1 of 2) Never done    DEXA (modify frequency per FRAX score)  Never done   [employer] Visit (AWV)  Never done    Breast cancer screen  03/19/2020    Flu vaccine (1) 09/01/2021    Colon cancer screen colonoscopy  03/26/2023    Lipid screen  03/19/2024    Pneumococcal 65+ years Vaccine  Completed    COVID-19 Vaccine  Completed    Hepatitis C screen  Completed    Hepatitis A vaccine  Aged Out    Hepatitis B vaccine  Aged Out    Hib vaccine  Aged Out    Meningococcal (ACWY) vaccine  Aged Out     Recommendations for [employer] Due: see orders and patient instructions/AVS.  . Recommended screening schedule for the next 5-10 years is provided to the patient in written form: see Patient Instructions/AVS.    Storm Lindsey was seen today for medicare awv. Diagnoses and all orders for this visit:    Medicare annual wellness visit, subsequent/Routine general medical examination at a health care facility  Return 1 year  Chronic pain syndrome  OARRS report reviewed and no inconsistencies noted   The patient understands the risks of dependency/addiction with hydrocone and will take as little as possible and discontinue as soon as possible     Mild episode of recurrent major depressive disorder ([address]  Generally stable on medication  Allergic rhinitis due to other allergic trigger, unspecified seasonality/Mild intermittent asthma without complication  The patient states this has been a bad season for both. On singulair and albuterol      Health Maintenance reviewed with the patient: Shingrix was discussed and recommended.   Tdap also recommended

## 2021-08-25 RX ORDER — BUTALBITAL, ACETAMINOPHEN AND CAFFEINE 50; 325; 40 MG/1; MG/1; MG/1
1 TABLET ORAL EVERY 4 HOURS PRN
Qty: 100 TABLET | Refills: 1 | OUTPATIENT
Start: 2021-08-25

## 2021-08-25 NOTE — TELEPHONE ENCOUNTER
Patient is requesting her rf for butalbital   For her migraines    Patient saw Dr Mcdonnell Began on  4-23-21    She saw    6-24-21   Please advise

## 2021-08-26 RX ORDER — NITROGLYCERIN 0.4 MG/1
TABLET SUBLINGUAL
Qty: 25 TABLET | Refills: 2 | Status: SHIPPED | OUTPATIENT
Start: 2021-08-26

## 2021-09-01 DIAGNOSIS — J45.20 MILD INTERMITTENT ASTHMA WITHOUT COMPLICATION: ICD-10-CM

## 2021-09-01 RX ORDER — ALBUTEROL SULFATE 90 UG/1
AEROSOL, METERED RESPIRATORY (INHALATION)
Qty: 8.5 G | Refills: 11 | Status: SHIPPED | OUTPATIENT
Start: 2021-09-01 | End: 2022-03-15

## 2021-09-01 NOTE — TELEPHONE ENCOUNTER
Medication:   Requested Prescriptions     Pending Prescriptions Disp Refills    albuterol sulfate  (90 Base) MCG/ACT inhaler [Pharmacy Med Name: ALBUTEROL HFA INH (200 PUFFS)8.5GM] 8.5 g 11     Sig: INHALE 2 PUFFS BY MOUTH EVERY 4 HOURS AS NEEDED FOR WHEEZING        Last Filled:  2/8/21 8.5 g, 11 RF     Patient Phone Number: [phone] (home)     Last appt: 8/23/2021 AWV   Next appt: Visit date not found    Last OARRS:   RX Monitoring 8/23/2021   Attestation -   Periodic Controlled Substance Monitoring No signs of potential drug abuse or diversion identified.

## 2021-09-02 ENCOUNTER — OFFICE VISIT (OUTPATIENT)
Dept: ENT CLINIC | Age: 66
End: 2021-09-02
Payer: MEDICARE

## 2021-09-02 VITALS — HEART RATE: 77 BPM | SYSTOLIC BLOOD PRESSURE: 156 MMHG | TEMPERATURE: 97.7 F | DIASTOLIC BLOOD PRESSURE: 85 MMHG

## 2021-09-02 DIAGNOSIS — J01.90 ACUTE BACTERIAL RHINOSINUSITIS: Primary | ICD-10-CM

## 2021-09-02 DIAGNOSIS — B96.89 ACUTE BACTERIAL RHINOSINUSITIS: Primary | ICD-10-CM

## 2021-09-02 DIAGNOSIS — G43.101 MIGRAINE WITH AURA AND WITH STATUS MIGRAINOSUS, NOT INTRACTABLE: ICD-10-CM

## 2021-09-02 PROCEDURE — 4040F PNEUMOC VAC/ADMIN/RCVD: CPT | Performed by: OTOLARYNGOLOGY

## 2021-09-02 PROCEDURE — G8419 CALC BMI OUT NRM PARAM NOF/U: HCPCS | Performed by: OTOLARYNGOLOGY

## 2021-09-02 PROCEDURE — 3017F COLORECTAL CA SCREEN DOC REV: CPT | Performed by: OTOLARYNGOLOGY

## 2021-09-02 PROCEDURE — 1090F PRES/ABSN URINE INCON ASSESS: CPT | Performed by: OTOLARYNGOLOGY

## 2021-09-02 PROCEDURE — G8400 PT W/DXA NO RESULTS DOC: HCPCS | Performed by: OTOLARYNGOLOGY

## 2021-09-02 PROCEDURE — 1123F ACP DISCUSS/DSCN MKR DOCD: CPT | Performed by: OTOLARYNGOLOGY

## 2021-09-02 PROCEDURE — G8427 DOCREV CUR MEDS BY ELIG CLIN: HCPCS | Performed by: OTOLARYNGOLOGY

## 2021-09-02 PROCEDURE — 1036F TOBACCO NON-USER: CPT | Performed by: OTOLARYNGOLOGY

## 2021-09-02 PROCEDURE — 99213 OFFICE O/P EST LOW 20 MIN: CPT | Performed by: OTOLARYNGOLOGY

## 2021-09-02 RX ORDER — BUTALBITAL, ACETAMINOPHEN AND CAFFEINE 50; 325; 40 MG/1; MG/1; MG/1
1 TABLET ORAL EVERY 4 HOURS PRN
Qty: 40 TABLET | Refills: 0 | Status: SHIPPED | OUTPATIENT
Start: 2021-09-02 | End: 2021-10-12 | Stop reason: SDUPTHER

## 2021-09-02 RX ORDER — CEFUROXIME AXETIL 250 MG/1
250 TABLET ORAL 2 TIMES DAILY
Qty: 28 TABLET | Refills: 0 | Status: SHIPPED | OUTPATIENT
Start: 2021-09-02 | End: 2021-09-16

## 2021-09-02 ASSESSMENT — ENCOUNTER SYMPTOMS
TROUBLE SWALLOWING: 0
SORE THROAT: 0
RHINORRHEA: 1
VOICE CHANGE: 0
SINUS PAIN: 1
SINUS PRESSURE: 1

## 2021-09-02 NOTE — PATIENT INSTRUCTIONS
inflammation of the colon, usually due to C. difficile bacteria)  yeast or fungal  infections, Thompson-Deuce syndrome (very rare necrotic skin reaction with peeling of skin, blisters and arthritis), persistent symptoms/infection, and failure to improve. Fluticasone:  nosebleed, burning sensation, atrophy of nasal mucosa, septal ulceration or perforation, nasal irritation, nasal yeast infection,  drowsiness, bad taste.      ~~~>>> Also please read the medication information in this AVS and all information given to you by the pharmacist.             Patient Education        cefuroxime (oral/injection)  Pronunciation:  SEF ue CHARMAINE eem  What is the most important information I should know about cefuroxime? You should not use cefuroxime if you have ever had a severe allergic reaction to any type of cephalosporin antibiotic (Omnicef, Keflex, and others). What is cefuroxime? Cefuroxime is a cephalosporin (SEF a low spor in) antibiotic that is used to treat bacterial infections of the ear, nose, throat, lungs, skin, bones, joints, bladder, or kidneys. Cefuroxime is also used to treat gonorrhea, meningitis, sepsis, or early Lyme disease. Cefuroxime injection is sometimes given just before a surgery to prevent infection. Cefuroxime may also be used for purposes not listed in this medication guide. What should I discuss with my healthcare provider before using cefuroxime? You should not use this medicine if you have an allergy to any type of penicillin, or if you are allergic to cefuroxime or any other cephalosporin antibiotic (cefdinir, cefalexin, Keflex, Omnicef, and others). Tell your doctor if you have ever had:  · a stomach or intestinal disorder such as colitis;  · kidney disease;  · liver disease; or  · if you are malnourished. Tell your doctor if you are pregnant or breastfeeding. Some antibiotics can make birth control pills less effective.  Ask your doctor about using a non-oral birth control to prevent pregnancy. This includes injections, implants, skin patches, vaginal rings, condom, diaphragm, cervical cap, or contraceptive sponge. Cefuroxime is not approved for use by anyone younger than 1 months old. How should I use cefuroxime? Follow all directions on your prescription label and read all medication guides or instruction sheets. Use the medicine exactly as directed. Cefuroxime oral is taken by mouth. Cefuroxime oral  may be given as a single dose to treat gonorrhea. For most other infections, cefuroxime oral is usually given for 7 to 10 days, or for 20 days to treat early Lyme disease. Follow your doctor's dosing instructions very carefully. Swallow the tablet whole and do not crush, chew, or break it. You may take cefuroxime oral with or without food. Tell your doctor if a child taking cefuroxime oral cannot swallow a tablet whole. Cefuroxime injection is injected into a muscle, or as an infusion into a vein. A healthcare provider will give your first dose and may teach you how to properly use the medication by yourself. Read and carefully follow any Instructions for Use provided with cefuroxime injection. Ask your doctor or pharmacist if you don't understand all instructions. Prepare an injection only when you are ready to give it. Do not use if the medicine lhas changed colors or has particles in it. Call your pharmacist for new medicine. Use this medicine for the full prescribed length of time, even if your symptoms quickly improve. Skipping doses can increase your risk of infection that is resistant to medication. Cefuroxime will not treat a viral infection such as the flu or a common cold. This medicine can affect the results of certain medical tests. Tell any doctor who treats you that you are using cefuroxime. Store cefuroxime oral at room temperature away from moisture and heat. Keep the bottle tightly closed when not in use.   Cefuroxime injection is a powder that must be mixed with a and others may occur. Call your doctor for medical advice about side effects. You may report side effects to FDA at 4-585-FDA-8155. What other drugs will affect cefuroxime? Tell your doctor about all your other medicines, especially:  · probenecid;  · a diuretic or \"water pill\";  · a blood thinner --warfarin, Coumadin, Nia Camargo; or  · a stomach acid reducer --esomeprazole, famotidine, Nexium, Pepcid, Prevacid, Prilosec, Tagamet, Zantac, and others. This list is not complete. Other drugs may affect cefuroxime, including prescription and over-the-counter medicines, vitamins, and herbal products. Not all possible drug interactions are listed here. Where can I get more information? Your pharmacist can provide more information about cefuroxime. Remember, keep this and all other medicines out of the reach of children, never share your medicines with others, and use this medication only for the indication prescribed. Every effort has been made to ensure that the information provided by [address] is accurate, up-to-date, and complete, but no guarantee is made to that effect. Drug information contained herein may be time sensitive. [facility] information has been compiled for use by healthcare practitioners and consumers in the [place] and therefore [facility] does not warrant that uses outside of the [place] are appropriate, unless specifically indicated otherwise. [facility] drug information does not endorse drugs, diagnose patients or recommend therapy. [facility] drug information is an informational resource designed to assist licensed healthcare practitioners in caring for their patients and/or to serve consumers viewing this service as a supplement to, and not a substitute for, the expertise, skill, knowledge and judgment of healthcare practitioners.  The absence of a warning for a given drug or drug combination in no way should be construed to indicate that the drug or drug combination is safe, effective or appropriate for any given patient. [facility] does not assume any responsibility for any aspect of healthcare administered with the aid of information [facility] provides. The information contained herein is not intended to cover all possible uses, directions, precautions, warnings, drug interactions, allergic reactions, or adverse effects. If you have questions about the drugs you are taking, check with your doctor, nurse or pharmacist.  Copyright 3962-9417 [address]. Version: 9.01. Revision date: 1/4/2021. Care instructions adapted under license by [facility] ([facility]). If you have questions about a medical condition or this instruction, always ask your healthcare professional. [place] [address] any warranty or liability for your use of this information.

## 2021-09-02 NOTE — PROGRESS NOTES
[facility]         PCP:  Cheng Wood MD        CHIEF COMPLAINT  Chief Complaint   Patient presents with    Sinus Problem     runny nose, headaches, thraot drainage        HISTORY OF PRESENT ILLNESS       Kingston Da Silva is a 78 y.o. female here for evaluation and treatment of acute allergies and rhinosinusitis. Allergies are manifested by runny nose, sneezing, coughing, headache, goopy eye stuff, for about two weeks. Similar to past sinus infections. Tested for allergies, took immunotherapy for 10 years from Dr. Jose Carlos Dominguez and then stopped about 15 years ago because didn't seem to be helping. Takes Singulair once daily. Used Flonase. Gets sinusitis every few months. REVIEW OF SYSTEMS   Review of Systems   Constitutional: Negative for chills and fever. HENT: Positive for congestion, ear pain (right ear for two days), postnasal drip, rhinorrhea, sinus pressure, sinus pain and sneezing. Negative for ear discharge, hearing loss, sore throat, tinnitus, trouble swallowing and voice change.          PAST MEDICAL HISTORY    Past Medical History:   Diagnosis Date    Arthritis     fingers, hands    Asthma     Back pain     Bell's palsy     CHF (congestive heart failure) (HCC)     Chronic pain     BACK SURGERY X4, neck, left arm    Colitis 2/14/2013    Depression     Elevated sed rate     Family history of breast cancer in first degree relative     Gastric ulcer     GERD (gastroesophageal reflux disease)     colitis    Glaucoma     Hypertension     Movement disorder     chronic back pain    Neuromuscular disorder ([facility])     sciatica rt. leg    Osteoarthritis     PONV (postoperative nausea and vomiting)     SEVERE         Past Surgical History:   Procedure Laterality Date    BACK SURGERY      x 4    COLONOSCOPY  10/16/12    polyp removed and biopsy sent    COLONOSCOPY  3/26/13    CYSTOSCOPY  1/13/16    urethral dilitation    FACIAL NERVE SURGERY      D/T Bell's Palsy    FINGER SURGERY Left 04/03/2018    Excision of Left Thumb Digital Mucous cyst & DIP Joint Arthrotomy & Debridement    FINGER SURGERY  04/2018    left thumb    FINGER SURGERY Left 04/03/2018    thumb surgery     HYSTERECTOMY      Partial    UPPER GASTROINTESTINAL ENDOSCOPY  10/15/12    UPPER GASTROINTESTINAL ENDOSCOPY  2/11/15    with EUS    VENTRAL HERNIA REPAIR N/A 4/15/2019    LAPAROSCOPIC REPAIR OF INCISIONAL HERNIA WITH MESH performed by Juan Tineo MD at [address] [place] [address] Street:    09/02/21 0841   BP: (!) 156/85   Site: Left Upper Arm   Position: Sitting   Cuff Size: Child   Pulse: 77   Temp: 97.7 °F (36.5 °C)   TempSrc: Temporal         Physical Exam  Vitals reviewed. Constitutional:       General: She is awake. She is not in acute distress. Appearance: Normal appearance. She is well-developed. She is not ill-appearing or toxic-appearing. HENT:      Head: Normocephalic and atraumatic. Salivary Glands: Right salivary gland is not diffusely enlarged or tender. Left salivary gland is not diffusely enlarged or tender. Right Ear: Tympanic membrane, ear canal and external ear normal.      Left Ear: Tympanic membrane, ear canal and external ear normal.      Nose: Nose normal. No nasal deformity, septal deviation, mucosal edema, congestion or rhinorrhea. Right Turbinates: Not enlarged. Left Turbinates: Not enlarged. Right Sinus: No maxillary sinus tenderness or frontal sinus tenderness. Left Sinus: No maxillary sinus tenderness or frontal sinus tenderness. Mouth/Throat:      Lips: Pink. No lesions. Mouth: Mucous membranes are moist. No oral lesions. Tongue: No lesions. Palate: No mass and lesions. Pharynx: Oropharynx is clear. Uvula midline. No oropharyngeal exudate or posterior oropharyngeal erythema.       Tonsils: No tonsillar exudate or tonsillar abscesses. Neck:      Thyroid: No thyroid mass, thyromegaly or thyroid tenderness. Trachea: No tracheal deviation. Musculoskeletal:      Cervical back: Normal range of motion and neck supple. Lymphadenopathy:      Cervical: No cervical adenopathy. Neurological:      Mental Status: She is alert. LOUIE / Windy Bates / Candy Cummings was seen today for sinus problem. Diagnoses and all orders for this visit:    Acute bacterial rhinosinusitis  -     cefUROXime (CEFTIN) 250 MG tablet; Take 1 tablet by mouth 2 times daily for 14 days         RECOMMENDATIONS/PLAN      1. See Patient Instructions on file for this visit, which were discussed with the patient. 2. Acetaminophen (eg. Tylenol) or Ibuprofen (eg. Advil) (over the counter medications) as needed for pain. 3. Return if symptoms worsen or fail to clear up with treatment, or, for any further ENT or sinus problems or symptoms.

## 2021-09-13 DIAGNOSIS — G89.4 CHRONIC PAIN SYNDROME: ICD-10-CM

## 2021-09-13 RX ORDER — HYDROCODONE BITARTRATE AND ACETAMINOPHEN 7.5; 325 MG/1; MG/1
1 TABLET ORAL EVERY 6 HOURS PRN
Qty: 120 TABLET | Refills: 0 | Status: SHIPPED | OUTPATIENT
Start: 2021-09-13 | End: 2021-10-12 | Stop reason: SDUPTHER

## 2021-09-13 NOTE — TELEPHONE ENCOUNTER
Medication and Quantity requested: HYDROcodone-acetaminophen (NORCO) 7.5-325 MG per tablet          Last Visit  8/23/21    Pharmacy and phone number updated in [facility]:  Yes    Ingrid

## 2021-09-13 NOTE — TELEPHONE ENCOUNTER
Medication:   Requested Prescriptions     Pending Prescriptions Disp Refills    HYDROcodone-acetaminophen (NORCO) 7.5-325 MG per tablet 120 tablet 0     Sig: Take 1 tablet by mouth every 6 hours as needed for Pain for up to 30 days. Last Filled: 08/12/21    Patient Phone Number: [phone] (home)     Last appt: 8/23/2021   Next appt: Visit date not found    Last OARRS:   RX Monitoring 8/23/2021   Attestation -   Periodic Controlled Substance Monitoring No signs of potential drug abuse or diversion identified.

## 2021-09-15 DIAGNOSIS — F51.01 PRIMARY INSOMNIA: ICD-10-CM

## 2021-09-15 RX ORDER — TRAZODONE HYDROCHLORIDE 50 MG/1
TABLET ORAL
Qty: 60 TABLET | Refills: 11 | Status: SHIPPED | OUTPATIENT
Start: 2021-09-15 | End: 2022-08-31

## 2021-10-12 ENCOUNTER — OFFICE VISIT (OUTPATIENT)
Dept: CARDIOLOGY CLINIC | Age: 66
End: 2021-10-12
Payer: MEDICARE

## 2021-10-12 VITALS
HEIGHT: 68 IN | WEIGHT: 105 LBS | BODY MASS INDEX: 15.91 KG/M2 | HEART RATE: 84 BPM | OXYGEN SATURATION: 97 % | SYSTOLIC BLOOD PRESSURE: 96 MMHG | DIASTOLIC BLOOD PRESSURE: 60 MMHG

## 2021-10-12 DIAGNOSIS — G89.4 CHRONIC PAIN SYNDROME: ICD-10-CM

## 2021-10-12 DIAGNOSIS — J01.90 ACUTE BACTERIAL RHINOSINUSITIS: ICD-10-CM

## 2021-10-12 DIAGNOSIS — B96.89 ACUTE BACTERIAL RHINOSINUSITIS: ICD-10-CM

## 2021-10-12 DIAGNOSIS — I34.0 NONRHEUMATIC MITRAL VALVE REGURGITATION: Primary | ICD-10-CM

## 2021-10-12 DIAGNOSIS — I31.39 PERICARDIAL EFFUSION: ICD-10-CM

## 2021-10-12 DIAGNOSIS — I42.8 CARDIOMYOPATHY, NONISCHEMIC (HCC): ICD-10-CM

## 2021-10-12 PROCEDURE — 1123F ACP DISCUSS/DSCN MKR DOCD: CPT | Performed by: NURSE PRACTITIONER

## 2021-10-12 PROCEDURE — 1090F PRES/ABSN URINE INCON ASSESS: CPT | Performed by: NURSE PRACTITIONER

## 2021-10-12 PROCEDURE — 4040F PNEUMOC VAC/ADMIN/RCVD: CPT | Performed by: NURSE PRACTITIONER

## 2021-10-12 PROCEDURE — G8400 PT W/DXA NO RESULTS DOC: HCPCS | Performed by: NURSE PRACTITIONER

## 2021-10-12 PROCEDURE — G8419 CALC BMI OUT NRM PARAM NOF/U: HCPCS | Performed by: NURSE PRACTITIONER

## 2021-10-12 PROCEDURE — 1036F TOBACCO NON-USER: CPT | Performed by: NURSE PRACTITIONER

## 2021-10-12 PROCEDURE — 3017F COLORECTAL CA SCREEN DOC REV: CPT | Performed by: NURSE PRACTITIONER

## 2021-10-12 PROCEDURE — 99214 OFFICE O/P EST MOD 30 MIN: CPT | Performed by: NURSE PRACTITIONER

## 2021-10-12 PROCEDURE — G8484 FLU IMMUNIZE NO ADMIN: HCPCS | Performed by: NURSE PRACTITIONER

## 2021-10-12 PROCEDURE — G8427 DOCREV CUR MEDS BY ELIG CLIN: HCPCS | Performed by: NURSE PRACTITIONER

## 2021-10-12 RX ORDER — HYDROCODONE BITARTRATE AND ACETAMINOPHEN 7.5; 325 MG/1; MG/1
1 TABLET ORAL EVERY 6 HOURS PRN
Qty: 120 TABLET | Refills: 0 | Status: SHIPPED | OUTPATIENT
Start: 2021-10-12 | End: 2021-11-11 | Stop reason: SDUPTHER

## 2021-10-12 RX ORDER — BUTALBITAL, ACETAMINOPHEN AND CAFFEINE 50; 325; 40 MG/1; MG/1; MG/1
1 TABLET ORAL EVERY 4 HOURS PRN
Qty: 40 TABLET | Refills: 0 | Status: SHIPPED | OUTPATIENT
Start: 2021-10-12 | End: 2021-11-08 | Stop reason: SDUPTHER

## 2021-10-12 NOTE — TELEPHONE ENCOUNTER
Medication and Quantity requested: HYDROcodone-acetaminophen (NORCO) 7.5-325 MG per tablet-QTY. 120 tablets  AND  butalbital-acetaminophen-caffeine (FIORICET, ESGIC) -40 MG per tablet -QTY.  40 tablets           Last Visit  8/23/21    Pharmacy and phone number updated in [facility]:  yes  Ingrid

## 2021-10-12 NOTE — TELEPHONE ENCOUNTER
Medication:   Requested Prescriptions     Pending Prescriptions Disp Refills    HYDROcodone-acetaminophen (NORCO) 7.5-325 MG per tablet 120 tablet 0     Sig: Take 1 tablet by mouth every 6 hours as needed for Pain for up to 30 days.  butalbital-acetaminophen-caffeine (FIORICET, ESGIC) -40 MG per tablet 40 tablet 0     Sig: Take 1 tablet by mouth every 4 hours as needed for Headaches        Last Filled:      Patient Phone Number: [phone] (home)     Last appt: 8/23/2021   Next appt: Visit date not found    Last OARRS:   RX Monitoring 8/23/2021   Attestation -   Periodic Controlled Substance Monitoring No signs of potential drug abuse or diversion identified.

## 2021-10-12 NOTE — PROGRESS NOTES
[address]     Outpatient Follow Up Note    Viky Lagunas is 78 y.o. 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 CHIEF COMPLAINT / HPI:  Follow Up secondary to NICM    Subjective:   Sometimes when she's busy, her heart starts beating fast. It last <3-4 minutes but adrianne scary. Its nothing new. She denies significant chest pain. There is no SOB/SAEZ. She's been walking a mile / daily. She gets a little winded but feels so much better when she walks. The patient denies orthopnea/PND. The patient does not have swelling. The patients weight is stable around 104# . The patient is not experiencing palpitations or dizziness. She gets light headed if she stands too quickly  Her BP runs ~ 100/60    These symptoms show no change since the last OV. With regard to medication therapy the patient has been compliant with prescribed regimen. They have tolerated therapy to date.      Past Medical History:   Diagnosis Date    Arthritis     fingers, hands    Asthma     Back pain     Bell's palsy     CHF (congestive heart failure) (HCC)     Chronic pain     BACK SURGERY X4, neck, left arm    Colitis 2013    Depression     Elevated sed rate     Family history of breast cancer in first degree relative     Gastric ulcer     GERD (gastroesophageal reflux disease)     colitis    Glaucoma     Hypertension     Movement disorder     chronic back pain    Neuromuscular disorder (HCC)     sciatica rt. leg    Osteoarthritis     PONV (postoperative nausea and vomiting)     SEVERE     Social History:    Social History     Tobacco Use   Smoking Status Former Smoker    Packs/day: 0.25    Years: 20.00    Pack years: 5.00    Types: Cigarettes    Quit date: 2016    Years since quittin.5   Smokeless Tobacco Never Used     Current Medications:  Current Outpatient Medications   Medication Sig Dispense Refill    HYDROcodone-acetaminophen (NORCO) 7.5-325 MG per tablet Take 1 tablet by mouth every 6 hours as needed for Pain for up to 30 days.  120 tablet 0    butalbital-acetaminophen-caffeine (FIORICET, ESGIC) -40 MG per tablet Take 1 tablet by mouth every 4 hours as needed for Headaches 40 tablet 0    tiZANidine (ZANAFLEX) 4 MG tablet TAKE 1 TABLET BY MOUTH THREE TIMES DAILY 90 tablet 11    diclofenac sodium (VOLTAREN) 1 % GEL APPLY 2 GRAMS TOPICALLY TO HAND FOUR TIMES DAILY 500 g 5    traZODone (DESYREL) 50 MG tablet TAKE 2 TABLETS BY MOUTH EVERY NIGHT 60 tablet 11    albuterol sulfate  (90 Base) MCG/ACT inhaler INHALE 2 PUFFS BY MOUTH EVERY 4 HOURS AS NEEDED FOR WHEEZING 8.5 g 11    buPROPion (WELLBUTRIN XL) 150 MG extended release tablet TAKE 1 TABLET BY MOUTH TWICE DAILY 60 tablet 5    DEXILANT 60 MG CPDR delayed release capsule TAKE 1 CAPSULE BY MOUTH DAILY 90 capsule 3    gabapentin (NEURONTIN) 300 MG capsule TAKE 1 CAPSULE BY MOUTH THREE TIMES DAILY 90 capsule 5    fluticasone (FLOVENT HFA) 44 MCG/ACT inhaler Inhale 1 puff into the lungs 2 times daily 1 Inhaler 3    Probiotic Product (PROBIOTIC PO) Take by mouth daily       sertraline (ZOLOFT) 100 MG tablet TAKE 1 TABLET BY MOUTH THREE TIMES DAILY 270 tablet 2    ipratropium (ATROVENT) 0.06 % nasal spray 2 sprays by Each Nostril route 3 times daily as needed for Rhinitis (runny nose) 1 Bottle 3    Elastic Bandages & Supports (ELBOW BRACE) MISC Use as needed 2 each 0    albuterol (PROVENTIL) (2.5 MG/3ML) 0.083% nebulizer solution USE 1 VIAL IN NEBULIZER EVERY 6 HOURS 180 vial 11    ondansetron (ZOFRAN) 4 MG tablet Take 1 tablet by mouth every 6 hours as needed for Nausea 30 tablet 0    montelukast (SINGULAIR) 10 MG tablet TAKE 1 TABLET BY MOUTH EVERY NIGHT 90 tablet 3    fluticasone (FLOVENT HFA) 110 MCG/ACT inhaler INHALE 1 PUFF INTO THE LUNGS TWICE DAILY 12 g 5    Elastic Bandages & Supports (TRUFORM SUPPORT SOCK 20-30MMHG) MISC 1 each by Does not apply route daily 2 each 2    hydrocortisone (WESTCORT) 0.2 % cream APPLY EXTERNALLY TO THE AFFECTED AREA TWICE DAILY 30 g 5    Nutritional Supplements (BOOST CALORIE SMART) LIQD Take 1 Can by mouth See Admin Instructions EVERY day      nitroGLYCERIN (NITROSTAT) 0.4 MG SL tablet PLACE 1 TABLET UNDER THE TONGUE EVERY 5 MINUTES AS NEEDED FOR CHEST PAIN (Patient not taking: Reported on 10/12/2021) 25 tablet 2     No current facility-administered medications for this visit. REVIEW OF SYSTEMS:    CONSTITUTIONAL: No major weight gain or loss, fatigue, weakness, night sweats or fever. HEENT: No new vision difficulties or ringing in the ears. RESPIRATORY: No new SOB, PND, orthopnea or cough. CARDIOVASCULAR: See HPI  GI: No nausea, vomiting, diarrhea, constipation, abdominal pain or changes in bowel habits. : No urinary frequency, urgency, incontinence hematuria or dysuria. SKIN: No cyanosis or skin lesions. MUSCULOSKELETAL: No new muscle or joint pain. NEUROLOGICAL: No syncope or TIA-like symptoms. PSYCHIATRIC: No anxiety, pain, insomnia or depression    Objective:   PHYSICAL EXAM:       Vitals:    10/12/21 1437 10/12/21 1506   BP: 90/60 96/60   Site: Right Upper Arm    Position: Sitting    Cuff Size: Medium Adult    Pulse: 84    SpO2: 97%    Weight: 105 lb (47.6 kg)    Height: 5' 8\" (1.727 m)         VITALS:  BP 90/60 (Site: Right Upper Arm, Position: Sitting, Cuff Size: Medium Adult)   Pulse 84   Ht 5' 8\" (1.727 m)   Wt 105 lb (47.6 kg)   SpO2 97%   BMI 15.97 kg/m²   CONSTITUTIONAL: Cooperative, no apparent distress, and appears well nourished / developed  NEUROLOGIC:  Awake and orientated to person, place and time. PSYCH: Calm affect. SKIN: Warm and dry. HEENT: Sclera non-icteric, normocephalic, neck supple, no elevation of JVP, normal carotid pulses with no bruits and thyroid normal size.   LUNGS:  No increased work of breathing and clear to auscultation, no crackles or wheezing  CARDIOVASCULAR:  Regular rate 84 and rhythm with no murmurs, gallops, rubs, or abnormal heart sounds, normal PMI. The apical impulses not displaced  JVP less than 8 cm H2O  Heart tones are crisp and normal  Cervical veins are not engorged  The carotid upstroke is normal in amplitude and contour without delay or bruit  JVP is not elevated  ABDOMEN:  Normal bowel sounds, non-distended and non-tender to palpation  EXT: No edema, no calf tenderness. Pulses are present bilaterally. DATA:    Lab Results   Component Value Date    ALT 14 04/14/2021    AST 21 04/14/2021    ALKPHOS 101 04/14/2021    BILITOT <0.2 04/14/2021     Lab Results   Component Value Date    CREATININE 0.6 04/14/2021    BUN 13 04/14/2021     04/14/2021    K 4.6 04/14/2021     04/14/2021    CO2 28 04/14/2021     Lab Results   Component Value Date    TSH 0.34 09/04/2018    R9DHMIE 4.7 09/04/2018     Lab Results   Component Value Date    WBC 5.9 04/14/2021    HGB 12.4 04/14/2021    HCT 36.8 04/14/2021    MCV 92.2 04/14/2021     04/14/2021     No components found for: CHLPL  Lab Results   Component Value Date    TRIG 134 03/19/2019    TRIG 148 01/24/2011    TRIG 137 07/20/2010     Lab Results   Component Value Date    HDL 59 03/19/2019    HDL 64 (H) 01/24/2011    HDL 51 07/20/2010     Lab Results   Component Value Date    LDLCALC 114 (H) 03/19/2019    LDLCALC 139 (H) 01/24/2011    LDLCALC 169 (H) 07/20/2010     Lab Results   Component Value Date    LABVLDL 27 03/19/2019    LABVLDL 30 01/24/2011    LABVLDL 27 07/20/2010     Radiology Review:  Pertinent images / reports were reviewed as a part of this visit and reveals the following:    Last Echo: Feb '21  Summary   Normal left ventricular cavity size and wall thickness. Mild-to-moderately   reduced global systolic function with an ejection fraction estimated at 45%. The mid inferoseptum and apical septum appear hypokinetic. There is mild-to-moderate mitral regurgitation.    Probably at least moderate pericardial effusion with larger measurment   anteriorly. Correlate clinically for tamponade. The inferior vena cava Is mildly dilated (2.13 cm) with respiratory collapse   of about 50%    Last Stress Test: Feb '21:   Summary    The overall quality of the study is fair. Subdiaphragmatic attenuation is    present.        Left ventricular cavity is noted to be normal on the rest studies. Right    ventricle is normal.        There is a small perfusion defect of moderate severity of the apical    inferior wall. The defect is present at rest and stress, consistent with    scar. There is a corresponding wall motion abnormality.        Sum stress score of 2. TID 1.02. No visual TID.        Left ventricular ejection fraction was mildly decreased at 47%.        Low-moderate risk scan.         3/26/14: Holter: NSR avg 86 min 61 max 146. Brief episodes of AT    Last Angiogram: 2/2006: normal coronaries     Assessment:      Diagnosis Orders   1. Nonrheumatic mitral valve regurgitation   ~mild-mod noted on echo  ~c/o some SOB. Neg to exam for volume overload Echo 2D w doppler w color complete   2. Cardiomyopathy, nonischemic (HCC)   ~mild-mod HK,EF 45% by echo Feb '21  ~intolerant to BB d/t SOB    3. Pericardial effusion   ~stable / chronic   ~mod noted with last echo Feb '21; mild-mod on study from May '20      I had the opportunity to review the clinical symptoms and presentation of Yair Ashley. Plan:     1. Echocardiogram : surveillance for MR & pericardial effusion   2. F/U in six months    Overall the patient is stable from CV standpoint    I have addresed the patient's cardiac risk factors and adjusted pharmacologic treatment as needed. In addition, I have reinforced the need for patient directed risk factor modification. Further evaluation will be based upon the patient's clinical course and testing results.     All questions and concerns were addressed to the patient Alternatives to my treatment were

## 2021-11-08 ENCOUNTER — VIRTUAL VISIT (OUTPATIENT)
Dept: FAMILY MEDICINE CLINIC | Age: 66
End: 2021-11-08
Payer: MEDICARE

## 2021-11-08 DIAGNOSIS — R05.9 COUGH: Primary | ICD-10-CM

## 2021-11-08 DIAGNOSIS — G44.89 OTHER HEADACHE SYNDROME: ICD-10-CM

## 2021-11-08 PROCEDURE — 1090F PRES/ABSN URINE INCON ASSESS: CPT | Performed by: NURSE PRACTITIONER

## 2021-11-08 PROCEDURE — 99213 OFFICE O/P EST LOW 20 MIN: CPT | Performed by: NURSE PRACTITIONER

## 2021-11-08 PROCEDURE — G8427 DOCREV CUR MEDS BY ELIG CLIN: HCPCS | Performed by: NURSE PRACTITIONER

## 2021-11-08 PROCEDURE — G8400 PT W/DXA NO RESULTS DOC: HCPCS | Performed by: NURSE PRACTITIONER

## 2021-11-08 PROCEDURE — 4040F PNEUMOC VAC/ADMIN/RCVD: CPT | Performed by: NURSE PRACTITIONER

## 2021-11-08 PROCEDURE — 1123F ACP DISCUSS/DSCN MKR DOCD: CPT | Performed by: NURSE PRACTITIONER

## 2021-11-08 PROCEDURE — 3017F COLORECTAL CA SCREEN DOC REV: CPT | Performed by: NURSE PRACTITIONER

## 2021-11-08 RX ORDER — BUTALBITAL, ACETAMINOPHEN AND CAFFEINE 50; 325; 40 MG/1; MG/1; MG/1
1 TABLET ORAL EVERY 4 HOURS PRN
Qty: 40 TABLET | Refills: 0 | Status: SHIPPED | OUTPATIENT
Start: 2021-11-08 | End: 2021-11-19

## 2021-11-08 RX ORDER — DOXYCYCLINE HYCLATE 100 MG
100 TABLET ORAL 2 TIMES DAILY
Qty: 20 TABLET | Refills: 0 | Status: SHIPPED | OUTPATIENT
Start: 2021-11-08 | End: 2021-11-18

## 2021-11-08 ASSESSMENT — ENCOUNTER SYMPTOMS
CHEST TIGHTNESS: 0
COUGH: 1
DIARRHEA: 0
SHORTNESS OF BREATH: 1
RHINORRHEA: 1
SORE THROAT: 0
WHEEZING: 0
VOMITING: 0

## 2021-11-08 NOTE — PROGRESS NOTES
2021      TELEHEALTH EVALUATION -- Audio/Visual (During ODUNF-20 public health emergency)    HPI:    Vanesa Mehta (:  1955) has requested an audio/video evaluation for the following concern(s):    Cough  This is a new problem. The current episode started in the past 7 days. The problem has been gradually worsening. Associated symptoms include chills, headaches, postnasal drip, rhinorrhea and shortness of breath. Pertinent negatives include no ear pain, fever, sore throat or wheezing. She has tried nothing for the symptoms. Headache: Requesting refill on Fioricet today. States when fighting URI/sinus infections, fioricet helps with headache pain. Review of Systems   Constitutional: Positive for chills and fatigue (with body aches). Negative for fever. HENT: Positive for congestion, postnasal drip and rhinorrhea. Negative for ear pain and sore throat. Respiratory: Positive for cough (yellow sputum) and shortness of breath. Negative for chest tightness and wheezing. Gastrointestinal: Negative for diarrhea and vomiting. Neurological: Positive for headaches. Negative for dizziness. Prior to Visit Medications    Medication Sig Taking? Authorizing Provider   butalbital-acetaminophen-caffeine (FIORICET, ESGIC) -40 MG per tablet Take 1 tablet by mouth every 4 hours as needed for Headaches Yes JOAO Donohue CNP   doxycycline hyclate (VIBRA-TABS) 100 MG tablet Take 1 tablet by mouth 2 times daily for 10 days Yes JOAO Donohue CNP   HYDROcodone-acetaminophen (NORCO) 7.5-325 MG per tablet Take 1 tablet by mouth every 6 hours as needed for Pain for up to 30 days.  Yes Brenna Clarke MD   tiZANidine (ZANAFLEX) 4 MG tablet TAKE 1 TABLET BY MOUTH THREE TIMES DAILY Yes Brenna Clarke MD   diclofenac sodium (VOLTAREN) 1 % GEL APPLY 2 GRAMS TOPICALLY TO HAND FOUR TIMES DAILY Yes Brenna Clarke MD   traZODone (DESYREL) 50 MG tablet TAKE 2 TABLETS BY MOUTH EVERY NIGHT Yes Hadley Solorio Tayla Waterman MD   albuterol sulfate  (90 Base) MCG/ACT inhaler INHALE 2 PUFFS BY MOUTH EVERY 4 HOURS AS NEEDED FOR WHEEZING Yes Rachana Bales MD   nitroGLYCERIN (NITROSTAT) 0.4 MG SL tablet PLACE 1 TABLET UNDER THE TONGUE EVERY 5 MINUTES AS NEEDED FOR CHEST PAIN Yes JOAO Love - MARY   buPROPion (WELLBUTRIN XL) 150 MG extended release tablet TAKE 1 TABLET BY MOUTH TWICE DAILY Yes Rachana Bales MD   DEXILANT 60 MG CPDR delayed release capsule TAKE 1 CAPSULE BY MOUTH DAILY Yes Rachana Bales MD   gabapentin (NEURONTIN) 300 MG capsule TAKE 1 CAPSULE BY MOUTH THREE TIMES DAILY Yes Rachana Bales MD   fluticasone (FLOVENT HFA) 44 MCG/ACT inhaler Inhale 1 puff into the lungs 2 times daily Yes Merlin Muse, MD   Probiotic Product (PROBIOTIC PO) Take by mouth daily  Yes Lyla Ellis MD   sertraline (ZOLOFT) 100 MG tablet TAKE 1 TABLET BY MOUTH THREE TIMES DAILY Yes Rachana Bales MD   ipratropium (ATROVENT) 0.06 % nasal spray 2 sprays by Each Nostril route 3 times daily as needed for Rhinitis (runny nose) Yes Storm Carlisle DO   Elastic Bandages & Supports (ELBOW BRACE) MISC Use as needed Yes Gretchen Dahl MD   albuterol (PROVENTIL) (2.5 MG/3ML) 0.083% nebulizer solution USE 1 VIAL IN NEBULIZER EVERY 6 HOURS Yes Rachana Bales MD   ondansetron (ZOFRAN) 4 MG tablet Take 1 tablet by mouth every 6 hours as needed for Nausea Yes Rachana Bales MD   montelukast (SINGULAIR) 10 MG tablet TAKE 1 TABLET BY MOUTH EVERY NIGHT Yes Merlin Muse, MD   butalbital-acetaminophen-caffeine (FIORICET, ESGIC) -40 MG per tablet TAKE 1 TABLET BY MOUTH EVERY 4 HOURS AS NEEDED FOR HEADACHES MAY TAKE 2 TABLETS EVERY 4 HOURS AS NEEDED FOR PAIN Yes Merlin Muse, MD   fluticasone (FLOVENT HFA) 110 MCG/ACT inhaler INHALE 1 PUFF INTO THE LUNGS TWICE DAILY Yes Rachana Bales MD   Elastic Bandages & Supports ([address] [place] [address] Road 20-30MMHG) MISC 1 each by Does not apply route daily Yes JOAO Cunha - CNP hydrocortisone (WESTCORT) 0.2 % cream APPLY EXTERNALLY TO THE AFFECTED AREA TWICE DAILY Yes Darnell Mayfield MD   Nutritional Supplements (BOOST CALORIE SMART) LIQD Take 1 Can by mouth See Admin Instructions EVERY day Yes Historical Provider, MD       Past Medical History:   Diagnosis Date    Arthritis     fingers, hands    Asthma     Back pain     Bell's palsy     CHF (congestive heart failure) (HCC)     Chronic pain     BACK SURGERY X4, neck, left arm    Colitis 2/14/2013    Depression     Elevated sed rate     Family history of breast cancer in first degree relative     Gastric ulcer     GERD (gastroesophageal reflux disease)     colitis    Glaucoma     Hypertension     Movement disorder     chronic back pain    Neuromuscular disorder (HCC)     sciatica rt. leg    Osteoarthritis     PONV (postoperative nausea and vomiting)     SEVERE       Past Surgical History:   Procedure Laterality Date    BACK SURGERY      x 4    COLONOSCOPY  10/16/12    polyp removed and biopsy sent    COLONOSCOPY  3/26/13    CYSTOSCOPY  1/13/16    urethral dilitation    FACIAL NERVE SURGERY      D/T Bell's Palsy    FINGER SURGERY Left 04/03/2018    Excision of Left Thumb Digital Mucous cyst & DIP Joint Arthrotomy & Debridement    FINGER SURGERY  04/2018    left thumb    FINGER SURGERY Left 04/03/2018    thumb surgery     HYSTERECTOMY      Partial    UPPER GASTROINTESTINAL ENDOSCOPY  10/15/12    UPPER GASTROINTESTINAL ENDOSCOPY  2/11/15    with EUS    VENTRAL HERNIA REPAIR N/A 4/15/2019    LAPAROSCOPIC REPAIR OF INCISIONAL HERNIA WITH MESH performed by Nga Barfield MD at [facility] OR       Family History   Problem Relation Age of Onset    Heart Disease Mother     High Blood Pressure Mother     Arthritis Mother     Breast Cancer Sister     High Blood Pressure Brother     Diabetes Neg Hx     Stroke Neg Hx        Allergies   Allergen Reactions    Beta Adrenergic Blockers Shortness Of Breath    Pheniramine      Other reaction(s): rapid heartbeat    Antihistamine [Diphenhydramine Hcl] Nausea And Vomiting    Aspirin Hives    Codeine Hives    Dilaudid [Hydromorphone] Nausea And Vomiting    Nsaids Hives and Nausea And Vomiting    Prednisone Other (See Comments)     GI upset    Zithromax [Azithromycin] Nausea And Vomiting       Social History     Tobacco Use    Smoking status: Former Smoker     Packs/day: 0.25     Years: 20.00     Pack years: 5.00     Types: Cigarettes     Quit date: 2016     Years since quittin.6    Smokeless tobacco: Never Used   Vaping Use    Vaping Use: Never used   Substance Use Topics    Alcohol use: No     Alcohol/week: 0.0 standard drinks    Drug use: No          PHYSICAL EXAMINATION:  Vital Signs: (As obtained by patient/caregiver or practitioner observation)  There were no vitals taken for this visit. Respiratory rate appears normal  Able to speak in full sentences without difficulty    Constitutional: Appears well-developed and well-nourished. No apparent distress    Mental status: Alert and awake. Oriented to person/place/bandar. Able to follow commands    Eyes: EOM normal. Sclera normal. No discharge visible  HENT: Normocephalic, atraumatic. Mouth/Throat: Mucous membranes are moist. External Ears Normal    Neck: No visualized mass   Pulmonary/Chest: Respiratory effort normal.  No visualized signs of difficulty breathing or respiratory distress        Musculoskeletal:  Normal range of motion of neck  Neurological:       No Facial Asymmetry (Cranial nerve 7 motor function) (limited exam to video visit). No gaze palsy       Skin:  No significant exanthematous lesions or discoloration noted on facial skin       Psychiatric: Normal Affect. No Hallucinations            ASSESSMENT/PLAN:  1. Cough  Symtomatic treatment: Tylenol / Advil, rest, increase fluids. May also use: Delsym. - doxycycline hyclate (VIBRA-TABS) 100 MG tablet;  Take 1 tablet by mouth 2 times daily for 10 days  Dispense: 20 tablet; Refill: 0    2. Other headache syndrome  Stable, additional refills will need to be from PCP  -     butalbital-acetaminophen-caffeine (FIORICET, ESGIC) -40 MG per tablet; Take 1 tablet by mouth every 4 hours as needed for Headaches          Return if symptoms worsen or fail to improve. Yair Ashley is a 78 y.o. female being evaluated by a Virtual Visit (video visit) encounter to address concerns as mentioned above. A caregiver was present when appropriate. Due to this being a TeleHealth encounter (During PXWGO-80 public health emergency), evaluation of the following organ systems was limited: Vitals/Constitutional/EENT/Resp/CV/GI//MS/Neuro/Skin/Heme-Lymph-Imm. Pursuant to the emergency declaration under the [address] [place] [address], [address] [place] [address] authority and the [employer] and [employer] Act, this Virtual Visit was conducted with patient's (and/or legal guardian's) consent, to reduce the patient's risk of exposure to COVID-19 and provide necessary medical care. The patient (and/or legal guardian) has also been advised to contact this office for worsening conditions or problems, and seek emergency medical treatment and/or call 911 if deemed necessary. Patient identification was verified at the start of the visit: Yes    Total time spent on this encounter: Not billed by time minutes    Services were provided through a video synchronous discussion virtually to substitute for in-person clinic visit. Patient and provider were located at their individual homes. --JOAO Martinez - CNP on 11/8/2021 at 11:40 AM    An electronic signature was used to authenticate this note. Gretchen Espana

## 2021-11-11 DIAGNOSIS — G89.4 CHRONIC PAIN SYNDROME: ICD-10-CM

## 2021-11-11 RX ORDER — HYDROCODONE BITARTRATE AND ACETAMINOPHEN 7.5; 325 MG/1; MG/1
1 TABLET ORAL EVERY 6 HOURS PRN
Qty: 120 TABLET | Refills: 0 | Status: SHIPPED | OUTPATIENT
Start: 2021-11-11 | End: 2021-12-09 | Stop reason: SDUPTHER

## 2021-11-11 NOTE — TELEPHONE ENCOUNTER
Medication and Quantity requested:  HYDROcodone-acetaminophen (NORCO) 7.5-325 MG per tablet - qty 120        Last Visit  11/08/21 - [address] and phone number updated in [facility]:  Yes    Ingrid

## 2021-11-19 ENCOUNTER — OFFICE VISIT (OUTPATIENT)
Dept: FAMILY MEDICINE CLINIC | Age: 66
End: 2021-11-19
Payer: MEDICARE

## 2021-11-19 VITALS
SYSTOLIC BLOOD PRESSURE: 128 MMHG | WEIGHT: 109 LBS | HEART RATE: 81 BPM | OXYGEN SATURATION: 99 % | BODY MASS INDEX: 16.57 KG/M2 | DIASTOLIC BLOOD PRESSURE: 78 MMHG

## 2021-11-19 DIAGNOSIS — F33.0 MILD EPISODE OF RECURRENT MAJOR DEPRESSIVE DISORDER (HCC): ICD-10-CM

## 2021-11-19 DIAGNOSIS — G89.4 CHRONIC PAIN SYNDROME: Primary | ICD-10-CM

## 2021-11-19 DIAGNOSIS — J45.20 MILD INTERMITTENT ASTHMA WITHOUT COMPLICATION: ICD-10-CM

## 2021-11-19 DIAGNOSIS — J30.89 ALLERGIC RHINITIS DUE TO OTHER ALLERGIC TRIGGER, UNSPECIFIED SEASONALITY: ICD-10-CM

## 2021-11-19 DIAGNOSIS — J40 BRONCHITIS: ICD-10-CM

## 2021-11-19 PROCEDURE — G8427 DOCREV CUR MEDS BY ELIG CLIN: HCPCS | Performed by: FAMILY MEDICINE

## 2021-11-19 PROCEDURE — G8400 PT W/DXA NO RESULTS DOC: HCPCS | Performed by: FAMILY MEDICINE

## 2021-11-19 PROCEDURE — 99214 OFFICE O/P EST MOD 30 MIN: CPT | Performed by: FAMILY MEDICINE

## 2021-11-19 PROCEDURE — 4040F PNEUMOC VAC/ADMIN/RCVD: CPT | Performed by: FAMILY MEDICINE

## 2021-11-19 PROCEDURE — G8419 CALC BMI OUT NRM PARAM NOF/U: HCPCS | Performed by: FAMILY MEDICINE

## 2021-11-19 PROCEDURE — 1090F PRES/ABSN URINE INCON ASSESS: CPT | Performed by: FAMILY MEDICINE

## 2021-11-19 PROCEDURE — G8484 FLU IMMUNIZE NO ADMIN: HCPCS | Performed by: FAMILY MEDICINE

## 2021-11-19 PROCEDURE — 1123F ACP DISCUSS/DSCN MKR DOCD: CPT | Performed by: FAMILY MEDICINE

## 2021-11-19 PROCEDURE — 3017F COLORECTAL CA SCREEN DOC REV: CPT | Performed by: FAMILY MEDICINE

## 2021-11-19 PROCEDURE — 1036F TOBACCO NON-USER: CPT | Performed by: FAMILY MEDICINE

## 2021-11-19 RX ORDER — ARIPIPRAZOLE 5 MG/1
5 TABLET ORAL DAILY
Qty: 90 TABLET | Refills: 1 | Status: SHIPPED | OUTPATIENT
Start: 2021-11-19 | End: 2022-05-12

## 2021-11-19 ASSESSMENT — ENCOUNTER SYMPTOMS
WHEEZING: 1
COUGH: 1
NAUSEA: 1
SINUS PAIN: 1

## 2021-11-19 NOTE — PROGRESS NOTES
Neftali Carty (:  1955) is a 78 y.o. female,Established patient, here for evaluation of the following chief complaint(s):  Discuss Medications (depression medication. gets depressed and angry)         ASSESSMENT/PLAN:  1. Chronic pain syndrome  OARRS report reviewed and no inconsistencies noted   The patient understands the risks of dependency/addiction with hydrocodone and will take as little as possible and discontinue as soon as possible  2. Mild episode of recurrent major depressive disorder (HCC)  We discussed possibly stopping the sertraline and initiating an SNRI however she is on 300 mg of sertraline which would be difficult to approximate in the SNRI. After discussion we decided a trial of Abilify would be indicated. Also consider mood stabilizer as in alternative. -     ARIPiprazole (ABILIFY) 5 MG tablet; Take 1 tablet by mouth daily, Disp-90 tablet, R-1Normal  Return 3 months or sooner if not better  3. Mild intermittent asthma without complication  Improved in the last few days. Continue current treatment. 4. Bronchitis  Resolved/improved  5. Allergic rhinitis due to other allergic trigger, unspecified seasonality  Continue current treatment with Singulair, Flonase, Flovent    Return in about 3 months (around 2022) for OARRS. Subjective   SUBJECTIVE/OBJECTIVE:  HPI  OARRS/Chronic Pain: Patient states this remains the same and continues to require hydrocodone. Depression: Patient feels this perhaps is getting a little worse which she states manifests itself by getting angrier much more easily when it is really not appropriate to be angry. She finds she tends to take this out on her . She states for a while the Wellbutrin was working quite well along with her sertraline but now seems less effective. Asthma: She states she has had a recent cough and congestion for which she saw Rufino Moreno on a virtual visit 10 days ago. She is doing better now.   Allergic Rhinitis/Sinusitis/Bronchitis: As per above this flared up along with her asthma. She is unable to take antihistamines because they cause her heart to race however she is taking Flonase and Flovent along with Singulair. Review of Systems   Constitutional: Negative. Negative for diaphoresis and fever. HENT: Positive for congestion, postnasal drip and sinus pain. Respiratory: Positive for cough and wheezing. Cardiovascular: Negative. Gastrointestinal: Positive for nausea. Genitourinary: Negative. Neurological: Positive for headaches. Psychiatric/Behavioral: Positive for agitation ( states she gets angry and lashes out at [facility] when she gets depressed) and dysphoric mood. Objective   Physical Exam  Constitutional:       General: She is not in acute distress. HENT:      Head: Normocephalic and atraumatic. Mouth/Throat:      Pharynx: No oropharyngeal exudate. Eyes:      Conjunctiva/sclera: Conjunctivae normal.   Cardiovascular:      Rate and Rhythm: Normal rate and regular rhythm. Heart sounds: No murmur heard. Pulmonary:      Breath sounds: Normal breath sounds. No wheezing or rales. Musculoskeletal:      Cervical back: Neck supple. Lymphadenopathy:      Cervical: No cervical adenopathy. Skin:     General: Skin is warm and dry. Neurological:      Mental Status: She is alert and oriented to person, place, and time. Psychiatric:         Behavior: Behavior normal.         Thought Content: Thought content normal.         Judgment: Judgment normal.                  An electronic signature was used to authenticate this note.     --Gilbert Au MD

## 2021-12-09 DIAGNOSIS — G89.4 CHRONIC PAIN SYNDROME: ICD-10-CM

## 2021-12-09 RX ORDER — HYDROCODONE BITARTRATE AND ACETAMINOPHEN 7.5; 325 MG/1; MG/1
1 TABLET ORAL EVERY 6 HOURS PRN
Qty: 120 TABLET | Refills: 0 | Status: SHIPPED | OUTPATIENT
Start: 2021-12-09 | End: 2022-01-07 | Stop reason: SDUPTHER

## 2022-01-07 DIAGNOSIS — G89.4 CHRONIC PAIN SYNDROME: ICD-10-CM

## 2022-01-07 RX ORDER — HYDROCODONE BITARTRATE AND ACETAMINOPHEN 7.5; 325 MG/1; MG/1
1 TABLET ORAL EVERY 6 HOURS PRN
Qty: 120 TABLET | Refills: 0 | Status: SHIPPED | OUTPATIENT
Start: 2022-01-07 | End: 2022-02-04 | Stop reason: SDUPTHER

## 2022-01-07 NOTE — TELEPHONE ENCOUNTER
Medication:   Requested Prescriptions     Pending Prescriptions Disp Refills    HYDROcodone-acetaminophen (NORCO) 7.5-325 MG per tablet 120 tablet 0     Sig: Take 1 tablet by mouth every 6 hours as needed for Pain for up to 30 days. Last Filled:  12/9/21    Patient Phone Number: [phone] (home)     Last appt: 11/19/2021   Next appt: Visit date not found    Last OARRS:   RX Monitoring 11/19/2021   Attestation -   Periodic Controlled Substance Monitoring No signs of potential drug abuse or diversion identified. Medication:   Requested Prescriptions     Pending Prescriptions Disp Refills    HYDROcodone-acetaminophen (NORCO) 7.5-325 MG per tablet 120 tablet 0     Sig: Take 1 tablet by mouth every 6 hours as needed for Pain for up to 30 days. Last Filled:      Patient Phone Number: [phone] (home)     Last appt: 11/19/2021   Next appt: Visit date not found    Last OARRS:   RX Monitoring 11/19/2021   Attestation -   Periodic Controlled Substance Monitoring No signs of potential drug abuse or diversion identified.

## 2022-01-07 NOTE — TELEPHONE ENCOUNTER
Medication and Quantity requested: HYDROcodone-acetaminophen (NORCO) 7.5-325 MG per tablet-QTY.  120 tablets          Last Visit  11/19/21    Pharmacy and phone number updated in [facility]:  yes  Ingrid

## 2022-01-25 RX ORDER — GABAPENTIN 300 MG/1
CAPSULE ORAL
Qty: 90 CAPSULE | Refills: 5 | Status: SHIPPED | OUTPATIENT
Start: 2022-01-25 | End: 2022-07-26

## 2022-01-25 NOTE — TELEPHONE ENCOUNTER
Medication:   Requested Prescriptions     Pending Prescriptions Disp Refills    gabapentin (NEURONTIN) 300 MG capsule [Pharmacy Med Name: GABAPENTIN 300MG CAPSULES] 90 capsule 5     Sig: TAKE 1 CAPSULE BY MOUTH THREE TIMES DAILY        Last Filled:  08/02/2021 #[id number]     Patient Phone Number: [phone] (home)     Last appt: 11/19/2021   Next appt: Visit date not found    Last OARRS:   RX Monitoring 11/19/2021   Attestation -   Periodic Controlled Substance Monitoring No signs of potential drug abuse or diversion identified.

## 2022-01-31 ENCOUNTER — OFFICE VISIT (OUTPATIENT)
Dept: FAMILY MEDICINE CLINIC | Age: 67
End: 2022-01-31
Payer: MEDICARE

## 2022-01-31 VITALS
SYSTOLIC BLOOD PRESSURE: 108 MMHG | DIASTOLIC BLOOD PRESSURE: 64 MMHG | OXYGEN SATURATION: 98 % | BODY MASS INDEX: 16.57 KG/M2 | WEIGHT: 109 LBS | HEART RATE: 93 BPM

## 2022-01-31 DIAGNOSIS — R25.1 TREMOR: Primary | ICD-10-CM

## 2022-01-31 PROCEDURE — G8419 CALC BMI OUT NRM PARAM NOF/U: HCPCS | Performed by: NURSE PRACTITIONER

## 2022-01-31 PROCEDURE — 1036F TOBACCO NON-USER: CPT | Performed by: NURSE PRACTITIONER

## 2022-01-31 PROCEDURE — G8427 DOCREV CUR MEDS BY ELIG CLIN: HCPCS | Performed by: NURSE PRACTITIONER

## 2022-01-31 PROCEDURE — 99213 OFFICE O/P EST LOW 20 MIN: CPT | Performed by: NURSE PRACTITIONER

## 2022-01-31 PROCEDURE — 3017F COLORECTAL CA SCREEN DOC REV: CPT | Performed by: NURSE PRACTITIONER

## 2022-01-31 PROCEDURE — 1123F ACP DISCUSS/DSCN MKR DOCD: CPT | Performed by: NURSE PRACTITIONER

## 2022-01-31 PROCEDURE — 1090F PRES/ABSN URINE INCON ASSESS: CPT | Performed by: NURSE PRACTITIONER

## 2022-01-31 PROCEDURE — G8400 PT W/DXA NO RESULTS DOC: HCPCS | Performed by: NURSE PRACTITIONER

## 2022-01-31 PROCEDURE — 4040F PNEUMOC VAC/ADMIN/RCVD: CPT | Performed by: NURSE PRACTITIONER

## 2022-01-31 PROCEDURE — G8484 FLU IMMUNIZE NO ADMIN: HCPCS | Performed by: NURSE PRACTITIONER

## 2022-01-31 ASSESSMENT — ENCOUNTER SYMPTOMS: RESPIRATORY NEGATIVE: 1

## 2022-01-31 NOTE — PATIENT INSTRUCTIONS
Patient Education        Benign Essential Tremor: Care Instructions  Your Care Instructions     Benign essential tremor is a medical term for shaking that you can't control. Your hand or fingers may shake when you lift a cup or point at something. Or your voice may shake when you speak. This type of tremor is not harmful. It is not caused by a stroke or Parkinson's disease. Some things can affect how much you shake. For example, drinking or eating something with caffeine may make tremors worse for a while. Some medicines also can increase tremors. These include antidepressants and too much thyroid replacement. Talk to your doctor if you think one of your medicines makes your tremors worse. If you are self-conscious about your tremors, there are some things you can do to reduce them or make them less noticeable. This includes taking medicine. Follow-up care is a key part of your treatment and safety. Be sure to make and go to all appointments, and call your doctor if you are having problems. It's also a good idea to know your test results and keep a list of the medicines you take. How can you care for yourself at home? · Take your medicines exactly as prescribed. Call your doctor if you think you are having a problem with your medicine. Some medicines that help control tremors have to be taken every day, even if you are not having tremors. You will get more details on the specific medicines your doctor prescribes. · Get plenty of rest.  · Eat a balanced, healthy diet. · Try to reduce stress. Regular exercise and massages may help. · Limit alcohol. Heavy drinking can make your tremors worse. · Avoid drinks or foods with caffeine if they make your tremors worse. These include tea, cola, coffee, and chocolate. · Wear a heavy bracelet or watch. This adds a little weight to your hand. The extra weight may reduce tremors. · Drink from cups or glasses that are only half full.  You may also want to try drinking with a straw. When should you call for help? Watch closely for changes in your health, and be sure to contact your doctor if:    · You notice your tremors are getting worse.     · You can't do your everyday activities because of your tremors.     · You are sad and embarrassed about your shaking. Where can you learn more? Go to https://chpepiceweb.Sellbrite. org and sign in to your [employer] account. Enter B746 in the [employer] box to learn more about \"Benign Essential Tremor: Care Instructions. \"     If you do not have an account, please click on the \"Sign Up Now\" link. Current as of: April 8, 2021               Content Version: 13.1  © 2006-2021 [employer]. Care instructions adapted under license by [employer]. If you have questions about a medical condition or this instruction, always ask your healthcare professional. [place] [address] any warranty or liability for your use of this information. Patient Education        Movement Disorders: Exercises  Introduction  Here are some examples of exercises for problems with movement. Your doctor or physical therapist will tell you when you can start these exercises and which ones will work best for you. Problems with movement can happen along with many conditions, such as multiple sclerosis, Parkinson's disease, or damage from a stroke. No matter what is making movement hard for you, these exercises can help you be more flexible, strong, and steady on your feet. Start each exercise slowly. Ease off the exercise if you start to have pain. How to do the exercises  Prayer stretch    1. Start with your palms together in front of your chest, just below your chin. 2. Slowly lower your hands toward your waistline, keeping your hands close to your stomach and your palms together until you feel a mild to moderate stretch under your forearms. 3. Hold for at least 15 to 30 seconds. Repeat 2 to 4 times.   Shoulder stretch    1.  a doorway, and place one arm against the door frame. Your elbow should be a little higher than your shoulder. 2. Relax your shoulders as you lean forward, allowing your chest and shoulder muscles to stretch. You can also turn your body slightly away from your arm to stretch the muscles even more. 3. Hold for 15 to 30 seconds. 4. Repeat 2 to 4 times with each arm. Calf stretch    1. Place your hands on a wall for balance. You can also do this with your hands on the back of a chair or countertop. 2. Step back with your right leg. Keep the leg straight, and press your right heel into the floor. 3. Press your hips forward, bending your left leg slightly. You will feel the stretch in your right calf. 4. Hold the stretch 15 to 30 seconds. 5. Repeat 2 to 4 times with each leg. Hip flexor stretch (edge of table)    1. Lie flat on your back on a table or flat bench, with your knees and lower legs hanging off the edge of the table. 2. Grab one leg at the knee, and pull that knee back toward your chest. Relax the other leg. Let it hang down toward the floor until you feel a stretch in the upper thigh of that leg and hip. 3. Hold the stretch for at least 15 to 30 seconds. 4. Repeat 2 to 4 times for each leg. Back stretches    1. Get down on your hands and knees on the floor. 2. Relax your head, and allow it to droop. Round your back up toward the ceiling until you feel a nice stretch in your upper, middle, and lower back. Hold this stretch for as long as it feels comfortable, or about 15 to 30 seconds. 3. Return to the starting position with a flat back while you are on your hands and knees. 4. Let your back sway by pressing your stomach toward the floor. Lift your buttocks toward the ceiling. 5. Hold this position for 15 to 30 seconds. 6. Repeat 2 to 4 times. Midback stretch    If you have knee pain, do not do this exercise. 1. Kneel on the floor, and sit back on your ankles.   2. [employer] forward, place your hands on the floor, and stretch your arms out in front of you. Rest your head between your arms. 3. Gently push your chest toward the floor, reaching as far in front of you as you can. 4. Hold for 15 to 30 seconds. 5. Repeat 2 to 4 times. Press-up stretch    1. Lie on your stomach, supporting your body with your forearms. 2. Press your elbows down into the floor to raise your upper back. As you do this, relax your stomach muscles and allow your back to arch without using your back muscles. As you press up, do not let your hips or pelvis come off the floor. 3. Hold for 15 to 30 seconds, then relax. 4. Repeat 2 to 4 times. Chest and shoulder stretches    1. Put a few towels on top of one another and roll them together lengthwise. 2. Lie down, and place the roll of towels along the bones of your spine from your neck to your tailbone. Or lie on a foam roller if you have one. 3. Make sure that your head and tailbone area are supported with the roll of towels or on the foam roller. Be sure the towel roll or foam roller is in line with your spine. 4. Bend your knees to support your lower back. 5. Hold this position while you move your arms into the following positions:  6. Hold each arm position for 15 to 30 seconds. 7. Repeat the entire cycle of arm movements 2 to 4 times. 1. Arms down at your sides, with the palms facing up. 2. Arms out to your sides in a \"T\" shape. 3. Arms out to your sides with your elbows bent to 90 degrees, as in a \"goalpost\" shape. 4. Arms stretched over your head. Single knee-to-chest stretch    1. Lie on your back with your knees bent and your feet flat on the floor. You can put a small pillow under your head and neck if it is more comfortable. 2. Bring one knee to your chest, keeping the other foot flat on the floor. 3. Keep your lower back pressed to the floor. Hold for 15 to 30 seconds. 4. Relax, and lower the knee to the starting position.   5. Repeat with the other leg. Repeat 2 to 4 times with each leg. 6. To get more stretch, keep your other leg flat on the floor while pulling your knee to your chest.  Hip rotator stretch    1. Lie on your back with both knees bent and your feet flat on the floor. 2. Put the ankle of one leg on your opposite thigh near your knee. 3. Use your hand to gently push the raised knee away from your body until you feel a gentle stretch around your hip. 4. Hold the stretch for 15 to 30 seconds. 5. Repeat 2 to 4 times with each leg. Hamstring wall stretch    1. Lie on your back in a doorway, with your lower legs through the open door. 2. Slide the leg next to the doorway up the wall to straighten your knee. You should feel a gentle stretch down the back of your leg. 3. Hold the stretch for at least 1 minute to start. As you get used to it, work toward holding the stretch for as long as 6 minutes. 4. Do this exercise 2 to 4 times with one leg. Then move to the other side of the doorway to stretch the other leg. 1. Do not arch your back. 2. Do not bend either knee. 3. Keep one heel touching the floor and the other heel touching the wall. Do not point your toes. Hand flips for coordination    1. While seated, place your forearm and wrist on your thigh, palm down. 2. Flip your hand over so the back of your hand rests on your thigh and your palm is up. Alternate between palm up and palm down while keeping your forearm on your thigh. 3. Repeat 8 to 12 times with each hand. Finger opposition for coordination    1. With one hand, point your fingers and thumb straight up. Your wrist should be relaxed, following the line of your fingers and thumb. 2. Touch your thumb to each finger, one finger at a time. This will look like an \"okay\" sign, but try to keep your other fingers straight and pointing upward as much as you can. 3. Repeat 8 to 12 times with each hand. Finger extension for coordination    1.  Place your hand flat on a table.  2. Lift and then lower one finger at a time off the table. 3. Repeat 8 to 12 times with each hand. Shoulder blade squeeze for strength    1. Stand with your arms at your sides, and squeeze your shoulder blades together. Do not raise your shoulders up as you squeeze. 2. Hold 6 seconds. 3. Repeat 8 to 12 times. Bridging for strength    1. Lie on your back with both knees bent. Your knees should be bent about 90 degrees. 2. Then push your feet into the floor, squeeze your buttocks, and lift your hips off the floor until your shoulders, hips, and knees are all in a straight line. 3. Hold for about 6 seconds as you continue to breathe normally. 4. Slowly lower your hips back down to the floor. Rest for up to 10 seconds. 5. Repeat 8 to 12 times. Single-leg balance    1. Stand on a flat surface with your arms stretched out to your sides like you are making the letter \"T. \" Then lift one leg off the floor, bending it at the knee. If you are not steady on your feet, use one hand to hold on to a chair, counter, or wall. 2. Keep your standing knee straight. Try to balance on that leg for up to 30 seconds. Then rest for up to 10 seconds. 3. Repeat 6 to 8 times with each leg. 4. When you can balance on one leg for 30 seconds with your eyes open, try to balance on it with your eyes closed. 5. When you can do this exercise with your eyes closed for 30 seconds and with ease and no pain, try it while standing on a pillow or piece of foam.  Alternate arm and leg (bird dog) balance    Do this exercise slowly. Try to keep your body straight at all times. 1. Start on the floor, on your hands and knees. 2. Tighten your belly muscles by pulling your belly button in toward your spine. Be sure you continue to breathe normally and do not hold your breath. 3. Raise one arm off the floor, and hold it straight out in front of you. Be careful not to let your shoulder drop down, because that will twist your trunk.   4. Hold for about 6 seconds, then lower your arm and switch to your other arm. 5. Repeat 8 to 12 times with each arm. 6. When you can do this exercise with ease and no pain, try it with one leg raised off the floor. Hold your leg straight out behind you. Be careful not to let your hip drop down, because that will twist your trunk. 7. When holding your leg straight out becomes easier, try raising your opposite arm at the same time. Repeat steps 1 through 5. Balance-building exercise 1    1. Stand with a chair in front of you and a wall behind you, in case you lose your balance. 2. Stand with your feet together and your arms at your sides. 3. Move your head up and down 10 times. Balance-building exercise 2    1. Turn your head side to side 10 times. Balance-building exercise 3    1. Move your head diagonally up and down 10 times. Balance-building exercise 4    1. Move your head diagonally up and down 10 times on the other side. Follow-up care is a key part of your treatment and safety. Be sure to make and go to all appointments, and call your doctor if you are having problems. It's also a good idea to know your test results and keep a list of the medicines you take. Where can you learn more? Go to https://Lax.com.Ala-Septic. org and sign in to your [employer] account. Enter V184 in the [employer] box to learn more about \"Movement Disorders: Exercises. \"     If you do not have an account, please click on the \"Sign Up Now\" link. Current as of: April 8, 2021               Content Version: 13.1  © 2006-2021 Healthwise, Incorporated. Care instructions adapted under license by [facility] ([facility]). If you have questions about a medical condition or this instruction, always ask your healthcare professional. [place] [address] any warranty or liability for your use of this information.          Patient Education        Parkinson's Disease: Care Instructions  Overview  When you have Parkinson's disease, part of your brain cannot make enough dopamine, a chemical that helps control movement. The disease can cause tremors, stiffness, and problems with movement. Severe or advanced cases can also cause problems with thinking. Taking your medicines correctly and getting regular exercise may help you maintain your quality of life. There are many things that can cause Parkinson's disease symptoms, including some medicine, some toxins, and trauma to the head. The cause in most cases is not known. Follow-up care is a key part of your treatment and safety. Be sure to make and go to all appointments, and call your doctor if you are having problems. It's also a good idea to know your test results and keep a list of the medicines you take. How can you care for yourself at home? General care  · Take your medicines exactly as prescribed. Call your doctor if you think you are having a problem with your medicine. · Make sure your home is safe:  ? Place furniture so that you have something to hold on to as you walk around the house. ? Use chairs that make it easier to sit down and stand up. ? Group the things you use most, such as reading glasses, keys, and the telephone, in one easy-to-reach place. ? Tack down rugs so that you do not trip. ? Put no-slip tape and handrails in the tub to prevent falls. · Use a cane, walker, or scooter if your doctor suggests it. · Keep up your normal activities as much as you can. · Find ways to manage stress, which can make symptoms worse. · Spend time with family and friends. Join a support group for people with Parkinson's disease if you want extra help. · Depression is common with this condition. Tell your doctor if you have trouble sleeping, are eating too much or are not hungry, or feel sad or tearful all the time. Depression can be treated with medicine and counseling. Diet and exercise  · Eat a balanced diet.   · If you are taking levodopa, do not eat protein at the same time you take your medicine. Levodopa may not work as well if you take it at the same time you eat protein. You can eat normal amounts of protein. Talk to your doctor if you have questions. · If you have problems swallowing, change how and what you eat:  ? Try thick drinks, such as milk shakes. They are easier to swallow than other fluids. ? Do not eat foods that crumble easily. These can cause choking. ? Use a  to prepare food. Soft foods need less chewing. ? Eat small meals often so that you do not get tired from eating heavy meals. · Drink plenty of water and eat a high-fiber diet to prevent constipation. Parkinson'sand the medicines that treat itmay slow your intestines. · Get exercise on most days. Work with your doctor to set up a program of walking, swimming, or other exercise you are able to do. When should you call for help? Call your doctor now or seek immediate medical care if:    · You have a change in your symptoms.     · You develop other problems from your condition, such as:  ? Injury from a fall. ? Thinking or memory problems. ? A urinary tract infection (burning pain when urinating). Watch closely for changes in your health, and be sure to contact your doctor if:    · You lose weight because of problems with eating.     · You want more information about your condition or your medicines. Where can you learn more? Go to https://Austhink SoftwarepegiovanniVestor.SpotXchange. org and sign in to your YuMingle account. Enter A919 in the [facility] box to learn more about \"Parkinson's Disease: Care Instructions. \"     If you do not have an account, please click on the \"Sign Up Now\" link. Current as of: April 8, 2021               Content Version: 13.1  © 5252-4216 [employer]. Care instructions adapted under license by [facility] ([facility]).  If you have questions about a medical condition or this instruction, always ask your healthcare professional. Jesse Hood disclaims any warranty or liability for your use of this information.

## 2022-01-31 NOTE — PROGRESS NOTES
SUBJECTIVE:  Pt is a of 78 y.o. female comes in today with   Chief Complaint   Patient presents with    Shaking     Pt states she has shaking all over body x 1 year        Patient presenting today for evaluation of shakes. States entire body shaking for approx 1 yr. Initially intermittent. Now states constant shaking. States \"at times feels like my entire insides are shaking\". States arms and legs are usually affected. States shaking does not improve when engaging extremities. Associated with feeling off balance. No falls, but caught herself several times. No new stressors. Does worsen when feeling anxious. Prior to Visit Medications    Medication Sig Taking? Authorizing Provider   gabapentin (NEURONTIN) 300 MG capsule TAKE 1 CAPSULE BY MOUTH THREE TIMES DAILY Yes Jared Issa MD   HYDROcodone-acetaminophen (NORCO) 7.5-325 MG per tablet Take 1 tablet by mouth every 6 hours as needed for Pain for up to 30 days.  Yes Jared Issa MD   ARIPiprazole (ABILIFY) 5 MG tablet Take 1 tablet by mouth daily Yes Jared Issa MD   tiZANidine (ZANAFLEX) 4 MG tablet TAKE 1 TABLET BY MOUTH THREE TIMES DAILY Yes Jared Issa MD   diclofenac sodium (VOLTAREN) 1 % GEL APPLY 2 GRAMS TOPICALLY TO HAND FOUR TIMES DAILY Yes Jared Issa MD   traZODone (DESYREL) 50 MG tablet TAKE 2 TABLETS BY MOUTH EVERY NIGHT Yes Jared Issa MD   albuterol sulfate  (90 Base) MCG/ACT inhaler INHALE 2 PUFFS BY MOUTH EVERY 4 HOURS AS NEEDED FOR WHEEZING Yes Jared Issa MD   nitroGLYCERIN (NITROSTAT) 0.4 MG SL tablet PLACE 1 TABLET UNDER THE TONGUE EVERY 5 MINUTES AS NEEDED FOR CHEST PAIN Yes Oralee Kawasaki, APRN - CNP   buPROPion (WELLBUTRIN XL) 150 MG extended release tablet TAKE 1 TABLET BY MOUTH TWICE DAILY Yes Jared Issa MD   DEXILANT 60 MG CPDR delayed release capsule TAKE 1 CAPSULE BY MOUTH DAILY Yes Jared Issa MD   fluticasone (FLOVENT HFA) 44 MCG/ACT inhaler Inhale 1 puff into the lungs 2 times daily Yes Anu Yoo Jamal Ordoñez MD   Probiotic Product (PROBIOTIC PO) Take by mouth daily  Yes Historical Provider, MD   sertraline (ZOLOFT) 100 MG tablet TAKE 1 TABLET BY MOUTH THREE TIMES DAILY Yes Harman Brooks MD   ipratropium (ATROVENT) 0.06 % nasal spray 2 sprays by Each Nostril route 3 times daily as needed for Rhinitis (runny nose) Yes America Delacruzh, DO   Elastic Bandages & Supports (ELBOW BRACE) MISC Use as needed Yes Fernanda Boogie MD   albuterol (PROVENTIL) (2.5 MG/3ML) 0.083% nebulizer solution USE 1 VIAL IN NEBULIZER EVERY 6 HOURS Yes Harman Brooks MD   ondansetron (ZOFRAN) 4 MG tablet Take 1 tablet by mouth every 6 hours as needed for Nausea Yes Harman Brooks MD   montelukast (SINGULAIR) 10 MG tablet TAKE 1 TABLET BY MOUTH EVERY NIGHT Yes Aminah Tobias MD   fluticasone (FLOVENT HFA) 110 MCG/ACT inhaler INHALE 1 PUFF INTO THE LUNGS TWICE DAILY Yes Harman Brooks MD   Elastic Bandages & Supports (TRUFORM SUPPORT SOCK 20-30MMHG) MISC 1 each by Does not apply route daily Yes Joycelyn Apley, APRN - CNP   hydrocortisone (WESTCORT) 0.2 % cream APPLY EXTERNALLY TO THE AFFECTED AREA TWICE DAILY Yes Harman Brooks MD   Nutritional Supplements (BOOST CALORIE SMART) LIQD Take 1 Can by mouth See Admin Instructions EVERY day Yes Historical Provider, MD   butalbital-acetaminophen-caffeine (FIORICET, ESGIC) -40 MG per tablet TAKE 1 TABLET BY MOUTH EVERY 4 HOURS AS NEEDED FOR HEADACHES MAY TAKE 2 TABLETS EVERY 4 HOURS AS NEEDED FOR PAIN  Aminah Tobias MD         Patient's allergies, past medical, surgical, social and family histories werereviewed and updated as appropriate. Review of Systems   Constitutional: Positive for fatigue (chronic). Respiratory: Negative. Cardiovascular: Negative. Musculoskeletal: Positive for gait problem (off balance ). Neurological: Positive for tremors (generalized, worse to extremities). Negative for dizziness, light-headedness and headaches. Physical Exam  Vitals reviewed. Constitutional:       Appearance: Normal appearance. She is well-developed. Cardiovascular:      Rate and Rhythm: Normal rate and regular rhythm. Heart sounds: Normal heart sounds. No murmur heard. Pulmonary:      Effort: Pulmonary effort is normal.      Breath sounds: Normal breath sounds. Skin:     General: Skin is warm and dry. Neurological:      Mental Status: She is alert and oriented to person, place, and time. Comments: Tremor noted to BUE and head        Vitals:    01/31/22 1140   BP: 108/64   Pulse: 93   SpO2: 98%   Weight: 109 lb (49.4 kg)             ASSESSMENT:  1. Tremor        PLAN:  1. Tremor  New problem  Worsening  Suspicious for benign essential tremor. Also discussed other potential DD- Parkinson's, SSRI side effects, and other   -     AFL - Buster Alba MD, Neurology, [facility]  See pt instructions  F/u prn  Discussed use, benefit, and side effects of prescribed medications. All patient questions answered. Pt voiced understanding.

## 2022-02-01 DIAGNOSIS — F33.0 MILD EPISODE OF RECURRENT MAJOR DEPRESSIVE DISORDER (HCC): ICD-10-CM

## 2022-02-01 RX ORDER — BUPROPION HYDROCHLORIDE 150 MG/1
TABLET ORAL
Qty: 60 TABLET | Refills: 5 | Status: SHIPPED | OUTPATIENT
Start: 2022-02-01 | End: 2022-07-26

## 2022-02-01 NOTE — TELEPHONE ENCOUNTER
Medication:   Requested Prescriptions     Pending Prescriptions Disp Refills    buPROPion (WELLBUTRIN XL) 150 MG extended release tablet [Pharmacy Med Name: BUPROPION XL 150MG TABLETS (24 H)] 60 tablet 5     Sig: TAKE 1 TABLET BY MOUTH TWICE DAILY        Last Filled:  08/11/2021 #60 5rf    Patient Phone Number: [phone] (home)     Last appt: 1/31/2022   Next appt: Visit date not found    Last OARRS:   RX Monitoring 11/19/2021   Attestation -   Periodic Controlled Substance Monitoring No signs of potential drug abuse or diversion identified.

## 2022-02-03 DIAGNOSIS — F32.A DEPRESSION, UNSPECIFIED DEPRESSION TYPE: ICD-10-CM

## 2022-02-03 RX ORDER — SERTRALINE HYDROCHLORIDE 100 MG/1
TABLET, FILM COATED ORAL
Qty: 270 TABLET | Refills: 2 | Status: SHIPPED | OUTPATIENT
Start: 2022-02-03

## 2022-02-04 DIAGNOSIS — G89.4 CHRONIC PAIN SYNDROME: ICD-10-CM

## 2022-02-04 RX ORDER — HYDROCODONE BITARTRATE AND ACETAMINOPHEN 7.5; 325 MG/1; MG/1
1 TABLET ORAL EVERY 6 HOURS PRN
Qty: 120 TABLET | Refills: 0 | Status: SHIPPED | OUTPATIENT
Start: 2022-02-04 | End: 2022-03-04 | Stop reason: SDUPTHER

## 2022-02-04 NOTE — TELEPHONE ENCOUNTER
Medication:   Requested Prescriptions     Pending Prescriptions Disp Refills    HYDROcodone-acetaminophen (NORCO) 7.5-325 MG per tablet 120 tablet 0     Sig: Take 1 tablet by mouth every 6 hours as needed for Pain for up to 30 days. Last Filled:  120 x 0 RF 1/7/22    Patient Phone Number: [phone] (home)     Last appt: 1/31/2022   Next appt: Visit date not found    Last OARRS:   RX Monitoring 11/19/2021   Attestation -   Periodic Controlled Substance Monitoring No signs of potential drug abuse or diversion identified.

## 2022-02-14 ENCOUNTER — HOSPITAL ENCOUNTER (OUTPATIENT)
Dept: NON INVASIVE DIAGNOSTICS | Age: 67
Discharge: HOME | End: 2022-02-14
Payer: MEDICARE

## 2022-02-14 DIAGNOSIS — I34.0 NONRHEUMATIC MITRAL VALVE REGURGITATION: ICD-10-CM

## 2022-02-14 LAB
LV EF: 55 %
LVEF MODALITY: NORMAL

## 2022-02-14 PROCEDURE — 93306 TTE W/DOPPLER COMPLETE: CPT

## 2022-03-04 DIAGNOSIS — G89.4 CHRONIC PAIN SYNDROME: ICD-10-CM

## 2022-03-04 RX ORDER — HYDROCODONE BITARTRATE AND ACETAMINOPHEN 7.5; 325 MG/1; MG/1
1 TABLET ORAL EVERY 6 HOURS PRN
Qty: 120 TABLET | Refills: 0 | Status: SHIPPED | OUTPATIENT
Start: 2022-03-04 | End: 2022-04-01 | Stop reason: SDUPTHER

## 2022-03-04 NOTE — TELEPHONE ENCOUNTER
Medication:   Requested Prescriptions     Pending Prescriptions Disp Refills    HYDROcodone-acetaminophen (NORCO) 7.5-325 MG per tablet 120 tablet 0     Sig: Take 1 tablet by mouth every 6 hours as needed for Pain for up to 30 days. Last Filled:  2/4/2022, 120, 0    Patient Phone Number: [phone] (home)     Last appt: 1/31/2022   Next appt: Visit date not found    Last OARRS:   RX Monitoring 11/19/2021   Attestation -   Periodic Controlled Substance Monitoring No signs of potential drug abuse or diversion identified.

## 2022-03-04 NOTE — TELEPHONE ENCOUNTER
Medication and Quantity requested: HYDROcodone-acetaminophen (NORCO) 7.5-325 MG per tablet-QTY. 120 tablets         Last Visit  1/31/22    Pharmacy and phone number updated in EPIC:  yes  Ingrid

## 2022-03-15 DIAGNOSIS — J45.20 MILD INTERMITTENT ASTHMA WITHOUT COMPLICATION: ICD-10-CM

## 2022-03-15 RX ORDER — ALBUTEROL SULFATE 90 UG/1
AEROSOL, METERED RESPIRATORY (INHALATION)
Qty: 8.5 G | Refills: 11 | Status: SHIPPED | OUTPATIENT
Start: 2022-03-15 | End: 2022-09-26

## 2022-03-15 NOTE — TELEPHONE ENCOUNTER
Medication:   Requested Prescriptions     Pending Prescriptions Disp Refills    albuterol sulfate  (90 Base) MCG/ACT inhaler [Pharmacy Med Name: ALBUTEROL HFA INH (200 PUFFS)8.5GM] 8.5 g 11     Sig: INHALE 2 PUFFS BY MOUTH EVERY 4 HOURS AS NEEDED FOR WHEEZING        Last Filled:  09/01/2021 #1 11rf    Patient Phone Number: [phone] (home)     Last appt: 1/31/2022   Next appt: Visit date not found    Last OARRS:   RX Monitoring 11/19/2021   Attestation -   Periodic Controlled Substance Monitoring No signs of potential drug abuse or diversion identified.

## 2022-04-01 DIAGNOSIS — G89.4 CHRONIC PAIN SYNDROME: ICD-10-CM

## 2022-04-01 RX ORDER — HYDROCODONE BITARTRATE AND ACETAMINOPHEN 7.5; 325 MG/1; MG/1
1 TABLET ORAL EVERY 6 HOURS PRN
Qty: 120 TABLET | Refills: 0 | Status: SHIPPED | OUTPATIENT
Start: 2022-04-01 | End: 2022-05-02 | Stop reason: SDUPTHER

## 2022-04-01 NOTE — TELEPHONE ENCOUNTER
Medication and Quantity requested: HYDROcodone-acetaminophen (NORCO) 7.5-325 MG per tablet         Last Visit  1/31/22    Pharmacy and phone number updated in EPIC:  Yes  Ingrid Procedure:  REMOVAL OF INTRAUTERINE DEVICE (IUD) (N/A Uterus)  ABLATION ENDOMETRIAL NOVASURE (N/A Uterus)  DILATATION AND CURETTAGE (D&C) WITH HYSTEROSCOPY (N/A Uterus)    Relevant Problems   CARDIO   (+) Migraines      GI/HEPATIC   (+) GERD (gastroesophageal reflux disease)      NEURO/PSYCH   (+) Migraines      upt neg 4/1/2022       Anesthesia Plan  ASA Score- 2     Anesthesia Type- general with ASA Monitors  Additional Monitors:   Airway Plan: LMA  Comment: General anesthesia, LMA; standard ASA monitors  Risks and benefits discussed with patient; patient consented and agrees to proceed  I saw and evaluated the patient  If seen with CRNA, we have discussed the anesthetic plan and I am in agreement that the plan is appropriate for the patient          Plan Factors-    Chart reviewed  Existing labs reviewed  Induction- intravenous  Postoperative Plan- Plan for postoperative opioid use  Planned trial extubation    Informed Consent- Anesthetic plan and risks discussed with patient  I personally reviewed this patient with the CRNA  Discussed and agreed on the Anesthesia Plan with the CRNA  Rohith Cuellar

## 2022-04-01 NOTE — TELEPHONE ENCOUNTER
Medication:   Requested Prescriptions     Pending Prescriptions Disp Refills    HYDROcodone-acetaminophen (NORCO) 7.5-325 MG per tablet 120 tablet 0     Sig: Take 1 tablet by mouth every 6 hours as needed for Pain for up to 30 days. Last Filled:  3/4/22    Patient Phone Number: [phone] (home)     Last appt: 1/31/2022   Next appt: 4/14/2022    Last OARRS:   RX Monitoring 11/19/2021   Attestation -   Periodic Controlled Substance Monitoring No signs of potential drug abuse or diversion identified.

## 2022-04-01 NOTE — TELEPHONE ENCOUNTER
Medication and Quantity requested:  HYDROcodone-acetaminophen (NORCO) 7.5-325 MG per tablet - qty 120        Last Visit  01/31/22 - [address] and phone number updated in [facility]:  Yes    Ingrid

## 2022-05-02 DIAGNOSIS — G89.4 CHRONIC PAIN SYNDROME: ICD-10-CM

## 2022-05-02 RX ORDER — HYDROCODONE BITARTRATE AND ACETAMINOPHEN 7.5; 325 MG/1; MG/1
1 TABLET ORAL EVERY 6 HOURS PRN
Qty: 120 TABLET | Refills: 0 | Status: SHIPPED | OUTPATIENT
Start: 2022-05-02 | End: 2022-05-31 | Stop reason: SDUPTHER

## 2022-05-02 NOTE — TELEPHONE ENCOUNTER
Medication and Quantity requested: Hydrocodone ([place]) 7.5-325 tab  Last Visit  01/31/2022     Pharmacy and phone number updated in EPIC:  Brayan Bobo

## 2022-05-02 NOTE — TELEPHONE ENCOUNTER
Medication:   Requested Prescriptions     Pending Prescriptions Disp Refills    HYDROcodone-acetaminophen (NORCO) 7.5-325 MG per tablet 120 tablet 0     Sig: Take 1 tablet by mouth every 6 hours as needed for Pain for up to 30 days. Last Filled:  4/1/22    Patient Phone Number: [phone] (home)     Last appt: 1/31/2022   Next appt: 5/3/2022    Last OARRS:   RX Monitoring 11/19/2021   Attestation -   Periodic Controlled Substance Monitoring No signs of potential drug abuse or diversion identified.

## 2022-05-03 ENCOUNTER — OFFICE VISIT (OUTPATIENT)
Dept: FAMILY MEDICINE CLINIC | Age: 67
End: 2022-05-03
Payer: MEDICARE

## 2022-05-03 VITALS
HEIGHT: 68 IN | HEART RATE: 92 BPM | BODY MASS INDEX: 16.88 KG/M2 | DIASTOLIC BLOOD PRESSURE: 72 MMHG | WEIGHT: 111.38 LBS | SYSTOLIC BLOOD PRESSURE: 124 MMHG | OXYGEN SATURATION: 98 %

## 2022-05-03 DIAGNOSIS — J30.89 ALLERGIC RHINITIS DUE TO OTHER ALLERGIC TRIGGER, UNSPECIFIED SEASONALITY: ICD-10-CM

## 2022-05-03 DIAGNOSIS — F33.0 MILD EPISODE OF RECURRENT MAJOR DEPRESSIVE DISORDER (HCC): ICD-10-CM

## 2022-05-03 DIAGNOSIS — G89.4 CHRONIC PAIN SYNDROME: Primary | ICD-10-CM

## 2022-05-03 DIAGNOSIS — J45.20 MILD INTERMITTENT ASTHMA WITHOUT COMPLICATION: ICD-10-CM

## 2022-05-03 DIAGNOSIS — I42.8 CARDIOMYOPATHY, NONISCHEMIC (HCC): ICD-10-CM

## 2022-05-03 PROCEDURE — 1036F TOBACCO NON-USER: CPT | Performed by: FAMILY MEDICINE

## 2022-05-03 PROCEDURE — G8427 DOCREV CUR MEDS BY ELIG CLIN: HCPCS | Performed by: FAMILY MEDICINE

## 2022-05-03 PROCEDURE — 99214 OFFICE O/P EST MOD 30 MIN: CPT | Performed by: FAMILY MEDICINE

## 2022-05-03 PROCEDURE — G8400 PT W/DXA NO RESULTS DOC: HCPCS | Performed by: FAMILY MEDICINE

## 2022-05-03 PROCEDURE — 3017F COLORECTAL CA SCREEN DOC REV: CPT | Performed by: FAMILY MEDICINE

## 2022-05-03 PROCEDURE — G8419 CALC BMI OUT NRM PARAM NOF/U: HCPCS | Performed by: FAMILY MEDICINE

## 2022-05-03 PROCEDURE — 1123F ACP DISCUSS/DSCN MKR DOCD: CPT | Performed by: FAMILY MEDICINE

## 2022-05-03 PROCEDURE — 1090F PRES/ABSN URINE INCON ASSESS: CPT | Performed by: FAMILY MEDICINE

## 2022-05-03 PROCEDURE — 4040F PNEUMOC VAC/ADMIN/RCVD: CPT | Performed by: FAMILY MEDICINE

## 2022-05-03 ASSESSMENT — ENCOUNTER SYMPTOMS
WHEEZING: 1
DIARRHEA: 1
SHORTNESS OF BREATH: 1
ABDOMINAL PAIN: 1
RHINORRHEA: 1
BACK PAIN: 1

## 2022-05-03 NOTE — PROGRESS NOTES
Joaquin Rousseau (:  1955) is a 75 y.o. female,Established patient, here for evaluation of the following chief complaint(s):  Medication Check         ASSESSMENT/PLAN:  1. Chronic pain syndrome  OARRS report reviewed and no inconsistencies noted   The patient understands the risks of dependency/addiction with hydrocodone and will take as little as possible and discontinue as soon as possible     2. Cardiomyopathy, nonischemic (HCC)  Generally stable. There is been no change in her medication. She continues to see cardiology on a routine basis. 3. Mild episode of recurrent major depressive disorder ([address]  Recently well controlled with the below mentioned medications. Continue current treatment. 4. Mild intermittent asthma without complication  Patient is on no prophylactic inhaler as she states this is an intermittent issue. She does use albuterol for rescue which generally helps. 5. Allergic rhinitis due to other allergic trigger, unspecified seasonality  Patient treats this symptomatically. Return in about 3 months (around 8/3/2022) for OARRS. Subjective   SUBJECTIVE/OBJECTIVE:  HPI  Patient returns for routine checkup for her chronic pain syndrome which she states is unchanged. Her OARRS is checked on a every 3 month basis. She states her cardiomyopathy has been stable. She states she sees the cardiologist usually every 6 months and there have been no new changes in her medication or her condition. She generally states she is reasonably asymptomatic. Her depression remains reasonably well treated with both sertraline and bupropion and also Abilify. She states her asthma and allergies are usually worse in the spring and she has been using her rescue inhaler usually when her head allergies get the best of her particular with her postnasal drip. She continues to treat this symptomatically as well. Review of Systems   Constitutional: Negative.     HENT: Positive for congestion, postnasal drip and rhinorrhea. Respiratory: Positive for shortness of breath (  periodically with allergies) and wheezing ( periodically with allergies). Cardiovascular: Negative for chest pain, palpitations and leg swelling. Gastrointestinal: Positive for abdominal pain ( off and on) and diarrhea ( usually due to pn drip). Endocrine: Negative. Genitourinary: Negative. Musculoskeletal: Positive for arthralgias, back pain and myalgias. Neurological: Positive for tremors ( seeing Dr Panchito Navarrete. Medication has not been helping). Objective   Physical Exam  Constitutional:       General: She is not in acute distress. HENT:      Mouth/Throat:      Pharynx: No oropharyngeal exudate. Neck:      Thyroid: No thyromegaly. Vascular: No carotid bruit. Cardiovascular:      Rate and Rhythm: Normal rate and regular rhythm. Heart sounds: No murmur heard. Pulmonary:      Effort: Pulmonary effort is normal.      Breath sounds: Normal breath sounds. No wheezing or rales. Musculoskeletal:      Cervical back: Neck supple. Lymphadenopathy:      Cervical: No cervical adenopathy. Skin:     General: Skin is warm and dry. Neurological:      Mental Status: She is alert and oriented to person, place, and time. Psychiatric:         Behavior: Behavior normal.         Thought Content: Thought content normal.         Judgment: Judgment normal.                  An electronic signature was used to authenticate this note.     --Rajni Garduno MD

## 2022-05-11 DIAGNOSIS — G44.89 OTHER HEADACHE SYNDROME: ICD-10-CM

## 2022-05-11 RX ORDER — BUTALBITAL, ACETAMINOPHEN AND CAFFEINE 50; 325; 40 MG/1; MG/1; MG/1
1 TABLET ORAL EVERY 4 HOURS PRN
Qty: 40 TABLET | Refills: 0 | Status: SHIPPED | OUTPATIENT
Start: 2022-05-11 | End: 2022-05-12 | Stop reason: SDUPTHER

## 2022-05-11 NOTE — TELEPHONE ENCOUNTER
Medication and Quantity requested: butalbital-acetaminophen-caffeine (FIORICET, ESGIC) -40 MG per tablet         Last Visit  5/3/22    Pharmacy and phone number updated in EPIC:  yes

## 2022-05-11 NOTE — TELEPHONE ENCOUNTER
Medication:   Requested Prescriptions     Pending Prescriptions Disp Refills    butalbital-acetaminophen-caffeine (FIORICET, ESGIC) -40 MG per tablet 40 tablet 0     Sig: Take 1 tablet by mouth every 4 hours as needed for Headaches        Last Filled:      Patient Phone Number: [phone] (home)     Last appt: 5/3/2022   Next appt: 8/4/2022    Last OARRS:   RX Monitoring 5/3/2022   Attestation -   Periodic Controlled Substance Monitoring No signs of potential drug abuse or diversion identified.

## 2022-05-12 ENCOUNTER — TELEPHONE (OUTPATIENT)
Dept: FAMILY MEDICINE CLINIC | Age: 67
End: 2022-05-12

## 2022-05-12 DIAGNOSIS — F33.0 MILD EPISODE OF RECURRENT MAJOR DEPRESSIVE DISORDER (HCC): ICD-10-CM

## 2022-05-12 DIAGNOSIS — G44.89 OTHER HEADACHE SYNDROME: ICD-10-CM

## 2022-05-12 RX ORDER — BUTALBITAL, ACETAMINOPHEN AND CAFFEINE 50; 325; 40 MG/1; MG/1; MG/1
1 TABLET ORAL EVERY 6 HOURS PRN
Qty: 40 TABLET | Refills: 1 | Status: SHIPPED | OUTPATIENT
Start: 2022-05-12 | End: 2022-07-11

## 2022-05-12 RX ORDER — ARIPIPRAZOLE 5 MG/1
5 TABLET ORAL DAILY
Qty: 90 TABLET | Refills: 3 | Status: SHIPPED | OUTPATIENT
Start: 2022-05-12

## 2022-05-12 NOTE — TELEPHONE ENCOUNTER
Fioricet sent to pharmacy yesterday is being denied by patient's insurance  According to patient's pharmacy if RX is written for 1 tab every 6hrs. insurance will approve  Review and send new RX if appropriate  Ingrid

## 2022-05-12 NOTE — TELEPHONE ENCOUNTER
Medication:   Requested Prescriptions     Pending Prescriptions Disp Refills    ARIPiprazole (ABILIFY) 5 MG tablet [Pharmacy Med Name: ARIPIPRAZOLE 5MG TABLETS] 90 tablet 1     Sig: TAKE 1 TABLET BY MOUTH DAILY        Last Filled:  11/19/2021    Patient Phone Number: [phone] (home)     Last appt: 5/3/2022   Next appt: 8/4/2022    Last OARRS:   RX Monitoring 5/11/2022   Attestation -   Periodic Controlled Substance Monitoring No signs of potential drug abuse or diversion identified.

## 2022-05-31 ENCOUNTER — PATIENT MESSAGE (OUTPATIENT)
Dept: FAMILY MEDICINE CLINIC | Age: 67
End: 2022-05-31

## 2022-05-31 DIAGNOSIS — G89.4 CHRONIC PAIN SYNDROME: ICD-10-CM

## 2022-05-31 RX ORDER — HYDROCODONE BITARTRATE AND ACETAMINOPHEN 7.5; 325 MG/1; MG/1
1 TABLET ORAL EVERY 6 HOURS PRN
Qty: 120 TABLET | Refills: 0 | Status: SHIPPED | OUTPATIENT
Start: 2022-05-31 | End: 2022-06-29 | Stop reason: SDUPTHER

## 2022-05-31 NOTE — TELEPHONE ENCOUNTER
Medication:   Requested Prescriptions     Pending Prescriptions Disp Refills    HYDROcodone-acetaminophen (NORCO) 7.5-325 MG per tablet 120 tablet 0     Sig: Take 1 tablet by mouth every 6 hours as needed for Pain for up to 30 days. Last Filled:  5/2/22    Patient Phone Number: [phone] (home)     Last appt: 5/3/2022   Next appt: 8/4/2022    Last OARRS:   RX Monitoring 5/11/2022   Attestation -   Periodic Controlled Substance Monitoring No signs of potential drug abuse or diversion identified.

## 2022-05-31 NOTE — TELEPHONE ENCOUNTER
From: Darius Araujo  To: Dr. Yesika Gonzales: 5/31/2022 8:49 AM EDT  Subject: Hydrocodone refill    Morning Dr Lennie Hernandez, can you call in a refill for the above to [facility]? If you have any jars, can you bring those in also?   Nella Chavarria

## 2022-06-20 DIAGNOSIS — J45.20 MILD INTERMITTENT ASTHMA WITHOUT COMPLICATION: ICD-10-CM

## 2022-06-20 RX ORDER — ALBUTEROL SULFATE 2.5 MG/3ML
SOLUTION RESPIRATORY (INHALATION)
Qty: 540 ML | Refills: 3 | Status: SHIPPED | OUTPATIENT
Start: 2022-06-20

## 2022-06-20 NOTE — TELEPHONE ENCOUNTER
Medication:   Requested Prescriptions     Pending Prescriptions Disp Refills    albuterol (PROVENTIL) (2.5 MG/3ML) 0.083% nebulizer solution [Pharmacy Med Name: ALBUTEROL 0.083%(2.5MG/3ML) 60X3ML] 540 mL      Sig: USE 1 VIAL VIA NEBULIZER EVERY 6 HOURS        Last Filled: 2/19/21     Patient Phone Number: [phone] (home)     Last appt: 5/3/2022   Next appt: 8/4/2022    Last OARRS:   RX Monitoring 5/11/2022   Attestation -   Periodic Controlled Substance Monitoring No signs of potential drug abuse or diversion identified.

## 2022-06-22 ENCOUNTER — HOSPITAL ENCOUNTER (OUTPATIENT)
Dept: NUCLEAR MEDICINE | Age: 67
Discharge: HOME | End: 2022-06-22
Payer: MEDICARE

## 2022-06-22 DIAGNOSIS — G21.11 NEUROLEPTIC INDUCED PARKINSONISM (HCC): ICD-10-CM

## 2022-06-22 DIAGNOSIS — F32.A DEPRESSION, UNSPECIFIED DEPRESSION TYPE: ICD-10-CM

## 2022-06-22 PROCEDURE — 6370000000 HC RX 637 (ALT 250 FOR IP)

## 2022-06-22 PROCEDURE — 78803 RP LOCLZJ TUM SPECT 1 AREA: CPT

## 2022-06-22 PROCEDURE — 2580000003 HC RX 258: Performed by: PSYCHIATRY & NEUROLOGY

## 2022-06-22 PROCEDURE — 3430000000 HC RX DIAGNOSTIC RADIOPHARMACEUTICAL: Performed by: PSYCHIATRY & NEUROLOGY

## 2022-06-22 PROCEDURE — 3430000000 HC RX DIAGNOSTIC RADIOPHARMACEUTICAL

## 2022-06-22 PROCEDURE — A9584 IODINE I-123 IOFLUPANE: HCPCS

## 2022-06-22 PROCEDURE — A9584 IODINE I-123 IOFLUPANE: HCPCS | Performed by: PSYCHIATRY & NEUROLOGY

## 2022-06-22 RX ORDER — SODIUM CHLORIDE 0.9 % (FLUSH) 0.9 %
10 SYRINGE (ML) INJECTION PRN
Status: DISCONTINUED | OUTPATIENT
Start: 2022-06-22 | End: 2022-06-23 | Stop reason: HOSPADM

## 2022-06-22 RX ORDER — IODINE SOLUTION STRONG 5% (LUGOL'S) 5 %
4 SOLUTION ORAL ONCE
Status: COMPLETED | OUTPATIENT
Start: 2022-06-22 | End: 2022-06-22

## 2022-06-22 RX ADMIN — IODINE SOLUTION STRONG 5% (LUGOL'S) 0.2 ML: 5 SOLUTION at 07:44

## 2022-06-22 RX ADMIN — Medication 10 ML: at 08:59

## 2022-06-22 RX ADMIN — IOFLUPANE I-123 5.01 MILLICURIE: 2 INJECTION, SOLUTION INTRAVENOUS at 08:58

## 2022-06-29 DIAGNOSIS — G89.4 CHRONIC PAIN SYNDROME: ICD-10-CM

## 2022-06-29 RX ORDER — HYDROCODONE BITARTRATE AND ACETAMINOPHEN 7.5; 325 MG/1; MG/1
1 TABLET ORAL EVERY 6 HOURS PRN
Qty: 120 TABLET | Refills: 0 | Status: SHIPPED | OUTPATIENT
Start: 2022-06-29 | End: 2022-07-27 | Stop reason: SDUPTHER

## 2022-06-29 NOTE — TELEPHONE ENCOUNTER
Medication and Quantity requested: HYDROcodone-acetaminophen (NORCO) 7.5-325 MG per tablet         Last Visit 05/03/2022      Pharmacy and phone number updated in EPIC:  yes

## 2022-06-29 NOTE — TELEPHONE ENCOUNTER
Medication:   Requested Prescriptions     Pending Prescriptions Disp Refills    HYDROcodone-acetaminophen (NORCO) 7.5-325 MG per tablet 120 tablet 0     Sig: Take 1 tablet by mouth every 6 hours as needed for Pain for up to 30 days. Last Filled:  5/31/2022, 120, 0    Patient Phone Number: [phone] (home)     Last appt: 5/3/2022   Next appt: 8/4/2022    Last OARRS:   RX Monitoring 5/11/2022   Attestation -   Periodic Controlled Substance Monitoring No signs of potential drug abuse or diversion identified.

## 2022-07-11 DIAGNOSIS — G44.89 OTHER HEADACHE SYNDROME: ICD-10-CM

## 2022-07-11 RX ORDER — BUTALBITAL, ACETAMINOPHEN AND CAFFEINE 50; 325; 40 MG/1; MG/1; MG/1
TABLET ORAL
Qty: 40 TABLET | Refills: 1 | Status: SHIPPED | OUTPATIENT
Start: 2022-07-11 | End: 2022-08-04 | Stop reason: SDUPTHER

## 2022-07-11 NOTE — TELEPHONE ENCOUNTER
lov 5/3/22  lrf 40 1 5/12/22   Medication:   Requested Prescriptions     Pending Prescriptions Disp Refills    butalbital-acetaminophen-caffeine (FIORICET, ESGIC) -40 MG per tablet [Pharmacy Med Name: BUTALBITAL/ACETAMINOPHEN/CAFF TABS] 40 tablet 1     Sig: TAKE 1 TABLET BY MOUTH EVERY 6 HOURS AS NEEDED FOR HEADACHE        Last Filled:      Patient Phone Number: [phone] (home)     Last appt: 5/3/2022   Next appt: 8/4/2022    Last OARRS:   RX Monitoring 5/11/2022   Attestation -   Periodic Controlled Substance Monitoring No signs of potential drug abuse or diversion identified.

## 2022-07-25 DIAGNOSIS — F33.0 MILD EPISODE OF RECURRENT MAJOR DEPRESSIVE DISORDER (HCC): ICD-10-CM

## 2022-07-26 RX ORDER — GABAPENTIN 300 MG/1
CAPSULE ORAL
Qty: 90 CAPSULE | Refills: 5 | Status: SHIPPED | OUTPATIENT
Start: 2022-07-26 | End: 2022-08-25

## 2022-07-26 RX ORDER — BUPROPION HYDROCHLORIDE 150 MG/1
TABLET ORAL
Qty: 60 TABLET | Refills: 5 | Status: SHIPPED | OUTPATIENT
Start: 2022-07-26

## 2022-07-26 NOTE — TELEPHONE ENCOUNTER
Medication:   Requested Prescriptions     Pending Prescriptions Disp Refills    buPROPion (WELLBUTRIN XL) 150 MG extended release tablet [Pharmacy Med Name: BUPROPION XL 150MG TABLETS (24 H)] 60 tablet 5     Sig: TAKE 1 TABLET BY MOUTH TWICE DAILY    gabapentin (NEURONTIN) 300 MG capsule [Pharmacy Med Name: GABAPENTIN 300MG CAPSULES] 90 capsule 5     Sig: TAKE 1 CAPSULE BY MOUTH THREE TIMES DAILY        Last Filled:  01/25/2022    Patient Phone Number: [phone] (home)     Last appt: 5/3/2022   Next appt: 8/4/2022    Last OARRS:   RX Monitoring 5/11/2022   Attestation -   Periodic Controlled Substance Monitoring No signs of potential drug abuse or diversion identified.

## 2022-07-27 DIAGNOSIS — G89.4 CHRONIC PAIN SYNDROME: ICD-10-CM

## 2022-07-27 RX ORDER — HYDROCODONE BITARTRATE AND ACETAMINOPHEN 7.5; 325 MG/1; MG/1
1 TABLET ORAL EVERY 6 HOURS PRN
Qty: 120 TABLET | Refills: 0 | Status: SHIPPED | OUTPATIENT
Start: 2022-07-27 | End: 2022-08-26 | Stop reason: SDUPTHER

## 2022-07-27 NOTE — TELEPHONE ENCOUNTER
Medication and Quantity requested: HYDROcodone-acetaminophen ([address]) 7.5-325 MG per tablet       Last Visit  5/3/2022    Pharmacy and phone number updated in [facility]:  yes      Ingrid [id number] [facility]

## 2022-07-27 NOTE — TELEPHONE ENCOUNTER
Medication:   Requested Prescriptions      No prescriptions requested or ordered in this encounter        Last Filled:  06/29/2022    Patient Phone Number: [phone] (home)     Last appt: 5/3/2022   Next appt: 8/4/2022    Last OARRS:   RX Monitoring 5/11/2022   Attestation -   Periodic Controlled Substance Monitoring No signs of potential drug abuse or diversion identified.

## 2022-08-04 ENCOUNTER — OFFICE VISIT (OUTPATIENT)
Dept: FAMILY MEDICINE CLINIC | Age: 67
End: 2022-08-04
Payer: MEDICARE

## 2022-08-04 VITALS
HEART RATE: 93 BPM | DIASTOLIC BLOOD PRESSURE: 76 MMHG | HEIGHT: 68 IN | SYSTOLIC BLOOD PRESSURE: 122 MMHG | BODY MASS INDEX: 16.82 KG/M2 | WEIGHT: 111 LBS | OXYGEN SATURATION: 96 %

## 2022-08-04 DIAGNOSIS — J40 BRONCHITIS: ICD-10-CM

## 2022-08-04 DIAGNOSIS — J30.89 ALLERGIC RHINITIS DUE TO OTHER ALLERGIC TRIGGER, UNSPECIFIED SEASONALITY: ICD-10-CM

## 2022-08-04 DIAGNOSIS — G44.89 OTHER HEADACHE SYNDROME: ICD-10-CM

## 2022-08-04 DIAGNOSIS — G89.4 CHRONIC PAIN SYNDROME: Primary | ICD-10-CM

## 2022-08-04 PROCEDURE — G8419 CALC BMI OUT NRM PARAM NOF/U: HCPCS | Performed by: FAMILY MEDICINE

## 2022-08-04 PROCEDURE — G8400 PT W/DXA NO RESULTS DOC: HCPCS | Performed by: FAMILY MEDICINE

## 2022-08-04 PROCEDURE — 99214 OFFICE O/P EST MOD 30 MIN: CPT | Performed by: FAMILY MEDICINE

## 2022-08-04 PROCEDURE — 1090F PRES/ABSN URINE INCON ASSESS: CPT | Performed by: FAMILY MEDICINE

## 2022-08-04 PROCEDURE — 1123F ACP DISCUSS/DSCN MKR DOCD: CPT | Performed by: FAMILY MEDICINE

## 2022-08-04 PROCEDURE — 3017F COLORECTAL CA SCREEN DOC REV: CPT | Performed by: FAMILY MEDICINE

## 2022-08-04 PROCEDURE — G8427 DOCREV CUR MEDS BY ELIG CLIN: HCPCS | Performed by: FAMILY MEDICINE

## 2022-08-04 PROCEDURE — 1036F TOBACCO NON-USER: CPT | Performed by: FAMILY MEDICINE

## 2022-08-04 RX ORDER — DOXYCYCLINE HYCLATE 100 MG
100 TABLET ORAL 2 TIMES DAILY
Qty: 20 TABLET | Refills: 0 | Status: SHIPPED | OUTPATIENT
Start: 2022-08-04 | End: 2022-08-14

## 2022-08-04 RX ORDER — BUTALBITAL, ACETAMINOPHEN AND CAFFEINE 50; 325; 40 MG/1; MG/1; MG/1
TABLET ORAL
Qty: 40 TABLET | Refills: 1 | Status: SHIPPED | OUTPATIENT
Start: 2022-08-04 | End: 2022-08-26

## 2022-08-04 SDOH — ECONOMIC STABILITY: FOOD INSECURITY: WITHIN THE PAST 12 MONTHS, THE FOOD YOU BOUGHT JUST DIDN'T LAST AND YOU DIDN'T HAVE MONEY TO GET MORE.: NEVER TRUE

## 2022-08-04 SDOH — ECONOMIC STABILITY: FOOD INSECURITY: WITHIN THE PAST 12 MONTHS, YOU WORRIED THAT YOUR FOOD WOULD RUN OUT BEFORE YOU GOT MONEY TO BUY MORE.: NEVER TRUE

## 2022-08-04 ASSESSMENT — ENCOUNTER SYMPTOMS
WHEEZING: 1
SHORTNESS OF BREATH: 0
RHINORRHEA: 1
BACK PAIN: 1
COUGH: 1
SORE THROAT: 0
SINUS PRESSURE: 0
GASTROINTESTINAL NEGATIVE: 1

## 2022-08-04 ASSESSMENT — SOCIAL DETERMINANTS OF HEALTH (SDOH): HOW HARD IS IT FOR YOU TO PAY FOR THE VERY BASICS LIKE FOOD, HOUSING, MEDICAL CARE, AND HEATING?: NOT HARD AT ALL

## 2022-08-04 NOTE — PROGRESS NOTES
cough and wheezing. Negative for shortness of breath. Cardiovascular: Negative. Gastrointestinal: Negative. Endocrine: Negative. Genitourinary: Negative. Musculoskeletal:  Positive for arthralgias, back pain and myalgias. Allergic/Immunologic: Positive for environmental allergies. Psychiatric/Behavioral: Negative. Objective   Physical Exam  Constitutional:       General: She is not in acute distress. Cardiovascular:      Rate and Rhythm: Normal rate and regular rhythm. Pulmonary:      Effort: Pulmonary effort is normal.      Breath sounds: Wheezing present. No rales. Skin:     General: Skin is warm and dry. Neurological:      Mental Status: She is alert and oriented to person, place, and time. Psychiatric:         Behavior: Behavior normal.         Thought Content: Thought content normal.         Judgment: Judgment normal.                An electronic signature was used to authenticate this note.     --Nguyen Raza MD

## 2022-08-15 DIAGNOSIS — K21.9 GASTROESOPHAGEAL REFLUX DISEASE WITHOUT ESOPHAGITIS: ICD-10-CM

## 2022-08-15 RX ORDER — DEXLANSOPRAZOLE 60 MG/1
CAPSULE, DELAYED RELEASE ORAL
Qty: 90 CAPSULE | Refills: 3 | Status: SHIPPED | OUTPATIENT
Start: 2022-08-15

## 2022-08-15 NOTE — TELEPHONE ENCOUNTER
Medication:   Requested Prescriptions     Pending Prescriptions Disp Refills    DEXILANT 60 MG CPDR delayed release capsule [Pharmacy Med Name: DEXILANT 60MG CAP(FORMERLY KAPIDEX)] 90 capsule 3     Sig: TAKE 1 CAPSULE BY MOUTH DAILY        Last Filled:  90 x 3 RF 8/5/21    Patient Phone Number: [phone] (home)     Last appt: 8/4/2022   Next appt: 11/7/2022    Last OARRS:   RX Monitoring 8/4/2022   Attestation -   Periodic Controlled Substance Monitoring No signs of potential drug abuse or diversion identified.

## 2022-08-19 ENCOUNTER — TELEPHONE (OUTPATIENT)
Dept: FAMILY MEDICINE CLINIC | Age: 67
End: 2022-08-19

## 2022-08-19 NOTE — TELEPHONE ENCOUNTER
If COVID is negative, and she is not coughing and has no fever this sounds just like a summer cold and basically symptomatic treatment is all that is indicated. If symptoms persist she may want to recheck for COVID as I have seen people test negative for a few times and then later test positive.

## 2022-08-19 NOTE — TELEPHONE ENCOUNTER
Patient   Yellow   Runny nose a lot of drainage , throat, cold sore ,  no fever coughing  spells     Took Covid test on Wednesday and was Neg     Please call and advise     Pharm [email or web address]

## 2022-08-25 DIAGNOSIS — G44.89 OTHER HEADACHE SYNDROME: ICD-10-CM

## 2022-08-25 DIAGNOSIS — G89.4 CHRONIC PAIN SYNDROME: ICD-10-CM

## 2022-08-25 NOTE — TELEPHONE ENCOUNTER
Medication and Quantity requested:   HYDROcodone-acetaminophen (NORCO) 7.5-325 MG per tablet   QTY: 120 Tablet      Last Visit:08/04/2022         Pharmacy and phone number updated in EPIC:  Toni Collier

## 2022-08-26 RX ORDER — HYDROCODONE BITARTRATE AND ACETAMINOPHEN 7.5; 325 MG/1; MG/1
1 TABLET ORAL EVERY 6 HOURS PRN
Qty: 120 TABLET | Refills: 0 | Status: SHIPPED | OUTPATIENT
Start: 2022-08-26 | End: 2022-09-27 | Stop reason: SDUPTHER

## 2022-08-26 RX ORDER — BUTALBITAL, ACETAMINOPHEN AND CAFFEINE 50; 325; 40 MG/1; MG/1; MG/1
TABLET ORAL
Qty: 40 TABLET | Refills: 1 | Status: SHIPPED | OUTPATIENT
Start: 2022-08-26 | End: 2022-09-27 | Stop reason: SDUPTHER

## 2022-08-30 ENCOUNTER — OFFICE VISIT (OUTPATIENT)
Dept: SURGERY | Age: 67
End: 2022-08-30
Payer: MEDICARE

## 2022-08-30 VITALS — BODY MASS INDEX: 16.85 KG/M2 | SYSTOLIC BLOOD PRESSURE: 115 MMHG | DIASTOLIC BLOOD PRESSURE: 62 MMHG | WEIGHT: 110.8 LBS

## 2022-08-30 DIAGNOSIS — R10.32 LLQ PAIN: Primary | ICD-10-CM

## 2022-08-30 PROCEDURE — G8427 DOCREV CUR MEDS BY ELIG CLIN: HCPCS | Performed by: SURGERY

## 2022-08-30 PROCEDURE — 99203 OFFICE O/P NEW LOW 30 MIN: CPT | Performed by: SURGERY

## 2022-08-30 PROCEDURE — 1090F PRES/ABSN URINE INCON ASSESS: CPT | Performed by: SURGERY

## 2022-08-30 PROCEDURE — G8419 CALC BMI OUT NRM PARAM NOF/U: HCPCS | Performed by: SURGERY

## 2022-08-30 ASSESSMENT — ENCOUNTER SYMPTOMS
ABDOMINAL PAIN: 1
RESPIRATORY NEGATIVE: 1
EYES NEGATIVE: 1

## 2022-08-30 NOTE — PROGRESS NOTES
[facility]        PATIENT NAME: Solomon Mcgarry     TODAY'S DATE: 8/30/2022    Reason for Consult:  LLQ pain    Requesting Physician / PCP:  Dr. Nimo Brownlee:              The patient is a 75 y.o. female who presents with abdominal pain. She has had symptoms for the past few weeks, LLQ region, focal, sharp. More with pressure and activity. Associated symptoms are swelling and feeling a bulge in the region. The patient has had prior hernia surgery. She has had previous abdominal surgery with a left perimedial incision for anterior spinal surgery approach.       Past Medical History:        Diagnosis Date    Arthritis     fingers, hands    Asthma     Back pain     Bell's palsy     CHF (congestive heart failure) (HCC)     Chronic pain     BACK SURGERY X4, neck, left arm    Colitis 2/14/2013    Depression     Elevated sed rate     Family history of breast cancer in first degree relative     Gastric ulcer     GERD (gastroesophageal reflux disease)     colitis    Glaucoma     Hypertension     Movement disorder     chronic back pain    Neuromuscular disorder (HCC)     sciatica rt. leg    Osteoarthritis     PONV (postoperative nausea and vomiting)     SEVERE       Past Surgical History:        Procedure Laterality Date    BACK SURGERY      x 4    COLONOSCOPY  10/16/12    polyp removed and biopsy sent    COLONOSCOPY  3/26/13    CYSTOSCOPY  1/13/16    urethral dilitation    FACIAL NERVE SURGERY      D/T Bell's Palsy    FINGER SURGERY Left 04/03/2018    Excision of Left Thumb Digital Mucous cyst & DIP Joint Arthrotomy & Debridement    FINGER SURGERY  04/2018    left thumb    FINGER SURGERY Left 04/03/2018    thumb surgery     HYSTERECTOMY (CERVIX STATUS UNKNOWN)      Partial    UPPER GASTROINTESTINAL ENDOSCOPY  10/15/12    UPPER GASTROINTESTINAL ENDOSCOPY  2/11/15    with EUS    VENTRAL HERNIA REPAIR N/A 4/15/2019    LAPAROSCOPIC REPAIR OF INCISIONAL HERNIA WITH MESH performed by Truman Wu MD at [facility]       Current Medications:   No current facility-administered medications for this visit. Prior to Admission medications    Medication Sig Start Date End Date Taking? Authorizing Provider   butalbital-acetaminophen-caffeine (FIORICET, ESGIC) -40 MG per tablet TAKE 1 TABLET BY MOUTH EVERY 6 HOURS AS NEEDED FOR HEADACHE 8/26/22  Yes South Starks MD   HYDROcodone-acetaminophen (NORCO) 7.5-325 MG per tablet Take 1 tablet by mouth every 6 hours as needed for Pain for up to 30 days.  8/26/22 9/25/22 Yes South Starks MD   DEXILANT 60 MG CPDR delayed release capsule TAKE 1 CAPSULE BY MOUTH DAILY 8/15/22  Yes South Starks MD   buPROPion (WELLBUTRIN XL) 150 MG extended release tablet TAKE 1 TABLET BY MOUTH TWICE DAILY 7/26/22  Yes South Starks MD   albuterol (PROVENTIL) (2.5 MG/3ML) 0.083% nebulizer solution USE 1 VIAL VIA NEBULIZER EVERY 6 HOURS 6/20/22  Yes South Starks MD   ARIPiprazole (ABILIFY) 5 MG tablet TAKE 1 TABLET BY MOUTH DAILY 5/12/22  Yes South Starks MD   albuterol sulfate  (90 Base) MCG/ACT inhaler INHALE 2 PUFFS BY MOUTH EVERY 4 HOURS AS NEEDED FOR WHEEZING 3/15/22  Yes South Starks MD   sertraline (ZOLOFT) 100 MG tablet TAKE 1 TABLET BY MOUTH THREE TIMES DAILY 2/3/22  Yes South Starks MD   tiZANidine (ZANAFLEX) 4 MG tablet TAKE 1 TABLET BY MOUTH THREE TIMES DAILY 9/29/21  Yes South Starks MD   diclofenac sodium (VOLTAREN) 1 % GEL APPLY 2 GRAMS TOPICALLY TO HAND FOUR TIMES DAILY 9/22/21  Yes South Starks MD   traZODone (DESYREL) 50 MG tablet TAKE 2 TABLETS BY MOUTH EVERY NIGHT 9/15/21  Yes South Starks MD   nitroGLYCERIN (NITROSTAT) 0.4 MG SL tablet PLACE 1 TABLET UNDER THE TONGUE EVERY 5 MINUTES AS NEEDED FOR CHEST PAIN 8/26/21  Yes Otoniel Ricks, APRN - CNP   fluticasone (FLOVENT HFA) 44 MCG/ACT inhaler Inhale 1 puff into the lungs 2 times daily 6/24/21  Yes Jeremías Montiel MD   Probiotic Product (PROBIOTIC PO) Take by mouth daily    Yes Historical Provider, MD   ipratropium (ATROVENT) 0.06 % nasal spray 2 sprays by Each Nostril route 3 times daily as needed for Rhinitis (runny nose) 4/23/21  Yes Yudi Celeste,    Elastic Bandages & Supports (ELBOW BRACE) MISC Use as needed 4/12/21  Yes Allie Vo MD   ondansetron (ZOFRAN) 4 MG tablet Take 1 tablet by mouth every 6 hours as needed for Nausea 11/19/20  Yes Whitley Dueñas MD   montelukast (SINGULAIR) 10 MG tablet TAKE 1 TABLET BY MOUTH EVERY NIGHT 11/12/20  Yes Shari Beauchamp MD   fluticasone (FLOVENT HFA) 110 MCG/ACT inhaler INHALE 1 PUFF INTO THE LUNGS TWICE DAILY 7/10/20  Yes Whitley Dueñas MD   Elastic Bandages & Supports ([address] [place] [address] 20-30MMHG) MISC 1 each by Does not apply route daily 11/15/19  Yes JOAO Veliz - CNP   hydrocortisone ([address] [place] [address]) 0.2 % cream APPLY EXTERNALLY TO THE AFFECTED AREA TWICE DAILY 6/6/19  Yes Whitley Dueñas MD   Nutritional Supplements (BOOST CALORIE SMART) LIQD Take 1 Can by mouth See Admin Instructions EVERY day   Yes Historical Provider, MD   gabapentin (NEURONTIN) 300 MG capsule TAKE 1 CAPSULE BY MOUTH THREE TIMES DAILY 7/26/22 8/25/22  Whitley Dueñas MD   butalbital-acetaminophen-caffeine (FIORICET, ESGIC) -40 MG per tablet TAKE 1 TABLET BY MOUTH EVERY 4 HOURS AS NEEDED FOR HEADACHES MAY TAKE 2 TABLETS EVERY 4 HOURS AS NEEDED FOR PAIN 10/26/20   Shari Beauchamp MD        Allergies:  Beta adrenergic blockers, Pheniramine, Antihistamine [diphenhydramine hcl], Aspirin, Codeine, Dilaudid [hydromorphone], Nsaids, Prednisone, and Zithromax [azithromycin]    Social History:    reports that she quit smoking about 6 years ago. Her smoking use included cigarettes. She has a 5.00 pack-year smoking history. She has never used smokeless tobacco. She reports that she does not drink alcohol and does not use drugs.     Family History:        Problem Relation Age of Onset    Heart Disease Mother     High Blood Pressure Mother     Arthritis Mother     Breast Cancer Sister     High Blood Pressure Brother     Diabetes Neg Hx     Stroke Neg Hx        REVIEW OF SYSTEMS:  Review of Systems   Constitutional: Negative. HENT: Negative. Eyes: Negative. Respiratory: Negative. Cardiovascular: Negative. Gastrointestinal:  Positive for abdominal pain. Endocrine: Negative. Genitourinary: Negative. Musculoskeletal: Negative. Skin: Negative. Allergic/Immunologic: Positive for environmental allergies. Neurological: Negative. Hematological: Negative. Psychiatric/Behavioral: Negative. PHYSICAL EXAM:  VITALS:  Wt 110 lb 12.8 oz (50.3 kg)   BMI 16.85 kg/m²   CONSTITUTIONAL:  alert, no apparent distress and thin  EYES:  sclera clear  ENT:  normocepalic, without obvious abnormality  NECK:  supple, symmetrical, trachea midline  LUNGS:  clear to auscultation  CARDIOVASCULAR:  regular rate and rhythm  ABDOMEN:  scars noted are healed, 2 lap ports on the right, scar left perimedian, normal bowel sounds, soft, non-distended, tenderness noted in the left lower quadrant, lateral to scar area voluntary guarding absent, no masses palpated, but tender area is a little bulging and swollen  MUSCULOSKELETAL:  0+ edema lower extremities  NEUROLOGIC:  Mental Status Exam:  Level of Alertness:   awake  Orientation:   person, place, time  SKIN:  no bruising or bleeding and normal skin color, texture, turgor    DATA:        Radiology Review:   None    IMPRESSION/RECOMMENDATIONS:    LLQ abd pain, bulging, prior incision with some contracture in the area. May have a lateral area incisional hernia from prior back surgery incision. Will need to check CT to see. Will review and D/W pt thereafter. The plan has been reviewed in detail today. Risks and benefits have been reviewed, and all questions answered.        Lea Staples MD

## 2022-08-31 DIAGNOSIS — F51.01 PRIMARY INSOMNIA: ICD-10-CM

## 2022-08-31 RX ORDER — TRAZODONE HYDROCHLORIDE 50 MG/1
TABLET ORAL
Qty: 60 TABLET | Refills: 11 | Status: SHIPPED | OUTPATIENT
Start: 2022-08-31

## 2022-08-31 NOTE — TELEPHONE ENCOUNTER
Medication:   Requested Prescriptions     Pending Prescriptions Disp Refills    traZODone (DESYREL) 50 MG tablet [Pharmacy Med Name: TRAZODONE 50MG TABLETS] 60 tablet 11     Sig: TAKE 2 TABLETS BY MOUTH EVERY NIGHT        Last Filled:  9/15/2021, 60, 11    Patient Phone Number: [phone] (home)     Last appt: 8/4/2022   Next appt: 11/7/2022    Last OARRS:   RX Monitoring 8/4/2022   Attestation -   Periodic Controlled Substance Monitoring No signs of potential drug abuse or diversion identified.

## 2022-09-13 ENCOUNTER — HOSPITAL ENCOUNTER (OUTPATIENT)
Dept: CT IMAGING | Age: 67
Discharge: HOME | End: 2022-09-13
Payer: MEDICARE

## 2022-09-13 DIAGNOSIS — R10.32 LLQ PAIN: ICD-10-CM

## 2022-09-13 PROCEDURE — 74177 CT ABD & PELVIS W/CONTRAST: CPT

## 2022-09-13 PROCEDURE — 6360000004 HC RX CONTRAST MEDICATION: Performed by: SURGERY

## 2022-09-13 RX ADMIN — DIATRIZOATE MEGLUMINE AND DIATRIZOATE SODIUM 20 ML: 660; 100 LIQUID ORAL; RECTAL at 07:07

## 2022-09-13 RX ADMIN — IOPAMIDOL 75 ML: 755 INJECTION, SOLUTION INTRAVENOUS at 07:08

## 2022-09-14 DIAGNOSIS — G44.89 OTHER HEADACHE SYNDROME: ICD-10-CM

## 2022-09-14 RX ORDER — BUTALBITAL, ACETAMINOPHEN AND CAFFEINE 50; 325; 40 MG/1; MG/1; MG/1
TABLET ORAL
Qty: 40 TABLET | Refills: 1 | OUTPATIENT
Start: 2022-09-14

## 2022-09-16 ENCOUNTER — TELEPHONE (OUTPATIENT)
Dept: SURGERY | Age: 67
End: 2022-09-16

## 2022-09-16 NOTE — TELEPHONE ENCOUNTER
Majel Bacca, MD [facility]    Please call pt. I've reviewed CT. No hernia present to repair. Muscle and fascia is thin in old scar area, but no hernia present. She is very constipated, and some OTC laxatives will help. Also may have cystitis or a UTI and should check with PCP to see if a U/A and / or antibiotics would be helpful. Can see us prn.  Thanks

## 2022-09-16 NOTE — RESULT ENCOUNTER NOTE
Please call pt. I've reviewed CT. No hernia present to repair. Muscle and fascia is thin in old scar area, but no hernia present. She is very constipated, and some OTC laxatives will help. Also may have cystitis or a UTI and should check with PCP to see if a U/A and / or antibiotics would be helpful. Can see us prn.  Thanks

## 2022-09-22 DIAGNOSIS — G89.4 CHRONIC PAIN SYNDROME: ICD-10-CM

## 2022-09-22 DIAGNOSIS — M15.9 PRIMARY OSTEOARTHRITIS INVOLVING MULTIPLE JOINTS: ICD-10-CM

## 2022-09-22 RX ORDER — TIZANIDINE 4 MG/1
TABLET ORAL
Qty: 90 TABLET | Refills: 11 | Status: SHIPPED | OUTPATIENT
Start: 2022-09-22

## 2022-09-22 NOTE — TELEPHONE ENCOUNTER
Medication:   Requested Prescriptions     Pending Prescriptions Disp Refills    tiZANidine (ZANAFLEX) 4 MG tablet [Pharmacy Med Name: TIZANIDINE 4MG TABLETS] 90 tablet 11     Sig: TAKE 1 TABLET BY MOUTH THREE TIMES DAILY    diclofenac sodium (VOLTAREN) 1 % GEL [Pharmacy Med Name: DICLOFENAC 1% GEL 100GM] 500 g 5     Sig: APPLY 2 GRAMS TOPICALLY TO HAND FOUR TIMES DAILY        Last Filled:  9/29/2021    Patient Phone Number: [phone] (home)     Last appt: 8/4/2022   Next appt: 11/7/2022    Last OARRS:   RX Monitoring 8/4/2022   Attestation -   Periodic Controlled Substance Monitoring No signs of potential drug abuse or diversion identified.

## 2022-09-26 DIAGNOSIS — J45.20 MILD INTERMITTENT ASTHMA WITHOUT COMPLICATION: ICD-10-CM

## 2022-09-26 RX ORDER — ALBUTEROL SULFATE 90 UG/1
AEROSOL, METERED RESPIRATORY (INHALATION)
Qty: 8.5 G | Refills: 11 | Status: SHIPPED | OUTPATIENT
Start: 2022-09-26

## 2022-09-26 NOTE — TELEPHONE ENCOUNTER
Medication:   Requested Prescriptions     Pending Prescriptions Disp Refills    albuterol sulfate HFA (PROVENTIL;VENTOLIN;PROAIR) 108 (90 Base) MCG/ACT inhaler [Pharmacy Med Name: ALBUTEROL HFA INH (200 PUFFS)8.5GM] 8.5 g 11     Sig: INHALE 2 PUFFS BY MOUTH EVERY 4 HOURS AS NEEDED FOR WHEEZING        Last Filled:  240 ml x 3 RF 6/2/22    Patient Phone Number: [phone] (home)     Last appt: 8/4/2022   Next appt: 11/7/2022    Last OARRS:   RX Monitoring 8/4/2022   Attestation -   Periodic Controlled Substance Monitoring No signs of potential drug abuse or diversion identified.

## 2022-09-27 DIAGNOSIS — G44.89 OTHER HEADACHE SYNDROME: ICD-10-CM

## 2022-09-27 DIAGNOSIS — G89.4 CHRONIC PAIN SYNDROME: ICD-10-CM

## 2022-09-27 RX ORDER — BUTALBITAL, ACETAMINOPHEN AND CAFFEINE 50; 325; 40 MG/1; MG/1; MG/1
TABLET ORAL
Qty: 40 TABLET | Refills: 1 | Status: SHIPPED | OUTPATIENT
Start: 2022-09-27 | End: 2022-10-25 | Stop reason: SDUPTHER

## 2022-09-27 RX ORDER — HYDROCODONE BITARTRATE AND ACETAMINOPHEN 7.5; 325 MG/1; MG/1
1 TABLET ORAL EVERY 6 HOURS PRN
Qty: 120 TABLET | Refills: 0 | Status: SHIPPED | OUTPATIENT
Start: 2022-09-27 | End: 2022-10-25 | Stop reason: SDUPTHER

## 2022-09-27 NOTE — TELEPHONE ENCOUNTER
Medication:   Requested Prescriptions     Pending Prescriptions Disp Refills    HYDROcodone-acetaminophen (NORCO) 7.5-325 MG per tablet 120 tablet 0     Sig: Take 1 tablet by mouth every 6 hours as needed for Pain for up to 30 days. butalbital-acetaminophen-caffeine (FIORICET, ESGIC) -40 MG per tablet 40 tablet 1        Last Filled:  40 x 1 RF 8/26/22  60 x 11 RF 8/26/22    Patient Phone Number: [phone] (home)     Last appt: 8/4/2022   Next appt: 11/7/2022    Last OARRS:   RX Monitoring 8/4/2022   Attestation -   Periodic Controlled Substance Monitoring No signs of potential drug abuse or diversion identified.

## 2022-09-27 NOTE — TELEPHONE ENCOUNTER
MedicationS and Quantity requested:   HYDROcodone-acetaminophen (NORCO) 7.5-325 MG per tablet   QTY: 120 Tablet     butalbital-acetaminophen-caffeine (FIORICET, ESGIC) -40 MG per tablet  QTY: 40 tablets     Last Visit:08/04/2022            Pharmacy and phone number updated in EPIC:  [email or web address]

## 2022-10-25 DIAGNOSIS — G44.89 OTHER HEADACHE SYNDROME: ICD-10-CM

## 2022-10-25 DIAGNOSIS — G89.4 CHRONIC PAIN SYNDROME: ICD-10-CM

## 2022-10-25 RX ORDER — BUTALBITAL, ACETAMINOPHEN AND CAFFEINE 50; 325; 40 MG/1; MG/1; MG/1
TABLET ORAL
Qty: 40 TABLET | Refills: 1 | Status: SHIPPED | OUTPATIENT
Start: 2022-10-25 | End: 2022-11-22 | Stop reason: SDUPTHER

## 2022-10-25 RX ORDER — HYDROCODONE BITARTRATE AND ACETAMINOPHEN 7.5; 325 MG/1; MG/1
1 TABLET ORAL EVERY 6 HOURS PRN
Qty: 120 TABLET | Refills: 0 | Status: SHIPPED | OUTPATIENT
Start: 2022-10-25 | End: 2022-11-22 | Stop reason: SDUPTHER

## 2022-10-25 NOTE — TELEPHONE ENCOUNTER
Medication and Quantity requested:     butalbital-acetaminophen-caffeine (FIORICET, ESGIC) -40 MG per tablet   QTY: 40 tablet      HYDROcodone-acetaminophen (NORCO) 7.5-325 MG per tablet   QTY: 120 tablets      Last Visit: 08/04/2022        Pharmacy and phone number updated in EPIC:  [facility]

## 2022-10-25 NOTE — TELEPHONE ENCOUNTER
Medication:   Requested Prescriptions     Pending Prescriptions Disp Refills    HYDROcodone-acetaminophen (NORCO) 7.5-325 MG per tablet 120 tablet 0     Sig: Take 1 tablet by mouth every 6 hours as needed for Pain for up to 30 days. butalbital-acetaminophen-caffeine (FIORICET, ESGIC) -40 MG per tablet 40 tablet 1     Si tab as needed every 6 hours        Last Filled:  120 x 0 RF 22  40 x 1 rF 22    Patient Phone Number: [phone] (home)     Last appt: 2022   Next appt: 2022    Last OARRS:   RX Monitoring 2022   Attestation -   Periodic Controlled Substance Monitoring No signs of potential drug abuse or diversion identified.

## 2022-11-20 ENCOUNTER — HOSPITAL ENCOUNTER (EMERGENCY)
Age: 67
Discharge: HOME | End: 2022-11-20
Payer: MEDICARE

## 2022-11-20 ENCOUNTER — APPOINTMENT (OUTPATIENT)
Dept: CT IMAGING | Age: 67
End: 2022-11-20
Payer: MEDICARE

## 2022-11-20 VITALS
TEMPERATURE: 98.7 F | HEART RATE: 104 BPM | SYSTOLIC BLOOD PRESSURE: 158 MMHG | RESPIRATION RATE: 18 BRPM | OXYGEN SATURATION: 98 % | BODY MASS INDEX: 17.64 KG/M2 | DIASTOLIC BLOOD PRESSURE: 81 MMHG | WEIGHT: 116 LBS

## 2022-11-20 DIAGNOSIS — R10.9 ACUTE RIGHT FLANK PAIN: ICD-10-CM

## 2022-11-20 DIAGNOSIS — R11.2 NAUSEA AND VOMITING, UNSPECIFIED VOMITING TYPE: ICD-10-CM

## 2022-11-20 DIAGNOSIS — N39.0 URINARY TRACT INFECTION WITHOUT HEMATURIA, SITE UNSPECIFIED: Primary | ICD-10-CM

## 2022-11-20 DIAGNOSIS — N20.1 URETERIC STONE: ICD-10-CM

## 2022-11-20 LAB
A/G RATIO: 2 (ref 1.1–2.2)
ALBUMIN SERPL-MCNC: 4.6 G/DL (ref 3.4–5)
ALP BLD-CCNC: 109 U/L (ref 40–129)
ALT SERPL-CCNC: 11 U/L (ref 10–40)
ANION GAP SERPL CALCULATED.3IONS-SCNC: 13 MMOL/L (ref 3–16)
AST SERPL-CCNC: 19 U/L (ref 15–37)
ATYPICAL LYMPHOCYTE RELATIVE PERCENT: 1 % (ref 0–6)
BACTERIA: ABNORMAL /HPF
BANDED NEUTROPHILS RELATIVE PERCENT: 1 % (ref 0–7)
BASOPHILS ABSOLUTE: 0 K/UL (ref 0–0.2)
BASOPHILS RELATIVE PERCENT: 0 %
BILIRUB SERPL-MCNC: 0.3 MG/DL (ref 0–1)
BILIRUBIN URINE: NEGATIVE
BLOOD, URINE: ABNORMAL
BUN BLDV-MCNC: 21 MG/DL (ref 7–20)
CALCIUM SERPL-MCNC: 9.3 MG/DL (ref 8.3–10.6)
CHLORIDE BLD-SCNC: 100 MMOL/L (ref 99–110)
CLARITY: CLEAR
CO2: 25 MMOL/L (ref 21–32)
COLOR: YELLOW
CREAT SERPL-MCNC: 1 MG/DL (ref 0.6–1.2)
EOSINOPHILS ABSOLUTE: 0.3 K/UL (ref 0–0.6)
EOSINOPHILS RELATIVE PERCENT: 2 %
EPITHELIAL CELLS, UA: 2 /HPF (ref 0–5)
GFR SERPL CREATININE-BSD FRML MDRD: >60 ML/MIN/{1.73_M2}
GLUCOSE BLD-MCNC: 118 MG/DL (ref 70–99)
GLUCOSE URINE: NEGATIVE MG/DL
HCT VFR BLD CALC: 36.8 % (ref 36–48)
HEMOGLOBIN: 12.3 G/DL (ref 12–16)
HYALINE CASTS: 2 /LPF (ref 0–8)
KETONES, URINE: 15 MG/DL
LACTIC ACID: 1.1 MMOL/L (ref 0.4–2)
LEUKOCYTE ESTERASE, URINE: ABNORMAL
LYMPHOCYTES ABSOLUTE: 0.3 K/UL (ref 1–5.1)
LYMPHOCYTES RELATIVE PERCENT: 1 %
MCH RBC QN AUTO: 30.7 PG (ref 26–34)
MCHC RBC AUTO-ENTMCNC: 33.5 G/DL (ref 31–36)
MCV RBC AUTO: 91.6 FL (ref 80–100)
MICROSCOPIC EXAMINATION: YES
MONOCYTES ABSOLUTE: 0.4 K/UL (ref 0–1.3)
MONOCYTES RELATIVE PERCENT: 3 %
NEUTROPHILS ABSOLUTE: 12.4 K/UL (ref 1.7–7.7)
NEUTROPHILS RELATIVE PERCENT: 92 %
NITRITE, URINE: POSITIVE
PDW BLD-RTO: 13.4 % (ref 12.4–15.4)
PH UA: 6.5 (ref 5–8)
PLATELET # BLD: 218 K/UL (ref 135–450)
PLATELET SLIDE REVIEW: ADEQUATE
PMV BLD AUTO: 8.6 FL (ref 5–10.5)
POTASSIUM REFLEX MAGNESIUM: 4 MMOL/L (ref 3.5–5.1)
PROTEIN UA: ABNORMAL MG/DL
RBC # BLD: 4.02 M/UL (ref 4–5.2)
RBC # BLD: NORMAL 10*6/UL
RBC UA: 1 /HPF (ref 0–4)
SLIDE REVIEW: ABNORMAL
SODIUM BLD-SCNC: 138 MMOL/L (ref 136–145)
SPECIFIC GRAVITY UA: 1.02 (ref 1–1.03)
TOTAL PROTEIN: 6.9 G/DL (ref 6.4–8.2)
URINE REFLEX TO CULTURE: YES
URINE TYPE: ABNORMAL
UROBILINOGEN, URINE: 0.2 E.U./DL
WBC # BLD: 13.3 K/UL (ref 4–11)
WBC UA: 23 /HPF (ref 0–5)

## 2022-11-20 PROCEDURE — 36415 COLL VENOUS BLD VENIPUNCTURE: CPT

## 2022-11-20 PROCEDURE — 85025 COMPLETE CBC W/AUTO DIFF WBC: CPT

## 2022-11-20 PROCEDURE — 87077 CULTURE AEROBIC IDENTIFY: CPT

## 2022-11-20 PROCEDURE — 96375 TX/PRO/DX INJ NEW DRUG ADDON: CPT

## 2022-11-20 PROCEDURE — 96365 THER/PROPH/DIAG IV INF INIT: CPT

## 2022-11-20 PROCEDURE — 99285 EMERGENCY DEPT VISIT HI MDM: CPT

## 2022-11-20 PROCEDURE — 81001 URINALYSIS AUTO W/SCOPE: CPT

## 2022-11-20 PROCEDURE — 6370000000 HC RX 637 (ALT 250 FOR IP): Performed by: PHYSICIAN ASSISTANT

## 2022-11-20 PROCEDURE — 87186 SC STD MICRODIL/AGAR DIL: CPT

## 2022-11-20 PROCEDURE — 6360000004 HC RX CONTRAST MEDICATION: Performed by: PHYSICIAN ASSISTANT

## 2022-11-20 PROCEDURE — 2580000003 HC RX 258: Performed by: PHYSICIAN ASSISTANT

## 2022-11-20 PROCEDURE — 74177 CT ABD & PELVIS W/CONTRAST: CPT

## 2022-11-20 PROCEDURE — 87086 URINE CULTURE/COLONY COUNT: CPT

## 2022-11-20 PROCEDURE — 6360000002 HC RX W HCPCS: Performed by: PHYSICIAN ASSISTANT

## 2022-11-20 PROCEDURE — 80053 COMPREHEN METABOLIC PANEL: CPT

## 2022-11-20 PROCEDURE — 83605 ASSAY OF LACTIC ACID: CPT

## 2022-11-20 RX ORDER — MORPHINE SULFATE 2 MG/ML
2 INJECTION, SOLUTION INTRAMUSCULAR; INTRAVENOUS
Status: COMPLETED | OUTPATIENT
Start: 2022-11-20 | End: 2022-11-20

## 2022-11-20 RX ORDER — HYDROCODONE BITARTRATE AND ACETAMINOPHEN 7.5; 325 MG/1; MG/1
1 TABLET ORAL
Status: COMPLETED | OUTPATIENT
Start: 2022-11-20 | End: 2022-11-20

## 2022-11-20 RX ORDER — 0.9 % SODIUM CHLORIDE 0.9 %
500 INTRAVENOUS SOLUTION INTRAVENOUS ONCE
Status: COMPLETED | OUTPATIENT
Start: 2022-11-20 | End: 2022-11-20

## 2022-11-20 RX ORDER — ONDANSETRON 2 MG/ML
4 INJECTION INTRAMUSCULAR; INTRAVENOUS EVERY 6 HOURS PRN
Status: DISCONTINUED | OUTPATIENT
Start: 2022-11-20 | End: 2022-11-20 | Stop reason: HOSPADM

## 2022-11-20 RX ORDER — ONDANSETRON 4 MG/1
4 TABLET, ORALLY DISINTEGRATING ORAL EVERY 8 HOURS PRN
Qty: 20 TABLET | Refills: 0 | Status: SHIPPED | OUTPATIENT
Start: 2022-11-20 | End: 2022-11-27

## 2022-11-20 RX ORDER — CEPHALEXIN 500 MG/1
500 CAPSULE ORAL 2 TIMES DAILY
Qty: 14 CAPSULE | Refills: 0 | Status: SHIPPED | OUTPATIENT
Start: 2022-11-20 | End: 2022-11-27

## 2022-11-20 RX ORDER — TAMSULOSIN HYDROCHLORIDE 0.4 MG/1
0.4 CAPSULE ORAL DAILY
Qty: 5 CAPSULE | Refills: 0 | Status: SHIPPED | OUTPATIENT
Start: 2022-11-20 | End: 2022-11-25

## 2022-11-20 RX ORDER — METOCLOPRAMIDE HYDROCHLORIDE 5 MG/ML
10 INJECTION INTRAMUSCULAR; INTRAVENOUS ONCE
Status: COMPLETED | OUTPATIENT
Start: 2022-11-20 | End: 2022-11-20

## 2022-11-20 RX ADMIN — MORPHINE SULFATE 2 MG: 2 INJECTION, SOLUTION INTRAMUSCULAR; INTRAVENOUS at 17:18

## 2022-11-20 RX ADMIN — CEFTRIAXONE SODIUM 1000 MG: 1 INJECTION, POWDER, FOR SOLUTION INTRAMUSCULAR; INTRAVENOUS at 20:10

## 2022-11-20 RX ADMIN — SODIUM CHLORIDE 500 ML: 9 INJECTION, SOLUTION INTRAVENOUS at 20:09

## 2022-11-20 RX ADMIN — HYDROCODONE BITARTRATE AND ACETAMINOPHEN 1 TABLET: 7.5; 325 TABLET ORAL at 19:17

## 2022-11-20 RX ADMIN — METOCLOPRAMIDE 10 MG: 5 INJECTION, SOLUTION INTRAMUSCULAR; INTRAVENOUS at 19:15

## 2022-11-20 RX ADMIN — IOPAMIDOL 75 ML: 755 INJECTION, SOLUTION INTRAVENOUS at 18:09

## 2022-11-20 RX ADMIN — ONDANSETRON 4 MG: 2 INJECTION INTRAMUSCULAR; INTRAVENOUS at 17:16

## 2022-11-20 ASSESSMENT — ENCOUNTER SYMPTOMS
NAUSEA: 1
DIARRHEA: 0
EYE PAIN: 0
SHORTNESS OF BREATH: 0
CONSTIPATION: 0
RHINORRHEA: 0
SORE THROAT: 0
COUGH: 0
ABDOMINAL PAIN: 1
BACK PAIN: 0
VOMITING: 1

## 2022-11-20 ASSESSMENT — PAIN - FUNCTIONAL ASSESSMENT
PAIN_FUNCTIONAL_ASSESSMENT: 0-10
PAIN_FUNCTIONAL_ASSESSMENT: 0-10

## 2022-11-20 ASSESSMENT — PAIN DESCRIPTION - LOCATION
LOCATION: ABDOMEN;FLANK

## 2022-11-20 ASSESSMENT — PAIN SCALES - GENERAL
PAINLEVEL_OUTOF10: 10
PAINLEVEL_OUTOF10: 8
PAINLEVEL_OUTOF10: 10
PAINLEVEL_OUTOF10: 8

## 2022-11-20 ASSESSMENT — PAIN DESCRIPTION - FREQUENCY: FREQUENCY: CONTINUOUS

## 2022-11-20 ASSESSMENT — PAIN DESCRIPTION - ORIENTATION
ORIENTATION: RIGHT

## 2022-11-20 ASSESSMENT — PAIN DESCRIPTION - PAIN TYPE: TYPE: ACUTE PAIN

## 2022-11-20 NOTE — ED PROVIDER NOTES
[facility]        Pt Name: Alon Zuleta  MRN: [id number]  [place] 1955  Date of evaluation: 11/20/2022  Provider: GABI Noel  PCP: Chandrakant Torres MD  Note Started: 5:17 PM EST 11/20/2022        ARIANA. I have evaluated this patient. My supervising physician was available for consultation. CHIEF COMPLAINT       Chief Complaint   Patient presents with    Abdominal Pain     Pt presents to the ER with a sudden onset of RLQ pain that radiates to the rt flank. Pt states the pain makes her nauseated and triggers a vomiting episode. Pt rates pain 10/10 and states she has never had pain this bad. Pt came on at approx 1400 today. HISTORY OF PRESENT ILLNESS   (Location, Timing/Onset, Context/Setting, Quality, Duration, Modifying Factors, Severity, Associated Signs and Symptoms)  Note limiting factors. Chief Complaint: Right lower quadrant abdominal pain    Alon Zuleta is a 75 y.o. female who presents to the emergency room due to right lower quadrant abdominal pain via EMS due to pain suddenly starting at 2 PM today. Patient states her last meal was around 10 AM this morning. Patient states that the pain is associated with nausea and vomiting. Patient was given Zofran on transport to the emergency department which has not helped with her nausea and vomiting she states. Patient has any fevers, chills, chest pain, shortness of breath or difficulty breathing. Patient denies history of kidney stones. Patient states that she still has her appendix. She has a history of gastric ulcers CHF, asthma, hypertension and Bell's palsy. Nursing Notes were all reviewed and agreed with or any disagreements were addressed in the HPI. REVIEW OF SYSTEMS    (2-9 systems for level 4, 10 or more for level 5)     Review of Systems   Constitutional:  Negative for chills, diaphoresis and fever.    HENT:  Negative for congestion, rhinorrhea and sore throat. Eyes:  Negative for pain and visual disturbance. Respiratory:  Negative for cough and shortness of breath. Cardiovascular:  Negative for chest pain and leg swelling. Gastrointestinal:  Positive for abdominal pain, nausea and vomiting. Negative for constipation and diarrhea. Genitourinary:  Negative for difficulty urinating, dysuria and frequency. Musculoskeletal:  Negative for back pain and neck pain. Skin:  Negative for rash and wound. Neurological:  Negative for dizziness and light-headedness. Positives and Pertinent negatives as per HPI. Except as noted above in the ROS, all other systems were reviewed and negative.        PAST MEDICAL HISTORY     Past Medical History:   Diagnosis Date    Arthritis     fingers, hands    Asthma     Back pain     Bell's palsy     CHF (congestive heart failure) (HCC)     Chronic pain     BACK SURGERY X4, neck, left arm    Colitis 2/14/2013    Depression     Elevated sed rate     Family history of breast cancer in first degree relative     Gastric ulcer     GERD (gastroesophageal reflux disease)     colitis    Glaucoma     Hypertension     Movement disorder     chronic back pain    Neuromuscular disorder (HCC)     sciatica rt. leg    Osteoarthritis     PONV (postoperative nausea and vomiting)     SEVERE         SURGICAL HISTORY     Past Surgical History:   Procedure Laterality Date    BACK SURGERY      x 4    COLONOSCOPY  10/16/12    polyp removed and biopsy sent    COLONOSCOPY  3/26/13    CYSTOSCOPY  1/13/16    urethral dilitation    FACIAL NERVE SURGERY      D/T Bell's Palsy    FINGER SURGERY Left 04/03/2018    Excision of Left Thumb Digital Mucous cyst & DIP Joint Arthrotomy & Debridement    FINGER SURGERY  04/2018    left thumb    FINGER SURGERY Left 04/03/2018    thumb surgery     HYSTERECTOMY (CERVIX STATUS UNKNOWN)      Partial    UPPER GASTROINTESTINAL ENDOSCOPY  10/15/12    UPPER GASTROINTESTINAL ENDOSCOPY  2/11/15    with EUS VENTRAL HERNIA REPAIR N/A 4/15/2019    LAPAROSCOPIC REPAIR OF INCISIONAL HERNIA WITH MESH performed by Joanne Escamilla MD at [address] [place] [address]       Previous Medications    ALBUTEROL (PROVENTIL) (2.5 MG/3ML) 0.083% NEBULIZER SOLUTION    USE 1 VIAL VIA NEBULIZER EVERY 6 HOURS    ALBUTEROL SULFATE HFA (PROVENTIL;VENTOLIN;PROAIR) 108 (90 BASE) MCG/ACT INHALER    INHALE 2 PUFFS BY MOUTH EVERY 4 HOURS AS NEEDED FOR WHEEZING    ARIPIPRAZOLE (ABILIFY) 5 MG TABLET    TAKE 1 TABLET BY MOUTH DAILY    BUPROPION (WELLBUTRIN XL) 150 MG EXTENDED RELEASE TABLET    TAKE 1 TABLET BY MOUTH TWICE DAILY    BUTALBITAL-ACETAMINOPHEN-CAFFEINE (FIORICET, ESGIC) -40 MG PER TABLET    1 tab as needed every 6 hours    DEXILANT 60 MG CPDR DELAYED RELEASE CAPSULE    TAKE 1 CAPSULE BY MOUTH DAILY    DICLOFENAC SODIUM (VOLTAREN) 1 % GEL    APPLY 2 GRAMS TOPICALLY TO HAND FOUR TIMES DAILY    ELASTIC BANDAGES & SUPPORTS (ELBOW BRACE) MISC    Use as needed    ELASTIC BANDAGES & SUPPORTS (TRUFORM SUPPORT SOCK 20-30MMHG) MISC    1 each by Does not apply route daily    FLUTICASONE (FLOVENT HFA) 110 MCG/ACT INHALER    INHALE 1 PUFF INTO THE LUNGS TWICE DAILY    FLUTICASONE (FLOVENT HFA) 44 MCG/ACT INHALER    Inhale 1 puff into the lungs 2 times daily    GABAPENTIN (NEURONTIN) 300 MG CAPSULE    TAKE 1 CAPSULE BY MOUTH THREE TIMES DAILY    HYDROCODONE-ACETAMINOPHEN (NORCO) 7.5-325 MG PER TABLET    Take 1 tablet by mouth every 6 hours as needed for Pain for up to 30 days.     HYDROCORTISONE (WESTCORT) 0.2 % CREAM    APPLY EXTERNALLY TO THE AFFECTED AREA TWICE DAILY    IPRATROPIUM (ATROVENT) 0.06 % NASAL SPRAY    2 sprays by Each Nostril route 3 times daily as needed for Rhinitis (runny nose)    MONTELUKAST (SINGULAIR) 10 MG TABLET    TAKE 1 TABLET BY MOUTH EVERY NIGHT    NITROGLYCERIN (NITROSTAT) 0.4 MG SL TABLET    PLACE 1 TABLET UNDER THE TONGUE EVERY 5 MINUTES AS NEEDED FOR CHEST PAIN    NUTRITIONAL SUPPLEMENTS (BOOST CALORIE SMART) LIQD    Take 1 Can by mouth See Admin Instructions EVERY day    PROBIOTIC PRODUCT (PROBIOTIC PO)    Take by mouth daily     SERTRALINE (ZOLOFT) 100 MG TABLET    TAKE 1 TABLET BY MOUTH THREE TIMES DAILY    TIZANIDINE (ZANAFLEX) 4 MG TABLET    TAKE 1 TABLET BY MOUTH THREE TIMES DAILY    TRAZODONE (DESYREL) 50 MG TABLET    TAKE 2 TABLETS BY MOUTH EVERY NIGHT         ALLERGIES     Beta adrenergic blockers, Pheniramine, Antihistamine [diphenhydramine hcl], Aspirin, Codeine, Dilaudid [hydromorphone], Nsaids, Prednisone, and Zithromax [azithromycin]    FAMILYHISTORY       Family History   Problem Relation Age of Onset    Heart Disease Mother     High Blood Pressure Mother     Arthritis Mother     Breast Cancer Sister     High Blood Pressure Brother     Diabetes Neg Hx     Stroke Neg Hx           SOCIAL HISTORY       Social History     Tobacco Use    Smoking status: Former     Packs/day: 0.25     Years: 20.00     Pack years: 5.00     Types: Cigarettes     Quit date: 2016     Years since quittin.6    Smokeless tobacco: Never   Vaping Use    Vaping Use: Never used   Substance Use Topics    Alcohol use: No     Alcohol/week: 0.0 standard drinks    Drug use: No       SCREENINGS    Keila Coma Scale  Eye Opening: Spontaneous  Best Verbal Response: Oriented  Best Motor Response: Obeys commands  Keila Coma Scale Score: 15        PHYSICAL EXAM    (up to 7 for level 4, 8 or more for level 5)     ED Triage Vitals [22 1650]   BP Temp Temp Source Heart Rate Resp SpO2 Height Weight   105/84 98.7 °F (37.1 °C) Oral 79 24 98 % -- 116 lb (52.6 kg)       Physical Exam  Vitals and nursing note reviewed. Constitutional:       General: She is in acute distress. Appearance: She is normal weight. Cardiovascular:      Rate and Rhythm: Normal rate and regular rhythm. Pulmonary:      Effort: Pulmonary effort is normal.      Breath sounds: Normal breath sounds. Abdominal:      General: Abdomen is flat. There is no distension. Tenderness: There is abdominal tenderness in the right lower quadrant. There is no right CVA tenderness, left CVA tenderness, guarding or rebound. Positive signs include McBurney's sign. Negative signs include Meredith's sign, Rovsing's sign, psoas sign and obturator sign. Neurological:      Mental Status: She is alert and oriented to person, place, and time. Psychiatric:         Mood and Affect: Mood normal.         Behavior: Behavior normal.       DIAGNOSTIC RESULTS   LABS:    Labs Reviewed   CBC WITH AUTO DIFFERENTIAL - Abnormal; Notable for the following components:       Result Value    WBC 13.3 (*)     Neutrophils Absolute 12.4 (*)     Lymphocytes Absolute 0.3 (*)     All other components within normal limits   COMPREHENSIVE METABOLIC PANEL W/ REFLEX TO MG FOR LOW K - Abnormal; Notable for the following components:    Glucose 118 (*)     BUN 21 (*)     All other components within normal limits   URINALYSIS WITH REFLEX TO CULTURE - Abnormal; Notable for the following components:    Ketones, Urine 15 (*)     Blood, Urine TRACE (*)     Protein, UA TRACE (*)     Nitrite, Urine POSITIVE (*)     Leukocyte Esterase, Urine MODERATE (*)     All other components within normal limits   MICROSCOPIC URINALYSIS - Abnormal; Notable for the following components:    Bacteria, UA 4+ (*)     WBC, UA 23 (*)     All other components within normal limits   CULTURE, URINE   LACTIC ACID       When ordered only abnormal lab results are displayed. All other labs were within normal range or not returned as of this dictation. EKG: When ordered, EKG's are interpreted by the Emergency Department Physician in the absence of a cardiologist.  Please see their note for interpretation of EKG.     RADIOLOGY:   Non-plain film images such as CT, Ultrasound and MRI are read by the radiologist. Plain radiographic images are visualized and preliminarily interpreted by the ED Provider with the below findings:        Interpretation per the Radiologist below, if available at the time of this note:    CT ABDOMEN PELVIS W IV CONTRAST Additional Contrast? None   Final Result   1. 2 mm stone at the right utero vesicular junction with mild associated   hydronephrosis. 2. Punctate stone at the mid right kidney. 3. Moderate stool in the colon suggesting constipation. No results found. PROCEDURES   Unless otherwise noted below, none     Procedures    CRITICAL CARE TIME       CONSULTS:  IP CONSULT TO UROLOGY      EMERGENCY DEPARTMENT COURSE and DIFFERENTIAL DIAGNOSIS/MDM:   Vitals:    Vitals:    11/20/22 1650 11/20/22 1903   BP: 105/84 (!) 162/92   Pulse: 79 (!) 102   Resp: 24 18   Temp: 98.7 °F (37.1 °C)    TempSrc: Oral    SpO2: 98% 95%   Weight: 116 lb (52.6 kg)        Patient was given the following medications:  Medications   ondansetron (ZOFRAN) injection 4 mg (4 mg IntraVENous Given 11/20/22 1716)   cefTRIAXone (ROCEPHIN) 1,000 mg in dextrose 5 % 50 mL IVPB mini-bag (1,000 mg IntraVENous New Bag 11/20/22 2010)   0.9 % sodium chloride bolus (500 mLs IntraVENous New Bag 11/20/22 2009)   morphine (PF) injection 2 mg (2 mg IntraVENous Given 11/20/22 1718)   iopamidol (ISOVUE-370) 76 % injection 75 mL (75 mLs IntraVENous Given 11/20/22 1809)   HYDROcodone-acetaminophen (NORCO) 7.5-325 MG per tablet 1 tablet (1 tablet Oral Given 11/20/22 1917)   metoclopramide (REGLAN) injection 10 mg (10 mg IntraVENous Given 11/20/22 1915)     ED Course as of 11/20/22 2010   Sun Nov 20, 2022   1950 Spoke with Dr. King Nelson from urology who recommended that the patient can be seen as an outpatient, started on flowmax, pain medication and antibiotic coverage. [PE]      ED Course User Index  [PE] GABI Syed      Is this patient to be included in the SEP-1 Core Measure due to severe sepsis or septic shock? No   Exclusion criteria - the patient is NOT to be included for SEP-1 Core Measure due to:   Infection is not suspected    69-year-old female presents emergency department due to sudden onset right lower quadrant abdominal pain that radiates to her right flank started today around 2 PM.  Patient has history of the past.  Patient does take multiple pain medications at home for chronic pain. Patient also had nausea and vomiting associated with this abdominal pain today. Possible concerns for appendicitis, kidney stone urinary tract infection. CBC and CMP were ordered and not show any acute abnormalities. Patient's urinalysis was positive for nitrates and leukocytes with 4+ bacteria and increased white blood cells on microscopic exam.  Patient was given Rocephin IV here in the emergency department for this. It was 1.1. CT scan of the abdomen pelvis with IV contrast was ordered and did show a 2 mm kidney stone. I spoke with Dr. Paulette Roman from urology about this patient given that he orders for urinary tract infection with ureteral kidney stone. He stated that the patient can follow-up as an outpatient with pain medication Flomax and antibiotics. I discussed this with the patient she understands and agrees to this plan all questions were answered at time of discharge. Patient states that she has plenty of Norco at home to help with her pain. While here in the emergency department patient was given morphine, Norco, Zofran, Reglan for her symptoms which did help overall. Patient given referral to follow-up with Dr. Paulette Roman early this coming week for recheck. FINAL IMPRESSION      1. Urinary tract infection without hematuria, site unspecified    2. Ureteric stone    3. Acute right flank pain    4.  Nausea and vomiting, unspecified vomiting type          DISPOSITION/PLAN   DISPOSITION Decision To Discharge 11/20/2022 07:49:00 PM      PATIENT REFERRED TO:  Grayson Khan MD  [facility]  [phone]    Schedule an appointment as soon as possible for a visit on 11/22/2022  recheck    DISCHARGE MEDICATIONS:  New Prescriptions    CEPHALEXIN (KEFLEX) 500 MG CAPSULE    Take 1 capsule by mouth 2 times daily for 7 days    ONDANSETRON (ZOFRAN-ODT) 4 MG DISINTEGRATING TABLET    Place 1 tablet under the tongue every 8 hours as needed for Nausea or Vomiting May Sub regular tablet (non-ODT) if insurance does not cover ODT.     TAMSULOSIN (FLOMAX) 0.4 MG CAPSULE    Take 1 capsule by mouth daily for 5 days       DISCONTINUED MEDICATIONS:  Discontinued Medications    ONDANSETRON (ZOFRAN) 4 MG TABLET    Take 1 tablet by mouth every 6 hours as needed for Nausea              (Please note that portions of this note were completed with a voice recognition program.  Efforts were made to edit the dictations but occasionally words are mis-transcribed.)    GABI Noel (electronically signed)            GABI Noel  11/20/22 2011

## 2022-11-21 NOTE — DISCHARGE INSTRUCTIONS
Follow-up with urology no later than Tuesday for recheck    Pain medication as needed and start antibiotics as prescribed    Return to the emergency room for any worsening symptoms nausea vomiting fevers chills or worsening pain.

## 2022-11-22 DIAGNOSIS — G89.4 CHRONIC PAIN SYNDROME: ICD-10-CM

## 2022-11-22 DIAGNOSIS — G44.89 OTHER HEADACHE SYNDROME: ICD-10-CM

## 2022-11-22 LAB
ORGANISM: ABNORMAL
URINE CULTURE, ROUTINE: ABNORMAL

## 2022-11-22 RX ORDER — HYDROCODONE BITARTRATE AND ACETAMINOPHEN 7.5; 325 MG/1; MG/1
1 TABLET ORAL EVERY 6 HOURS PRN
Qty: 120 TABLET | Refills: 0 | Status: SHIPPED | OUTPATIENT
Start: 2022-11-22 | End: 2022-12-22

## 2022-11-22 RX ORDER — BUTALBITAL, ACETAMINOPHEN AND CAFFEINE 50; 325; 40 MG/1; MG/1; MG/1
TABLET ORAL
Qty: 40 TABLET | Refills: 1 | Status: SHIPPED | OUTPATIENT
Start: 2022-11-22

## 2022-11-22 NOTE — TELEPHONE ENCOUNTER
Medication and Quantity requested:    butalbital-acetaminophen-caffeine (FIORICET, ESGIC) -40 MG per tablet     AND    HYDROcodone-acetaminophen (NORCO) 7.5-325 MG per tablet 1 tablet     Last Visit  08/04/2022      Pharmacy and phone number updated in EPIC:    Yes Ingrid       PATIENT IS REQUESTING HER PAIN MEDS A LITTLE EARLY  [address] TO PASS THEM AND IS IN A LOT OF PAIN  [address]

## 2022-11-22 NOTE — TELEPHONE ENCOUNTER
Medication:   Requested Prescriptions     Pending Prescriptions Disp Refills    butalbital-acetaminophen-caffeine (FIORICET, ESGIC) -40 MG per tablet 40 tablet 1     Si tab as needed every 6 hours    HYDROcodone-acetaminophen (NORCO) 7.5-325 MG per tablet 120 tablet 0     Sig: Take 1 tablet by mouth every 6 hours as needed for Pain for up to 30 days. Last Filled:  120 x 0 RF     Patient Phone Number: [phone] (home)     Last appt: 2022   Next appt: Visit date not found    Last OARRS:   RX Monitoring 2022   Attestation -   Periodic Controlled Substance Monitoring No signs of potential drug abuse or diversion identified.

## 2022-12-12 DIAGNOSIS — G44.89 OTHER HEADACHE SYNDROME: ICD-10-CM

## 2022-12-12 RX ORDER — BUTALBITAL, ACETAMINOPHEN AND CAFFEINE 50; 325; 40 MG/1; MG/1; MG/1
TABLET ORAL
Qty: 40 TABLET | Refills: 3 | Status: SHIPPED | OUTPATIENT
Start: 2022-12-12 | End: 2023-01-19 | Stop reason: SDUPTHER

## 2022-12-12 NOTE — TELEPHONE ENCOUNTER
Medication:   Requested Prescriptions     Pending Prescriptions Disp Refills    butalbital-acetaminophen-caffeine (FIORICET, ESGIC) -40 MG per tablet [Pharmacy Med Name: BUT/ACETAMINOPHEN/CAFF -40 TB] 40 tablet 1     Sig: TAKE 1 TABLET BY MOUTH EVERY 6 HOURS AS NEEDED        Last Filled:  40 z 1 RF 11/22//2    Patient Phone Number: [phone] (home)     Last appt: 8/4/2022   Next appt: Visit date not found    Last OARRS:   RX Monitoring 8/4/2022   Attestation -   Periodic Controlled Substance Monitoring No signs of potential drug abuse or diversion identified.

## 2022-12-20 DIAGNOSIS — G89.4 CHRONIC PAIN SYNDROME: ICD-10-CM

## 2022-12-20 DIAGNOSIS — F32.A DEPRESSION, UNSPECIFIED DEPRESSION TYPE: ICD-10-CM

## 2022-12-20 RX ORDER — HYDROCODONE BITARTRATE AND ACETAMINOPHEN 7.5; 325 MG/1; MG/1
1 TABLET ORAL EVERY 6 HOURS PRN
Qty: 120 TABLET | Refills: 0 | Status: SHIPPED | OUTPATIENT
Start: 2022-12-20 | End: 2023-01-19

## 2022-12-20 NOTE — TELEPHONE ENCOUNTER
Lov 8/4/22  Lrf 120 0 11/22/22 Medication:   Requested Prescriptions     Pending Prescriptions Disp Refills    HYDROcodone-acetaminophen (NORCO) 7.5-325 MG per tablet 120 tablet 0     Sig: Take 1 tablet by mouth every 6 hours as needed for Pain for up to 30 days. Last Filled:      Patient Phone Number: [phone] (home)     Last appt: 8/4/2022   Next appt: Visit date not found    Last OARRS:   RX Monitoring 8/4/2022   Attestation -   Periodic Controlled Substance Monitoring No signs of potential drug abuse or diversion identified.

## 2022-12-21 RX ORDER — SERTRALINE HYDROCHLORIDE 100 MG/1
TABLET, FILM COATED ORAL
Qty: 270 TABLET | Refills: 2 | Status: SHIPPED | OUTPATIENT
Start: 2022-12-21

## 2023-01-17 RX ORDER — GABAPENTIN 300 MG/1
CAPSULE ORAL
Qty: 90 CAPSULE | Refills: 5 | Status: SHIPPED | OUTPATIENT
Start: 2023-01-17 | End: 2023-02-17

## 2023-01-19 DIAGNOSIS — G89.4 CHRONIC PAIN SYNDROME: ICD-10-CM

## 2023-01-19 DIAGNOSIS — G44.89 OTHER HEADACHE SYNDROME: ICD-10-CM

## 2023-01-19 RX ORDER — HYDROCODONE BITARTRATE AND ACETAMINOPHEN 7.5; 325 MG/1; MG/1
1 TABLET ORAL EVERY 6 HOURS PRN
Qty: 120 TABLET | Refills: 0 | Status: SHIPPED | OUTPATIENT
Start: 2023-01-19 | End: 2023-02-18

## 2023-01-19 RX ORDER — BUTALBITAL, ACETAMINOPHEN AND CAFFEINE 50; 325; 40 MG/1; MG/1; MG/1
TABLET ORAL
Qty: 40 TABLET | Refills: 3 | Status: SHIPPED | OUTPATIENT
Start: 2023-01-19

## 2023-01-19 NOTE — TELEPHONE ENCOUNTER
Lov 8/4/22  Lrf 120 0 12/20/22  40 3 12/12/22 Medication:   Requested Prescriptions     Pending Prescriptions Disp Refills    HYDROcodone-acetaminophen (NORCO) 7.5-325 MG per tablet 120 tablet 0     Sig: Take 1 tablet by mouth every 6 hours as needed for Pain for up to 30 days. butalbital-acetaminophen-caffeine (FIORICET, ESGIC) -40 MG per tablet 40 tablet 3     Sig: TAKE 1 TABLET BY MOUTH EVERY 6 HOURS AS NEEDED        Last Filled:      Patient Phone Number: [phone] (home)     Last appt: 8/4/2022   Next appt: Visit date not found    Last OARRS:   RX Monitoring 8/4/2022   Attestation -   Periodic Controlled Substance Monitoring No signs of potential drug abuse or diversion identified. fever

## 2023-01-19 NOTE — TELEPHONE ENCOUNTER
Medication and Quantity requested: [address] Visit  8-4-22, [address] and phone number updated in [facility]:  yes,Ingrid

## 2023-02-15 ENCOUNTER — TELEPHONE (OUTPATIENT)
Dept: FAMILY MEDICINE CLINIC | Age: 68
End: 2023-02-15

## 2023-02-15 NOTE — TELEPHONE ENCOUNTER
Patient tested positive for Covid today and she has been experiencing symptoms for about 2 days      She is currently experiencing congestion, body aches, muscles cramps, coughing, cannot sleep or eat very well     Patient would like to know if she can be prescribed medication for this?      Patient would like a call back with information on what to do

## 2023-02-16 ENCOUNTER — TELEPHONE (OUTPATIENT)
Dept: FAMILY MEDICINE CLINIC | Age: 68
End: 2023-02-16

## 2023-02-16 ENCOUNTER — SCHEDULED TELEPHONE ENCOUNTER (OUTPATIENT)
Dept: FAMILY MEDICINE CLINIC | Age: 68
End: 2023-02-16
Payer: MEDICARE

## 2023-02-16 DIAGNOSIS — U07.1 COVID: Primary | ICD-10-CM

## 2023-02-16 PROCEDURE — 1123F ACP DISCUSS/DSCN MKR DOCD: CPT | Performed by: NURSE PRACTITIONER

## 2023-02-16 PROCEDURE — 99213 OFFICE O/P EST LOW 20 MIN: CPT | Performed by: NURSE PRACTITIONER

## 2023-02-16 SDOH — ECONOMIC STABILITY: FOOD INSECURITY: WITHIN THE PAST 12 MONTHS, YOU WORRIED THAT YOUR FOOD WOULD RUN OUT BEFORE YOU GOT MONEY TO BUY MORE.: OFTEN TRUE

## 2023-02-16 SDOH — ECONOMIC STABILITY: HOUSING INSECURITY
IN THE LAST 12 MONTHS, WAS THERE A TIME WHEN YOU DID NOT HAVE A STEADY PLACE TO SLEEP OR SLEPT IN A SHELTER (INCLUDING NOW)?: NO

## 2023-02-16 SDOH — ECONOMIC STABILITY: FOOD INSECURITY: WITHIN THE PAST 12 MONTHS, THE FOOD YOU BOUGHT JUST DIDN'T LAST AND YOU DIDN'T HAVE MONEY TO GET MORE.: OFTEN TRUE

## 2023-02-16 SDOH — ECONOMIC STABILITY: INCOME INSECURITY: HOW HARD IS IT FOR YOU TO PAY FOR THE VERY BASICS LIKE FOOD, HOUSING, MEDICAL CARE, AND HEATING?: HARD

## 2023-02-16 ASSESSMENT — PATIENT HEALTH QUESTIONNAIRE - PHQ9
SUM OF ALL RESPONSES TO PHQ QUESTIONS 1-9: 0
10. IF YOU CHECKED OFF ANY PROBLEMS, HOW DIFFICULT HAVE THESE PROBLEMS MADE IT FOR YOU TO DO YOUR WORK, TAKE CARE OF THINGS AT HOME, OR GET ALONG WITH OTHER PEOPLE: 0
7. TROUBLE CONCENTRATING ON THINGS, SUCH AS READING THE NEWSPAPER OR WATCHING TELEVISION: 0
SUM OF ALL RESPONSES TO PHQ QUESTIONS 1-9: 0
3. TROUBLE FALLING OR STAYING ASLEEP: 0
9. THOUGHTS THAT YOU WOULD BE BETTER OFF DEAD, OR OF HURTING YOURSELF: 0
2. FEELING DOWN, DEPRESSED OR HOPELESS: 0
SUM OF ALL RESPONSES TO PHQ9 QUESTIONS 1 & 2: 0
1. LITTLE INTEREST OR PLEASURE IN DOING THINGS: 0
6. FEELING BAD ABOUT YOURSELF - OR THAT YOU ARE A FAILURE OR HAVE LET YOURSELF OR YOUR FAMILY DOWN: 0
SUM OF ALL RESPONSES TO PHQ QUESTIONS 1-9: 0
SUM OF ALL RESPONSES TO PHQ QUESTIONS 1-9: 0
5. POOR APPETITE OR OVEREATING: 0
4. FEELING TIRED OR HAVING LITTLE ENERGY: 0
8. MOVING OR SPEAKING SO SLOWLY THAT OTHER PEOPLE COULD HAVE NOTICED. OR THE OPPOSITE, BEING SO FIGETY OR RESTLESS THAT YOU HAVE BEEN MOVING AROUND A LOT MORE THAN USUAL: 0

## 2023-02-16 ASSESSMENT — ENCOUNTER SYMPTOMS
SINUS PRESSURE: 0
WHEEZING: 0
SINUS PAIN: 0
RHINORRHEA: 0
ABDOMINAL PAIN: 1
COUGH: 1
VOMITING: 0
CHEST TIGHTNESS: 0
DIARRHEA: 0
SHORTNESS OF BREATH: 0
NAUSEA: 0

## 2023-02-16 NOTE — TELEPHONE ENCOUNTER
Please let pt know Dr Maribell Minor is currently out of office  Please schedule VV with available provider today

## 2023-02-16 NOTE — PROGRESS NOTES
2023    TELEHEALTH EVALUATION -- Audio/Visual (During JTB[facility]-02 public health emergency)    Leonid Solorio (:  1955) has requested an audio/video evaluation for the following concern(s):    Symptoms started three days ago. Tested positive for covid. Was exposed to someone in their building. Having cough, congestion, headache, fever. She is fully vaccinated. Review of Systems   Constitutional:  Positive for chills, diaphoresis, fatigue and fever. HENT:  Positive for congestion, ear pain and sneezing. Negative for postnasal drip, rhinorrhea, sinus pressure and sinus pain. Respiratory:  Positive for cough. Negative for chest tightness, shortness of breath and wheezing. Cardiovascular:  Negative for chest pain, palpitations and leg swelling. Gastrointestinal:  Positive for abdominal pain. Negative for diarrhea, nausea and vomiting. Musculoskeletal:  Positive for myalgias. Neurological:  Positive for headaches. Negative for dizziness and weakness. Prior to Visit Medications    Medication Sig Taking? Authorizing Provider   nirmatrelvir/ritonavir 300/100 (PAXLOVID, 300/100,) 20 x 150 MG & 10 x 100MG TBPK Take 3 tablets (two 150 mg nirmatrelvir and one 100 mg ritonavir tablets) by mouth every 12 hours for 5 days. Yes JOAO Estrada - MARY   HYDROcodone-acetaminophen (NORCO) 7.5-325 MG per tablet Take 1 tablet by mouth every 6 hours as needed for Pain for up to 30 days.  Yes Andreia Cornejo MD   butalbital-acetaminophen-caffeine (FIORICET, ESGIC) -85 MG per tablet TAKE 1 TABLET BY MOUTH EVERY 6 HOURS AS NEEDED Yes Andreia Cornejo MD   gabapentin (NEURONTIN) 300 MG capsule TAKE 1 CAPSULE BY MOUTH THREE TIMES DAILY Yes Andreia Cornejo MD   sertraline (ZOLOFT) 100 MG tablet TAKE 1 TABLET BY MOUTH THREE TIMES DAILY Yes Andreia Cornejo MD   albuterol sulfate HFA (PROVENTIL;VENTOLIN;PROAIR) 108 (90 Base) MCG/ACT inhaler INHALE 2 PUFFS BY MOUTH EVERY 4 HOURS AS NEEDED FOR WHEEZING Yes Naresh Pedrazaing Chiara Andrade MD   tiZANidine (ZANAFLEX) 4 MG tablet TAKE 1 TABLET BY MOUTH THREE TIMES DAILY Yes Radha Espinoza MD   diclofenac sodium (VOLTAREN) 1 % GEL APPLY 2 GRAMS TOPICALLY TO HAND FOUR TIMES DAILY Yes Radha Espinoza MD   traZODone ([facility]) 50 MG tablet TAKE 2 TABLETS BY MOUTH EVERY NIGHT Yes Geneva Armando MD   DEXILANT 60 MG CPDR delayed release capsule TAKE 1 CAPSULE BY MOUTH DAILY Yes Geneva Armando MD   buPROPion (WELLBUTRIN XL) 150 MG extended release tablet TAKE 1 TABLET BY MOUTH TWICE DAILY Yes Geneva Armando MD   albuterol (PROVENTIL) (2.5 MG/3ML) 0.083% nebulizer solution USE 1 VIAL VIA NEBULIZER EVERY 6 HOURS Yes Geneva Armando MD   ARIPiprazole (ABILIFY) 5 MG tablet TAKE 1 TABLET BY MOUTH DAILY Yes Geneva Armando MD   nitroGLYCERIN (NITROSTAT) 0.4 MG SL tablet PLACE 1 TABLET UNDER THE TONGUE EVERY 5 MINUTES AS NEEDED FOR CHEST PAIN Yes JOAO Chaney CNP   fluticasone (FLOVENT HFA) 44 MCG/ACT inhaler Inhale 1 puff into the lungs 2 times daily Yes Vladislavtracey Sheikh MD   Probiotic Product (PROBIOTIC PO) Take by mouth daily  Yes Historical Provider, MD   ipratropium (ATROVENT) 0.06 % nasal spray 2 sprays by Each Nostril route 3 times daily as needed for Rhinitis (runny nose) Yes Tamie Diaz,    Elastic Bandages & Supports (ELBOW BRACE) MISC Use as needed Yes Natalia Rooney MD   montelukast (SINGULAIR) 10 MG tablet TAKE 1 TABLET BY MOUTH EVERY NIGHT Yes Vladislav Sheikh MD   fluticasone (FLOVENT HFA) 110 MCG/ACT inhaler INHALE 1 PUFF INTO THE LUNGS TWICE DAILY Yes Geneva Armando MD   Elastic Bandages & Supports (TRUFORM SUPPORT SOCK 20-30MMHG) MISC 1 each by Does not apply route daily Yes JAOO Roy CNP   hydrocortisone (WESTCORT) 0.2 % cream APPLY EXTERNALLY TO THE AFFECTED AREA TWICE DAILY Yes Geneva Armando MD   Nutritional Supplements (BOOST CALORIE SMART) LIQD Take 1 Can by mouth See Admin Instructions EVERY day Yes Historical Provider, MD   tamsulosin (FLOMAX) 0.4 MG capsule Take 1 capsule by mouth daily for 5 days  GABI Sanabria   butalbital-acetaminophen-caffeine (FIORICET, ESGIC) -40 MG per tablet TAKE 1 TABLET BY MOUTH EVERY 4 HOURS AS NEEDED FOR HEADACHES MAY TAKE 2 TABLETS EVERY 4 HOURS AS NEEDED FOR PAIN  Vladislavtracey Sheikh MD     Past Medical History:   Diagnosis Date    Arthritis     fingers, hands    Asthma     Back pain     Bell's palsy     CHF (congestive heart failure) ([facility])     Chronic pain     BACK SURGERY X4, neck, left arm    Colitis 2/14/2013    Depression     Elevated sed rate     Family history of breast cancer in first degree relative     Gastric ulcer     GERD (gastroesophageal reflux disease)     colitis    Glaucoma     Hypertension     Movement disorder     chronic back pain    Neuromuscular disorder ([facility])     sciatica rt. leg    Osteoarthritis     PONV (postoperative nausea and vomiting)     SEVERE     Past Surgical History:   Procedure Laterality Date    BACK SURGERY      x 4    COLONOSCOPY  10/16/12    polyp removed and biopsy sent    COLONOSCOPY  3/26/13    CYSTOSCOPY  1/13/16    urethral dilitation    FACIAL NERVE SURGERY      D/T Bell's Palsy    FINGER SURGERY Left 04/03/2018    Excision of Left Thumb Digital Mucous cyst & DIP Joint Arthrotomy & Debridement    FINGER SURGERY  04/2018    left thumb    FINGER SURGERY Left 04/03/2018    thumb surgery     HYSTERECTOMY (CERVIX STATUS UNKNOWN)      Partial    UPPER GASTROINTESTINAL ENDOSCOPY  10/15/12    UPPER GASTROINTESTINAL ENDOSCOPY  2/11/15    with EUS    VENTRAL HERNIA REPAIR N/A 4/15/2019    LAPAROSCOPIC REPAIR OF INCISIONAL HERNIA WITH MESH performed by Champ Paniagua MD at [facility] OR     Family History   Problem Relation Age of Onset    Heart Disease Mother     High Blood Pressure Mother     Arthritis Mother     Breast Cancer Sister     High Blood Pressure Brother     Diabetes Neg Hx     Stroke Neg Hx      Allergies   Allergen Reactions    Beta Adrenergic Blockers Shortness Of Breath    Pheniramine      Other reaction(s): rapid heartbeat    Antihistamine [Diphenhydramine Hcl] Nausea And Vomiting    Aspirin Hives    Codeine Hives    Dilaudid [Hydromorphone] Nausea And Vomiting    Nsaids Hives and Nausea And Vomiting    Prednisone Other (See Comments)     GI upset    Zithromax [Azithromycin] Nausea And Vomiting     Social History     Tobacco Use    Smoking status: Former     Packs/day: 0.25     Years: 20.00     Pack years: 5.00     Types: Cigarettes     Quit date: 2016     Years since quittin.8    Smokeless tobacco: Never   Vaping Use    Vaping Use: Never used   Substance Use Topics    Alcohol use: No     Alcohol/week: 0.0 standard drinks    Drug use: No        PHYSICAL EXAMINATION:  Vital Signs: (As obtained by patient/caregiver or practitioner observation)  There were no vitals taken for this visit. Respiratory rate appears normal  Constitutional: Appears well-developed and well-nourished. No apparent distress    Mental status: Alert and awake. Oriented to person/place/bandar. Able to follow commands    Eyes: EOM normal. Sclera normal. No discharge visible  HENT: Normocephalic, atraumatic. Mouth/Throat: Mucous membranes are moist. External Ears Normal    Neck: No visualized mass   Pulmonary/Chest: Respiratory effort normal.  No visualized signs of difficulty breathing or respiratory distress        Musculoskeletal:  Normal range of motion of neck  Neurological: No Facial Asymmetry (Cranial nerve 7 motor function) (limited exam to video visit). No gaze palsy       Skin:  No significant exanthematous lesions or discoloration noted on facial skin       Psychiatric: Normal Affect. No Hallucinations          ASSESSMENT/PLAN:  1. COVID  Recommend drinking plenty of fluids, rest as much as possible, tylenol or motrin as needed for body aches, pain or fever. May also use other the counter cold and flu medications such as NyQuil or DayQuil as needed.   If using OTC cough and cold medication avoid tylenol. If you are SYMPTOMATIC:  You must quarantine for five full days from the start of symptoms. At the end of five days if you are continuing to improve you can leave quarantine with strict mask wearing at all times for five more days. If your symptoms have not started to improve, recommend full ten days of quarantine. - nirmatrelvir/ritonavir 300/100 (PAXLOVID, 300/100,) 20 x 150 MG & 10 x 100MG TBPK; Take 3 tablets (two 150 mg nirmatrelvir and one 100 mg ritonavir tablets) by mouth every 12 hours for 5 days. Dispense: 30 tablet; Refill: 0    Return if symptoms worsen or fail to improve. Solomon Mcgarry is a 75 y.o. female being evaluated by a Virtual Visit (video visit) encounter to address concerns as mentioned above. A caregiver was present when appropriate. Due to this being a TeleHealth encounter (During [facility] public health emergency), evaluation of the following organ systems was limited: Vitals/Constitutional/EENT/Resp/CV/GI//MS/Neuro/Skin/Heme-Lymph-Imm. Pursuant to the emergency declaration under the [facility], [address] [place] [address] authority and the [employer] Act, this Virtual Visit was conducted with patient's (and/or legal guardian's) consent, to reduce the patient's risk of exposure to COVID-19 and provide necessary medical care. The patient (and/or legal guardian) has also been advised to contact this office for worsening conditions or problems, and seek emergency medical treatment and/or call 911 if deemed necessary. Patient identification was verified at the start of the visit: Yes    Total time spent on this encounter:  20  minutes    Services were provided through a video synchronous discussion virtually to substitute for in-person clinic visit. Patient and provider were located at their individual homes.     --JOAO Bunch CNP on 2/16/2023 at 12:38 PM    An electronic signature was used to authenticate this note. Adele Xavier

## 2023-02-16 NOTE — TELEPHONE ENCOUNTER
nirmatrelvir/ritonavir 300/100 (PAXLOVID, 300/100,) 20 x 150 MG & 10 x 100MG TBPK     Raysa with [facility] said there possibly could be a drug interaction with these medications:      ARIPiprazole (ABILIFY) 5 MG tablet    butalbital-acetaminophen-caffeine (FIORICET, ESGIC) -40 MG per tablet    DEXILANT 60 MG CPDR delayed release capsule    HYDROcodone-acetaminophen (NORCO) 7.5-325 MG per tablet    traZODone (DESYREL) 50 MG tablet

## 2023-02-17 DIAGNOSIS — G89.4 CHRONIC PAIN SYNDROME: ICD-10-CM

## 2023-02-17 RX ORDER — HYDROCODONE BITARTRATE AND ACETAMINOPHEN 7.5; 325 MG/1; MG/1
1 TABLET ORAL EVERY 6 HOURS PRN
Qty: 120 TABLET | Refills: 0 | Status: SHIPPED | OUTPATIENT
Start: 2023-02-18 | End: 2023-03-20

## 2023-02-17 RX ORDER — HYDROCODONE BITARTRATE AND ACETAMINOPHEN 7.5; 325 MG/1; MG/1
1 TABLET ORAL EVERY 6 HOURS PRN
Qty: 120 TABLET | Refills: 0 | Status: SHIPPED | OUTPATIENT
Start: 2023-02-18 | End: 2023-02-17 | Stop reason: SDUPTHER

## 2023-02-17 NOTE — TELEPHONE ENCOUNTER
Medication and Quantity requested: HYDROcodone-acetaminophen ([address]) 7.5-325 MG per tablet       Last Visit  8.4.22    Pharmacy and phone number updated in EPIC:  yes  Sepideh Bond

## 2023-02-17 NOTE — TELEPHONE ENCOUNTER
Patient called and stated that [facility] [address] is out of the     HYDROcodone-acetaminophen ([address]) 7.5-325 MG per tablet [[id number]      Please send script to the [facility] store [address]

## 2023-02-20 ENCOUNTER — TELEPHONE (OUTPATIENT)
Dept: FAMILY MEDICINE CLINIC | Age: 68
End: 2023-02-20

## 2023-02-20 RX ORDER — CEPHALEXIN 500 MG/1
500 CAPSULE ORAL 3 TIMES DAILY
Qty: 30 CAPSULE | Refills: 0 | Status: SHIPPED | OUTPATIENT
Start: 2023-02-20 | End: 2023-03-02

## 2023-02-20 NOTE — TELEPHONE ENCOUNTER
Patient has Covid  Patient given Paxlovid-patient has 2 days left of medication  Patient states she has severe chest congestion, cough-productive-yellow, 99 temp, head congestion, chest feels tight, walking-SOB  Patient is asking if she should be on an antibiotic?   Ingrid  Contact patient

## 2023-03-03 DIAGNOSIS — G44.89 OTHER HEADACHE SYNDROME: ICD-10-CM

## 2023-03-03 RX ORDER — BUTALBITAL, ACETAMINOPHEN AND CAFFEINE 50; 325; 40 MG/1; MG/1; MG/1
TABLET ORAL
Qty: 40 TABLET | Refills: 3 | Status: SHIPPED | OUTPATIENT
Start: 2023-03-03

## 2023-03-03 NOTE — TELEPHONE ENCOUNTER
Medication and Quantity requested: butalbital-acetaminophen-caffeine (FIORICET, ESGIC) -40 MG per tablet       Last Visit  2/16/2023    Pharmacy and phone number updated in EPIC:  yes

## 2023-03-03 NOTE — TELEPHONE ENCOUNTER
Lov 2/16/23  Lrf 40 3 1/19/23 Medication:   Requested Prescriptions     Pending Prescriptions Disp Refills    butalbital-acetaminophen-caffeine (FIORICET, ESGIC) -40 MG per tablet 40 tablet 3     Sig: TAKE 1 TABLET BY MOUTH EVERY 6 HOURS AS NEEDED        Last Filled:      Patient Phone Number: [phone] (home)     Last appt: 2/16/2023   Next appt: 3/9/2023    Last OARRS:   RX Monitoring 8/4/2022   Attestation -   Periodic Controlled Substance Monitoring No signs of potential drug abuse or diversion identified.

## 2023-03-05 DIAGNOSIS — F33.0 MILD EPISODE OF RECURRENT MAJOR DEPRESSIVE DISORDER (HCC): ICD-10-CM

## 2023-03-06 RX ORDER — BUPROPION HYDROCHLORIDE 150 MG/1
TABLET ORAL
Qty: 60 TABLET | Refills: 5 | Status: SHIPPED | OUTPATIENT
Start: 2023-03-06

## 2023-03-06 NOTE — TELEPHONE ENCOUNTER
Medication:   Requested Prescriptions     Pending Prescriptions Disp Refills    buPROPion (WELLBUTRIN XL) 150 MG extended release tablet [Pharmacy Med Name: BUPROPION XL 150MG TABLETS (24 H)] 60 tablet 5     Sig: TAKE 1 TABLET BY MOUTH TWICE DAILY        Last Filled:      Patient Phone Number: [phone] (home)     Last appt: 2/16/2023   Next appt: 3/9/2023    Last OARRS:   RX Monitoring 8/4/2022   Attestation -   Periodic Controlled Substance Monitoring No signs of potential drug abuse or diversion identified.

## 2023-03-09 ENCOUNTER — OFFICE VISIT (OUTPATIENT)
Dept: FAMILY MEDICINE CLINIC | Age: 68
End: 2023-03-09
Payer: MEDICARE

## 2023-03-09 VITALS
WEIGHT: 117 LBS | BODY MASS INDEX: 17.73 KG/M2 | HEIGHT: 68 IN | HEART RATE: 95 BPM | OXYGEN SATURATION: 98 % | SYSTOLIC BLOOD PRESSURE: 116 MMHG | RESPIRATION RATE: 16 BRPM | DIASTOLIC BLOOD PRESSURE: 71 MMHG

## 2023-03-09 DIAGNOSIS — J45.20 MILD INTERMITTENT ASTHMA WITHOUT COMPLICATION: ICD-10-CM

## 2023-03-09 DIAGNOSIS — G89.4 CHRONIC PAIN SYNDROME: Primary | ICD-10-CM

## 2023-03-09 DIAGNOSIS — U07.1 COVID: ICD-10-CM

## 2023-03-09 DIAGNOSIS — F33.0 MILD EPISODE OF RECURRENT MAJOR DEPRESSIVE DISORDER (HCC): ICD-10-CM

## 2023-03-09 DIAGNOSIS — I42.8 CARDIOMYOPATHY, NONISCHEMIC (HCC): ICD-10-CM

## 2023-03-09 PROCEDURE — G8427 DOCREV CUR MEDS BY ELIG CLIN: HCPCS | Performed by: FAMILY MEDICINE

## 2023-03-09 PROCEDURE — 1036F TOBACCO NON-USER: CPT | Performed by: FAMILY MEDICINE

## 2023-03-09 PROCEDURE — 1090F PRES/ABSN URINE INCON ASSESS: CPT | Performed by: FAMILY MEDICINE

## 2023-03-09 PROCEDURE — 3017F COLORECTAL CA SCREEN DOC REV: CPT | Performed by: FAMILY MEDICINE

## 2023-03-09 PROCEDURE — G8400 PT W/DXA NO RESULTS DOC: HCPCS | Performed by: FAMILY MEDICINE

## 2023-03-09 PROCEDURE — G8484 FLU IMMUNIZE NO ADMIN: HCPCS | Performed by: FAMILY MEDICINE

## 2023-03-09 PROCEDURE — 1123F ACP DISCUSS/DSCN MKR DOCD: CPT | Performed by: FAMILY MEDICINE

## 2023-03-09 PROCEDURE — 99214 OFFICE O/P EST MOD 30 MIN: CPT | Performed by: FAMILY MEDICINE

## 2023-03-09 PROCEDURE — G8419 CALC BMI OUT NRM PARAM NOF/U: HCPCS | Performed by: FAMILY MEDICINE

## 2023-03-09 ASSESSMENT — ENCOUNTER SYMPTOMS
BACK PAIN: 1
GASTROINTESTINAL NEGATIVE: 1
SHORTNESS OF BREATH: 0
WHEEZING: 0

## 2023-03-17 DIAGNOSIS — G89.4 CHRONIC PAIN SYNDROME: ICD-10-CM

## 2023-03-17 RX ORDER — HYDROCODONE BITARTRATE AND ACETAMINOPHEN 7.5; 325 MG/1; MG/1
1 TABLET ORAL EVERY 6 HOURS PRN
Qty: 120 TABLET | Refills: 0 | Status: SHIPPED | OUTPATIENT
Start: 2023-03-17 | End: 2023-04-16

## 2023-03-17 NOTE — TELEPHONE ENCOUNTER
Medication and Quantity requested: HYDROcodone-acetaminophen (NORCO) 7.5-325 MG per tablet        Last Visit  3/9/2023    Pharmacy and phone number updated in EPIC:  yes

## 2023-04-10 DIAGNOSIS — G44.89 OTHER HEADACHE SYNDROME: ICD-10-CM

## 2023-04-10 RX ORDER — BUTALBITAL, ACETAMINOPHEN AND CAFFEINE 50; 325; 40 MG/1; MG/1; MG/1
TABLET ORAL
Qty: 40 TABLET | OUTPATIENT
Start: 2023-04-10

## 2023-04-17 DIAGNOSIS — G44.89 OTHER HEADACHE SYNDROME: ICD-10-CM

## 2023-04-17 DIAGNOSIS — G89.4 CHRONIC PAIN SYNDROME: ICD-10-CM

## 2023-04-17 RX ORDER — HYDROCODONE BITARTRATE AND ACETAMINOPHEN 7.5; 325 MG/1; MG/1
1 TABLET ORAL EVERY 6 HOURS PRN
Qty: 120 TABLET | Refills: 0 | Status: SHIPPED | OUTPATIENT
Start: 2023-04-17 | End: 2023-05-17

## 2023-04-17 RX ORDER — BUTALBITAL, ACETAMINOPHEN AND CAFFEINE 50; 325; 40 MG/1; MG/1; MG/1
TABLET ORAL
Qty: 40 TABLET | Refills: 3 | Status: SHIPPED | OUTPATIENT
Start: 2023-04-17

## 2023-04-17 NOTE — TELEPHONE ENCOUNTER
Medication and Quantity requested:      butalbital-acetaminophen-caffeine (FIORICET, ESGIC) -40 MG per tablet [[id number]]    AND  HYDROcodone-acetaminophen (NORCO) 7.5-325 MG per tablet [[id number]]          Last Visit  03/09/2023      Pharmacy and phone number updated in EPIC:  yes  Ingrid drake

## 2023-04-17 NOTE — TELEPHONE ENCOUNTER
Medication:   Requested Prescriptions     Pending Prescriptions Disp Refills    butalbital-acetaminophen-caffeine (FIORICET, ESGIC) -40 MG per tablet 40 tablet 3     Sig: TAKE 1 TABLET BY MOUTH EVERY 6 HOURS AS NEEDED    HYDROcodone-acetaminophen (NORCO) 7.5-325 MG per tablet 120 tablet 0     Sig: Take 1 tablet by mouth every 6 hours as needed for Pain for up to 30 days. Last Filled:  120 X 0 rf 4/16/23      Patient Phone Number: [phone] (home)     Last appt: 3/9/2023   Next appt: 6/9/2023    Last OARRS:   RX Monitoring 3/9/2023   Attestation -   Periodic Controlled Substance Monitoring No signs of potential drug abuse or diversion identified.

## 2023-05-04 DIAGNOSIS — F33.0 MILD EPISODE OF RECURRENT MAJOR DEPRESSIVE DISORDER (HCC): ICD-10-CM

## 2023-05-04 RX ORDER — ARIPIPRAZOLE 5 MG/1
5 TABLET ORAL DAILY
Qty: 90 TABLET | Refills: 3 | Status: SHIPPED | OUTPATIENT
Start: 2023-05-04

## 2023-05-04 NOTE — TELEPHONE ENCOUNTER
Medication:   Requested Prescriptions     Pending Prescriptions Disp Refills    ARIPiprazole (ABILIFY) 5 MG tablet [Pharmacy Med Name: ARIPIPRAZOLE 5MG TABLETS] 90 tablet 3     Sig: TAKE 1 TABLET BY MOUTH DAILY        Last Filled:  5/12/2022, 90, 3    Patient Phone Number: [phone] (home)     Last appt: 3/9/2023   Next appt: 6/9/2023    Last OARRS:   RX Monitoring 3/9/2023   Attestation -   Periodic Controlled Substance Monitoring No signs of potential drug abuse or diversion identified.

## 2023-05-16 DIAGNOSIS — G89.4 CHRONIC PAIN SYNDROME: ICD-10-CM

## 2023-05-16 RX ORDER — HYDROCODONE BITARTRATE AND ACETAMINOPHEN 7.5; 325 MG/1; MG/1
1 TABLET ORAL EVERY 6 HOURS PRN
Qty: 120 TABLET | Refills: 0 | Status: SHIPPED | OUTPATIENT
Start: 2023-05-16 | End: 2023-06-15

## 2023-06-01 DIAGNOSIS — G44.89 OTHER HEADACHE SYNDROME: ICD-10-CM

## 2023-06-01 RX ORDER — BUTALBITAL, ACETAMINOPHEN AND CAFFEINE 50; 325; 40 MG/1; MG/1; MG/1
TABLET ORAL
Qty: 40 TABLET | Refills: 3 | Status: SHIPPED | OUTPATIENT
Start: 2023-06-01

## 2023-06-01 NOTE — TELEPHONE ENCOUNTER
Medication:   Requested Prescriptions     Pending Prescriptions Disp Refills    butalbital-acetaminophen-caffeine (FIORICET, ESGIC) -40 MG per tablet [Pharmacy Med Name: BUT/ACETAMINOPHEN/CAFF -40 TB] 40 tablet 3     Sig: TAKE 1 TABLET BY MOUTH EVERY 6 HOURS AS NEEDED          Patient Phone Number: [phone] (home)     Last appt: 3/9/2023   Next appt: 6/9/2023    Last OARRS:   RX Monitoring 5/16/2023   Attestation -   Periodic Controlled Substance Monitoring No signs of potential drug abuse or diversion identified.

## 2023-06-09 ENCOUNTER — OFFICE VISIT (OUTPATIENT)
Dept: FAMILY MEDICINE CLINIC | Age: 68
End: 2023-06-09
Payer: MEDICARE

## 2023-06-09 VITALS
DIASTOLIC BLOOD PRESSURE: 92 MMHG | SYSTOLIC BLOOD PRESSURE: 140 MMHG | OXYGEN SATURATION: 97 % | BODY MASS INDEX: 17.3 KG/M2 | HEART RATE: 78 BPM | WEIGHT: 113.8 LBS

## 2023-06-09 DIAGNOSIS — J45.20 MILD INTERMITTENT ASTHMA WITHOUT COMPLICATION: ICD-10-CM

## 2023-06-09 DIAGNOSIS — I42.8 CARDIOMYOPATHY, NONISCHEMIC (HCC): ICD-10-CM

## 2023-06-09 DIAGNOSIS — F33.0 MILD EPISODE OF RECURRENT MAJOR DEPRESSIVE DISORDER (HCC): ICD-10-CM

## 2023-06-09 DIAGNOSIS — G89.4 CHRONIC PAIN SYNDROME: Primary | ICD-10-CM

## 2023-06-09 PROCEDURE — 1090F PRES/ABSN URINE INCON ASSESS: CPT | Performed by: FAMILY MEDICINE

## 2023-06-09 PROCEDURE — 99214 OFFICE O/P EST MOD 30 MIN: CPT | Performed by: FAMILY MEDICINE

## 2023-06-09 PROCEDURE — G8419 CALC BMI OUT NRM PARAM NOF/U: HCPCS | Performed by: FAMILY MEDICINE

## 2023-06-09 PROCEDURE — 3017F COLORECTAL CA SCREEN DOC REV: CPT | Performed by: FAMILY MEDICINE

## 2023-06-09 PROCEDURE — 1036F TOBACCO NON-USER: CPT | Performed by: FAMILY MEDICINE

## 2023-06-09 PROCEDURE — G8427 DOCREV CUR MEDS BY ELIG CLIN: HCPCS | Performed by: FAMILY MEDICINE

## 2023-06-09 PROCEDURE — 1123F ACP DISCUSS/DSCN MKR DOCD: CPT | Performed by: FAMILY MEDICINE

## 2023-06-09 PROCEDURE — G8400 PT W/DXA NO RESULTS DOC: HCPCS | Performed by: FAMILY MEDICINE

## 2023-06-09 RX ORDER — HYDROCODONE BITARTRATE AND ACETAMINOPHEN 7.5; 325 MG/1; MG/1
1 TABLET ORAL EVERY 6 HOURS PRN
Qty: 120 TABLET | Refills: 0 | Status: SHIPPED | OUTPATIENT
Start: 2023-06-09 | End: 2023-07-09

## 2023-06-09 RX ORDER — NITROGLYCERIN 0.4 MG/1
TABLET SUBLINGUAL
Qty: 25 TABLET | Refills: 2 | Status: SHIPPED | OUTPATIENT
Start: 2023-06-09

## 2023-06-09 ASSESSMENT — ENCOUNTER SYMPTOMS
WHEEZING: 1
BACK PAIN: 1
GASTROINTESTINAL NEGATIVE: 1

## 2023-06-09 NOTE — PROGRESS NOTES
Sonya Patricia (:  1955) is a 70 y.o. female,Established patient, here for evaluation of the following chief complaint(s):  Follow-up (3 month check up)         ASSESSMENT/PLAN:    Chronic pain syndrome  -     HYDROcodone-acetaminophen (NORCO) 7.5-325 MG per tablet; Take 1 tablet by mouth every 6 hours as needed for Pain for up to 30 days. OARRS report reviewed and no inconsistencies noted   The patient understands the risks of dependency/addiction with hydrocodone and will take as little as possible and discontinue as soon as possible   Cardiomyopathy, nonischemic (HCC)  -     nitroGLYCERIN (NITROSTAT) 0.4 MG SL tablet; PLACE 1 TABLET UNDER THE TONGUE EVERY 5 MINUTES AS NEEDED FOR CHEST PAIN  Patient will schedule an upcoming appointment in approximate month with cardiology  Mild episode of recurrent major depressive disorder ([facility] [address]  Reasonably well controlled  Continue current treatment   Mild intermittent asthma without complication  Reasonably well controlled  Continue current treatment        Return in about 4 weeks (around 2023) for AWV. Subjective   SUBJECTIVE/OBJECTIVE:  HPI  Chronic pain syndrome: Patient continues with the same amount of pain. She requires hydrocodone on a routine basis. She states if she wants to remain active she needs to continue to take this. Cardiomyopathy: Patient states she is not having any symptoms. She sees the cardiologist NP in the near future. She states she rarely needs her nitroglycerin but the prescription she has is out of date. She states she has probably only taken 2 of them since she has had them. Depression: Patient continues on Wellbutrin and Abilify and continues to do generally well. She states her back pain is somewhat depressing to her but she states she handles it. Asthma: Patient states this has been generally stable and has not had any significant issues lately. Review of Systems   Constitutional: Negative.     Respiratory:

## 2023-06-23 ENCOUNTER — OFFICE VISIT (OUTPATIENT)
Dept: FAMILY MEDICINE CLINIC | Age: 68
End: 2023-06-23

## 2023-06-23 VITALS
HEIGHT: 68 IN | SYSTOLIC BLOOD PRESSURE: 110 MMHG | TEMPERATURE: 97.7 F | DIASTOLIC BLOOD PRESSURE: 66 MMHG | HEART RATE: 90 BPM | BODY MASS INDEX: 17.03 KG/M2 | WEIGHT: 112.38 LBS

## 2023-06-23 DIAGNOSIS — Z01.818 PRE-OP EXAM: Primary | ICD-10-CM

## 2023-06-23 NOTE — PROGRESS NOTES
[address] Note    Date: 6/23/2023                                               Brianne Xie:     Chief Complaint   Patient presents with    Pre-op Exam       HPI  Patient is here for preop exam.  Is having cataract surgery on 7/10/23 with Dr. Shantal El. Patient can walk a good distance without chest pain or shortness of breath. Can climb stairs and do moderate housework. Denies any personal or family history of blood clots. Does have hx of vomiting with anesthesia.          Patient Active Problem List    Diagnosis Date Noted    Pericardial effusion 05/06/2013    Cardiomyopathy ([facility] [address] 05/06/2013    Mild episode of recurrent major depressive disorder ([facility] [address] 07/15/2021    Bilateral carotid artery stenosis 02/26/2020    Epigastric pain 02/26/2020    PONV (postoperative nausea and vomiting) 04/16/2019    Incisional hernia, without obstruction or gangrene     Intractable cluster headache syndrome 01/07/2019    Mucous cyst of finger 03/14/2018    Mild intermittent asthma without complication 45/08/8202    Acute bronchitis 05/09/2017    Allergic sinusitis 05/09/2017    Chronic pain syndrome 12/16/2016    Facet arthropathy 12/16/2016    Central spinal stenosis 12/16/2016    Failed back syndrome 12/16/2016    Depression 12/16/2016    Primary insomnia 12/16/2016    History of MI (myocardial infarction) 05/10/2016    Diarrhea 01/12/2016    Neuritis 04/28/2015    Pyelonephritis 01/04/2015    Choledocholithiasis 01/04/2015    Elevated sed rate 12/23/2014    Allergic rhinitis 10/28/2014    Gastroenteritis 03/10/2014    Hypokalemia 03/10/2014    Vertigo, labyrinthine 12/09/2013    Facial nerve paralysis 12/09/2013    IBS (irritable bowel syndrome) 04/04/2013    Abdominal pain, generalized 03/25/2013    Colitis 02/15/2013    Family history of breast cancer in first degree relative     Chronic pain     Glaucoma        Past Medical History:   Diagnosis Date    Arthritis     fingers, hands    Asthma     Back pain

## 2023-07-07 ENCOUNTER — HOSPITAL ENCOUNTER (OUTPATIENT)
Dept: CARDIOLOGY | Age: 68
Discharge: HOME | End: 2023-07-07
Payer: MEDICARE

## 2023-07-07 DIAGNOSIS — I34.0 NONRHEUMATIC MITRAL VALVE REGURGITATION: ICD-10-CM

## 2023-07-07 LAB
LV EF: 55 %
LVEF MODALITY: NORMAL

## 2023-07-07 PROCEDURE — 93306 TTE W/DOPPLER COMPLETE: CPT

## 2023-07-11 ENCOUNTER — OFFICE VISIT (OUTPATIENT)
Dept: CARDIOLOGY CLINIC | Age: 68
End: 2023-07-11
Payer: MEDICARE

## 2023-07-11 VITALS
OXYGEN SATURATION: 97 % | HEIGHT: 68 IN | DIASTOLIC BLOOD PRESSURE: 72 MMHG | BODY MASS INDEX: 16.51 KG/M2 | WEIGHT: 108.9 LBS | HEART RATE: 83 BPM | SYSTOLIC BLOOD PRESSURE: 144 MMHG

## 2023-07-11 DIAGNOSIS — I34.0 NONRHEUMATIC MITRAL VALVE REGURGITATION: Primary | ICD-10-CM

## 2023-07-11 DIAGNOSIS — R42 DIZZY: ICD-10-CM

## 2023-07-11 DIAGNOSIS — I42.8 CARDIOMYOPATHY, NONISCHEMIC (HCC): ICD-10-CM

## 2023-07-11 DIAGNOSIS — I31.39 PERICARDIAL EFFUSION: ICD-10-CM

## 2023-07-11 DIAGNOSIS — R00.2 PALPITATIONS: ICD-10-CM

## 2023-07-11 PROCEDURE — 3017F COLORECTAL CA SCREEN DOC REV: CPT | Performed by: NURSE PRACTITIONER

## 2023-07-11 PROCEDURE — 1123F ACP DISCUSS/DSCN MKR DOCD: CPT | Performed by: NURSE PRACTITIONER

## 2023-07-11 PROCEDURE — G8427 DOCREV CUR MEDS BY ELIG CLIN: HCPCS | Performed by: NURSE PRACTITIONER

## 2023-07-11 PROCEDURE — G8400 PT W/DXA NO RESULTS DOC: HCPCS | Performed by: NURSE PRACTITIONER

## 2023-07-11 PROCEDURE — 1090F PRES/ABSN URINE INCON ASSESS: CPT | Performed by: NURSE PRACTITIONER

## 2023-07-11 PROCEDURE — 1036F TOBACCO NON-USER: CPT | Performed by: NURSE PRACTITIONER

## 2023-07-11 PROCEDURE — 99214 OFFICE O/P EST MOD 30 MIN: CPT | Performed by: NURSE PRACTITIONER

## 2023-07-11 PROCEDURE — G8419 CALC BMI OUT NRM PARAM NOF/U: HCPCS | Performed by: NURSE PRACTITIONER

## 2023-07-11 NOTE — PROGRESS NOTES
[facility]     Outpatient Follow Up Note    Zuleyma Michelle is 70 y.o. 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 CHIEF COMPLAINT / HPI:  Follow Up secondary to NICM    Subjective:   She's had chest discomfort attributed to worried about the surgery she had yesterday (Rt cataract with lens implant). She hasn't been taking her pain meds since she was NPO which leaves her BP up. It was 139/79. Her normal is 102/62. She has problems with feeling light headed when getting up to fast    She has no SOB. She denies orthopnea/PND. She has fluttering which is pretty much daily and non-bothersome. Her  says that its gotten worse. She has CLBP. Her pain was in her upper back; like a alfred horse. The patient does not have swelling. She gets light headed if she stands too quickly    These symptoms show no change since the last OV. With regard to medication therapy the patient has been compliant with prescribed regimen. They have tolerated therapy to date.      Past Medical History:   Diagnosis Date    Arthritis     fingers, hands    Asthma     Back pain     Bell's palsy     CHF (congestive heart failure) (HCC)     Chronic pain     BACK SURGERY X4, neck, left arm    Colitis 2013    Depression     Elevated sed rate     Family history of breast cancer in first degree relative     Gastric ulcer     GERD (gastroesophageal reflux disease)     colitis    Glaucoma     Hypertension     Movement disorder     chronic back pain    Neuromuscular disorder (HCC)     sciatica rt. leg    Osteoarthritis     PONV (postoperative nausea and vomiting)     SEVERE     Social History:    Social History     Tobacco Use   Smoking Status Former    Packs/day: 0.25    Years: 20.00    Pack years: 5.00    Types: Cigarettes    Quit date: 2016    Years since quittin.2   Smokeless Tobacco Never     Current Medications:  Current Outpatient Medications

## 2023-07-12 ENCOUNTER — HOSPITAL ENCOUNTER (OUTPATIENT)
Age: 68
Discharge: HOME | End: 2023-07-12
Payer: MEDICARE

## 2023-07-12 ENCOUNTER — HOSPITAL ENCOUNTER (OUTPATIENT)
Dept: GENERAL RADIOLOGY | Age: 68
Discharge: HOME | End: 2023-07-12
Payer: MEDICARE

## 2023-07-12 ENCOUNTER — OFFICE VISIT (OUTPATIENT)
Dept: FAMILY MEDICINE CLINIC | Age: 68
End: 2023-07-12
Payer: MEDICARE

## 2023-07-12 ENCOUNTER — TELEPHONE (OUTPATIENT)
Dept: FAMILY MEDICINE CLINIC | Age: 68
End: 2023-07-12

## 2023-07-12 VITALS
SYSTOLIC BLOOD PRESSURE: 98 MMHG | WEIGHT: 107.4 LBS | BODY MASS INDEX: 16.33 KG/M2 | OXYGEN SATURATION: 99 % | HEART RATE: 110 BPM | DIASTOLIC BLOOD PRESSURE: 68 MMHG

## 2023-07-12 DIAGNOSIS — S61.217A LACERATION OF LEFT LITTLE FINGER WITHOUT DAMAGE TO NAIL, FOREIGN BODY PRESENCE UNSPECIFIED, INITIAL ENCOUNTER: ICD-10-CM

## 2023-07-12 DIAGNOSIS — S52.002A CLOSED FRACTURE OF PROXIMAL END OF LEFT ULNA, UNSPECIFIED FRACTURE MORPHOLOGY, INITIAL ENCOUNTER: Primary | ICD-10-CM

## 2023-07-12 DIAGNOSIS — M79.642 LEFT HAND PAIN: ICD-10-CM

## 2023-07-12 DIAGNOSIS — M25.522 LEFT ELBOW PAIN: ICD-10-CM
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
## 2023-07-12 PROCEDURE — 1036F TOBACCO NON-USER: CPT | Performed by: NURSE PRACTITIONER

## 2023-07-12 PROCEDURE — 73130 X-RAY EXAM OF HAND: CPT

## 2023-07-12 PROCEDURE — 73080 X-RAY EXAM OF ELBOW: CPT

## 2023-07-12 PROCEDURE — G8400 PT W/DXA NO RESULTS DOC: HCPCS | Performed by: NURSE PRACTITIONER

## 2023-07-12 PROCEDURE — 1090F PRES/ABSN URINE INCON ASSESS: CPT | Performed by: NURSE PRACTITIONER

## 2023-07-12 PROCEDURE — 99214 OFFICE O/P EST MOD 30 MIN: CPT | Performed by: NURSE PRACTITIONER

## 2023-07-12 PROCEDURE — G8427 DOCREV CUR MEDS BY ELIG CLIN: HCPCS | Performed by: NURSE PRACTITIONER

## 2023-07-12 PROCEDURE — 3017F COLORECTAL CA SCREEN DOC REV: CPT | Performed by: NURSE PRACTITIONER

## 2023-07-12 PROCEDURE — G8419 CALC BMI OUT NRM PARAM NOF/U: HCPCS | Performed by: NURSE PRACTITIONER

## 2023-07-12 PROCEDURE — 90471 IMMUNIZATION ADMIN: CPT | Performed by: NURSE PRACTITIONER

## 2023-07-12 PROCEDURE — 90715 TDAP VACCINE 7 YRS/> IM: CPT | Performed by: NURSE PRACTITIONER

## 2023-07-12 PROCEDURE — 1123F ACP DISCUSS/DSCN MKR DOCD: CPT | Performed by: NURSE PRACTITIONER

## 2023-07-12 ASSESSMENT — PATIENT HEALTH QUESTIONNAIRE - PHQ9
7. TROUBLE CONCENTRATING ON THINGS, SUCH AS READING THE NEWSPAPER OR WATCHING TELEVISION: 0
9. THOUGHTS THAT YOU WOULD BE BETTER OFF DEAD, OR OF HURTING YOURSELF: 0
1. LITTLE INTEREST OR PLEASURE IN DOING THINGS: 0
10. IF YOU CHECKED OFF ANY PROBLEMS, HOW DIFFICULT HAVE THESE PROBLEMS MADE IT FOR YOU TO DO YOUR WORK, TAKE CARE OF THINGS AT HOME, OR GET ALONG WITH OTHER PEOPLE: 0
SUM OF ALL RESPONSES TO PHQ QUESTIONS 1-9: 0
5. POOR APPETITE OR OVEREATING: 0
6. FEELING BAD ABOUT YOURSELF - OR THAT YOU ARE A FAILURE OR HAVE LET YOURSELF OR YOUR FAMILY DOWN: 0
4. FEELING TIRED OR HAVING LITTLE ENERGY: 0
3. TROUBLE FALLING OR STAYING ASLEEP: 0
SUM OF ALL RESPONSES TO PHQ QUESTIONS 1-9: 0
SUM OF ALL RESPONSES TO PHQ9 QUESTIONS 1 & 2: 0
2. FEELING DOWN, DEPRESSED OR HOPELESS: 0
SUM OF ALL RESPONSES TO PHQ QUESTIONS 1-9: 0
SUM OF ALL RESPONSES TO PHQ QUESTIONS 1-9: 0
8. MOVING OR SPEAKING SO SLOWLY THAT OTHER PEOPLE COULD HAVE NOTICED. OR THE OPPOSITE, BEING SO FIGETY OR RESTLESS THAT YOU HAVE BEEN MOVING AROUND A LOT MORE THAN USUAL: 0

## 2023-07-12 NOTE — PROGRESS NOTES
SUBJECTIVE:  Pt is a of 70 y.o. female comes in today with   Chief Complaint   Patient presents with    Fall     Pt states she have left wrist and elbow pain      Presenting today for evaluation of fall. Tripped over board on sidewalk yesterday and fell. States left elbow and right hand fit first and then knees. Cuts to left hand. Removed glass from tip of left pinky. Bleeding has stopped. Pain in left hand and elbow. Hurts to make a fist. Decreased ROM of left elbow. Denies LOC or head injury. Cataract surgery 7/10/23 and check up yesterday prior to fall was normal.       Prior to Visit Medications    Medication Sig Taking?  Authorizing Provider   nitroGLYCERIN (NITROSTAT) 0.4 MG SL tablet PLACE 1 TABLET UNDER THE TONGUE EVERY 5 MINUTES AS NEEDED FOR CHEST PAIN Yes Dinorah Mckee MD   butalbital-acetaminophen-caffeine (FIORICET, ESGIC) -42 MG per tablet TAKE 1 TABLET BY MOUTH EVERY 6 HOURS AS NEEDED Yes Dinorah Mckee MD   sertraline (ZOLOFT) 100 MG tablet TAKE 1 TABLET BY MOUTH THREE TIMES DAILY Yes Dinorah Mckee MD   albuterol sulfate HFA (PROVENTIL;VENTOLIN;PROAIR) 108 (90 Base) MCG/ACT inhaler INHALE 2 PUFFS BY MOUTH EVERY 4 HOURS AS NEEDED FOR WHEEZING Yes Dinorah Mckee MD   tiZANidine (ZANAFLEX) 4 MG tablet TAKE 1 TABLET BY MOUTH THREE TIMES DAILY Yes Boubacar Harris MD   diclofenac sodium (VOLTAREN) 1 % GEL APPLY 2 GRAMS TOPICALLY TO HAND FOUR TIMES DAILY Yes Boubacar Harris MD   traZODone (DESYREL) 50 MG tablet TAKE 2 TABLETS BY MOUTH EVERY NIGHT Yes Dinorah Mckee MD   DEXILANT 60 MG CPDR delayed release capsule TAKE 1 CAPSULE BY MOUTH DAILY Yes Dinorah Mckee MD   albuterol (PROVENTIL) (2.5 MG/3ML) 0.083% nebulizer solution USE 1 VIAL VIA NEBULIZER EVERY 6 HOURS Yes Dinorah Mckee MD   Probiotic Product (PROBIOTIC PO) Take by mouth daily  Yes Lyla Ellis MD   fluticasone (FLOVENT HFA) 110 MCG/ACT inhaler INHALE 1 PUFF INTO THE LUNGS TWICE DAILY Yes Dinorah Mckee MD   Nutritional Supplements

## 2023-07-12 NOTE — TELEPHONE ENCOUNTER
Pt took a fall yesterday and her left arm is bothering her and would like to be seen by any provider if possible since lambert is out or something tomorrow morning with lambert if he can squeeze her in.

## 2023-07-13 ENCOUNTER — OFFICE VISIT (OUTPATIENT)
Dept: ORTHOPEDIC SURGERY | Age: 68
End: 2023-07-13

## 2023-07-13 VITALS — BODY MASS INDEX: 16.37 KG/M2 | WEIGHT: 108 LBS | HEIGHT: 68 IN

## 2023-07-13 DIAGNOSIS — G44.89 OTHER HEADACHE SYNDROME: ICD-10-CM

## 2023-07-13 DIAGNOSIS — S52.002A CLOSED FRACTURE OF PROXIMAL END OF LEFT ULNA, UNSPECIFIED FRACTURE MORPHOLOGY, INITIAL ENCOUNTER: ICD-10-CM

## 2023-07-13 DIAGNOSIS — S63.502A WRIST SPRAIN, LEFT, INITIAL ENCOUNTER: Primary | ICD-10-CM

## 2023-07-13 DIAGNOSIS — G89.4 CHRONIC PAIN SYNDROME: ICD-10-CM

## 2023-07-13 RX ORDER — BUTALBITAL, ACETAMINOPHEN AND CAFFEINE 50; 325; 40 MG/1; MG/1; MG/1
1 TABLET ORAL EVERY 6 HOURS PRN
Qty: 40 TABLET | Refills: 3 | Status: SHIPPED | OUTPATIENT
Start: 2023-07-13

## 2023-07-13 RX ORDER — HYDROCODONE BITARTRATE AND ACETAMINOPHEN 7.5; 325 MG/1; MG/1
1 TABLET ORAL EVERY 6 HOURS PRN
Qty: 120 TABLET | Refills: 0 | Status: SHIPPED | OUTPATIENT
Start: 2023-07-13 | End: 2023-08-12

## 2023-07-13 NOTE — TELEPHONE ENCOUNTER
Medication:   Requested Prescriptions     Pending Prescriptions Disp Refills    butalbital-acetaminophen-caffeine (FIORICET, ESGIC) -40 MG per tablet 40 tablet 3     Sig: Take 1 tablet by mouth every 6 hours as needed    HYDROcodone-acetaminophen (NORCO) 7.5-325 MG per tablet 120 tablet 0     Sig: Take 1 tablet by mouth every 6 hours as needed for Pain for up to 30 days. Last Filled:  120 x 0 RF 6/9/23  120 x 0 RF 5/16/23    Patient Phone Number: [phone] (home)     Last appt: 7/12/2023   Next appt: Visit date not found    Last OARRS:   RX Monitoring 6/9/2023   Attestation -   Periodic Controlled Substance Monitoring No signs of potential drug abuse or diversion identified.

## 2023-07-13 NOTE — PROGRESS NOTES
Ms. Dewey Roberts is a 70 y.o. right handed woman  who is seen today in Hand Surgical Consultation at the request of Leeann Iqbal MD.    She is seen today regarding an injury occurring on July 11th, 2023. She reports injuring her left Elbow, having Tripped on an obstacle while walking. She did not note any neurologic symptoms at that time. She was not seen for Emergency evaluation elsewhere where radiographs were obtained & she was immobilized. She reports moderate pain located in the  medial aspect of the distal arm & elbow, there is  tenderness at the wrist, no tenderness elsewhere in the injured extremity. She notes today, no neurologic symptoms in the Whole Hand. Symptoms show no change over time. The patient did fall onto an outstretched hand before she hit her elbow. She is also complaining of left dorsal wrist pain. I have today reviewed with Dewey Roberts the clinically relevant, past medical history, medications, allergies,  family history, social history, and Review Of Systems & I have documented any details relevant to today's presenting complaints in my history above. Ms. Edi Hazel self-reported past medical history, medications, allergies,  family history, social history, and Review Of Systems have been scanned into the chart under the \"Media\" tab. Physical Exam:  Ms. Edi Hazel most recent vitals:  Vitals  Height: 5' 8\" (172.7 cm)  Weight - Scale: 108 lb (49 kg)    She is well nourished, oriented to person, place & time. She demonstrates appropriate mood and affect as well as normal gait and station.     Skin: Normal in appearance, Normal Color, Normal Texture, Free of Lesions, and moderate Ecchymosis in the injured limb, normal on the contralateral side  Digital range of motion is without significant limitation and limited by pain on the Left, normal on the Right  Wrist range of motion is limited by pain and without significant limitation on the Left, normal

## 2023-07-13 NOTE — TELEPHONE ENCOUNTER
Medication and Quantity requested:  HYDROcodone-acetaminophen (NORCO) 7.5-325 MG per tablet     butalbital-acetaminophen-caffeine (FIORICET, ESGIC) -40 MG per tablet    (2 Medications)      Last Visit  07/12/23 - [facility] and phone number updated in EPIC:    [place]

## 2023-07-17 DIAGNOSIS — G89.4 CHRONIC PAIN SYNDROME: Primary | ICD-10-CM

## 2023-07-17 RX ORDER — GABAPENTIN 300 MG/1
CAPSULE ORAL
Qty: 90 CAPSULE | Refills: 5 | Status: SHIPPED | OUTPATIENT
Start: 2023-07-17 | End: 2023-08-17

## 2023-07-17 NOTE — TELEPHONE ENCOUNTER
Medication:   Requested Prescriptions     Pending Prescriptions Disp Refills    gabapentin (NEURONTIN) 300 MG capsule [Pharmacy Med Name: GABAPENTIN 300MG CAPSULES] 90 capsule 5     Sig: TAKE 1 CAPSULE BY MOUTH THREE TIMES DAILY          Last appt: 7/12/2023   Next appt: Visit date not found    Last OARRS:   RX Monitoring 6/9/2023   Attestation -   Periodic Controlled Substance Monitoring No signs of potential drug abuse or diversion identified.

## 2023-07-26 ENCOUNTER — OFFICE VISIT (OUTPATIENT)
Dept: CARDIOLOGY CLINIC | Age: 68
End: 2023-07-26
Payer: MEDICARE

## 2023-07-26 VITALS
DIASTOLIC BLOOD PRESSURE: 64 MMHG | BODY MASS INDEX: 16.57 KG/M2 | WEIGHT: 109.3 LBS | HEART RATE: 73 BPM | HEIGHT: 68 IN | SYSTOLIC BLOOD PRESSURE: 102 MMHG | OXYGEN SATURATION: 97 %

## 2023-07-26 DIAGNOSIS — I42.8 CARDIOMYOPATHY, NONISCHEMIC (HCC): ICD-10-CM

## 2023-07-26 DIAGNOSIS — I34.0 NONRHEUMATIC MITRAL VALVE REGURGITATION: ICD-10-CM

## 2023-07-26 DIAGNOSIS — I47.1 PAT (PAROXYSMAL ATRIAL TACHYCARDIA) (HCC): Primary | ICD-10-CM

## 2023-07-26 DIAGNOSIS — I31.39 PERICARDIAL EFFUSION: ICD-10-CM

## 2023-07-26 DIAGNOSIS — R42 DIZZY: ICD-10-CM

## 2023-07-26 PROCEDURE — 3017F COLORECTAL CA SCREEN DOC REV: CPT | Performed by: NURSE PRACTITIONER

## 2023-07-26 PROCEDURE — G8419 CALC BMI OUT NRM PARAM NOF/U: HCPCS | Performed by: NURSE PRACTITIONER

## 2023-07-26 PROCEDURE — 1123F ACP DISCUSS/DSCN MKR DOCD: CPT | Performed by: NURSE PRACTITIONER

## 2023-07-26 PROCEDURE — 99214 OFFICE O/P EST MOD 30 MIN: CPT | Performed by: NURSE PRACTITIONER

## 2023-07-26 PROCEDURE — G8400 PT W/DXA NO RESULTS DOC: HCPCS | Performed by: NURSE PRACTITIONER

## 2023-07-26 PROCEDURE — 1090F PRES/ABSN URINE INCON ASSESS: CPT | Performed by: NURSE PRACTITIONER

## 2023-07-26 PROCEDURE — 1036F TOBACCO NON-USER: CPT | Performed by: NURSE PRACTITIONER

## 2023-07-26 PROCEDURE — G8427 DOCREV CUR MEDS BY ELIG CLIN: HCPCS | Performed by: NURSE PRACTITIONER

## 2023-07-26 NOTE — PROGRESS NOTES
[facility]     Outpatient Follow Up Note    Pavithra Serra is 70 y.o. 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 CHIEF COMPLAINT / HPI:  Follow Up secondary to fluttering ; her holter showed symptoms with sinus rhythm. However she had five short episodes of PAT    Subjective:   She continues to feel light headed if she gets up too quick. Her BP this morning was 137/83 attributed to pain. It usually is 102-110/60  She fell fracturing her Lt elbow and sprained her Lt wrist. She denies feeling dizzy just tripped. She misjudged her footing after removing her eye patch (s/p cataract surgery)    She has no SOB. She denies orthopnea/PND. She has fluttering which is pretty much daily and non-bothersome. She's noticed that with exertion her HR goes up     She has CLBP. Her pain was in her upper back; like a alfred horse. The patient does not have swelling. She gets light headed if she stands too quickly    These symptoms show no change since the last OV. With regard to medication therapy the patient has been compliant with prescribed regimen. They have tolerated therapy to date.      Past Medical History:   Diagnosis Date    Arthritis     fingers, hands    Asthma     Back pain     Bell's palsy     CHF (congestive heart failure) (HCC)     Chronic pain     BACK SURGERY X4, neck, left arm    Colitis 2/14/2013    Depression     Elevated sed rate     Family history of breast cancer in first degree relative     Gastric ulcer     GERD (gastroesophageal reflux disease)     colitis    Glaucoma     Hypertension     Movement disorder     chronic back pain    Neuromuscular disorder (HCC)     sciatica rt. leg    Osteoarthritis     PONV (postoperative nausea and vomiting)     SEVERE     Social History:    Social History     Tobacco Use   Smoking Status Former    Packs/day: 0.25    Years: 20.00    Pack years: 5.00    Types: Cigarettes    Quit date: 4/1/2016

## 2023-07-31 ENCOUNTER — TELEPHONE (OUTPATIENT)
Dept: FAMILY MEDICINE CLINIC | Age: 68
End: 2023-07-31

## 2023-07-31 DIAGNOSIS — F33.0 MILD EPISODE OF RECURRENT MAJOR DEPRESSIVE DISORDER (HCC): ICD-10-CM

## 2023-07-31 RX ORDER — BUPROPION HYDROCHLORIDE 150 MG/1
150 TABLET ORAL 2 TIMES DAILY
Qty: 60 TABLET | Refills: 5 | Status: CANCELLED | OUTPATIENT
Start: 2023-07-31

## 2023-07-31 RX ORDER — BUPROPION HYDROCHLORIDE 150 MG/1
150 TABLET ORAL 2 TIMES DAILY
Qty: 60 TABLET | Refills: 5 | Status: SHIPPED | OUTPATIENT
Start: 2023-07-31

## 2023-07-31 NOTE — TELEPHONE ENCOUNTER
Pt stated that she been trying to get a refill on her Bupropion xl 150mg 2x days for the past 3 days but she was told by pharmacy that this medicine was cancelled out. Pt wants to know why this medicine is cancelled out. Pt also wants a refill called in today because she was unable to take her medicine today.

## 2023-07-31 NOTE — TELEPHONE ENCOUNTER
I have sent the prescription to the [address] [place] [address]. It was discontinued by Elsa Rosales on 7/11 with the reason being that the patient was not taking this.

## 2023-08-02 DIAGNOSIS — K21.9 GASTROESOPHAGEAL REFLUX DISEASE WITHOUT ESOPHAGITIS: ICD-10-CM

## 2023-08-02 RX ORDER — DEXLANSOPRAZOLE 60 MG/1
CAPSULE, DELAYED RELEASE ORAL
Qty: 90 CAPSULE | Refills: 3 | Status: SHIPPED | OUTPATIENT
Start: 2023-08-02

## 2023-08-05 DIAGNOSIS — F51.01 PRIMARY INSOMNIA: ICD-10-CM

## 2023-08-08 RX ORDER — TRAZODONE HYDROCHLORIDE 50 MG/1
TABLET ORAL
Qty: 60 TABLET | Refills: 11 | Status: SHIPPED | OUTPATIENT
Start: 2023-08-08

## 2023-08-08 NOTE — TELEPHONE ENCOUNTER
Medication:   Requested Prescriptions     Pending Prescriptions Disp Refills    traZODone (DESYREL) 50 MG tablet [Pharmacy Med Name: TRAZODONE 50MG TABLETS] 60 tablet 11     Sig: TAKE 2 TABLETS BY MOUTH EVERY NIGHT        Last appt: 7/12/2023   Next appt: Visit date not found    Last OARRS:   RX Monitoring 6/9/2023   Attestation -   Periodic Controlled Substance Monitoring No signs of potential drug abuse or diversion identified.

## 2023-08-11 DIAGNOSIS — G89.4 CHRONIC PAIN SYNDROME: ICD-10-CM

## 2023-08-11 RX ORDER — HYDROCODONE BITARTRATE AND ACETAMINOPHEN 7.5; 325 MG/1; MG/1
1 TABLET ORAL EVERY 6 HOURS PRN
Qty: 120 TABLET | Refills: 0 | Status: SHIPPED | OUTPATIENT
Start: 2023-08-11 | End: 2023-09-10

## 2023-08-11 NOTE — TELEPHONE ENCOUNTER
Medication and Quantity requested:      HYDROcodone-acetaminophen ([address]) 7.5-325 MG per tablet [[id number]]        Last Visit  07/12/2023      Pharmacy and phone number updated in [facility]:  yes    Ingrid

## 2023-08-11 NOTE — TELEPHONE ENCOUNTER
Medication:   Requested Prescriptions     Pending Prescriptions Disp Refills    HYDROcodone-acetaminophen (NORCO) 7.5-325 MG per tablet 120 tablet 0     Sig: Take 1 tablet by mouth every 6 hours as needed for Pain for up to 30 days. Last Filled:  120 x 0 RF 7/13/23    Patient Phone Number: [phone] (home)     Last appt: 7/12/2023   Next appt: Visit date not found    Last OARRS:   RX Monitoring 6/9/2023   Attestation -   Periodic Controlled Substance Monitoring No signs of potential drug abuse or diversion identified.

## 2023-08-24 DIAGNOSIS — G44.89 OTHER HEADACHE SYNDROME: ICD-10-CM

## 2023-08-24 RX ORDER — BUTALBITAL, ACETAMINOPHEN AND CAFFEINE 50; 325; 40 MG/1; MG/1; MG/1
1 TABLET ORAL EVERY 6 HOURS PRN
Qty: 40 TABLET | Refills: 3 | Status: SHIPPED | OUTPATIENT
Start: 2023-08-24

## 2023-08-24 NOTE — TELEPHONE ENCOUNTER
Medication and Quantity requested:  butalbital-acetaminophen-caffeine JACKY Hall) -40 MG per tablet      Last Visit  07/12/23 - [facility] and phone number updated in [facility]:  yes    Ingrid

## 2023-09-01 DIAGNOSIS — F32.A DEPRESSION, UNSPECIFIED DEPRESSION TYPE: ICD-10-CM

## 2023-09-01 RX ORDER — SERTRALINE HYDROCHLORIDE 100 MG/1
TABLET, FILM COATED ORAL
Qty: 270 TABLET | Refills: 2 | Status: SHIPPED | OUTPATIENT
Start: 2023-09-01

## 2023-09-11 DIAGNOSIS — G89.4 CHRONIC PAIN SYNDROME: ICD-10-CM

## 2023-09-11 RX ORDER — HYDROCODONE BITARTRATE AND ACETAMINOPHEN 7.5; 325 MG/1; MG/1
1 TABLET ORAL EVERY 6 HOURS PRN
Qty: 120 TABLET | Refills: 0 | Status: SHIPPED | OUTPATIENT
Start: 2023-09-11 | End: 2023-10-11

## 2023-09-11 NOTE — TELEPHONE ENCOUNTER
Medication and Quantity requested:      HYDROcodone-acetaminophen ([place]) 7.5-325 MG per tablet [[id number]]         Last Visit  07/12/2023      Pharmacy and phone number updated in EPIC:  yes      Ingrid drake

## 2023-09-11 NOTE — TELEPHONE ENCOUNTER
Lov 7/12/23  Lrf 120 0 8/11/23Medication:   Requested Prescriptions     Pending Prescriptions Disp Refills    HYDROcodone-acetaminophen (NORCO) 7.5-325 MG per tablet 120 tablet 0     Sig: Take 1 tablet by mouth every 6 hours as needed for Pain for up to 30 days. Last Filled:      Patient Phone Number: [phone] (home)     Last appt: 7/12/2023   Next appt: Visit date not found    Last OARRS:       6/9/2023    10:10 AM   RX Monitoring   Periodic Controlled Substance Monitoring No signs of potential drug abuse or diversion identified.

## 2023-09-29 DIAGNOSIS — G44.89 OTHER HEADACHE SYNDROME: ICD-10-CM

## 2023-09-29 RX ORDER — BUTALBITAL, ACETAMINOPHEN AND CAFFEINE 50; 325; 40 MG/1; MG/1; MG/1
1 TABLET ORAL EVERY 6 HOURS PRN
Qty: 40 TABLET | Refills: 3 | Status: SHIPPED | OUTPATIENT
Start: 2023-09-29

## 2023-09-29 NOTE — TELEPHONE ENCOUNTER
Medication and Quantity requested:        butalbital-acetaminophen-caffeine Rosario JACKY Lees) -40 MG per tablet [[id number]]     Last Visit  07/12/2023      Pharmacy and phone number updated in [facility]:  yes    Ingrid

## 2023-09-29 NOTE — TELEPHONE ENCOUNTER
Lov 7/12/23  Lrf 40 3 8/24/23 Medication:   Requested Prescriptions     Pending Prescriptions Disp Refills    butalbital-acetaminophen-caffeine (FIORICET, ESGIC) -40 MG per tablet 40 tablet 3     Sig: Take 1 tablet by mouth every 6 hours as needed for Headaches        Last Filled:      Patient Phone Number: [phone] (home)     Last appt: 7/12/2023   Next appt: Visit date not found    Last OARRS:       9/11/2023     6:03 PM   RX Monitoring   Periodic Controlled Substance Monitoring No signs of potential drug abuse or diversion identified.

## 2023-10-09 DIAGNOSIS — G89.4 CHRONIC PAIN SYNDROME: ICD-10-CM

## 2023-10-09 RX ORDER — HYDROCODONE BITARTRATE AND ACETAMINOPHEN 7.5; 325 MG/1; MG/1
1 TABLET ORAL EVERY 6 HOURS PRN
Qty: 120 TABLET | Refills: 0 | Status: SHIPPED | OUTPATIENT
Start: 2023-10-09 | End: 2023-11-08

## 2023-10-09 NOTE — TELEPHONE ENCOUNTER
Medication:   Requested Prescriptions     Pending Prescriptions Disp Refills    HYDROcodone-acetaminophen (NORCO) 7.5-325 MG per tablet 120 tablet 0     Sig: Take 1 tablet by mouth every 6 hours as needed for Pain for up to 30 days. Last Filled:  120 x 0 RF 10/1123    Patient Phone Number: [phone] (home)     Last appt: 7/12/2023   Next appt: 10/12/2023    Last OARRS:       9/11/2023     6:03 PM   RX Monitoring   Periodic Controlled Substance Monitoring No signs of potential drug abuse or diversion identified.

## 2023-10-09 NOTE — TELEPHONE ENCOUNTER
Medication and Quantity requested: HYDROcodone-acetaminophen ([place]ah [place]) 7.5-325 MG per tablet [[id number]       Last Visit  7/12/23    Pharmacy and phone number updated in EPIC:  yes

## 2023-10-10 DIAGNOSIS — G89.4 CHRONIC PAIN SYNDROME: ICD-10-CM

## 2023-10-10 RX ORDER — TIZANIDINE 4 MG/1
TABLET ORAL
Qty: 90 TABLET | Refills: 5 | Status: SHIPPED | OUTPATIENT
Start: 2023-10-10

## 2023-10-10 NOTE — TELEPHONE ENCOUNTER
Medication:   Requested Prescriptions     Pending Prescriptions Disp Refills    tiZANidine (ZANAFLEX) 4 MG tablet [Pharmacy Med Name: TIZANIDINE 4MG TABLETS] 90 tablet 11     Sig: TAKE 1 TABLET BY MOUTH THREE TIMES DAILY        Last Filled:  90 X 11 rf 9/22/23  Patient Phone Number: [phone] (home)     Last appt: 7/12/2023   Next appt: 10/12/2023    Last OARRS:       9/11/2023     6:03 PM   RX Monitoring   Periodic Controlled Substance Monitoring No signs of potential drug abuse or diversion identified.

## 2023-10-12 ENCOUNTER — OFFICE VISIT (OUTPATIENT)
Dept: FAMILY MEDICINE CLINIC | Age: 68
End: 2023-10-12
Payer: MEDICARE

## 2023-10-12 VITALS
DIASTOLIC BLOOD PRESSURE: 84 MMHG | BODY MASS INDEX: 16.06 KG/M2 | HEART RATE: 79 BPM | SYSTOLIC BLOOD PRESSURE: 132 MMHG | WEIGHT: 106 LBS | OXYGEN SATURATION: 97 % | HEIGHT: 68 IN

## 2023-10-12 DIAGNOSIS — J45.20 MILD INTERMITTENT ASTHMA WITHOUT COMPLICATION: ICD-10-CM

## 2023-10-12 DIAGNOSIS — H25.9 SENILE CATARACT OF LEFT EYE, UNSPECIFIED AGE-RELATED CATARACT TYPE: ICD-10-CM

## 2023-10-12 DIAGNOSIS — G89.4 CHRONIC PAIN SYNDROME: ICD-10-CM

## 2023-10-12 DIAGNOSIS — H40.9 GLAUCOMA OF BOTH EYES, UNSPECIFIED GLAUCOMA TYPE: ICD-10-CM

## 2023-10-12 DIAGNOSIS — K21.9 GASTROESOPHAGEAL REFLUX DISEASE WITHOUT ESOPHAGITIS: ICD-10-CM

## 2023-10-12 DIAGNOSIS — Z01.818 PRE-OP EXAM: Primary | ICD-10-CM

## 2023-10-12 DIAGNOSIS — G44.89 OTHER HEADACHE SYNDROME: ICD-10-CM

## 2023-10-12 DIAGNOSIS — F33.0 MILD EPISODE OF RECURRENT MAJOR DEPRESSIVE DISORDER (HCC): ICD-10-CM

## 2023-10-12 PROCEDURE — G8400 PT W/DXA NO RESULTS DOC: HCPCS | Performed by: FAMILY MEDICINE

## 2023-10-12 PROCEDURE — 3017F COLORECTAL CA SCREEN DOC REV: CPT | Performed by: FAMILY MEDICINE

## 2023-10-12 PROCEDURE — 1123F ACP DISCUSS/DSCN MKR DOCD: CPT | Performed by: FAMILY MEDICINE

## 2023-10-12 PROCEDURE — G8484 FLU IMMUNIZE NO ADMIN: HCPCS | Performed by: FAMILY MEDICINE

## 2023-10-12 PROCEDURE — G8419 CALC BMI OUT NRM PARAM NOF/U: HCPCS | Performed by: FAMILY MEDICINE

## 2023-10-12 PROCEDURE — G8427 DOCREV CUR MEDS BY ELIG CLIN: HCPCS | Performed by: FAMILY MEDICINE

## 2023-10-12 PROCEDURE — 1036F TOBACCO NON-USER: CPT | Performed by: FAMILY MEDICINE

## 2023-10-12 PROCEDURE — 1090F PRES/ABSN URINE INCON ASSESS: CPT | Performed by: FAMILY MEDICINE

## 2023-10-12 PROCEDURE — 99214 OFFICE O/P EST MOD 30 MIN: CPT | Performed by: FAMILY MEDICINE

## 2023-10-12 RX ORDER — ALBUTEROL SULFATE 90 UG/1
AEROSOL, METERED RESPIRATORY (INHALATION)
Qty: 8.5 G | Refills: 11 | Status: SHIPPED | OUTPATIENT
Start: 2023-10-12

## 2023-10-12 NOTE — PROGRESS NOTES
Chief Complaint:     Cm Johnson is a 70 y.o. female who presents for a preoperative physical examination. She is scheduled to have Left Trabeculectomy with  Mitomycin/Phaco.w IOL done by Dr. Donald Hansen at [address] on 10/25/2023.     History of Present Illness:      decreased vision  Similar surgery to R eye approximately 3 months ago on 7/10    Past Medical History:   Diagnosis Date    Arthritis     fingers, hands    Asthma     Back pain     Bell's palsy     CHF (congestive heart failure) (HCC)     Chronic pain     BACK SURGERY X4, neck, left arm    Colitis 2/14/2013    Depression     Elevated sed rate     Family history of breast cancer in first degree relative     Gastric ulcer     GERD (gastroesophageal reflux disease)     colitis    Glaucoma     Hypertension     Movement disorder     chronic back pain    Neuromuscular disorder (HCC)     sciatica rt. leg    Osteoarthritis     PONV (postoperative nausea and vomiting)     SEVERE        Review of patient's past surgical history indicates:     Past Surgical History:   Procedure Laterality Date    BACK SURGERY      x 4    COLONOSCOPY  10/16/2012    polyp removed and biopsy sent    COLONOSCOPY  03/26/2013    CYSTOSCOPY  01/13/2016    urethral dilitation    EYE SURGERY Right 07/10/2023    FACIAL NERVE SURGERY      D/T Bell's Palsy    FINGER SURGERY Left 04/03/2018    Excision of Left Thumb Digital Mucous cyst & DIP Joint Arthrotomy & Debridement    FINGER SURGERY  04/2018    left thumb    FINGER SURGERY Left 04/03/2018    thumb surgery     HYSTERECTOMY (CERVIX STATUS UNKNOWN)      Partial    UPPER GASTROINTESTINAL ENDOSCOPY  10/15/2012    UPPER GASTROINTESTINAL ENDOSCOPY  02/11/2015    with EUS    VENTRAL HERNIA REPAIR N/A 04/15/2019    LAPAROSCOPIC REPAIR OF INCISIONAL HERNIA WITH MESH performed by Kwasi Wood MD at [facility]                                                   Current Outpatient Medications   Medication Sig Dispense Refill    albuterol

## 2023-10-23 ENCOUNTER — TELEPHONE (OUTPATIENT)
Dept: FAMILY MEDICINE CLINIC | Age: 68
End: 2023-10-23

## 2023-10-23 NOTE — TELEPHONE ENCOUNTER
Patient called and needs a new handicap placard she is up for renewal     Please call pt when letter is written

## 2023-10-30 ENCOUNTER — TELEPHONE (OUTPATIENT)
Dept: CARDIOLOGY CLINIC | Age: 68
End: 2023-10-30

## 2023-11-02 DIAGNOSIS — G44.89 OTHER HEADACHE SYNDROME: ICD-10-CM

## 2023-11-02 RX ORDER — BUTALBITAL, ACETAMINOPHEN AND CAFFEINE 50; 325; 40 MG/1; MG/1; MG/1
1 TABLET ORAL EVERY 6 HOURS PRN
Qty: 40 TABLET | Refills: 3 | Status: SHIPPED | OUTPATIENT
Start: 2023-11-02

## 2023-11-02 NOTE — TELEPHONE ENCOUNTER
Medication and Quantity requested:  butalbital-acetaminophen-caffeine Miranda Fink, ESGIC) -40 MG per tablet       Last Visit  10/12/23 - Dr Namrata Mina and phone number updated in [facility]:  yes    Ingrid

## 2023-11-02 NOTE — TELEPHONE ENCOUNTER
Medication:   Requested Prescriptions     Pending Prescriptions Disp Refills    butalbital-acetaminophen-caffeine (FIORICET, ESGIC) -40 MG per tablet 40 tablet 3     Sig: Take 1 tablet by mouth every 6 hours as needed for Headaches        Last Filled:  40 x 3 RF 9/9/23    Patient Phone Number: [phone] (home)     Last appt: 10/12/2023   Next appt: Visit date not found    Last OARRS:       9/11/2023     6:03 PM   RX Monitoring   Periodic Controlled Substance Monitoring No signs of potential drug abuse or diversion identified.

## 2023-11-08 DIAGNOSIS — G89.4 CHRONIC PAIN SYNDROME: ICD-10-CM

## 2023-11-08 RX ORDER — HYDROCODONE BITARTRATE AND ACETAMINOPHEN 7.5; 325 MG/1; MG/1
1 TABLET ORAL EVERY 6 HOURS PRN
Qty: 120 TABLET | Refills: 0 | Status: SHIPPED | OUTPATIENT
Start: 2023-11-08 | End: 2023-12-08

## 2023-11-08 NOTE — TELEPHONE ENCOUNTER
Medication and Quantity requested: HYDROcodone-acetaminophen (NORCO) 7.5-325 MG per tablet       Last Visit  10/12/23    Pharmacy and phone number updated in EPIC:  yes

## 2023-11-08 NOTE — TELEPHONE ENCOUNTER
Medication:   Requested Prescriptions     Pending Prescriptions Disp Refills    HYDROcodone-acetaminophen (NORCO) 7.5-325 MG per tablet 120 tablet 0     Sig: Take 1 tablet by mouth every 6 hours as needed for Pain for up to 30 days. Last Filled:  120 x 0 RF 11/8/23    Patient Phone Number: [phone] (home)     Last appt: 10/12/2023   Next appt: Visit date not found    Last OARRS:       9/11/2023     6:03 PM   RX Monitoring   Periodic Controlled Substance Monitoring No signs of potential drug abuse or diversion identified.

## 2023-12-05 DIAGNOSIS — M15.9 PRIMARY OSTEOARTHRITIS INVOLVING MULTIPLE JOINTS: ICD-10-CM

## 2023-12-05 DIAGNOSIS — G89.4 CHRONIC PAIN SYNDROME: ICD-10-CM

## 2023-12-05 RX ORDER — HYDROCODONE BITARTRATE AND ACETAMINOPHEN 7.5; 325 MG/1; MG/1
1 TABLET ORAL EVERY 6 HOURS PRN
Qty: 120 TABLET | Refills: 0 | Status: SHIPPED | OUTPATIENT
Start: 2023-12-05 | End: 2023-12-06 | Stop reason: SDUPTHER

## 2023-12-05 NOTE — TELEPHONE ENCOUNTER
Medication and Quantity requested:      HYDROcodone-acetaminophen ([address]) 7.5-325 MG per tablet [[id number]]         Last Visit  10/12/2023      Pharmacy and phone number updated in EPIC:  yes    Ingrid drake

## 2023-12-05 NOTE — TELEPHONE ENCOUNTER
Medication:   Requested Prescriptions     Pending Prescriptions Disp Refills    HYDROcodone-acetaminophen (NORCO) 7.5-325 MG per tablet 120 tablet 0     Sig: Take 1 tablet by mouth every 6 hours as needed for Pain for up to 30 days. Last Filled:  11/8/23    Patient Phone Number: [phone] (home)     Last appt: 10/12/2023   Next appt: Visit date not found    Last OARRS:       11/8/2023    12:38 PM   RX Monitoring   Periodic Controlled Substance Monitoring No signs of potential drug abuse or diversion identified.

## 2023-12-06 DIAGNOSIS — G89.4 CHRONIC PAIN SYNDROME: ICD-10-CM

## 2023-12-06 RX ORDER — HYDROCODONE BITARTRATE AND ACETAMINOPHEN 7.5; 325 MG/1; MG/1
1 TABLET ORAL EVERY 6 HOURS PRN
Qty: 120 TABLET | Refills: 0 | Status: SHIPPED | OUTPATIENT
Start: 2023-12-06 | End: 2024-01-05

## 2023-12-06 NOTE — TELEPHONE ENCOUNTER
Ingrid called and said that the     HYDROcodone-acetaminophen ([place]) 7.5-325 MG per tablet [[id number]]     Is on back order and they aren't sure when this medication will be in     They didn't say if they are contacting the patient

## 2023-12-06 NOTE — TELEPHONE ENCOUNTER
Patient called back and needs medication sent to     [facility] on [address] on [address]     For the     HYDROcodone-acetaminophen (Vernal Crutch) 7.5-325 MG per tablet [[id number]]

## 2023-12-12 DIAGNOSIS — G44.89 OTHER HEADACHE SYNDROME: ICD-10-CM

## 2023-12-12 RX ORDER — BUTALBITAL, ACETAMINOPHEN AND CAFFEINE 50; 325; 40 MG/1; MG/1; MG/1
1 TABLET ORAL EVERY 6 HOURS PRN
Qty: 40 TABLET | Refills: 3 | Status: SHIPPED | OUTPATIENT
Start: 2023-12-12

## 2023-12-12 NOTE — TELEPHONE ENCOUNTER
Medication:   Requested Prescriptions     Pending Prescriptions Disp Refills    butalbital-acetaminophen-caffeine (FIORICET, ESGIC) -40 MG per tablet 40 tablet 3     Sig: Take 1 tablet by mouth every 6 hours as needed for Headaches        Last Filled:  11/02/23    Patient Phone Number: [phone] (home)     Last appt: 10/12/2023   Next appt: Visit date not found    Last OARRS:       11/8/2023    12:38 PM   RX Monitoring   Periodic Controlled Substance Monitoring No signs of potential drug abuse or diversion identified.

## 2023-12-12 NOTE — TELEPHONE ENCOUNTER
Medication and Quantity requested:      butalbital-acetaminophen-caffeine Sage Crea, ESGIC) -40 MG per tablet [[id number]]         Last Visit      10/12/2023    Pharmacy and phone number updated in [facility]:  yes        Ingrid young

## 2024-01-03 DIAGNOSIS — J45.20 MILD INTERMITTENT ASTHMA WITHOUT COMPLICATION: ICD-10-CM

## 2024-01-03 RX ORDER — ALBUTEROL SULFATE 2.5 MG/3ML
SOLUTION RESPIRATORY (INHALATION)
Qty: 540 ML | Refills: 3 | Status: SHIPPED | OUTPATIENT
Start: 2024-01-03

## 2024-01-03 NOTE — TELEPHONE ENCOUNTER
Medication:   Requested Prescriptions     Pending Prescriptions Disp Refills    albuterol (PROVENTIL) (2.5 MG/3ML) 0.083% nebulizer solution [Pharmacy Med Name: ALBUTEROL 0.083%(2.5MG/3ML) 60X3ML] 540 mL 3     Sig: USE 1 VIAL VIA NEBULIZER EVERY 6 HOURS        Last Filled:  6/20/22    Patient Phone Number: [phone] (home)     Last appt: 10/12/2023   Next appt: Visit date not found    Last OARRS:       11/8/2023    12:38 PM   RX Monitoring   Periodic Controlled Substance Monitoring No signs of potential drug abuse or diversion identified.

## 2024-01-05 DIAGNOSIS — G89.4 CHRONIC PAIN SYNDROME: ICD-10-CM

## 2024-01-05 RX ORDER — HYDROCODONE BITARTRATE AND ACETAMINOPHEN 7.5; 325 MG/1; MG/1
1 TABLET ORAL EVERY 6 HOURS PRN
Qty: 120 TABLET | Refills: 0 | Status: SHIPPED | OUTPATIENT
Start: 2024-01-05 | End: 2024-02-04

## 2024-01-05 RX ORDER — GABAPENTIN 300 MG/1
CAPSULE ORAL
Qty: 90 CAPSULE | Refills: 5 | Status: SHIPPED | OUTPATIENT
Start: 2024-01-05 | End: 2024-02-05

## 2024-01-05 NOTE — TELEPHONE ENCOUNTER
Medication and Quantity requested:      HYDROcodone-acetaminophen (NORCO) 7.5-325 MG per tablet [[id number]]       Last Visit  10/12/2023      Pharmacy and phone number updated in EPIC:  yes    Ingrid drake

## 2024-01-05 NOTE — TELEPHONE ENCOUNTER
Medication:   Requested Prescriptions     Pending Prescriptions Disp Refills    HYDROcodone-acetaminophen (NORCO) 7.5-325 MG per tablet 120 tablet 0     Sig: Take 1 tablet by mouth every 6 hours as needed for Pain for up to 30 days.        Last Filled:  12/6/23    Patient Phone Number: [phone] (home)     Last appt: 10/12/2023   Next appt: Visit date not found    Last OARRS:       11/8/2023    12:38 PM   RX Monitoring   Periodic Controlled Substance Monitoring No signs of potential drug abuse or diversion identified.

## 2024-01-19 DIAGNOSIS — G44.89 OTHER HEADACHE SYNDROME: ICD-10-CM

## 2024-01-19 RX ORDER — BUTALBITAL, ACETAMINOPHEN AND CAFFEINE 50; 325; 40 MG/1; MG/1; MG/1
1 TABLET ORAL EVERY 6 HOURS PRN
Qty: 40 TABLET | Refills: 3 | Status: SHIPPED | OUTPATIENT
Start: 2024-01-19

## 2024-01-19 NOTE — TELEPHONE ENCOUNTER
Medication:   Requested Prescriptions     Pending Prescriptions Disp Refills    butalbital-acetaminophen-caffeine (FIORICET, ESGIC) -40 MG per tablet 40 tablet 3     Sig: Take 1 tablet by mouth every 6 hours as needed for Headaches        Last Filled:  12/12/23    Patient Phone Number: [phone] (home)     Last appt: 10/12/2023   Next appt: Visit date not found    Last OARRS:       11/8/2023    12:38 PM   RX Monitoring   Periodic Controlled Substance Monitoring No signs of potential drug abuse or diversion identified.

## 2024-01-19 NOTE — TELEPHONE ENCOUNTER
Medication and Quantity requested:      butalbital-acetaminophen-caffeine (FIORICET, ESGIC) -40 MG per tablet [[id number]]       Last Visit    10/12/2023    Pharmacy and phone [facility]:  yes    Pharm arnoldo drake

## 2024-01-23 NOTE — PROGRESS NOTES
[facility]    2024    Marielena Razo (:  1955) is a 68 y.o. female who is here for follow up on her history of pericardial effusion and to review results of echocardiogram done today.     Referring Provider: Sandro Ruiz MD    HISTORY:  Ms. 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  Her EF has since normalized.  She had a normal nuclear stress test prior to back surgery in 2016.      Today she has complaints of elevated blood pressure in the mornings when she gets up, she started checking it because she felt \"off\" . After about an hour it comes down and she feels better. She gets dizzy with getting up too quickly. The blood pressure started about 2 weeks ago.  She feels winded with the morning exertion when her BP is elevated. She notes discomfort in her back but has a history of 4 back surgeries.       REVIEW OF SYSTEMS:  A complete review of systems was reviewed and is negative except as noted in the history of present illness.    Prior to Visit Medications    Medication Sig Taking? Authorizing Provider   butalbital-acetaminophen-caffeine (FIORICET, ESGIC) -40 MG per tablet Take 1 tablet by mouth every 6 hours as needed for Headaches Yes Sandro Ruiz MD   HYDROcodone-acetaminophen (NORCO) 7.5-325 MG per tablet Take 1 tablet by mouth every 6 hours as needed for Pain for up to 30 days. Yes Jcarlos Botello MD   gabapentin (NEURONTIN) 300 MG capsule TAKE 1 CAPSULE BY MOUTH THREE TIMES DAILY Yes Sandro Ruiz MD   albuterol (PROVENTIL) (2.5 MG/3ML) 0.083% nebulizer solution USE 1 VIAL VIA NEBULIZER EVERY 6 HOURS Yes Sandro Ruiz MD   diclofenac sodium (VOLTAREN) 1 % GEL APPLY 2 GRAMS TOPICALLY TO HAND 4 TIMES DAILY Yes Sandro Ruzi MD   albuterol sulfate HFA (PROVENTIL;VENTOLIN;PROAIR) 108 (90 Base) MCG/ACT inhaler INHALE 2 PUFFS BY MOUTH EVERY 4 HOURS AS NEEDED FOR

## 2024-01-26 ENCOUNTER — OFFICE VISIT (OUTPATIENT)
Dept: CARDIOLOGY CLINIC | Age: 69
End: 2024-01-26

## 2024-01-26 VITALS
BODY MASS INDEX: 15.91 KG/M2 | HEART RATE: 84 BPM | OXYGEN SATURATION: 98 % | SYSTOLIC BLOOD PRESSURE: 120 MMHG | WEIGHT: 105 LBS | DIASTOLIC BLOOD PRESSURE: 70 MMHG | HEIGHT: 68 IN

## 2024-01-26 DIAGNOSIS — R07.9 CHEST PAIN, UNSPECIFIED TYPE: ICD-10-CM

## 2024-01-26 DIAGNOSIS — I65.23 BILATERAL CAROTID ARTERY STENOSIS: ICD-10-CM

## 2024-01-26 DIAGNOSIS — I34.0 NONRHEUMATIC MITRAL VALVE REGURGITATION: Primary | ICD-10-CM

## 2024-01-26 DIAGNOSIS — I34.1 MITRAL VALVE PROLAPSE: ICD-10-CM

## 2024-01-26 DIAGNOSIS — I42.9 CARDIOMYOPATHY, UNSPECIFIED TYPE (HCC): ICD-10-CM

## 2024-01-26 NOTE — PATIENT INSTRUCTIONS
Hold off on your Tea, check your BP in the morning and again a few hours later, & late afternoon or evening.   It is normal to go up with exertion, but it should go back to normal     Chemical stress test   Echocardiogram

## 2024-02-01 ENCOUNTER — HOSPITAL ENCOUNTER (OUTPATIENT)
Dept: NON INVASIVE DIAGNOSTICS | Age: 69
Discharge: HOME | End: 2024-02-01
Payer: MEDICARE

## 2024-02-01 DIAGNOSIS — F33.0 MILD EPISODE OF RECURRENT MAJOR DEPRESSIVE DISORDER (HCC): ICD-10-CM

## 2024-02-01 DIAGNOSIS — R07.9 CHEST PAIN, UNSPECIFIED TYPE: ICD-10-CM

## 2024-02-01 PROCEDURE — 6360000002 HC RX W HCPCS: Performed by: INTERNAL MEDICINE

## 2024-02-01 PROCEDURE — 3430000000 HC RX DIAGNOSTIC RADIOPHARMACEUTICAL: Performed by: INTERNAL MEDICINE

## 2024-02-01 PROCEDURE — 78452 HT MUSCLE IMAGE SPECT MULT: CPT

## 2024-02-01 PROCEDURE — 93017 CV STRESS TEST TRACING ONLY: CPT | Performed by: INTERNAL MEDICINE

## 2024-02-01 PROCEDURE — A9502 TC99M TETROFOSMIN: HCPCS | Performed by: INTERNAL MEDICINE

## 2024-02-01 RX ORDER — AMINOPHYLLINE 25 MG/ML
100 INJECTION, SOLUTION INTRAVENOUS ONCE
Status: COMPLETED | OUTPATIENT
Start: 2024-02-01 | End: 2024-02-01

## 2024-02-01 RX ORDER — REGADENOSON 0.08 MG/ML
0.4 INJECTION, SOLUTION INTRAVENOUS
Status: COMPLETED | OUTPATIENT
Start: 2024-02-01 | End: 2024-02-01

## 2024-02-01 RX ORDER — REGADENOSON 0.08 MG/ML
0.4 INJECTION, SOLUTION INTRAVENOUS
OUTPATIENT
Start: 2024-02-01

## 2024-02-01 RX ORDER — BUPROPION HYDROCHLORIDE 150 MG/1
150 TABLET ORAL 2 TIMES DAILY
Qty: 60 TABLET | Refills: 5 | Status: SHIPPED | OUTPATIENT
Start: 2024-02-01

## 2024-02-01 RX ADMIN — REGADENOSON 0.4 MG: 0.08 INJECTION, SOLUTION INTRAVENOUS at 09:06

## 2024-02-01 RX ADMIN — AMINOPHYLLINE 100 MG: 25 INJECTION, SOLUTION INTRAVENOUS at 09:15

## 2024-02-01 RX ADMIN — TETROFOSMIN 30 MILLICURIE: 1.38 INJECTION, POWDER, LYOPHILIZED, FOR SOLUTION INTRAVENOUS at 09:14

## 2024-02-01 RX ADMIN — TETROFOSMIN 10 MILLICURIE: 1.38 INJECTION, POWDER, LYOPHILIZED, FOR SOLUTION INTRAVENOUS at 08:18

## 2024-02-01 NOTE — TELEPHONE ENCOUNTER
Medication:   Requested Prescriptions     Pending Prescriptions Disp Refills    buPROPion (WELLBUTRIN XL) 150 MG extended release tablet [Pharmacy Med Name: BUPROPION XL 150MG TABLETS (24 H)] 60 tablet 5     Sig: TAKE 1 TABLET BY MOUTH TWICE DAILY        Last Filled:  7/31/2023    Patient Phone Number: [phone] (home)     Last appt: 10/12/2023   Next appt: Visit date not found    Last OARRS:       11/8/2023    12:38 PM   RX Monitoring   Periodic Controlled Substance Monitoring No signs of potential drug abuse or diversion identified.

## 2024-02-01 NOTE — PROGRESS NOTES
Instructed on Lexiscan Stress Test Procedure including possible side effects/ adverse reactions. Patient verbalizes  understanding and denies having any questions .See [facility]             Aminophylline given per lexiscan protocol in stress lab for c/o persistant nausea.

## 2024-02-02 ENCOUNTER — HOSPITAL ENCOUNTER (OUTPATIENT)
Dept: CARDIOLOGY | Age: 69
Discharge: HOME | End: 2024-02-02
Payer: MEDICARE

## 2024-02-02 DIAGNOSIS — I34.1 MITRAL VALVE PROLAPSE: ICD-10-CM

## 2024-02-02 DIAGNOSIS — I42.9 CARDIOMYOPATHY, UNSPECIFIED TYPE (HCC): ICD-10-CM

## 2024-02-02 DIAGNOSIS — R07.9 CHEST PAIN, UNSPECIFIED TYPE: ICD-10-CM

## 2024-02-02 DIAGNOSIS — I34.0 NONRHEUMATIC MITRAL VALVE REGURGITATION: ICD-10-CM

## 2024-02-02 DIAGNOSIS — G89.4 CHRONIC PAIN SYNDROME: ICD-10-CM

## 2024-02-02 PROCEDURE — 93306 TTE W/DOPPLER COMPLETE: CPT

## 2024-02-02 RX ORDER — HYDROCODONE BITARTRATE AND ACETAMINOPHEN 7.5; 325 MG/1; MG/1
1 TABLET ORAL EVERY 6 HOURS PRN
Qty: 120 TABLET | Refills: 0 | Status: SHIPPED | OUTPATIENT
Start: 2024-02-02 | End: 2024-03-03

## 2024-02-02 NOTE — TELEPHONE ENCOUNTER
Medication and Quantity requested:   HYDROcodone-acetaminophen (NORCO) 7.5-325 MG per tablet    Would like it sent in this Sunday for pickup,  Last Visit  10/12/23    Pharmacy and phone number updated in EPIC:  yes

## 2024-02-09 DIAGNOSIS — I34.0 NONRHEUMATIC MITRAL VALVE REGURGITATION: ICD-10-CM

## 2024-02-09 DIAGNOSIS — I42.9 CARDIOMYOPATHY, UNSPECIFIED TYPE (HCC): Primary | ICD-10-CM

## 2024-02-09 DIAGNOSIS — I34.1 MITRAL VALVE PROLAPSE: ICD-10-CM

## 2024-03-01 DIAGNOSIS — G89.4 CHRONIC PAIN SYNDROME: ICD-10-CM

## 2024-03-01 DIAGNOSIS — G44.89 OTHER HEADACHE SYNDROME: ICD-10-CM

## 2024-03-01 RX ORDER — HYDROCODONE BITARTRATE AND ACETAMINOPHEN 7.5; 325 MG/1; MG/1
1 TABLET ORAL EVERY 6 HOURS PRN
Qty: 120 TABLET | Refills: 0 | Status: SHIPPED | OUTPATIENT
Start: 2024-03-01 | End: 2024-03-31

## 2024-03-01 RX ORDER — BUTALBITAL, ACETAMINOPHEN AND CAFFEINE 50; 325; 40 MG/1; MG/1; MG/1
1 TABLET ORAL EVERY 6 HOURS PRN
Qty: 40 TABLET | Refills: 3 | Status: SHIPPED | OUTPATIENT
Start: 2024-03-01

## 2024-03-01 NOTE — TELEPHONE ENCOUNTER
Medication:   Requested Prescriptions     Pending Prescriptions Disp Refills    butalbital-acetaminophen-caffeine (FIORICET, ESGIC) -40 MG per tablet 40 tablet 3     Sig: Take 1 tablet by mouth every 6 hours as needed for Headaches    HYDROcodone-acetaminophen (NORCO) 7.5-325 MG per tablet 120 tablet 0     Sig: Take 1 tablet by mouth every 6 hours as needed for Pain for up to 30 days.        Last Filled:  02/02/24 for norco and 01/19/24 for fioricet     Patient Phone Number: [phone] (home)     Last appt: 10/12/2023   Next appt: 3/21/2024    Last OARRS:       2/2/2024    12:52 PM   RX Monitoring   Periodic Controlled Substance Monitoring No signs of potential drug abuse or diversion identified.

## 2024-03-01 NOTE — TELEPHONE ENCOUNTER
Medication and Quantity requested:     HYDROcodone-acetaminophen (NORCO) 7.5-325 MG per tablet [[id number]]      AND    butalbital-acetaminophen-caffeine (FIORICET, ESGIC) -40 MG per tablet [[id number]]     Last Visit  1/26/2024    Pharmacy and phone number updated in [facility]:  yes    Ingrid [address]

## 2024-03-21 ENCOUNTER — OFFICE VISIT (OUTPATIENT)
Dept: FAMILY MEDICINE CLINIC | Age: 69
End: 2024-03-21
Payer: MEDICARE

## 2024-03-21 VITALS
HEIGHT: 68 IN | HEART RATE: 92 BPM | DIASTOLIC BLOOD PRESSURE: 72 MMHG | BODY MASS INDEX: 16.25 KG/M2 | WEIGHT: 107.2 LBS | OXYGEN SATURATION: 99 % | SYSTOLIC BLOOD PRESSURE: 114 MMHG

## 2024-03-21 DIAGNOSIS — H40.9 GLAUCOMA OF BOTH EYES, UNSPECIFIED GLAUCOMA TYPE: ICD-10-CM

## 2024-03-21 DIAGNOSIS — I47.10 SVT (SUPRAVENTRICULAR TACHYCARDIA): ICD-10-CM

## 2024-03-21 DIAGNOSIS — Z01.818 PREOP EXAMINATION: Primary | ICD-10-CM

## 2024-03-21 DIAGNOSIS — G89.4 CHRONIC PAIN SYNDROME: ICD-10-CM

## 2024-03-21 DIAGNOSIS — J45.20 MILD INTERMITTENT ASTHMA WITHOUT COMPLICATION: ICD-10-CM

## 2024-03-21 DIAGNOSIS — F33.0 MILD EPISODE OF RECURRENT MAJOR DEPRESSIVE DISORDER (HCC): ICD-10-CM

## 2024-03-21 PROCEDURE — G8427 DOCREV CUR MEDS BY ELIG CLIN: HCPCS | Performed by: FAMILY MEDICINE

## 2024-03-21 PROCEDURE — G8484 FLU IMMUNIZE NO ADMIN: HCPCS | Performed by: FAMILY MEDICINE

## 2024-03-21 PROCEDURE — G8419 CALC BMI OUT NRM PARAM NOF/U: HCPCS | Performed by: FAMILY MEDICINE

## 2024-03-21 PROCEDURE — 1123F ACP DISCUSS/DSCN MKR DOCD: CPT | Performed by: FAMILY MEDICINE

## 2024-03-21 PROCEDURE — 3017F COLORECTAL CA SCREEN DOC REV: CPT | Performed by: FAMILY MEDICINE

## 2024-03-21 PROCEDURE — 1090F PRES/ABSN URINE INCON ASSESS: CPT | Performed by: FAMILY MEDICINE

## 2024-03-21 PROCEDURE — G8400 PT W/DXA NO RESULTS DOC: HCPCS | Performed by: FAMILY MEDICINE

## 2024-03-21 PROCEDURE — 1036F TOBACCO NON-USER: CPT | Performed by: FAMILY MEDICINE

## 2024-03-21 PROCEDURE — 99214 OFFICE O/P EST MOD 30 MIN: CPT | Performed by: FAMILY MEDICINE

## 2024-03-21 ASSESSMENT — PATIENT HEALTH QUESTIONNAIRE - PHQ9
9. THOUGHTS THAT YOU WOULD BE BETTER OFF DEAD, OR OF HURTING YOURSELF: NOT AT ALL
SUM OF ALL RESPONSES TO PHQ QUESTIONS 1-9: 0
5. POOR APPETITE OR OVEREATING: NOT AT ALL
6. FEELING BAD ABOUT YOURSELF - OR THAT YOU ARE A FAILURE OR HAVE LET YOURSELF OR YOUR FAMILY DOWN: NOT AT ALL
2. FEELING DOWN, DEPRESSED OR HOPELESS: NOT AT ALL
4. FEELING TIRED OR HAVING LITTLE ENERGY: NOT AT ALL
3. TROUBLE FALLING OR STAYING ASLEEP: NOT AT ALL
SUM OF ALL RESPONSES TO PHQ QUESTIONS 1-9: 0
SUM OF ALL RESPONSES TO PHQ QUESTIONS 1-9: 0
7. TROUBLE CONCENTRATING ON THINGS, SUCH AS READING THE NEWSPAPER OR WATCHING TELEVISION: NOT AT ALL
8. MOVING OR SPEAKING SO SLOWLY THAT OTHER PEOPLE COULD HAVE NOTICED. OR THE OPPOSITE, BEING SO FIGETY OR RESTLESS THAT YOU HAVE BEEN MOVING AROUND A LOT MORE THAN USUAL: NOT AT ALL
SUM OF ALL RESPONSES TO PHQ9 QUESTIONS 1 & 2: 0
10. IF YOU CHECKED OFF ANY PROBLEMS, HOW DIFFICULT HAVE THESE PROBLEMS MADE IT FOR YOU TO DO YOUR WORK, TAKE CARE OF THINGS AT HOME, OR GET ALONG WITH OTHER PEOPLE: NOT DIFFICULT AT ALL
1. LITTLE INTEREST OR PLEASURE IN DOING THINGS: NOT AT ALL
SUM OF ALL RESPONSES TO PHQ QUESTIONS 1-9: 0

## 2024-03-21 NOTE — PROGRESS NOTES
Chief Complaint:     Marielena Razo is a 68 y.o. female who presents for a preoperative physical examination.  She is scheduled to have bleb revision left eye first and then R eye in 2 weeks done by Dr. Penny Rebollar at [facility] on 3/27/2024.    History of Present Illness:      Has had previous surgery for cataracts and glaucoma. Since July has developed blebs on both eyes which are symptomatic    Past Medical History:   Diagnosis Date    Arthritis     fingers, hands    Asthma     Back pain     Bell's palsy     CHF (congestive heart failure) ([facility])     Chronic pain     BACK SURGERY X4, neck, left arm    Colitis 2/14/2013    Depression     Elevated sed rate     Family history of breast cancer in first degree relative     Gastric ulcer     GERD (gastroesophageal reflux disease)     colitis    Glaucoma     Hypertension     Movement disorder     chronic back pain    Neuromuscular disorder (HCC)     sciatica rt. leg    Osteoarthritis     PONV (postoperative nausea and vomiting)     SEVERE        Review of patient's past surgical history indicates:     Past Surgical History:   Procedure Laterality Date    BACK SURGERY      x 4    COLONOSCOPY  10/16/2012    polyp removed and biopsy sent    COLONOSCOPY  03/26/2013    CYSTOSCOPY  01/13/2016    urethral dilitation    EYE SURGERY Right 07/10/2023    FACIAL NERVE SURGERY      D/T Bell's Palsy    FINGER SURGERY Left 04/03/2018    Excision of Left Thumb Digital Mucous cyst & DIP Joint Arthrotomy & Debridement    FINGER SURGERY  04/2018    left thumb    FINGER SURGERY Left 04/03/2018    thumb surgery     HYSTERECTOMY (CERVIX STATUS UNKNOWN)      Partial    UPPER GASTROINTESTINAL ENDOSCOPY  10/15/2012    UPPER GASTROINTESTINAL ENDOSCOPY  02/11/2015    with EUS    VENTRAL HERNIA REPAIR N/A 04/15/2019    LAPAROSCOPIC REPAIR OF INCISIONAL HERNIA WITH MESH performed by Soto Wong MD at [facility] OR                                                   Current Outpatient Medications

## 2024-04-01 DIAGNOSIS — G89.4 CHRONIC PAIN SYNDROME: ICD-10-CM

## 2024-04-01 RX ORDER — HYDROCODONE BITARTRATE AND ACETAMINOPHEN 7.5; 325 MG/1; MG/1
1 TABLET ORAL EVERY 6 HOURS PRN
Qty: 120 TABLET | Refills: 0 | Status: SHIPPED | OUTPATIENT
Start: 2024-04-01 | End: 2024-05-01

## 2024-04-01 NOTE — TELEPHONE ENCOUNTER
Lov 3/21/24  Lrf 120 0 3/1/24 Medication:   Requested Prescriptions     Pending Prescriptions Disp Refills    HYDROcodone-acetaminophen (NORCO) 7.5-325 MG per tablet 120 tablet 0     Sig: Take 1 tablet by mouth every 6 hours as needed for Pain for up to 30 days.        Last Filled:      Patient Phone Number: [phone] (home)     Last appt: 3/21/2024   Next appt: Visit date not found    Last OARRS:       3/21/2024     3:56 PM   RX Monitoring   Periodic Controlled Substance Monitoring No signs of potential drug abuse or diversion identified.

## 2024-04-01 NOTE — TELEPHONE ENCOUNTER
Medication and Quantity requested:        HYDROcodone-acetaminophen (NORCO) 7.5-325 MG per tablet [[id number]]  ENDED         Last Visit  03/21/2024    Pharmacy and phone number updated in EPIC:  yes        Ingrid drake

## 2024-04-12 DIAGNOSIS — G44.89 OTHER HEADACHE SYNDROME: ICD-10-CM

## 2024-04-12 RX ORDER — BUTALBITAL, ACETAMINOPHEN AND CAFFEINE 50; 325; 40 MG/1; MG/1; MG/1
1 TABLET ORAL EVERY 6 HOURS PRN
Qty: 40 TABLET | Refills: 3 | Status: SHIPPED | OUTPATIENT
Start: 2024-04-12

## 2024-04-12 NOTE — TELEPHONE ENCOUNTER
Lov 3/21/24  Lrf 40 3 3/1/24 Medication:   Requested Prescriptions     Pending Prescriptions Disp Refills    butalbital-acetaminophen-caffeine (FIORICET, ESGIC) -40 MG per tablet 40 tablet 3     Sig: Take 1 tablet by mouth every 6 hours as needed for Headaches        Last Filled:      Patient Phone Number: [phone] (home)     Last appt: 3/21/2024   Next appt: Visit date not found    Last OARRS:       3/21/2024     3:56 PM   RX Monitoring   Periodic Controlled Substance Monitoring No signs of potential drug abuse or diversion identified.

## 2024-04-12 NOTE — TELEPHONE ENCOUNTER
Medication and Quantity requested: .     butalbital-acetaminophen-caffeine (FIORICET, ESGIC) -40 MG per tablet [[id number]]         Last Visit    03/21/2024  Pharmacy and phone number updated in [facility]:  yes      Ingrid [facility]

## 2024-04-21 DIAGNOSIS — G44.89 OTHER HEADACHE SYNDROME: ICD-10-CM

## 2024-04-22 RX ORDER — BUTALBITAL, ACETAMINOPHEN AND CAFFEINE 50; 325; 40 MG/1; MG/1; MG/1
TABLET ORAL
Qty: 360 TABLET | OUTPATIENT
Start: 2024-04-22

## 2024-04-28 DIAGNOSIS — F33.0 MILD EPISODE OF RECURRENT MAJOR DEPRESSIVE DISORDER (HCC): ICD-10-CM

## 2024-04-29 DIAGNOSIS — G89.4 CHRONIC PAIN SYNDROME: ICD-10-CM

## 2024-04-29 RX ORDER — HYDROCODONE BITARTRATE AND ACETAMINOPHEN 7.5; 325 MG/1; MG/1
1 TABLET ORAL EVERY 6 HOURS PRN
Qty: 120 TABLET | Refills: 0 | Status: SHIPPED | OUTPATIENT
Start: 2024-04-29 | End: 2024-05-29

## 2024-04-29 NOTE — TELEPHONE ENCOUNTER
Medication and Quantity requested:      HYDROcodone-acetaminophen (NORCO) 7.5-325 MG per tablet [[id number]]         Last Visit  03/21/2024      Pharmacy and phone number updated in EPIC:  yes        [facility] [address] [facility]

## 2024-04-29 NOTE — TELEPHONE ENCOUNTER
Medication:   Requested Prescriptions     Pending Prescriptions Disp Refills    HYDROcodone-acetaminophen (NORCO) 7.5-325 MG per tablet 120 tablet 0     Sig: Take 1 tablet by mouth every 6 hours as needed for Pain for up to 30 days.        Last Filled:    4/1/24  Patient Phone Number: [phone] (home)     Last appt: 3/21/2024   Next appt: Visit date not found    Last OARRS:       3/21/2024     3:56 PM   RX Monitoring   Periodic Controlled Substance Monitoring No signs of potential drug abuse or diversion identified.

## 2024-04-30 DIAGNOSIS — F33.0 MILD EPISODE OF RECURRENT MAJOR DEPRESSIVE DISORDER (HCC): ICD-10-CM

## 2024-04-30 RX ORDER — ARIPIPRAZOLE 5 MG/1
5 TABLET ORAL DAILY
Qty: 90 TABLET | Refills: 3 | OUTPATIENT
Start: 2024-04-30

## 2024-04-30 NOTE — TELEPHONE ENCOUNTER
Medication:   Requested Prescriptions     Pending Prescriptions Disp Refills    ARIPiprazole (ABILIFY) 5 MG tablet 90 tablet 3     Sig: Take 1 tablet by mouth daily        Last Filled: 01/29/2024, 90, 3    Patient Phone Number: [phone] (home)     Last appt: 3/21/2024   Next appt: Visit date not found    Last OARRS:       3/21/2024     3:56 PM   RX Monitoring   Periodic Controlled Substance Monitoring No signs of potential drug abuse or diversion identified.

## 2024-04-30 NOTE — TELEPHONE ENCOUNTER
Medication:   Requested Prescriptions     Pending Prescriptions Disp Refills    ARIPiprazole (ABILIFY) 5 MG tablet [Pharmacy Med Name: ARIPIPRAZOLE 5MG TABLETS] 90 tablet 3     Sig: TAKE 1 TABLET BY MOUTH DAILY        Last Filled:  05/04/2023, 90, 3    Patient Phone Number: [phone] (home)     Last appt: 3/21/2024   Next appt: 4/29/2024    Last OARRS:       3/21/2024     3:56 PM   RX Monitoring   Periodic Controlled Substance Monitoring No signs of potential drug abuse or diversion identified.

## 2024-05-08 RX ORDER — ARIPIPRAZOLE 5 MG/1
5 TABLET ORAL DAILY
Qty: 30 TABLET | Refills: 3 | Status: SHIPPED | OUTPATIENT
Start: 2024-05-08

## 2024-05-08 NOTE — TELEPHONE ENCOUNTER
Medication and Quantity requested: ARIPiprazole (ABILIFY) 5 MG tablet      Last Visit  3/21/2024    Pharmacy and phone number updated in [facility]:  yes- send to [id number] - [place] [id number]

## 2024-05-22 DIAGNOSIS — G44.89 OTHER HEADACHE SYNDROME: ICD-10-CM

## 2024-05-22 RX ORDER — ONDANSETRON 4 MG/1
4 TABLET, ORALLY DISINTEGRATING ORAL EVERY 8 HOURS PRN
Qty: 20 TABLET | Refills: 0 | Status: SHIPPED | OUTPATIENT
Start: 2024-05-22 | End: 2024-05-29

## 2024-05-22 RX ORDER — BUTALBITAL, ACETAMINOPHEN AND CAFFEINE 50; 325; 40 MG/1; MG/1; MG/1
1 TABLET ORAL EVERY 6 HOURS PRN
Qty: 40 TABLET | Refills: 0 | Status: SHIPPED | OUTPATIENT
Start: 2024-05-22

## 2024-05-22 NOTE — TELEPHONE ENCOUNTER
Medication and Quantity requested:   ondansetron (ZOFRAN-ODT) 4 MG disintegrating tablet     Qty 20    Last Visit 03/21/2024    butalbital-acetaminophen-caffeine (FIORICET, ESGIC) -40 MG per tablet   Qty 40  new prescription    Pharmacy and phone number updated in EPIC:  yes    Please send to [facility] [address]

## 2024-05-25 DIAGNOSIS — F32.A DEPRESSION, UNSPECIFIED DEPRESSION TYPE: ICD-10-CM

## 2024-05-28 ENCOUNTER — TELEPHONE (OUTPATIENT)
Dept: FAMILY MEDICINE CLINIC | Age: 69
End: 2024-05-28

## 2024-05-28 DIAGNOSIS — G89.4 CHRONIC PAIN SYNDROME: ICD-10-CM

## 2024-05-28 RX ORDER — SERTRALINE HYDROCHLORIDE 100 MG/1
TABLET, FILM COATED ORAL
Qty: 270 TABLET | Refills: 2 | Status: SHIPPED | OUTPATIENT
Start: 2024-05-28

## 2024-05-28 RX ORDER — HYDROCODONE BITARTRATE AND ACETAMINOPHEN 7.5; 325 MG/1; MG/1
1 TABLET ORAL EVERY 6 HOURS PRN
Qty: 120 TABLET | Refills: 0 | Status: SHIPPED | OUTPATIENT
Start: 2024-05-28 | End: 2024-06-27

## 2024-05-28 NOTE — TELEPHONE ENCOUNTER
Medication and Quantity requested:      HYDROcodone-acetaminophen (NORCO) 7.5-325 MG per tablet      Last Visit  03/21/24 - Dr Ruiz    Pharmacy and phone number updated in EPIC:   yes    [facility] [address]

## 2024-05-31 ENCOUNTER — OFFICE VISIT (OUTPATIENT)
Dept: FAMILY MEDICINE CLINIC | Age: 69
End: 2024-05-31
Payer: MEDICARE

## 2024-05-31 VITALS
OXYGEN SATURATION: 97 % | DIASTOLIC BLOOD PRESSURE: 76 MMHG | BODY MASS INDEX: 15.81 KG/M2 | HEART RATE: 88 BPM | WEIGHT: 104 LBS | SYSTOLIC BLOOD PRESSURE: 120 MMHG

## 2024-05-31 DIAGNOSIS — E78.00 HYPERCHOLESTEREMIA: ICD-10-CM

## 2024-05-31 DIAGNOSIS — L01.00 IMPETIGO: Primary | ICD-10-CM

## 2024-05-31 DIAGNOSIS — R53.82 CHRONIC FATIGUE: ICD-10-CM

## 2024-05-31 PROCEDURE — G8427 DOCREV CUR MEDS BY ELIG CLIN: HCPCS | Performed by: FAMILY MEDICINE

## 2024-05-31 PROCEDURE — 99213 OFFICE O/P EST LOW 20 MIN: CPT | Performed by: FAMILY MEDICINE

## 2024-05-31 PROCEDURE — 1090F PRES/ABSN URINE INCON ASSESS: CPT | Performed by: FAMILY MEDICINE

## 2024-05-31 PROCEDURE — 3017F COLORECTAL CA SCREEN DOC REV: CPT | Performed by: FAMILY MEDICINE

## 2024-05-31 PROCEDURE — 1123F ACP DISCUSS/DSCN MKR DOCD: CPT | Performed by: FAMILY MEDICINE

## 2024-05-31 PROCEDURE — G8419 CALC BMI OUT NRM PARAM NOF/U: HCPCS | Performed by: FAMILY MEDICINE

## 2024-05-31 PROCEDURE — 1036F TOBACCO NON-USER: CPT | Performed by: FAMILY MEDICINE

## 2024-05-31 PROCEDURE — G8400 PT W/DXA NO RESULTS DOC: HCPCS | Performed by: FAMILY MEDICINE

## 2024-05-31 RX ORDER — CEPHALEXIN 500 MG/1
500 CAPSULE ORAL 3 TIMES DAILY
Qty: 30 CAPSULE | Refills: 0 | Status: SHIPPED | OUTPATIENT
Start: 2024-05-31 | End: 2024-06-10

## 2024-05-31 SDOH — ECONOMIC STABILITY: FOOD INSECURITY: WITHIN THE PAST 12 MONTHS, YOU WORRIED THAT YOUR FOOD WOULD RUN OUT BEFORE YOU GOT MONEY TO BUY MORE.: NEVER TRUE

## 2024-05-31 SDOH — ECONOMIC STABILITY: INCOME INSECURITY: HOW HARD IS IT FOR YOU TO PAY FOR THE VERY BASICS LIKE FOOD, HOUSING, MEDICAL CARE, AND HEATING?: NOT HARD AT ALL

## 2024-05-31 SDOH — ECONOMIC STABILITY: FOOD INSECURITY: WITHIN THE PAST 12 MONTHS, THE FOOD YOU BOUGHT JUST DIDN'T LAST AND YOU DIDN'T HAVE MONEY TO GET MORE.: NEVER TRUE

## 2024-05-31 ASSESSMENT — ENCOUNTER SYMPTOMS
GASTROINTESTINAL NEGATIVE: 1
ROS SKIN COMMENTS: PER HPI
RESPIRATORY NEGATIVE: 1

## 2024-05-31 NOTE — PROGRESS NOTES
Marielena Razo (:  1955) is a 69 y.o. female,Established patient, here for evaluation of the following chief complaint(s):  Leg Pain      Assessment & Plan   1. Impetigo  -     cephALEXin (KEFLEX) 500 MG capsule; Take 1 capsule by mouth 3 times daily for 10 days, Disp-30 capsule, R-0Normal  -     mupirocin (BACTROBAN) 2 % ointment; Apply topically 3 times daily., Disp-30 g, R-1, Normal  Dermatologist if not better  2. Chronic fatigue  -     CBC with Auto Differential; Future  -     Comprehensive Metabolic Panel; Future  3. Hypercholesteremia  -     Lipid Panel; Future  -     TSH with Reflex; Future      Return in about 1 month (around 2024) for AWV.       Subjective   HPI  Patient states she has had an open area on her left lower leg/shin for several weeks.  It seems to be healing but then never healed.  She then developed a similar satellite lesion.  She states it does seem to ooze.  She is uncertain what started this.  She states that a are a bit uncomfortable.  She has no systemic symptoms  Review of Systems   Constitutional: Negative.    Respiratory: Negative.     Cardiovascular: Negative.    Gastrointestinal: Negative.    Endocrine: Negative.    Genitourinary: Negative.    Skin:         Per HPI          Objective   Physical Exam  Constitutional:       General: She is not in acute distress.     Appearance: She is not ill-appearing, toxic-appearing or diaphoretic.   HENT:      Head: Normocephalic and atraumatic.   Eyes:      Conjunctiva/sclera: Conjunctivae normal.   Skin:     General: Skin is warm and dry.      Comments: Two half centimeter superficially open areas on her left anterior lower leg.  There was no surrounding inflammation.  There was no drainage at this time   Neurological:      Mental Status: She is alert and oriented to person, place, and time.   Psychiatric:         Mood and Affect: Mood normal.         Behavior: Behavior normal.         Thought Content: Thought content normal.

## 2024-06-01 DIAGNOSIS — G44.89 OTHER HEADACHE SYNDROME: ICD-10-CM

## 2024-06-06 RX ORDER — BUTALBITAL, ACETAMINOPHEN AND CAFFEINE 50; 325; 40 MG/1; MG/1; MG/1
TABLET ORAL
Qty: 40 TABLET | Refills: 2 | Status: SHIPPED | OUTPATIENT
Start: 2024-06-06

## 2024-06-06 NOTE — TELEPHONE ENCOUNTER
Medication:   Requested Prescriptions     Pending Prescriptions Disp Refills    butalbital-acetaminophen-caffeine (FIORICET, ESGIC) -40 MG per tablet [Pharmacy Med Name: BUT/ACETAMINOPHEN/CAFF -40 TB] 40 tablet 0     Sig: TAKE 1 TABLET BY MOUTH EVERY 6 HOURS AS NEEDED FOR HEADACHE        Last Filled:      Patient Phone Number: [phone] (home)     Last appt: 5/31/2024   Next appt: 6/10/2024    Last OARRS:       3/21/2024     3:56 PM   RX Monitoring   Periodic Controlled Substance Monitoring No signs of potential drug abuse or diversion identified.

## 2024-06-10 ENCOUNTER — OFFICE VISIT (OUTPATIENT)
Dept: FAMILY MEDICINE CLINIC | Age: 69
End: 2024-06-10
Payer: MEDICARE

## 2024-06-10 VITALS
SYSTOLIC BLOOD PRESSURE: 126 MMHG | HEART RATE: 80 BPM | OXYGEN SATURATION: 98 % | DIASTOLIC BLOOD PRESSURE: 80 MMHG | BODY MASS INDEX: 15.76 KG/M2 | WEIGHT: 104 LBS | HEIGHT: 68 IN

## 2024-06-10 DIAGNOSIS — Z12.31 ENCOUNTER FOR SCREENING MAMMOGRAM FOR MALIGNANT NEOPLASM OF BREAST: ICD-10-CM

## 2024-06-10 DIAGNOSIS — Z78.0 POSTMENOPAUSAL: ICD-10-CM

## 2024-06-10 DIAGNOSIS — Z12.11 COLON CANCER SCREENING: ICD-10-CM

## 2024-06-10 DIAGNOSIS — E78.00 HYPERCHOLESTEREMIA: ICD-10-CM

## 2024-06-10 DIAGNOSIS — R53.82 CHRONIC FATIGUE: ICD-10-CM

## 2024-06-10 DIAGNOSIS — F33.0 MILD EPISODE OF RECURRENT MAJOR DEPRESSIVE DISORDER (HCC): ICD-10-CM

## 2024-06-10 DIAGNOSIS — Z00.00 MEDICARE ANNUAL WELLNESS VISIT, SUBSEQUENT: Primary | ICD-10-CM

## 2024-06-10 DIAGNOSIS — G89.4 CHRONIC PAIN SYNDROME: ICD-10-CM

## 2024-06-10 DIAGNOSIS — I47.10 SVT (SUPRAVENTRICULAR TACHYCARDIA) (HCC): ICD-10-CM

## 2024-06-10 DIAGNOSIS — J45.20 MILD INTERMITTENT ASTHMA WITHOUT COMPLICATION: ICD-10-CM

## 2024-06-10 DIAGNOSIS — H40.9 GLAUCOMA OF BOTH EYES, UNSPECIFIED GLAUCOMA TYPE: ICD-10-CM

## 2024-06-10 LAB
ALBUMIN SERPL-MCNC: 4.5 G/DL (ref 3.4–5)
ALBUMIN/GLOB SERPL: 1.8 {RATIO} (ref 1.1–2.2)
ALP SERPL-CCNC: 123 U/L (ref 40–129)
ALT SERPL-CCNC: 9 U/L (ref 10–40)
ANION GAP SERPL CALCULATED.3IONS-SCNC: 12 MMOL/L (ref 3–16)
AST SERPL-CCNC: 16 U/L (ref 15–37)
BASOPHILS # BLD: 0.1 K/UL (ref 0–0.2)
BASOPHILS NFR BLD: 1 %
BILIRUB SERPL-MCNC: 0.3 MG/DL (ref 0–1)
BUN SERPL-MCNC: 19 MG/DL (ref 7–20)
CALCIUM SERPL-MCNC: 9.5 MG/DL (ref 8.3–10.6)
CHLORIDE SERPL-SCNC: 103 MMOL/L (ref 99–110)
CHOLEST SERPL-MCNC: 195 MG/DL (ref 0–199)
CO2 SERPL-SCNC: 27 MMOL/L (ref 21–32)
CREAT SERPL-MCNC: 0.7 MG/DL (ref 0.6–1.2)
DEPRECATED RDW RBC AUTO: 13.4 % (ref 12.4–15.4)
EOSINOPHIL # BLD: 0.2 K/UL (ref 0–0.6)
EOSINOPHIL NFR BLD: 3 %
GFR SERPLBLD CREATININE-BSD FMLA CKD-EPI: >90 ML/MIN/{1.73_M2}
GLUCOSE SERPL-MCNC: 87 MG/DL (ref 70–99)
HCT VFR BLD AUTO: 39.1 % (ref 36–48)
HDLC SERPL-MCNC: 72 MG/DL (ref 40–60)
HGB BLD-MCNC: 13.1 G/DL (ref 12–16)
LDLC SERPL CALC-MCNC: 102 MG/DL
LYMPHOCYTES # BLD: 1.6 K/UL (ref 1–5.1)
LYMPHOCYTES NFR BLD: 30.2 %
MCH RBC QN AUTO: 30.9 PG (ref 26–34)
MCHC RBC AUTO-ENTMCNC: 33.7 G/DL (ref 31–36)
MCV RBC AUTO: 91.7 FL (ref 80–100)
MONOCYTES # BLD: 0.5 K/UL (ref 0–1.3)
MONOCYTES NFR BLD: 8.9 %
NEUTROPHILS # BLD: 3 K/UL (ref 1.7–7.7)
NEUTROPHILS NFR BLD: 56.9 %
PLATELET # BLD AUTO: 215 K/UL (ref 135–450)
PMV BLD AUTO: 8.4 FL (ref 5–10.5)
POTASSIUM SERPL-SCNC: 4.5 MMOL/L (ref 3.5–5.1)
PROT SERPL-MCNC: 7 G/DL (ref 6.4–8.2)
RBC # BLD AUTO: 4.26 M/UL (ref 4–5.2)
SODIUM SERPL-SCNC: 142 MMOL/L (ref 136–145)
TRIGL SERPL-MCNC: 105 MG/DL (ref 0–150)
TSH SERPL DL<=0.005 MIU/L-ACNC: 0.59 UIU/ML (ref 0.27–4.2)
VLDLC SERPL CALC-MCNC: 21 MG/DL
WBC # BLD AUTO: 5.3 K/UL (ref 4–11)

## 2024-06-10 PROCEDURE — 1123F ACP DISCUSS/DSCN MKR DOCD: CPT | Performed by: FAMILY MEDICINE

## 2024-06-10 PROCEDURE — G0439 PPPS, SUBSEQ VISIT: HCPCS | Performed by: FAMILY MEDICINE

## 2024-06-10 PROCEDURE — 3017F COLORECTAL CA SCREEN DOC REV: CPT | Performed by: FAMILY MEDICINE

## 2024-06-10 ASSESSMENT — LIFESTYLE VARIABLES
HOW MANY STANDARD DRINKS CONTAINING ALCOHOL DO YOU HAVE ON A TYPICAL DAY: PATIENT DOES NOT DRINK
HOW OFTEN DO YOU HAVE A DRINK CONTAINING ALCOHOL: NEVER

## 2024-06-10 ASSESSMENT — PATIENT HEALTH QUESTIONNAIRE - PHQ9
7. TROUBLE CONCENTRATING ON THINGS, SUCH AS READING THE NEWSPAPER OR WATCHING TELEVISION: NOT AT ALL
SUM OF ALL RESPONSES TO PHQ QUESTIONS 1-9: 1
1. LITTLE INTEREST OR PLEASURE IN DOING THINGS: NOT AT ALL
4. FEELING TIRED OR HAVING LITTLE ENERGY: NOT AT ALL
10. IF YOU CHECKED OFF ANY PROBLEMS, HOW DIFFICULT HAVE THESE PROBLEMS MADE IT FOR YOU TO DO YOUR WORK, TAKE CARE OF THINGS AT HOME, OR GET ALONG WITH OTHER PEOPLE: NOT DIFFICULT AT ALL
2. FEELING DOWN, DEPRESSED OR HOPELESS: NOT AT ALL
9. THOUGHTS THAT YOU WOULD BE BETTER OFF DEAD, OR OF HURTING YOURSELF: NOT AT ALL
SUM OF ALL RESPONSES TO PHQ QUESTIONS 1-9: 1
SUM OF ALL RESPONSES TO PHQ QUESTIONS 1-9: 1
8. MOVING OR SPEAKING SO SLOWLY THAT OTHER PEOPLE COULD HAVE NOTICED. OR THE OPPOSITE, BEING SO FIGETY OR RESTLESS THAT YOU HAVE BEEN MOVING AROUND A LOT MORE THAN USUAL: NOT AT ALL
5. POOR APPETITE OR OVEREATING: NOT AT ALL
SUM OF ALL RESPONSES TO PHQ QUESTIONS 1-9: 1
6. FEELING BAD ABOUT YOURSELF - OR THAT YOU ARE A FAILURE OR HAVE LET YOURSELF OR YOUR FAMILY DOWN: NOT AT ALL
3. TROUBLE FALLING OR STAYING ASLEEP: SEVERAL DAYS
SUM OF ALL RESPONSES TO PHQ9 QUESTIONS 1 & 2: 0

## 2024-06-10 NOTE — PATIENT INSTRUCTIONS
attack. These may include:    Chest pain or pressure, or a strange feeling in the chest.     Sweating.     Shortness of breath.     Pain, pressure, or a strange feeling in the back, neck, jaw, or upper belly or in one or both shoulders or arms.     Lightheadedness or sudden weakness.     A fast or irregular heartbeat.   After you call 911, the  may tell you to chew 1 adult-strength or 2 to 4 low-dose aspirin. Wait for an ambulance. Do not try to drive yourself.  Watch closely for changes in your health, and be sure to contact your doctor if you have any problems.  Where can you learn more?  Go to https://www.[employer].net/patientEd and enter F075 to learn more about \"A Healthy Heart: Care Instructions.\"  Current as of: June 24, 2023  Content Version: 14.1  © 3188-4307 [employer].   Care instructions adapted under license by [employer]. If you have questions about a medical condition or this instruction, always ask your healthcare professional. [employer] disclaims any warranty or liability for your use of this information.      Personalized Preventive Plan for Marielena Razo - 6/10/2024  Medicare offers a range of preventive health benefits. Some of the tests and screenings are paid in full while other may be subject to a deductible, co-insurance, and/or copay.    Some of these benefits include a comprehensive review of your medical history including lifestyle, illnesses that may run in your family, and various assessments and screenings as appropriate.    After reviewing your medical record and screening and assessments performed today your provider may have ordered immunizations, labs, imaging, and/or referrals for you.  A list of these orders (if applicable) as well as your Preventive Care list are included within your After Visit Summary for your review.    Other Preventive Recommendations:    A preventive eye exam performed by an eye specialist is recommended every 1-2

## 2024-06-10 NOTE — PROGRESS NOTES
Medicare Annual Wellness Visit    Marielena Razo is here for Medicare AWV    Assessment & Plan     Medicare annual wellness visit, subsequent  Return 1 year  Chronic pain syndrome  OARRS report reviewed and no inconsistencies noted   The patient understands the risks of dependency/addiction with hydrocodone and will take as little as possible and discontinue as soon as possible   SVT (supraventricular tachycardia) (HCC)  No new issues.  Patient is followed by cardiology  Mild episode of recurrent major depressive disorder (HCC)  Patient remains on sertraline  Hypercholesteremia  Lab will be done today  Mild intermittent asthma without complication  Uses albuterol  Colon cancer screening  -     COLONOSCOPY (Screening); Future    Encounter for screening mammogram for malignant neoplasm of breast  -     NATALYA DIGITAL SCREENING AUGMENTED BILATERAL; Future    Postmenopausal  -     DEXA BONE DENSITY AXIAL SKELETON; Future    Glaucoma of both eyes, unspecified glaucoma type  Patient is followed routinely by ophthalmology.  She states she has had 4 surgeries done this year.     Recommendations for Preventive Services Due: see orders and patient instructions/AVS.  Recommended screening schedule for the next 5-10 years is provided to the patient in written form: see Patient Instructions/AVS.    Health Maintenance reviewed with the patient: RSV recommended      Return in about 3 months (around 9/10/2024) for OARRS.     Subjective   See A/P    Patient's complete Health Risk Assessment and screening values have been reviewed and are found in Flowsheets. The following problems were reviewed today and where indicated follow up appointments were made and/or referrals ordered.    Positive Risk Factor Screenings with Interventions:       Cognitive:   Clock Drawing Test (CDT): Normal  Words recalled: 0 Words Recalled  Total Score: (!) 2  Total Score Interpretation: Abnormal Mini-Cog        Controlled Medication Review:      Today's

## 2024-06-16 DIAGNOSIS — G89.4 CHRONIC PAIN SYNDROME: ICD-10-CM

## 2024-06-17 RX ORDER — TIZANIDINE 4 MG/1
TABLET ORAL
Qty: 90 TABLET | Refills: 5 | Status: SHIPPED | OUTPATIENT
Start: 2024-06-17

## 2024-06-24 DIAGNOSIS — G89.4 CHRONIC PAIN SYNDROME: ICD-10-CM

## 2024-06-24 RX ORDER — GABAPENTIN 300 MG/1
CAPSULE ORAL
Qty: 90 CAPSULE | Refills: 5 | Status: SHIPPED | OUTPATIENT
Start: 2024-06-24 | End: 2024-12-24

## 2024-06-24 NOTE — TELEPHONE ENCOUNTER
Recent Visits  Date Type Provider Dept   06/10/24 Office Visit Sandro Ruiz MD [facility]    05/31/24 Office Visit Sandro Ruiz MD [facility]    03/21/24 Office Visit Sandro Ruiz MD [facility]    10/12/23 Office Visit Sandro Ruiz MD [facility]    07/12/23 Office Visit Rasheeda Davis APRN - CNP [facility]    06/23/23 Office Visit Jordan Baez MD [facility]    06/09/23 Office Visit Sandro Ruiz MD [facility]    03/09/23 Office Visit Sandro Ruiz MD [facility]   Showing recent visits within past 540 days with a meds authorizing provider and meeting all other requirements  Future Appointments  No visits were found meeting these conditions.  Showing future appointments within next 150 days with a meds authorizing provider and meeting all other requirements

## 2024-06-26 DIAGNOSIS — G89.4 CHRONIC PAIN SYNDROME: ICD-10-CM

## 2024-06-26 RX ORDER — HYDROCODONE BITARTRATE AND ACETAMINOPHEN 7.5; 325 MG/1; MG/1
1 TABLET ORAL EVERY 6 HOURS PRN
Qty: 120 TABLET | Refills: 0 | Status: SHIPPED | OUTPATIENT
Start: 2024-06-26 | End: 2024-07-26

## 2024-06-26 NOTE — TELEPHONE ENCOUNTER
Medication and Quantity requested: HYDROcodone-acetaminophen (NORCO) 7.5-325 MG per table      Last Visit  6/10/2024    Pharmacy and phone number updated in [facility]:  yes   [id number] - [place], [place] - [address] - P [phone] - F [phone]  [address], [facility] [phone]

## 2024-06-26 NOTE — TELEPHONE ENCOUNTER
Medication:   Requested Prescriptions     Pending Prescriptions Disp Refills    HYDROcodone-acetaminophen (NORCO) 7.5-325 MG per tablet 120 tablet 0     Sig: Take 1 tablet by mouth every 6 hours as needed for Pain for up to 30 days.        Last Filled:  5/28/2024, 120, 0    Patient Phone Number: [phone] (home)     Last appt: 6/10/2024   Next appt: Visit date not found    Last OARRS:       6/10/2024     8:35 AM   RX Monitoring   Periodic Controlled Substance Monitoring No signs of potential drug abuse or diversion identified.

## 2024-07-05 DIAGNOSIS — G44.89 OTHER HEADACHE SYNDROME: ICD-10-CM

## 2024-07-08 ENCOUNTER — TELEPHONE (OUTPATIENT)
Dept: FAMILY MEDICINE CLINIC | Age: 69
End: 2024-07-08

## 2024-07-08 RX ORDER — BUTALBITAL, ACETAMINOPHEN AND CAFFEINE 50; 325; 40 MG/1; MG/1; MG/1
TABLET ORAL
Qty: 40 TABLET | Refills: 5 | Status: SHIPPED | OUTPATIENT
Start: 2024-07-08

## 2024-07-08 NOTE — TELEPHONE ENCOUNTER
Medication:   Requested Prescriptions     Pending Prescriptions Disp Refills    butalbital-acetaminophen-caffeine (FIORICET, ESGIC) -40 MG per tablet [Pharmacy Med Name: BUT/ACETAMINOPHEN/CAFF -40 TB] 40 tablet 2     Sig: TAKE 1 TABLET BY MOUTH EVERY 6 HOURS AS NEEDED FOR HEADACHE        Last Filled:  06/06/2024 #40 2rf    Patient Phone Number: [phone] (home)     Last appt: 6/10/2024   Next appt: Visit date not found    Last OARRS:       6/10/2024     8:35 AM   RX Monitoring   Periodic Controlled Substance Monitoring No signs of potential drug abuse or diversion identified.

## 2024-07-08 NOTE — TELEPHONE ENCOUNTER
Patient sent a my chart message       Dr Lambert I have lost around 10lbs and I do not know why.       Dr lambert has no available appointments in her time frame .     Please call and advise

## 2024-07-10 DIAGNOSIS — K21.9 GASTROESOPHAGEAL REFLUX DISEASE WITHOUT ESOPHAGITIS: ICD-10-CM

## 2024-07-10 RX ORDER — DEXLANSOPRAZOLE 60 MG/1
CAPSULE, DELAYED RELEASE ORAL
Qty: 90 CAPSULE | Refills: 3 | Status: SHIPPED | OUTPATIENT
Start: 2024-07-10

## 2024-07-10 NOTE — TELEPHONE ENCOUNTER
Medication:   Requested Prescriptions     Pending Prescriptions Disp Refills    dexlansoprazole (DEXILANT) 60 MG CPDR delayed release capsule [Pharmacy Med Name: DEXLANSOPRAZOLE 60MG DR CAPSULES] 90 capsule 3     Sig: TAKE 1 CAPSULE BY MOUTH DAILY        Last Filled:  90.3  08.02.23    Patient Phone Number: [phone] (home)     Last appt: 6/10/2024   Next appt: 7/11/2024    Last OARRS:       6/10/2024     8:35 AM   RX Monitoring   Periodic Controlled Substance Monitoring No signs of potential drug abuse or diversion identified.

## 2024-07-11 ENCOUNTER — OFFICE VISIT (OUTPATIENT)
Dept: FAMILY MEDICINE CLINIC | Age: 69
End: 2024-07-11
Payer: MEDICARE

## 2024-07-11 VITALS
HEIGHT: 68 IN | RESPIRATION RATE: 16 BRPM | BODY MASS INDEX: 15.61 KG/M2 | HEART RATE: 92 BPM | SYSTOLIC BLOOD PRESSURE: 137 MMHG | WEIGHT: 103 LBS | OXYGEN SATURATION: 97 % | DIASTOLIC BLOOD PRESSURE: 81 MMHG

## 2024-07-11 DIAGNOSIS — R68.89 FLUCTUATION OF WEIGHT: Primary | ICD-10-CM

## 2024-07-11 DIAGNOSIS — L98.9 SKIN LESION: ICD-10-CM

## 2024-07-11 PROBLEM — I20.9 ANGINA PECTORIS, UNSPECIFIED (HCC): Status: ACTIVE | Noted: 2024-07-11

## 2024-07-11 PROBLEM — I25.119 ATHEROSCLEROTIC HEART DISEASE OF NATIVE CORONARY ARTERY WITH UNSPECIFIED ANGINA PECTORIS (HCC): Status: ACTIVE | Noted: 2024-07-11

## 2024-07-11 PROCEDURE — 1123F ACP DISCUSS/DSCN MKR DOCD: CPT | Performed by: FAMILY MEDICINE

## 2024-07-11 PROCEDURE — G8419 CALC BMI OUT NRM PARAM NOF/U: HCPCS | Performed by: FAMILY MEDICINE

## 2024-07-11 PROCEDURE — 1036F TOBACCO NON-USER: CPT | Performed by: FAMILY MEDICINE

## 2024-07-11 PROCEDURE — G8427 DOCREV CUR MEDS BY ELIG CLIN: HCPCS | Performed by: FAMILY MEDICINE

## 2024-07-11 PROCEDURE — G8400 PT W/DXA NO RESULTS DOC: HCPCS | Performed by: FAMILY MEDICINE

## 2024-07-11 PROCEDURE — 1090F PRES/ABSN URINE INCON ASSESS: CPT | Performed by: FAMILY MEDICINE

## 2024-07-11 PROCEDURE — 99213 OFFICE O/P EST LOW 20 MIN: CPT | Performed by: FAMILY MEDICINE

## 2024-07-11 PROCEDURE — 3017F COLORECTAL CA SCREEN DOC REV: CPT | Performed by: FAMILY MEDICINE

## 2024-07-11 ASSESSMENT — ENCOUNTER SYMPTOMS
GASTROINTESTINAL NEGATIVE: 1
RESPIRATORY NEGATIVE: 1
SHORTNESS OF BREATH: 0
WHEEZING: 0

## 2024-07-11 ASSESSMENT — PATIENT HEALTH QUESTIONNAIRE - PHQ9
8. MOVING OR SPEAKING SO SLOWLY THAT OTHER PEOPLE COULD HAVE NOTICED. OR THE OPPOSITE, BEING SO FIGETY OR RESTLESS THAT YOU HAVE BEEN MOVING AROUND A LOT MORE THAN USUAL: NOT AT ALL
6. FEELING BAD ABOUT YOURSELF - OR THAT YOU ARE A FAILURE OR HAVE LET YOURSELF OR YOUR FAMILY DOWN: NOT AT ALL
10. IF YOU CHECKED OFF ANY PROBLEMS, HOW DIFFICULT HAVE THESE PROBLEMS MADE IT FOR YOU TO DO YOUR WORK, TAKE CARE OF THINGS AT HOME, OR GET ALONG WITH OTHER PEOPLE: NOT DIFFICULT AT ALL
SUM OF ALL RESPONSES TO PHQ QUESTIONS 1-9: 0
SUM OF ALL RESPONSES TO PHQ9 QUESTIONS 1 & 2: 0
2. FEELING DOWN, DEPRESSED OR HOPELESS: NOT AT ALL
5. POOR APPETITE OR OVEREATING: NOT AT ALL
SUM OF ALL RESPONSES TO PHQ QUESTIONS 1-9: 0
9. THOUGHTS THAT YOU WOULD BE BETTER OFF DEAD, OR OF HURTING YOURSELF: NOT AT ALL
SUM OF ALL RESPONSES TO PHQ QUESTIONS 1-9: 0
1. LITTLE INTEREST OR PLEASURE IN DOING THINGS: NOT AT ALL
3. TROUBLE FALLING OR STAYING ASLEEP: NOT AT ALL
4. FEELING TIRED OR HAVING LITTLE ENERGY: NOT AT ALL
SUM OF ALL RESPONSES TO PHQ QUESTIONS 1-9: 0
7. TROUBLE CONCENTRATING ON THINGS, SUCH AS READING THE NEWSPAPER OR WATCHING TELEVISION: NOT AT ALL

## 2024-07-11 NOTE — PROGRESS NOTES
Marielena Razo (:  1955) is a 69 y.o. female,Established patient, here for evaluation of the following chief complaint(s):  Concern For COVID-19 (Lesion on leg not going away has tried ointment also losing weight and gaining weight doesn't know what could be going on pt stated she eats all day long )      Assessment & Plan   1. Fluctuation of weight  Since her appetite is normal, uncertain whether appetite stimulant such as Megace would help but may be worth a try.  She will also take an extra boost per day.  Consider malignancy workup although there is no localizing signs or symptoms and patient's weight has really not changed significantly.  2. Skin lesion  -     Soto Rivera MD, General Surgery, [facility]      Return if symptoms worsen or fail to improve.       Subjective   HPI  Patient states her weight seems to yoyo by 3 pounds.  She will lose 3 pounds then gained 3 pounds and she states she does not have much despair.  She states her appetite is good and she eats 3 square meals per day as well as supplements with boost.  She has no other physical complaints that might relate to an occult cancer i.e. she has had no GI or  symptoms.  She has no respiratory, neurologic, or cardiac symptoms.  She had lab work done in  with normal thyroid, CBC, LFTs, BMP, etc.    Review of Systems   Constitutional:  Positive for fatigue. Negative for activity change, appetite change, chills, diaphoresis and fever.   Respiratory: Negative.  Negative for shortness of breath and wheezing.    Cardiovascular: Negative.    Gastrointestinal: Negative.    Endocrine: Negative.    Genitourinary: Negative.    Allergic/Immunologic: Positive for environmental allergies.          Objective   Physical Exam  Constitutional:       General: She is not in acute distress.     Appearance: She is not ill-appearing, toxic-appearing or diaphoretic.   HENT:      Head: Normocephalic and atraumatic.   Eyes:

## 2024-07-25 DIAGNOSIS — G89.4 CHRONIC PAIN SYNDROME: ICD-10-CM

## 2024-07-25 RX ORDER — HYDROCODONE BITARTRATE AND ACETAMINOPHEN 7.5; 325 MG/1; MG/1
1 TABLET ORAL EVERY 6 HOURS PRN
Qty: 120 TABLET | Refills: 0 | Status: SHIPPED | OUTPATIENT
Start: 2024-07-25 | End: 2024-08-24

## 2024-07-25 NOTE — TELEPHONE ENCOUNTER
Medication and Quantity requested:     HYDROcodone-acetaminophen (NORCO) 7.5-325 MG per tablet [[id number]]            Last Visit  07/11/2024      Pharmacy and phone number updated in EPIC:  yes      Ingrid Lance

## 2024-07-25 NOTE — TELEPHONE ENCOUNTER
Medication:   Requested Prescriptions     Pending Prescriptions Disp Refills    HYDROcodone-acetaminophen (NORCO) 7.5-325 MG per tablet 120 tablet 0     Sig: Take 1 tablet by mouth every 6 hours as needed for Pain for up to 30 days.        Last Filled:  6/26/24    Patient Phone Number: [phone] (home)     Last appt: 7/11/2024   Next appt: Visit date not found    Last OARRS:       6/10/2024     8:35 AM   RX Monitoring   Periodic Controlled Substance Monitoring No signs of potential drug abuse or diversion identified.

## 2024-07-26 ENCOUNTER — OFFICE VISIT (OUTPATIENT)
Dept: CARDIOLOGY CLINIC | Age: 69
End: 2024-07-26
Payer: MEDICARE

## 2024-07-26 VITALS
WEIGHT: 100.6 LBS | SYSTOLIC BLOOD PRESSURE: 132 MMHG | DIASTOLIC BLOOD PRESSURE: 60 MMHG | HEART RATE: 75 BPM | BODY MASS INDEX: 15.25 KG/M2 | HEIGHT: 68 IN | OXYGEN SATURATION: 98 %

## 2024-07-26 DIAGNOSIS — I31.39 PERICARDIAL EFFUSION: ICD-10-CM

## 2024-07-26 DIAGNOSIS — I47.19 PAT (PAROXYSMAL ATRIAL TACHYCARDIA) (HCC): Primary | ICD-10-CM

## 2024-07-26 DIAGNOSIS — R42 DIZZY: ICD-10-CM

## 2024-07-26 DIAGNOSIS — I42.9 CARDIOMYOPATHY, UNSPECIFIED TYPE (HCC): ICD-10-CM

## 2024-07-26 DIAGNOSIS — I34.0 NONRHEUMATIC MITRAL VALVE REGURGITATION: ICD-10-CM

## 2024-07-26 PROCEDURE — 99214 OFFICE O/P EST MOD 30 MIN: CPT | Performed by: NURSE PRACTITIONER

## 2024-07-26 PROCEDURE — G8419 CALC BMI OUT NRM PARAM NOF/U: HCPCS | Performed by: NURSE PRACTITIONER

## 2024-07-26 PROCEDURE — G8400 PT W/DXA NO RESULTS DOC: HCPCS | Performed by: NURSE PRACTITIONER

## 2024-07-26 PROCEDURE — 3017F COLORECTAL CA SCREEN DOC REV: CPT | Performed by: NURSE PRACTITIONER

## 2024-07-26 PROCEDURE — G8427 DOCREV CUR MEDS BY ELIG CLIN: HCPCS | Performed by: NURSE PRACTITIONER

## 2024-07-26 PROCEDURE — 1090F PRES/ABSN URINE INCON ASSESS: CPT | Performed by: NURSE PRACTITIONER

## 2024-07-26 PROCEDURE — 1123F ACP DISCUSS/DSCN MKR DOCD: CPT | Performed by: NURSE PRACTITIONER

## 2024-07-26 PROCEDURE — 1036F TOBACCO NON-USER: CPT | Performed by: NURSE PRACTITIONER

## 2024-07-26 NOTE — PROGRESS NOTES
LUNGS TWICE DAILY 12 g 5    nitroGLYCERIN (NITROSTAT) 0.4 MG SL tablet PLACE 1 TABLET UNDER THE TONGUE EVERY 5 MINUTES AS NEEDED FOR CHEST PAIN (Patient not taking: Reported on 7/26/2024) 25 tablet 2     No current facility-administered medications for this visit.     REVIEW OF SYSTEMS:    CONSTITUTIONAL: No major weight gain or loss, fatigue, weakness, night sweats or fever.  HEENT: No new vision difficulties or ringing in the ears.  RESPIRATORY: No new SOB, PND, orthopnea or cough.   CARDIOVASCULAR: See HPI  GI: No nausea, vomiting, diarrhea, constipation, abdominal pain or changes in bowel habits.  : No urinary frequency, urgency, incontinence hematuria or dysuria.  SKIN: No cyanosis or skin lesions.  MUSCULOSKELETAL: No new muscle or joint pain.  NEUROLOGICAL: No syncope or TIA-like symptoms.  PSYCHIATRIC: No anxiety, pain, insomnia or depression    Objective:   PHYSICAL EXAM:          Vitals:    07/26/24 0902 07/26/24 0920   BP: 126/80 132/60   Site: Left Upper Arm Right Upper Arm   Position: Sitting    Cuff Size: Medium Adult Medium Adult   Pulse: 75    SpO2: 98%    Weight: 45.6 kg (100 lb 9.6 oz)    Height: 1.727 m (5' 8\")        VITALS:  /60 (Site: Right Upper Arm, Cuff Size: Medium Adult)   Pulse 75   Ht 1.727 m (5' 8\")   Wt 45.6 kg (100 lb 9.6 oz)   SpO2 98%   BMI 15.30 kg/m²   CONSTITUTIONAL: Cooperative, no apparent distress, and appears well nourished / developed  NEUROLOGIC:  Awake and orientated to person, place and time.  PSYCH: Calm affect.  SKIN: Warm and dry; LUE support.  HEENT: Sclera non-icteric, normocephalic, neck supple, no elevation of JVP, normal carotid pulses with no bruits and thyroid normal size.  LUNGS:  No increased work of breathing and essentially clear  CARDIOVASCULAR:  Regular rate 72 and rhythm with no murmurs, gallops, rubs, or abnormal heart sounds, normal PMI.The apical impulses not displaced  JVP less than 8 cm H2O  Heart tones are crisp and normal  Cervical

## 2024-07-30 ENCOUNTER — INITIAL CONSULT (OUTPATIENT)
Dept: SURGERY | Age: 69
End: 2024-07-30
Payer: MEDICARE

## 2024-07-30 VITALS — DIASTOLIC BLOOD PRESSURE: 70 MMHG | BODY MASS INDEX: 15.54 KG/M2 | WEIGHT: 102.2 LBS | SYSTOLIC BLOOD PRESSURE: 130 MMHG

## 2024-07-30 DIAGNOSIS — R20.9 DISTURBANCE OF SKIN SENSATION: Primary | ICD-10-CM

## 2024-07-30 DIAGNOSIS — R22.42 MASS OF SKIN OF LEFT LOWER LEG: ICD-10-CM

## 2024-07-30 PROCEDURE — 3017F COLORECTAL CA SCREEN DOC REV: CPT | Performed by: SURGERY

## 2024-07-30 PROCEDURE — G8427 DOCREV CUR MEDS BY ELIG CLIN: HCPCS | Performed by: SURGERY

## 2024-07-30 PROCEDURE — G8400 PT W/DXA NO RESULTS DOC: HCPCS | Performed by: SURGERY

## 2024-07-30 PROCEDURE — 1036F TOBACCO NON-USER: CPT | Performed by: SURGERY

## 2024-07-30 PROCEDURE — 1123F ACP DISCUSS/DSCN MKR DOCD: CPT | Performed by: SURGERY

## 2024-07-30 PROCEDURE — 99213 OFFICE O/P EST LOW 20 MIN: CPT | Performed by: SURGERY

## 2024-07-30 PROCEDURE — G8419 CALC BMI OUT NRM PARAM NOF/U: HCPCS | Performed by: SURGERY

## 2024-07-30 PROCEDURE — 1090F PRES/ABSN URINE INCON ASSESS: CPT | Performed by: SURGERY

## 2024-07-30 ASSESSMENT — ENCOUNTER SYMPTOMS
RESPIRATORY NEGATIVE: 1
ALLERGIC/IMMUNOLOGIC NEGATIVE: 1
EYES NEGATIVE: 1
GASTROINTESTINAL NEGATIVE: 1

## 2024-07-30 NOTE — PROGRESS NOTES
[facility] GENERAL AND LAPAROSCOPIC SURGERY                       PATIENT NAME: Marielena Razo        TODAY'S DATE: 7/30/2024    Reason for Consult:  Mass    Requesting Physician:  Dr. MONTY Ruiz    HISTORY OF PRESENT ILLNESS:              The patient is a 69 y.o. female who presents with a leg lesion, site on the left lower leg, irritating, tender, sticking to the sheets etc. No fever, no prior drainage at the site. Has reddish skin color change. No fever.    Past Medical History:        Diagnosis Date    Arthritis     fingers, hands    Asthma     Back pain     Bell's palsy     CHF (congestive heart failure) (HCC)     Chronic pain     BACK SURGERY X4, neck, left arm    Colitis 2/14/2013    Depression     Elevated sed rate     Family history of breast cancer in first degree relative     Gastric ulcer     GERD (gastroesophageal reflux disease)     colitis    Glaucoma     Hypertension     Movement disorder     chronic back pain    Neuromuscular disorder (HCC)     sciatica rt. leg    Osteoarthritis     PONV (postoperative nausea and vomiting)     SEVERE       Past Surgical History:        Procedure Laterality Date    BACK SURGERY      x 4    COLONOSCOPY  10/16/2012    polyp removed and biopsy sent    COLONOSCOPY  03/26/2013    CYSTOSCOPY  01/13/2016    urethral dilitation    EYE SURGERY Right 07/10/2023    FACIAL NERVE SURGERY      D/T Bell's Palsy    FINGER SURGERY Left 04/03/2018    Excision of Left Thumb Digital Mucous cyst & DIP Joint Arthrotomy & Debridement    FINGER SURGERY  04/2018    left thumb    FINGER SURGERY Left 04/03/2018    thumb surgery     HYSTERECTOMY (CERVIX STATUS UNKNOWN)      Partial    UPPER GASTROINTESTINAL ENDOSCOPY  10/15/2012    UPPER GASTROINTESTINAL ENDOSCOPY  02/11/2015    with EUS    VENTRAL HERNIA REPAIR N/A 04/15/2019    LAPAROSCOPIC REPAIR OF INCISIONAL HERNIA WITH MESH performed by Soto Wong MD at [facility] OR       Current Medications:   No current

## 2024-08-04 DIAGNOSIS — F51.01 PRIMARY INSOMNIA: ICD-10-CM

## 2024-08-04 DIAGNOSIS — F33.0 MILD EPISODE OF RECURRENT MAJOR DEPRESSIVE DISORDER (HCC): ICD-10-CM

## 2024-08-06 DIAGNOSIS — F33.0 MILD EPISODE OF RECURRENT MAJOR DEPRESSIVE DISORDER (HCC): ICD-10-CM

## 2024-08-06 RX ORDER — TRAZODONE HYDROCHLORIDE 50 MG/1
TABLET ORAL
Qty: 60 TABLET | Refills: 11 | Status: SHIPPED | OUTPATIENT
Start: 2024-08-06

## 2024-08-06 RX ORDER — BUPROPION HYDROCHLORIDE 150 MG/1
150 TABLET ORAL 2 TIMES DAILY
Qty: 60 TABLET | Refills: 5 | Status: SHIPPED | OUTPATIENT
Start: 2024-08-06

## 2024-08-06 RX ORDER — BUPROPION HYDROCHLORIDE 150 MG/1
150 TABLET ORAL 2 TIMES DAILY
Qty: 60 TABLET | Refills: 5 | OUTPATIENT
Start: 2024-08-06

## 2024-08-06 NOTE — TELEPHONE ENCOUNTER
Medication:   Requested Prescriptions     Pending Prescriptions Disp Refills    buPROPion (WELLBUTRIN XL) 150 MG extended release tablet [Pharmacy Med Name: BUPROPION XL 150MG TABLETS (24 H)] 60 tablet 5     Sig: TAKE 1 TABLET BY MOUTH TWICE DAILY    traZODone (DESYREL) 50 MG tablet [Pharmacy Med Name: TRAZODONE 50MG TABLETS] 60 tablet 11     Sig: TAKE 2 TABLETS BY MOUTH EVERY NIGHT        Last Filled:  02/01/2024, 60, 5.  08/08/2023, 60, 11    Patient Phone Number: [phone] (home)     Last appt: 7/11/2024   Next appt: Visit date not found    Last OARRS:       7/25/2024     9:19 AM   RX Monitoring   Periodic Controlled Substance Monitoring No signs of potential drug abuse or diversion identified.

## 2024-08-06 NOTE — TELEPHONE ENCOUNTER
Medication:   Requested Prescriptions     Pending Prescriptions Disp Refills    buPROPion (WELLBUTRIN XL) 150 MG extended release tablet [Pharmacy Med Name: BUPROPION XL 150MG TABLETS (24 H)] 60 tablet 5     Sig: TAKE 1 TABLET BY MOUTH TWICE DAILY        Last Filled:  02/01/2024, 60, 5    Patient Phone Number: [phone] (home)     Last appt: 7/11/2024   Next appt: Visit date not found    Last OARRS:       7/25/2024     9:19 AM   RX Monitoring   Periodic Controlled Substance Monitoring No signs of potential drug abuse or diversion identified.

## 2024-08-08 DIAGNOSIS — F51.01 PRIMARY INSOMNIA: ICD-10-CM

## 2024-08-08 DIAGNOSIS — F33.0 MILD EPISODE OF RECURRENT MAJOR DEPRESSIVE DISORDER (HCC): ICD-10-CM

## 2024-08-13 ENCOUNTER — PROCEDURE VISIT (OUTPATIENT)
Dept: SURGERY | Age: 69
End: 2024-08-13

## 2024-08-13 VITALS — SYSTOLIC BLOOD PRESSURE: 130 MMHG | WEIGHT: 102 LBS | DIASTOLIC BLOOD PRESSURE: 70 MMHG | BODY MASS INDEX: 15.51 KG/M2

## 2024-08-13 DIAGNOSIS — R22.42 MASS OF SKIN OF LEFT LOWER LEG: Primary | ICD-10-CM

## 2024-08-13 DIAGNOSIS — R20.9 DISTURBANCE OF SKIN SENSATION: ICD-10-CM

## 2024-08-13 RX ORDER — LIDOCAINE HYDROCHLORIDE AND EPINEPHRINE 10; 10 MG/ML; UG/ML
10 INJECTION, SOLUTION INFILTRATION; PERINEURAL ONCE
Status: COMPLETED | OUTPATIENT
Start: 2024-08-13 | End: 2024-08-13

## 2024-08-13 RX ADMIN — LIDOCAINE HYDROCHLORIDE AND EPINEPHRINE 10 ML: 10; 10 INJECTION, SOLUTION INFILTRATION; PERINEURAL at 13:26

## 2024-08-13 NOTE — PROGRESS NOTES
Procedure Note:  Left lower leg area mass  Site confirmed, pt consents to excision  Has had increased size, tenderness locally, drainage and crusting      Chloraprep Prep  1% Lido with epi X 10 ml local  #15 blade excision done  Full thickness skin, SQ, and entire lesion removed; 4.2 cm excision  Layered intermediate closure: Closed with 3-0 Vicryl in superficial fascia and 4-0 Monocryl for skin, 4.2 cm length  Skin Affix glue placed  Pt tolerated well  Site hemostatic  DSD placed  Dressing care instructions reviewed with pt  Pathology sent    Soto Wong

## 2024-08-19 ENCOUNTER — TELEPHONE (OUTPATIENT)
Dept: SURGERY | Age: 69
End: 2024-08-19

## 2024-08-19 NOTE — TELEPHONE ENCOUNTER
----- Message from Dr. Soto Wong MD sent at 8/19/2024 10:20 AM EDT -----  Please call pt. 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 Site is removed, clear margins. Nothing else to do at this time except monitor for any recurrence in the future.  CJ

## 2024-08-22 DIAGNOSIS — G89.4 CHRONIC PAIN SYNDROME: ICD-10-CM

## 2024-08-22 RX ORDER — HYDROCODONE BITARTRATE AND ACETAMINOPHEN 7.5; 325 MG/1; MG/1
1 TABLET ORAL EVERY 6 HOURS PRN
Qty: 120 TABLET | Refills: 0 | Status: SHIPPED | OUTPATIENT
Start: 2024-08-22 | End: 2024-09-21

## 2024-08-22 NOTE — TELEPHONE ENCOUNTER
Medication:   Requested Prescriptions     Pending Prescriptions Disp Refills    HYDROcodone-acetaminophen (NORCO) 7.5-325 MG per tablet 120 tablet 0     Sig: Take 1 tablet by mouth every 6 hours as needed for Pain for up to 30 days.        Last Filled:  7/25/24    Patient Phone Number: [phone] (home)     Last appt: 7/11/2024   Next appt: Visit date not found    Last OARRS:       7/25/2024     9:19 AM   RX Monitoring   Periodic Controlled Substance Monitoring No signs of potential drug abuse or diversion identified.

## 2024-08-22 NOTE — TELEPHONE ENCOUNTER
Medication and Quantity requested:        HYDROcodone-acetaminophen (NORCO) 7.5-325 MG per tablet [[id number]]         Last Visit  07/11/2024      Pharmacy and phone number updated in EPIC:  yes    [facility]

## 2024-09-03 DIAGNOSIS — G44.89 OTHER HEADACHE SYNDROME: ICD-10-CM

## 2024-09-03 RX ORDER — BUTALBITAL, ACETAMINOPHEN AND CAFFEINE 50; 325; 40 MG/1; MG/1; MG/1
1 TABLET ORAL EVERY 6 HOURS PRN
Qty: 40 TABLET | Refills: 5 | Status: SHIPPED | OUTPATIENT
Start: 2024-09-03

## 2024-09-03 NOTE — TELEPHONE ENCOUNTER
Medication:   Requested Prescriptions     Pending Prescriptions Disp Refills    butalbital-acetaminophen-caffeine (FIORICET, ESGIC) -40 MG per tablet 40 tablet 5     Sig: Take 1 tablet by mouth every 6 hours as needed for Headaches Max Daily Amount: 4 tablets        Last Filled:  7/8/2024, 40, 5    Patient Phone Number: [phone] (home)     Last appt: 7/11/2024   Next appt: 9/16/2024    Last OARRS:       8/22/2024     9:46 AM   RX Monitoring   Periodic Controlled Substance Monitoring No signs of potential drug abuse or diversion identified.

## 2024-09-03 NOTE — TELEPHONE ENCOUNTER
Medication and Quantity requested:   butalbital-acetaminophen-caffeine (FIORICET, ESGIC) -40 MG per tablet [[id number]]      Last Visit  7-    Pharmacy and phone number updated in [facility]:      [id number] [place] [address] - P [phone] - F [phone]

## 2024-09-09 RX ORDER — ARIPIPRAZOLE 5 MG/1
5 TABLET ORAL DAILY
Qty: 30 TABLET | Refills: 5 | Status: SHIPPED | OUTPATIENT
Start: 2024-09-09

## 2024-09-16 ENCOUNTER — OFFICE VISIT (OUTPATIENT)
Dept: FAMILY MEDICINE CLINIC | Age: 69
End: 2024-09-16
Payer: MEDICARE

## 2024-09-16 VITALS
OXYGEN SATURATION: 98 % | SYSTOLIC BLOOD PRESSURE: 110 MMHG | BODY MASS INDEX: 15.2 KG/M2 | DIASTOLIC BLOOD PRESSURE: 60 MMHG | HEART RATE: 79 BPM | WEIGHT: 100 LBS

## 2024-09-16 DIAGNOSIS — G89.4 CHRONIC PAIN SYNDROME: Primary | ICD-10-CM

## 2024-09-16 DIAGNOSIS — I42.9 CARDIOMYOPATHY, UNSPECIFIED TYPE (HCC): ICD-10-CM

## 2024-09-16 DIAGNOSIS — L98.9 SKIN LESION: ICD-10-CM

## 2024-09-16 DIAGNOSIS — F33.0 MILD EPISODE OF RECURRENT MAJOR DEPRESSIVE DISORDER (HCC): ICD-10-CM

## 2024-09-16 DIAGNOSIS — I25.119 ATHEROSCLEROSIS OF NATIVE CORONARY ARTERY OF NATIVE HEART WITH ANGINA PECTORIS (HCC): ICD-10-CM

## 2024-09-16 DIAGNOSIS — J45.20 MILD INTERMITTENT ASTHMA WITHOUT COMPLICATION: ICD-10-CM

## 2024-09-16 PROCEDURE — G8427 DOCREV CUR MEDS BY ELIG CLIN: HCPCS | Performed by: FAMILY MEDICINE

## 2024-09-16 PROCEDURE — 99214 OFFICE O/P EST MOD 30 MIN: CPT | Performed by: FAMILY MEDICINE

## 2024-09-16 PROCEDURE — G8400 PT W/DXA NO RESULTS DOC: HCPCS | Performed by: FAMILY MEDICINE

## 2024-09-16 PROCEDURE — 1090F PRES/ABSN URINE INCON ASSESS: CPT | Performed by: FAMILY MEDICINE

## 2024-09-16 PROCEDURE — 1036F TOBACCO NON-USER: CPT | Performed by: FAMILY MEDICINE

## 2024-09-16 PROCEDURE — 1123F ACP DISCUSS/DSCN MKR DOCD: CPT | Performed by: FAMILY MEDICINE

## 2024-09-16 PROCEDURE — G8419 CALC BMI OUT NRM PARAM NOF/U: HCPCS | Performed by: FAMILY MEDICINE

## 2024-09-16 PROCEDURE — 3017F COLORECTAL CA SCREEN DOC REV: CPT | Performed by: FAMILY MEDICINE

## 2024-09-16 PROCEDURE — G2211 COMPLEX E/M VISIT ADD ON: HCPCS | Performed by: FAMILY MEDICINE

## 2024-09-16 RX ORDER — HYDROCODONE BITARTRATE AND ACETAMINOPHEN 7.5; 325 MG/1; MG/1
1 TABLET ORAL EVERY 6 HOURS PRN
Qty: 120 TABLET | Refills: 0 | Status: SHIPPED | OUTPATIENT
Start: 2024-09-16 | End: 2024-10-16

## 2024-09-16 ASSESSMENT — ENCOUNTER SYMPTOMS
GASTROINTESTINAL NEGATIVE: 1
BACK PAIN: 1
RESPIRATORY NEGATIVE: 1

## 2024-10-16 DIAGNOSIS — J45.20 MILD INTERMITTENT ASTHMA WITHOUT COMPLICATION: ICD-10-CM

## 2024-10-17 DIAGNOSIS — G89.4 CHRONIC PAIN SYNDROME: ICD-10-CM

## 2024-10-17 RX ORDER — ALBUTEROL SULFATE 90 UG/1
INHALANT RESPIRATORY (INHALATION)
Qty: 8.5 G | Refills: 11 | Status: SHIPPED | OUTPATIENT
Start: 2024-10-17

## 2024-10-17 RX ORDER — HYDROCODONE BITARTRATE AND ACETAMINOPHEN 7.5; 325 MG/1; MG/1
1 TABLET ORAL EVERY 6 HOURS PRN
Qty: 120 TABLET | Refills: 0 | Status: SHIPPED | OUTPATIENT
Start: 2024-10-17 | End: 2024-11-16

## 2024-10-17 NOTE — TELEPHONE ENCOUNTER
Medication:   Requested Prescriptions     Pending Prescriptions Disp Refills    HYDROcodone-acetaminophen (NORCO) 7.5-325 MG per tablet 120 tablet 0     Sig: Take 1 tablet by mouth every 6 hours as needed for Pain for up to 30 days.        Last Filled:  09/16/2024, 120, 0    Patient Phone Number: [phone] (home)     Last appt: 9/16/2024   Next appt: Visit date not found    Last OARRS:       9/16/2024     9:12 AM   RX Monitoring   Periodic Controlled Substance Monitoring No signs of potential drug abuse or diversion identified.

## 2024-10-17 NOTE — TELEPHONE ENCOUNTER
Medication and Quantity requested:    HYDROcodone-acetaminophen (NORCO) 7.5-325 MG per tablet     Last Visit  09/16/24 - Dr Ruiz    Pharmacy and phone number updated in EPIC:  yes    Ingrid Lance

## 2024-10-28 DIAGNOSIS — G44.89 OTHER HEADACHE SYNDROME: ICD-10-CM

## 2024-10-28 RX ORDER — BUTALBITAL, ACETAMINOPHEN AND CAFFEINE 50; 325; 40 MG/1; MG/1; MG/1
TABLET ORAL
Qty: 40 TABLET | Refills: 5 | Status: SHIPPED | OUTPATIENT
Start: 2024-10-28

## 2024-10-28 NOTE — TELEPHONE ENCOUNTER
Medication:   Requested Prescriptions     Pending Prescriptions Disp Refills    butalbital-acetaminophen-caffeine (FIORICET, ESGIC) -40 MG per tablet [Pharmacy Med Name: BUT/ACETAMINOPHEN/CAFF -40 TB] 40 tablet      Sig: TAKE 1 TABLET BY MOUTH EVERY 6 HOURS AS NEEDED FOR HEADACHE. MAX DAILY AMOUNT: 4 TABLETS        Last Filled:  09/03/2024, 40, 5    Patient Phone Number: [phone] (home)     Last appt: 9/16/2024   Next appt: Visit date not found    Last OARRS:       9/16/2024     9:12 AM   RX Monitoring   Periodic Controlled Substance Monitoring No signs of potential drug abuse or diversion identified.

## 2024-11-14 DIAGNOSIS — G89.4 CHRONIC PAIN SYNDROME: ICD-10-CM

## 2024-11-14 RX ORDER — HYDROCODONE BITARTRATE AND ACETAMINOPHEN 7.5; 325 MG/1; MG/1
1 TABLET ORAL EVERY 6 HOURS PRN
Qty: 120 TABLET | Refills: 0 | Status: SHIPPED | OUTPATIENT
Start: 2024-11-14 | End: 2024-12-14

## 2024-11-14 NOTE — TELEPHONE ENCOUNTER
Medication and Quantity requested:      HYDROcodone-acetaminophen (NORCO) 7.5-325 MG per tablet [[id number]]     Last Visit  9-    Pharmacy and phone number updated in EPIC:    [facility] #[id number] - [address]

## 2024-11-14 NOTE — TELEPHONE ENCOUNTER
Lov 9/16/24  Lrf 120 0 10/17/24 Medication:   Requested Prescriptions     Pending Prescriptions Disp Refills    HYDROcodone-acetaminophen (NORCO) 7.5-325 MG per tablet 120 tablet 0     Sig: Take 1 tablet by mouth every 6 hours as needed for Pain for up to 30 days.        Last Filled:      Patient Phone Number: [phone] (home)     Last appt: 9/16/2024   Next appt: Visit date not found    Last OARRS:       9/16/2024     9:12 AM   RX Monitoring   Periodic Controlled Substance Monitoring No signs of potential drug abuse or diversion identified.

## 2024-12-09 NOTE — PROGRESS NOTES
indicated  - Risk for CHARLENE. consider sleep study down the road for diagnosis. Recommend intra/postop pulmonary monitoring as for high risk for CHARLENE.   - Medical management per surgical team.     Dale Main MD     This dictation was generated by voice recognition computer software.  Although all attempts are made to edit the dictation for accuracy, there may be errors in the transcription that are not intended.

## 2024-12-10 ENCOUNTER — OFFICE VISIT (OUTPATIENT)
Dept: FAMILY MEDICINE CLINIC | Age: 69
End: 2024-12-10
Payer: MEDICARE

## 2024-12-10 VITALS
HEIGHT: 68 IN | HEART RATE: 74 BPM | DIASTOLIC BLOOD PRESSURE: 82 MMHG | WEIGHT: 102.4 LBS | OXYGEN SATURATION: 98 % | BODY MASS INDEX: 15.52 KG/M2 | SYSTOLIC BLOOD PRESSURE: 112 MMHG

## 2024-12-10 DIAGNOSIS — Z01.818 PRE-OP EVALUATION: Primary | ICD-10-CM

## 2024-12-10 PROBLEM — H25.89 OTHER AGE-RELATED CATARACT: Status: ACTIVE | Noted: 2023-11-28

## 2024-12-10 PROCEDURE — G8484 FLU IMMUNIZE NO ADMIN: HCPCS | Performed by: STUDENT IN AN ORGANIZED HEALTH CARE EDUCATION/TRAINING PROGRAM

## 2024-12-10 PROCEDURE — G8419 CALC BMI OUT NRM PARAM NOF/U: HCPCS | Performed by: STUDENT IN AN ORGANIZED HEALTH CARE EDUCATION/TRAINING PROGRAM

## 2024-12-10 PROCEDURE — 1123F ACP DISCUSS/DSCN MKR DOCD: CPT | Performed by: STUDENT IN AN ORGANIZED HEALTH CARE EDUCATION/TRAINING PROGRAM

## 2024-12-10 PROCEDURE — 1159F MED LIST DOCD IN RCRD: CPT | Performed by: STUDENT IN AN ORGANIZED HEALTH CARE EDUCATION/TRAINING PROGRAM

## 2024-12-10 PROCEDURE — 99213 OFFICE O/P EST LOW 20 MIN: CPT | Performed by: STUDENT IN AN ORGANIZED HEALTH CARE EDUCATION/TRAINING PROGRAM

## 2024-12-10 PROCEDURE — G8427 DOCREV CUR MEDS BY ELIG CLIN: HCPCS | Performed by: STUDENT IN AN ORGANIZED HEALTH CARE EDUCATION/TRAINING PROGRAM

## 2024-12-10 PROCEDURE — 1090F PRES/ABSN URINE INCON ASSESS: CPT | Performed by: STUDENT IN AN ORGANIZED HEALTH CARE EDUCATION/TRAINING PROGRAM

## 2024-12-10 PROCEDURE — 3017F COLORECTAL CA SCREEN DOC REV: CPT | Performed by: STUDENT IN AN ORGANIZED HEALTH CARE EDUCATION/TRAINING PROGRAM

## 2024-12-10 PROCEDURE — 1036F TOBACCO NON-USER: CPT | Performed by: STUDENT IN AN ORGANIZED HEALTH CARE EDUCATION/TRAINING PROGRAM

## 2024-12-10 PROCEDURE — G8400 PT W/DXA NO RESULTS DOC: HCPCS | Performed by: STUDENT IN AN ORGANIZED HEALTH CARE EDUCATION/TRAINING PROGRAM

## 2024-12-10 ASSESSMENT — PATIENT HEALTH QUESTIONNAIRE - PHQ9
SUM OF ALL RESPONSES TO PHQ QUESTIONS 1-9: 0
SUM OF ALL RESPONSES TO PHQ QUESTIONS 1-9: 0
SUM OF ALL RESPONSES TO PHQ9 QUESTIONS 1 & 2: 0
1. LITTLE INTEREST OR PLEASURE IN DOING THINGS: NOT AT ALL
SUM OF ALL RESPONSES TO PHQ QUESTIONS 1-9: 0
2. FEELING DOWN, DEPRESSED OR HOPELESS: NOT AT ALL
SUM OF ALL RESPONSES TO PHQ QUESTIONS 1-9: 0

## 2024-12-10 ASSESSMENT — ENCOUNTER SYMPTOMS
SINUS PAIN: 0
WHEEZING: 1
CONSTIPATION: 0
COUGH: 1
BACK PAIN: 1
NAUSEA: 0
DIARRHEA: 0
VOMITING: 0

## 2024-12-13 DIAGNOSIS — G89.4 CHRONIC PAIN SYNDROME: ICD-10-CM

## 2024-12-13 RX ORDER — HYDROCODONE BITARTRATE AND ACETAMINOPHEN 7.5; 325 MG/1; MG/1
1 TABLET ORAL EVERY 6 HOURS PRN
Qty: 120 TABLET | Refills: 0 | Status: SHIPPED | OUTPATIENT
Start: 2024-12-13 | End: 2025-01-12

## 2024-12-13 NOTE — TELEPHONE ENCOUNTER
Medication and Quantity requested:        HYDROcodone-acetaminophen (NORCO) 7.5-325 MG per tablet [[id number]]     Last Visit  12/10/2024    Pharmacy and phone number updated in EPIC:  yes    Ingrid drake

## 2024-12-13 NOTE — TELEPHONE ENCOUNTER
Lov 12/10/24  Lrf 120 0 11/14/24 Medication:   Requested Prescriptions     Pending Prescriptions Disp Refills    HYDROcodone-acetaminophen (NORCO) 7.5-325 MG per tablet 120 tablet 0     Sig: Take 1 tablet by mouth every 6 hours as needed for Pain for up to 30 days.        Last Filled:      Patient Phone Number: [phone] (home)     Last appt: 12/10/2024   Next appt: Visit date not found    Last OARRS:       9/16/2024     9:12 AM   RX Monitoring   Periodic Controlled Substance Monitoring No signs of potential drug abuse or diversion identified.

## 2024-12-27 DIAGNOSIS — G44.89 OTHER HEADACHE SYNDROME: ICD-10-CM

## 2024-12-27 RX ORDER — BUTALBITAL, ACETAMINOPHEN AND CAFFEINE 50; 325; 40 MG/1; MG/1; MG/1
TABLET ORAL
Qty: 40 TABLET | Refills: 5 | Status: SHIPPED | OUTPATIENT
Start: 2024-12-27

## 2024-12-27 NOTE — TELEPHONE ENCOUNTER
Medication:   Requested Prescriptions     Pending Prescriptions Disp Refills    butalbital-acetaminophen-caffeine (FIORICET, ESGIC) -40 MG per tablet [Pharmacy Med Name: BUT/ACETAMINOPHEN/CAFF -40 TB] 40 tablet 5     Sig: TAKE 1 TABLET BY MOUTH EVERY 6 HOURS AS NEEDED FOR HEADACHE. MAX DAILY AMOUNT: 4 TABLETS        Last Filled:  10/28/2024, 40, 5    Patient Phone Number: [phone] (home)     Last appt: 12/10/2024   Next appt: Visit date not found    Last OARRS:       12/13/2024    12:00 PM   RX Monitoring   Periodic Controlled Substance Monitoring No signs of potential drug abuse or diversion identified.

## 2024-12-28 DIAGNOSIS — G89.4 CHRONIC PAIN SYNDROME: ICD-10-CM

## 2024-12-30 RX ORDER — GABAPENTIN 300 MG/1
CAPSULE ORAL
Qty: 90 CAPSULE | Refills: 5 | Status: SHIPPED | OUTPATIENT
Start: 2024-12-30 | End: 2025-06-26

## 2024-12-30 NOTE — TELEPHONE ENCOUNTER
Medication:   Requested Prescriptions     Pending Prescriptions Disp Refills    gabapentin (NEURONTIN) 300 MG capsule [Pharmacy Med Name: GABAPENTIN 300MG CAPSULES] 90 capsule 5     Sig: TAKE 1 CAPSULE BY MOUTH THREE TIMES DAILY        Last Filled:  06/24/2024    Patient Phone Number: [phone] (home)     Last appt: 12/10/2024   Next appt: Visit date not found    Last OARRS:       12/27/2024    12:19 PM   RX Monitoring   Periodic Controlled Substance Monitoring No signs of potential drug abuse or diversion identified.

## 2025-01-10 DIAGNOSIS — G89.4 CHRONIC PAIN SYNDROME: ICD-10-CM

## 2025-01-10 RX ORDER — HYDROCODONE BITARTRATE AND ACETAMINOPHEN 7.5; 325 MG/1; MG/1
1 TABLET ORAL EVERY 6 HOURS PRN
Qty: 120 TABLET | Refills: 0 | Status: SHIPPED | OUTPATIENT
Start: 2025-01-10 | End: 2025-02-09

## 2025-01-10 NOTE — TELEPHONE ENCOUNTER
Medication and Quantity requested: HYDROcodone-acetaminophen (NORCO) 7.5-325 MG per table      Last Visit  12/10/2024      Pharmacy and phone number updated in [facility]:  yes    [facility] #[id number] - [place], [place] - [facility] - P [phone] - F [phone]

## 2025-01-10 NOTE — TELEPHONE ENCOUNTER
Lov 12/10/24  Lrf 120 0 12/13/24Medication:   Requested Prescriptions     Pending Prescriptions Disp Refills    HYDROcodone-acetaminophen (NORCO) 7.5-325 MG per tablet 120 tablet 0     Sig: Take 1 tablet by mouth every 6 hours as needed for Pain for up to 30 days.        Last Filled:      Patient Phone Number: [phone] (home)     Last appt: 12/10/2024   Next appt: Visit date not found    Last OARRS:       12/27/2024    12:19 PM   RX Monitoring   Periodic Controlled Substance Monitoring No signs of potential drug abuse or diversion identified.

## 2025-02-04 DIAGNOSIS — F32.A DEPRESSION, UNSPECIFIED DEPRESSION TYPE: ICD-10-CM

## 2025-02-04 RX ORDER — SERTRALINE HYDROCHLORIDE 100 MG/1
100 TABLET, FILM COATED ORAL 3 TIMES DAILY
Qty: 270 TABLET | Refills: 2 | Status: SHIPPED | OUTPATIENT
Start: 2025-02-04

## 2025-02-04 NOTE — TELEPHONE ENCOUNTER
Last OV: 12/10/2024  Next OV: Visit date not found    Next appointment due:    Last fill:05/28/2024  Refills:270/2  Medication:   Requested Prescriptions     Pending Prescriptions Disp Refills    sertraline (ZOLOFT) 100 MG tablet 270 tablet 2     Sig: Take 1 tablet by mouth 3 times daily        Last Filled:      Patient Phone Number: [phone] (home)     Last appt: 12/10/2024   Next appt: Visit date not found    Last OARRS:       12/27/2024    12:19 PM   RX Monitoring   Periodic Controlled Substance Monitoring No signs of potential drug abuse or diversion identified.

## 2025-02-07 ENCOUNTER — TELEPHONE (OUTPATIENT)
Dept: FAMILY MEDICINE CLINIC | Age: 70
End: 2025-02-07

## 2025-02-07 DIAGNOSIS — G89.4 CHRONIC PAIN SYNDROME: ICD-10-CM

## 2025-02-07 RX ORDER — HYDROCODONE BITARTRATE AND ACETAMINOPHEN 7.5; 325 MG/1; MG/1
1 TABLET ORAL EVERY 6 HOURS PRN
Qty: 120 TABLET | Refills: 0 | Status: SHIPPED | OUTPATIENT
Start: 2025-02-07 | End: 2025-03-09

## 2025-02-07 NOTE — TELEPHONE ENCOUNTER
Last OV: 12/10/2024  Next OV: 2/11/2025    Next appointment due:    Last fill:01/10/2025  Refills:120/0Medication:   Requested Prescriptions     Pending Prescriptions Disp Refills    HYDROcodone-acetaminophen (NORCO) 7.5-325 MG per tablet 120 tablet 0     Sig: Take 1 tablet by mouth every 6 hours as needed for Pain for up to 30 days.        Last Filled:      Patient Phone Number: [phone] (home)     Last appt: 12/10/2024   Next appt: 2/11/2025    Last OARRS:       12/27/2024    12:19 PM   RX Monitoring   Periodic Controlled Substance Monitoring No signs of potential drug abuse or diversion identified.

## 2025-02-07 NOTE — TELEPHONE ENCOUNTER
Medication and Quantity requested:        HYDROcodone-acetaminophen (NORCO) 7.5-325 MG per tablet [[id number]]         Last Visit    12/10/2024  Pharmacy and phone number updated in EPIC:       Ingrid Lance

## 2025-02-11 ENCOUNTER — OFFICE VISIT (OUTPATIENT)
Dept: FAMILY MEDICINE CLINIC | Age: 70
End: 2025-02-11

## 2025-02-11 VITALS
BODY MASS INDEX: 15.66 KG/M2 | DIASTOLIC BLOOD PRESSURE: 71 MMHG | TEMPERATURE: 97.5 F | SYSTOLIC BLOOD PRESSURE: 124 MMHG | OXYGEN SATURATION: 97 % | WEIGHT: 103 LBS | HEART RATE: 89 BPM

## 2025-02-11 DIAGNOSIS — F33.0 MILD EPISODE OF RECURRENT MAJOR DEPRESSIVE DISORDER (HCC): ICD-10-CM

## 2025-02-11 DIAGNOSIS — R05.1 ACUTE COUGH: Primary | ICD-10-CM

## 2025-02-11 PROBLEM — I47.10 SVT (SUPRAVENTRICULAR TACHYCARDIA): Status: RESOLVED | Noted: 2024-03-21 | Resolved: 2025-02-11

## 2025-02-11 PROBLEM — J20.9 ACUTE BRONCHITIS: Status: RESOLVED | Noted: 2017-05-09 | Resolved: 2025-02-11

## 2025-02-11 PROBLEM — R10.13 EPIGASTRIC PAIN: Status: RESOLVED | Noted: 2020-02-26 | Resolved: 2025-02-11

## 2025-02-11 PROBLEM — M67.449 MUCOUS CYST OF FINGER: Status: RESOLVED | Noted: 2018-03-14 | Resolved: 2025-02-11

## 2025-02-11 PROBLEM — R68.89 FLUCTUATION OF WEIGHT: Status: RESOLVED | Noted: 2024-07-11 | Resolved: 2025-02-11

## 2025-02-11 PROBLEM — J30.9 ALLERGIC SINUSITIS: Status: RESOLVED | Noted: 2017-05-09 | Resolved: 2025-02-11

## 2025-02-11 PROBLEM — R11.2 PONV (POSTOPERATIVE NAUSEA AND VOMITING): Status: RESOLVED | Noted: 2019-04-16 | Resolved: 2025-02-11

## 2025-02-11 PROBLEM — Z98.890 PONV (POSTOPERATIVE NAUSEA AND VOMITING): Status: RESOLVED | Noted: 2019-04-16 | Resolved: 2025-02-11

## 2025-02-11 RX ORDER — DEXTROMETHORPHAN HBR AND GUAIFENESIN 5; 100 MG/5ML; MG/5ML
5 LIQUID ORAL EVERY 4 HOURS PRN
Qty: 120 ML | Refills: 0 | Status: SHIPPED | OUTPATIENT
Start: 2025-02-11 | End: 2025-02-21

## 2025-02-11 RX ORDER — DOXYCYCLINE HYCLATE 100 MG
100 TABLET ORAL 2 TIMES DAILY
Qty: 14 TABLET | Refills: 0 | Status: SHIPPED | OUTPATIENT
Start: 2025-02-11 | End: 2025-02-18

## 2025-02-11 RX ORDER — BUPROPION HYDROCHLORIDE 150 MG/1
150 TABLET ORAL 2 TIMES DAILY
Qty: 60 TABLET | Refills: 5 | Status: SHIPPED | OUTPATIENT
Start: 2025-02-11

## 2025-02-11 SDOH — ECONOMIC STABILITY: FOOD INSECURITY: WITHIN THE PAST 12 MONTHS, YOU WORRIED THAT YOUR FOOD WOULD RUN OUT BEFORE YOU GOT MONEY TO BUY MORE.: NEVER TRUE

## 2025-02-11 SDOH — ECONOMIC STABILITY: FOOD INSECURITY: WITHIN THE PAST 12 MONTHS, THE FOOD YOU BOUGHT JUST DIDN'T LAST AND YOU DIDN'T HAVE MONEY TO GET MORE.: NEVER TRUE

## 2025-02-11 ASSESSMENT — PATIENT HEALTH QUESTIONNAIRE - PHQ9
2. FEELING DOWN, DEPRESSED OR HOPELESS: NOT AT ALL
SUM OF ALL RESPONSES TO PHQ QUESTIONS 1-9: 0
1. LITTLE INTEREST OR PLEASURE IN DOING THINGS: NOT AT ALL
SUM OF ALL RESPONSES TO PHQ9 QUESTIONS 1 & 2: 0
SUM OF ALL RESPONSES TO PHQ QUESTIONS 1-9: 0

## 2025-02-11 NOTE — PROGRESS NOTES
Congestion, Disp: 120 mL, Rfl: 0    HYDROcodone-acetaminophen (NORCO) 7.5-325 MG per tablet, Take 1 tablet by mouth every 6 hours as needed for Pain for up to 30 days., Disp: 120 tablet, Rfl: 0    sertraline (ZOLOFT) 100 MG tablet, Take 1 tablet by mouth 3 times daily, Disp: 270 tablet, Rfl: 2    gabapentin (NEURONTIN) 300 MG capsule, TAKE 1 CAPSULE BY MOUTH THREE TIMES DAILY, Disp: 90 capsule, Rfl: 5    butalbital-acetaminophen-caffeine (FIORICET, ESGIC) -40 MG per tablet, TAKE 1 TABLET BY MOUTH EVERY 6 HOURS AS NEEDED FOR HEADACHE. MAX DAILY AMOUNT: 4 TABLETS, Disp: 40 tablet, Rfl: 5    albuterol sulfate HFA (PROVENTIL;VENTOLIN;PROAIR) 108 (90 Base) MCG/ACT inhaler, INHALE 2 PUFFS BY MOUTH EVERY 4 HOURS AS NEEDED FOR WHEEZING, Disp: 8.5 g, Rfl: 11    ARIPiprazole (ABILIFY) 5 MG tablet, TAKE 1 TABLET BY MOUTH DAILY, Disp: 30 tablet, Rfl: 5    traZODone (DESYREL) 50 MG tablet, TAKE 2 TABLETS BY MOUTH EVERY NIGHT, Disp: 60 tablet, Rfl: 11    buPROPion (WELLBUTRIN XL) 150 MG extended release tablet, TAKE 1 TABLET BY MOUTH TWICE DAILY, Disp: 60 tablet, Rfl: 5    Nutritional Supplements (BOOST HIGH PROTEIN) LIQD, Take 1 Can by mouth in the morning and at bedtime, Disp: , Rfl:     dexlansoprazole (DEXILANT) 60 MG CPDR delayed release capsule, TAKE 1 CAPSULE BY MOUTH DAILY, Disp: 90 capsule, Rfl: 3    tiZANidine (ZANAFLEX) 4 MG tablet, TAKE 1 TABLET BY MOUTH THREE TIMES DAILY (Patient taking differently: Take 1 tablet by mouth every 8 hours as needed), Disp: 90 tablet, Rfl: 5    albuterol (PROVENTIL) (2.5 MG/3ML) 0.083% nebulizer solution, USE 1 VIAL VIA NEBULIZER EVERY 6 HOURS, Disp: 540 mL, Rfl: 3    diclofenac sodium (VOLTAREN) 1 % GEL, APPLY 2 GRAMS TOPICALLY TO HAND 4 TIMES DAILY, Disp: 500 g, Rfl: 5    nitroGLYCERIN (NITROSTAT) 0.4 MG SL tablet, PLACE 1 TABLET UNDER THE TONGUE EVERY 5 MINUTES AS NEEDED FOR CHEST PAIN, Disp: 25 tablet, Rfl: 2    Probiotic Product (PROBIOTIC PO), Take by mouth daily , Disp: ,

## 2025-02-11 NOTE — TELEPHONE ENCOUNTER
Last OV: 2/11/2025  Next OV: Visit date not found    Next appointment due:    Last fill:08/06/2024  Refills:60/5

## 2025-02-14 ENCOUNTER — TELEPHONE (OUTPATIENT)
Dept: FAMILY MEDICINE CLINIC | Age: 70
End: 2025-02-14

## 2025-02-14 NOTE — TELEPHONE ENCOUNTER
Pt called and wanted to tell myrna that she is not feeling any better at the moment and wanted "[employer] to recommend what she should do next   [facility] #[id number] - [place] [address] - P [phone] - F [phone]

## 2025-02-27 DIAGNOSIS — G44.89 OTHER HEADACHE SYNDROME: ICD-10-CM

## 2025-02-27 RX ORDER — BUTALBITAL, ACETAMINOPHEN AND CAFFEINE 50; 325; 40 MG/1; MG/1; MG/1
1 TABLET ORAL EVERY 6 HOURS PRN
Qty: 40 TABLET | Refills: 5 | Status: SHIPPED | OUTPATIENT
Start: 2025-02-27

## 2025-02-27 NOTE — TELEPHONE ENCOUNTER
Last OV: 2/11/2025  Next OV: Visit date not found    Next appointment due:    Last fill:12/27/2024  Refills:40/5

## 2025-03-03 RX ORDER — ARIPIPRAZOLE 5 MG/1
5 TABLET ORAL DAILY
Qty: 30 TABLET | Refills: 5 | Status: SHIPPED | OUTPATIENT
Start: 2025-03-03

## 2025-03-03 NOTE — TELEPHONE ENCOUNTER
Last OV: 2/11/2025  Next OV: Visit date not found    Next appointment due:    Last fill:09/09/2024  Refills:30/5

## 2025-03-06 DIAGNOSIS — G89.4 CHRONIC PAIN SYNDROME: ICD-10-CM

## 2025-03-06 RX ORDER — HYDROCODONE BITARTRATE AND ACETAMINOPHEN 7.5; 325 MG/1; MG/1
1 TABLET ORAL EVERY 6 HOURS PRN
Qty: 120 TABLET | Refills: 0 | Status: SHIPPED | OUTPATIENT
Start: 2025-03-06 | End: 2025-04-05

## 2025-03-06 NOTE — TELEPHONE ENCOUNTER
Medication and Quantity requested:      HYDROcodone-acetaminophen (NORCO) 7.5-325 MG per tablet [[id number]]     Last Visit   2-11-25    Pharmacy and phone number updated in [facility]:      [facility] #[id number] [place] [address] - P [phone] - F [phone]

## 2025-03-06 NOTE — TELEPHONE ENCOUNTER
Medication:   Requested Prescriptions     Pending Prescriptions Disp Refills    HYDROcodone-acetaminophen (NORCO) 7.5-325 MG per tablet 120 tablet 0     Sig: Take 1 tablet by mouth every 6 hours as needed for Pain for up to 30 days.        Last Filled:  2/7/2025, 120, 0    Patient Phone Number: [phone] (home)     Last appt: 2/11/2025   Next appt: Visit date not found    Last OARRS:       12/27/2024    12:19 PM   RX Monitoring   Periodic Controlled Substance Monitoring No signs of potential drug abuse or diversion identified.

## 2025-03-20 ENCOUNTER — OFFICE VISIT (OUTPATIENT)
Dept: FAMILY MEDICINE CLINIC | Age: 70
End: 2025-03-20
Payer: MEDICARE

## 2025-03-20 VITALS
TEMPERATURE: 97.2 F | HEIGHT: 68 IN | HEART RATE: 98 BPM | WEIGHT: 107.2 LBS | DIASTOLIC BLOOD PRESSURE: 74 MMHG | OXYGEN SATURATION: 99 % | SYSTOLIC BLOOD PRESSURE: 109 MMHG | RESPIRATION RATE: 16 BRPM | BODY MASS INDEX: 16.25 KG/M2

## 2025-03-20 DIAGNOSIS — R51.9 NONINTRACTABLE HEADACHE, UNSPECIFIED CHRONICITY PATTERN, UNSPECIFIED HEADACHE TYPE: ICD-10-CM

## 2025-03-20 DIAGNOSIS — J01.10 ACUTE NON-RECURRENT FRONTAL SINUSITIS: Primary | ICD-10-CM

## 2025-03-20 DIAGNOSIS — Z12.31 ENCOUNTER FOR SCREENING MAMMOGRAM FOR MALIGNANT NEOPLASM OF BREAST: ICD-10-CM

## 2025-03-20 PROCEDURE — 1036F TOBACCO NON-USER: CPT | Performed by: FAMILY MEDICINE

## 2025-03-20 PROCEDURE — 1160F RVW MEDS BY RX/DR IN RCRD: CPT | Performed by: FAMILY MEDICINE

## 2025-03-20 PROCEDURE — G8419 CALC BMI OUT NRM PARAM NOF/U: HCPCS | Performed by: FAMILY MEDICINE

## 2025-03-20 PROCEDURE — 3017F COLORECTAL CA SCREEN DOC REV: CPT | Performed by: FAMILY MEDICINE

## 2025-03-20 PROCEDURE — G8400 PT W/DXA NO RESULTS DOC: HCPCS | Performed by: FAMILY MEDICINE

## 2025-03-20 PROCEDURE — G8427 DOCREV CUR MEDS BY ELIG CLIN: HCPCS | Performed by: FAMILY MEDICINE

## 2025-03-20 PROCEDURE — 99213 OFFICE O/P EST LOW 20 MIN: CPT | Performed by: FAMILY MEDICINE

## 2025-03-20 PROCEDURE — 1123F ACP DISCUSS/DSCN MKR DOCD: CPT | Performed by: FAMILY MEDICINE

## 2025-03-20 PROCEDURE — 1159F MED LIST DOCD IN RCRD: CPT | Performed by: FAMILY MEDICINE

## 2025-03-20 PROCEDURE — 1090F PRES/ABSN URINE INCON ASSESS: CPT | Performed by: FAMILY MEDICINE

## 2025-03-20 RX ORDER — AMOXICILLIN 500 MG/1
500 CAPSULE ORAL 2 TIMES DAILY
Qty: 20 CAPSULE | Refills: 0 | Status: SHIPPED | OUTPATIENT
Start: 2025-03-20 | End: 2025-03-30

## 2025-03-20 RX ORDER — DEXTROMETHORPHAN HYDROBROMIDE AND PROMETHAZINE HYDROCHLORIDE 15; 6.25 MG/5ML; MG/5ML
5 SYRUP ORAL 4 TIMES DAILY PRN
Qty: 240 ML | Refills: 1 | Status: SHIPPED | OUTPATIENT
Start: 2025-03-20 | End: 2025-04-13

## 2025-03-20 SDOH — ECONOMIC STABILITY: FOOD INSECURITY: WITHIN THE PAST 12 MONTHS, YOU WORRIED THAT YOUR FOOD WOULD RUN OUT BEFORE YOU GOT MONEY TO BUY MORE.: NEVER TRUE

## 2025-03-20 SDOH — ECONOMIC STABILITY: FOOD INSECURITY: WITHIN THE PAST 12 MONTHS, THE FOOD YOU BOUGHT JUST DIDN'T LAST AND YOU DIDN'T HAVE MONEY TO GET MORE.: NEVER TRUE

## 2025-03-20 ASSESSMENT — ENCOUNTER SYMPTOMS
SINUS PRESSURE: 1
DIARRHEA: 0
CONSTIPATION: 0
SINUS PAIN: 1
SHORTNESS OF BREATH: 1
COUGH: 1
NAUSEA: 0
SORE THROAT: 1
VOMITING: 0
WHEEZING: 0
RHINORRHEA: 1

## 2025-03-20 NOTE — PROGRESS NOTES
palpitations.   Gastrointestinal:  Negative for constipation, diarrhea, nausea and vomiting.   Skin:  Negative for rash.          Objective   Physical Exam  Constitutional:       General: She is not in acute distress.     Appearance: Normal appearance. She is not ill-appearing or toxic-appearing.   HENT:      Head:        Comments: Tender 0.25 cm nodules right scalp.     Right Ear: There is impacted cerumen.      Left Ear: Tympanic membrane, ear canal and external ear normal.      Nose: Nose normal.      Mouth/Throat:      Mouth: Mucous membranes are moist.   Eyes:      Extraocular Movements: Extraocular movements intact.      Conjunctiva/sclera: Conjunctivae normal.      Pupils: Pupils are equal, round, and reactive to light.   Cardiovascular:      Rate and Rhythm: Normal rate and regular rhythm.      Pulses: Normal pulses.      Heart sounds: Normal heart sounds. No murmur heard.     No friction rub. No gallop.   Pulmonary:      Effort: Pulmonary effort is normal. No respiratory distress.      Breath sounds: Normal breath sounds. No wheezing or rhonchi.   Abdominal:      General: Abdomen is flat. Bowel sounds are normal.      Palpations: Abdomen is soft.      Tenderness: There is no abdominal tenderness. There is no guarding or rebound.   Musculoskeletal:      Right lower leg: No edema.      Left lower leg: No edema.   Skin:     General: Skin is warm.      Capillary Refill: Capillary refill takes less than 2 seconds.   Neurological:      General: No focal deficit present.      Mental Status: She is alert and oriented to person, place, and time. Mental status is at baseline.   Psychiatric:         Mood and Affect: Mood normal.         Behavior: Behavior normal.         Thought Content: Thought content normal.         Judgment: Judgment normal.                  An electronic signature was used to authenticate this note.    --Dedrick Calvo, DO

## 2025-04-02 ENCOUNTER — HOSPITAL ENCOUNTER (OUTPATIENT)
Dept: GENERAL RADIOLOGY | Age: 70
Discharge: HOME | End: 2025-04-02
Payer: MEDICARE

## 2025-04-02 ENCOUNTER — HOSPITAL ENCOUNTER (OUTPATIENT)
Age: 70
Discharge: HOME | End: 2025-04-02
Payer: MEDICARE

## 2025-04-02 DIAGNOSIS — R51.9 NONINTRACTABLE HEADACHE, UNSPECIFIED CHRONICITY PATTERN, UNSPECIFIED HEADACHE TYPE: ICD-10-CM

## 2025-04-02 PROCEDURE — 70250 X-RAY EXAM OF SKULL: CPT

## 2025-04-04 DIAGNOSIS — G89.4 CHRONIC PAIN SYNDROME: ICD-10-CM

## 2025-04-04 RX ORDER — HYDROCODONE BITARTRATE AND ACETAMINOPHEN 7.5; 325 MG/1; MG/1
1 TABLET ORAL EVERY 6 HOURS PRN
Qty: 120 TABLET | Refills: 0 | Status: SHIPPED | OUTPATIENT
Start: 2025-04-04 | End: 2025-05-04

## 2025-04-04 NOTE — TELEPHONE ENCOUNTER
Last OV: 3/20/2025  Next OV: Visit date not found    Next appointment due:    Last fill:06/17/2024  Refills:90/5

## 2025-04-14 ENCOUNTER — RESULTS FOLLOW-UP (OUTPATIENT)
Dept: FAMILY MEDICINE CLINIC | Age: 70
End: 2025-04-14

## 2025-04-28 ENCOUNTER — OFFICE VISIT (OUTPATIENT)
Dept: FAMILY MEDICINE CLINIC | Age: 70
End: 2025-04-28
Payer: MEDICARE

## 2025-04-28 VITALS
DIASTOLIC BLOOD PRESSURE: 74 MMHG | WEIGHT: 105 LBS | HEIGHT: 68 IN | SYSTOLIC BLOOD PRESSURE: 120 MMHG | RESPIRATION RATE: 16 BRPM | HEART RATE: 91 BPM | BODY MASS INDEX: 15.91 KG/M2 | OXYGEN SATURATION: 96 %

## 2025-04-28 DIAGNOSIS — J30.89 SEASONAL ALLERGIC RHINITIS DUE TO OTHER ALLERGIC TRIGGER: Primary | ICD-10-CM

## 2025-04-28 DIAGNOSIS — G44.89 OTHER HEADACHE SYNDROME: ICD-10-CM

## 2025-04-28 DIAGNOSIS — R58 ECCHYMOSIS: ICD-10-CM

## 2025-04-28 PROCEDURE — G8419 CALC BMI OUT NRM PARAM NOF/U: HCPCS | Performed by: STUDENT IN AN ORGANIZED HEALTH CARE EDUCATION/TRAINING PROGRAM

## 2025-04-28 PROCEDURE — 1160F RVW MEDS BY RX/DR IN RCRD: CPT | Performed by: STUDENT IN AN ORGANIZED HEALTH CARE EDUCATION/TRAINING PROGRAM

## 2025-04-28 PROCEDURE — 1036F TOBACCO NON-USER: CPT | Performed by: STUDENT IN AN ORGANIZED HEALTH CARE EDUCATION/TRAINING PROGRAM

## 2025-04-28 PROCEDURE — 1159F MED LIST DOCD IN RCRD: CPT | Performed by: STUDENT IN AN ORGANIZED HEALTH CARE EDUCATION/TRAINING PROGRAM

## 2025-04-28 PROCEDURE — G8427 DOCREV CUR MEDS BY ELIG CLIN: HCPCS | Performed by: STUDENT IN AN ORGANIZED HEALTH CARE EDUCATION/TRAINING PROGRAM

## 2025-04-28 PROCEDURE — 1123F ACP DISCUSS/DSCN MKR DOCD: CPT | Performed by: STUDENT IN AN ORGANIZED HEALTH CARE EDUCATION/TRAINING PROGRAM

## 2025-04-28 PROCEDURE — 3017F COLORECTAL CA SCREEN DOC REV: CPT | Performed by: STUDENT IN AN ORGANIZED HEALTH CARE EDUCATION/TRAINING PROGRAM

## 2025-04-28 PROCEDURE — G8400 PT W/DXA NO RESULTS DOC: HCPCS | Performed by: STUDENT IN AN ORGANIZED HEALTH CARE EDUCATION/TRAINING PROGRAM

## 2025-04-28 PROCEDURE — 1090F PRES/ABSN URINE INCON ASSESS: CPT | Performed by: STUDENT IN AN ORGANIZED HEALTH CARE EDUCATION/TRAINING PROGRAM

## 2025-04-28 PROCEDURE — 99214 OFFICE O/P EST MOD 30 MIN: CPT | Performed by: STUDENT IN AN ORGANIZED HEALTH CARE EDUCATION/TRAINING PROGRAM

## 2025-04-28 RX ORDER — FLUTICASONE PROPIONATE 50 MCG
2 SPRAY, SUSPENSION (ML) NASAL DAILY
Qty: 16 G | Refills: 0 | Status: SHIPPED | OUTPATIENT
Start: 2025-04-28

## 2025-04-28 RX ORDER — BUTALBITAL, ACETAMINOPHEN AND CAFFEINE 50; 325; 40 MG/1; MG/1; MG/1
1 TABLET ORAL EVERY 6 HOURS PRN
Qty: 40 TABLET | Refills: 5 | Status: SHIPPED | OUTPATIENT
Start: 2025-04-28

## 2025-04-28 RX ORDER — FLUTICASONE PROPIONATE 50 MCG
2 SPRAY, SUSPENSION (ML) NASAL DAILY
Qty: 48 G | OUTPATIENT
Start: 2025-04-28

## 2025-04-28 RX ORDER — BUTALBITAL, ACETAMINOPHEN AND CAFFEINE 50; 325; 40 MG/1; MG/1; MG/1
TABLET ORAL
Qty: 40 TABLET | OUTPATIENT
Start: 2025-04-28

## 2025-04-28 ASSESSMENT — PATIENT HEALTH QUESTIONNAIRE - PHQ9
3. TROUBLE FALLING OR STAYING ASLEEP: NOT AT ALL
2. FEELING DOWN, DEPRESSED OR HOPELESS: NOT AT ALL
5. POOR APPETITE OR OVEREATING: NOT AT ALL
SUM OF ALL RESPONSES TO PHQ QUESTIONS 1-9: 0
9. THOUGHTS THAT YOU WOULD BE BETTER OFF DEAD, OR OF HURTING YOURSELF: NOT AT ALL
4. FEELING TIRED OR HAVING LITTLE ENERGY: NOT AT ALL
10. IF YOU CHECKED OFF ANY PROBLEMS, HOW DIFFICULT HAVE THESE PROBLEMS MADE IT FOR YOU TO DO YOUR WORK, TAKE CARE OF THINGS AT HOME, OR GET ALONG WITH OTHER PEOPLE: NOT DIFFICULT AT ALL
SUM OF ALL RESPONSES TO PHQ QUESTIONS 1-9: 0
SUM OF ALL RESPONSES TO PHQ QUESTIONS 1-9: 0
1. LITTLE INTEREST OR PLEASURE IN DOING THINGS: NOT AT ALL
7. TROUBLE CONCENTRATING ON THINGS, SUCH AS READING THE NEWSPAPER OR WATCHING TELEVISION: NOT AT ALL
SUM OF ALL RESPONSES TO PHQ QUESTIONS 1-9: 0
6. FEELING BAD ABOUT YOURSELF - OR THAT YOU ARE A FAILURE OR HAVE LET YOURSELF OR YOUR FAMILY DOWN: NOT AT ALL
8. MOVING OR SPEAKING SO SLOWLY THAT OTHER PEOPLE COULD HAVE NOTICED. OR THE OPPOSITE, BEING SO FIGETY OR RESTLESS THAT YOU HAVE BEEN MOVING AROUND A LOT MORE THAN USUAL: NOT AT ALL

## 2025-04-28 NOTE — PROGRESS NOTES
Marielena Razo is a 70 y.o. year old female here for:    Chief Complaint:  Chief Complaint   Patient presents with    Skin Problem     Right forearm, red and getting bigger, x1 week     Congestion     X1 week           ASSESSMENT & PLAN:  Marielena Razo is a 70 y.o. year old female here for Skin Problem (Right forearm, red and getting bigger, x1 week ) and Congestion (X1 week )  .The following is a summary of the plan made at this visit:    1. Seasonal allergic rhinitis due to other allergic trigger  -     fluticasone (FLONASE) 50 MCG/ACT nasal spray; 2 sprays by Each Nostril route daily, Disp-16 g, R-0Normal  2. Other headache syndrome  -     butalbital-acetaminophen-caffeine (FIORICET, ESGIC) -40 MG per tablet; Take 1 tablet by mouth every 6 hours as needed for Headaches Max Daily Amount: 4 tablets, Disp-40 tablet, R-5Normal  3. Ecchymosis  Comments:  Benign appearing, CTM       No follow-ups on file.      Reviewed all pertinent previous records, including lab work and imaging. Encouraged patient to call back with any question/concerns.  Return/ED precautions discussed, all questions/concerns answered and patient verbalized understanding/approval of treatment plan.   MD Dale Pal MD     Subjective:  HPI:   Patient is a pleasant 70 y.o. year old female presenting to [facility] for an acute visit to discuss congestion and a spot on her arm.    Spot is on right forearm. Noticed about a 1 week ago without significant change. Only change is purplish discoloration. No pain, irritation, itchiness. No other spots anywhere.    Was treated for cough and congestion recently. Cough is improved, though still with congestion. Does not currently use a nasal spray. Is amenable to try.     No other concerns at this time      Past Medical History:   Diagnosis Date    Arthritis     fingers, hands    Asthma     Back pain     Bell's palsy     CHF (congestive heart failure) ([facility])

## 2025-05-02 DIAGNOSIS — G89.4 CHRONIC PAIN SYNDROME: ICD-10-CM

## 2025-05-02 RX ORDER — HYDROCODONE BITARTRATE AND ACETAMINOPHEN 7.5; 325 MG/1; MG/1
1 TABLET ORAL EVERY 6 HOURS PRN
Qty: 120 TABLET | Refills: 0 | Status: SHIPPED | OUTPATIENT
Start: 2025-05-02 | End: 2025-06-01

## 2025-05-02 NOTE — TELEPHONE ENCOUNTER
Medication:   Requested Prescriptions     Pending Prescriptions Disp Refills    HYDROcodone-acetaminophen (NORCO) 7.5-325 MG per tablet 120 tablet 0     Sig: Take 1 tablet by mouth every 6 hours as needed for Pain for up to 30 days.        Last Filled:  4/4/2025, 120, 0    Patient Phone Number: [phone] (home)     Last appt: 4/28/2025   Next appt: Visit date not found    Last OARRS:       12/27/2024    12:19 PM   RX Monitoring   Periodic Controlled Substance Monitoring No signs of potential drug abuse or diversion identified.

## 2025-06-02 DIAGNOSIS — G89.4 CHRONIC PAIN SYNDROME: ICD-10-CM

## 2025-06-02 RX ORDER — HYDROCODONE BITARTRATE AND ACETAMINOPHEN 7.5; 325 MG/1; MG/1
1 TABLET ORAL EVERY 6 HOURS PRN
Qty: 120 TABLET | Refills: 0 | Status: SHIPPED | OUTPATIENT
Start: 2025-06-02 | End: 2025-07-02

## 2025-06-02 NOTE — TELEPHONE ENCOUNTER
Medication:   Requested Prescriptions     Pending Prescriptions Disp Refills    HYDROcodone-acetaminophen (NORCO) 7.5-325 MG per tablet 120 tablet 0     Sig: Take 1 tablet by mouth every 6 hours as needed for Pain for up to 30 days.        Last Filled:  5/2/2025, 120, 0    Patient Phone Number: [phone] (home)     Last appt: 4/28/2025   Next appt: Visit date not found    Last OARRS:       12/27/2024    12:19 PM   RX Monitoring   Periodic Controlled Substance Monitoring No signs of potential drug abuse or diversion identified.

## 2025-06-12 DIAGNOSIS — M15.0 PRIMARY OSTEOARTHRITIS INVOLVING MULTIPLE JOINTS: ICD-10-CM

## 2025-06-12 DIAGNOSIS — J45.20 MILD INTERMITTENT ASTHMA WITHOUT COMPLICATION: ICD-10-CM

## 2025-06-12 RX ORDER — ALBUTEROL SULFATE 0.83 MG/ML
SOLUTION RESPIRATORY (INHALATION)
Qty: 540 ML | Refills: 3 | Status: SHIPPED | OUTPATIENT
Start: 2025-06-12

## 2025-06-12 NOTE — TELEPHONE ENCOUNTER
Medication:   Requested Prescriptions     Pending Prescriptions Disp Refills    albuterol (PROVENTIL) (2.5 MG/3ML) 0.083% nebulizer solution 540 mL 3     Sig: USE 1 VIAL VIA NEBULIZER EVERY 6 HOURS    diclofenac sodium (VOLTAREN) 1 %  g 5     Sig: APPLY 2 GRAMS TOPICALLY TO HAND 4 TIMES DAILY        Last Filled:      Patient Phone Number: [phone] (home)     Last appt: 4/28/2025   Next appt: Visit date not found    Last OARRS:       12/27/2024    12:19 PM   RX Monitoring   Periodic Controlled Substance Monitoring No signs of potential drug abuse or diversion identified.

## 2025-06-16 ENCOUNTER — TELEPHONE (OUTPATIENT)
Dept: FAMILY MEDICINE CLINIC | Age: 70
End: 2025-06-16

## 2025-06-16 DIAGNOSIS — R11.0 NAUSEA: Primary | ICD-10-CM

## 2025-06-16 RX ORDER — ONDANSETRON 4 MG/1
4 TABLET, ORALLY DISINTEGRATING ORAL EVERY 8 HOURS PRN
Qty: 20 TABLET | Refills: 0 | Status: SHIPPED | OUTPATIENT
Start: 2025-06-16 | End: 2025-06-23

## 2025-06-16 NOTE — TELEPHONE ENCOUNTER
Medication and Quantity requested: ondansetron (ZOFRAN-ODT) 4 MG disintegrating tablet [[id number]]  ENDED      Last Visit  4/28/25    Pharmacy and phone number updated in [facility]:  yes    [facility] [employer] [employer] [facility] #[id number] [place] [address] - P [phone] - F [phone]

## 2025-06-19 NOTE — PROGRESS NOTES
Saurabh Richards (:  1955) is a 75 y.o. female,Established patient, here for evaluation of the following chief complaint(s):  Follow-up (Follow up on all medication )         Assessment/Plan:    Julia Alejandra was seen today for follow-up. Diagnoses and all orders for this visit:    Chronic pain syndrome  OARRS report reviewed and no inconsistencies noted   The patient understands the risks of dependency/addiction with codon and will take as little as possible and discontinue as soon as possible   Cardiomyopathy, nonischemic [facility])  Patient follows with cardiology. She is currently on no medication. Mild episode of recurrent major depressive disorder ([address]  Stable/Controlled  Continue current treatment   COVID  Resolved  Mild intermittent asthma without complication  Recent episode has resolved           Return in about 3 months (around 2023). Subjective   SUBJECTIVE/OBJECTIVE:  HPI  Chronic pain syndrome: Patient continues with the same amount of pain. She requires hydrocodone on a routine basis. She states if she wants to remain active she needs to continue to take this. Cardiomyopathy: Patient states she is not having any symptoms. She sees the cardiologist NP usually yearly. Depression: Patient continues on Wellbutrin and Abilify and continues to do generally well. She states her back pain is somewhat depressing to her but she states she handles it. Asthma/bronchitis/COVID: Patient had COVID approximately 3 weeks ago. She did take Paxlovid which helped. She then developed a bronchitis and asthma with wheezing and shortness of breath. She used her nebulizer also had a course of cephalexin and states this is better now. Review of Systems   Constitutional:  Positive for activity change. Respiratory:  Negative for shortness of breath (better now) and wheezing (better now). Cardiovascular: Negative. Negative for chest pain. Gastrointestinal: Negative. Endocrine: Negative. Genitourinary: Negative. Musculoskeletal:  Positive for arthralgias and back pain. Neurological: Negative. Psychiatric/Behavioral:  Positive for dysphoric mood. Objective   Physical Exam  Constitutional:       General: She is not in acute distress. Appearance: She is not ill-appearing, toxic-appearing or diaphoretic. HENT:      Head: Normocephalic and atraumatic. Eyes:      Conjunctiva/sclera: Conjunctivae normal.   Neck:      Thyroid: No thyromegaly. Vascular: No carotid bruit. Cardiovascular:      Rate and Rhythm: Normal rate and regular rhythm. Pulmonary:      Effort: Pulmonary effort is normal.      Breath sounds: Normal breath sounds. No wheezing or rales. Musculoskeletal:      Cervical back: Neck supple. Lymphadenopathy:      Cervical: No cervical adenopathy. Skin:     General: Skin is warm and dry. Neurological:      Mental Status: She is alert and oriented to person, place, and time. Psychiatric:         Mood and Affect: Mood normal.         Behavior: Behavior normal.         Thought Content: Thought content normal.         Judgment: Judgment normal.                An electronic signature was used to authenticate this note.     --Angel Montilla MD son

## 2025-06-26 DIAGNOSIS — G44.89 OTHER HEADACHE SYNDROME: ICD-10-CM

## 2025-06-26 RX ORDER — BUTALBITAL, ACETAMINOPHEN AND CAFFEINE 50; 325; 40 MG/1; MG/1; MG/1
TABLET ORAL
Qty: 40 TABLET | Refills: 5 | Status: SHIPPED | OUTPATIENT
Start: 2025-06-26

## 2025-06-26 NOTE — TELEPHONE ENCOUNTER
Medication:   Requested Prescriptions     Pending Prescriptions Disp Refills    butalbital-acetaminophen-caffeine (FIORICET, ESGIC) -40 MG per tablet [Pharmacy Med Name: BUT/ACETAMINOPHEN/CAFF -40 TB] 40 tablet 5     Sig: TAKE 1 TABLET BY MOUTH EVERY 6 HOURS AS NEEDED FOR HEADACHE. MAX DAILY AMOUNT: 4 TABLETS        Last Filled:  4/28/2025, 40, 5    Patient Phone Number: [phone] (home)     Last appt: 4/28/2025   Next appt: Visit date not found    Last OARRS:       12/27/2024    12:19 PM   RX Monitoring   Periodic Controlled Substance Monitoring No signs of potential drug abuse or diversion identified.

## 2025-06-27 DIAGNOSIS — K21.9 GASTROESOPHAGEAL REFLUX DISEASE WITHOUT ESOPHAGITIS: ICD-10-CM

## 2025-06-27 RX ORDER — DEXLANSOPRAZOLE 60 MG/1
60 CAPSULE, DELAYED RELEASE ORAL DAILY
Qty: 90 CAPSULE | Refills: 3 | Status: SHIPPED | OUTPATIENT
Start: 2025-06-27

## 2025-06-27 NOTE — TELEPHONE ENCOUNTER
Last OV: 4/28/2025  Next OV: Visit date not found    Next appointment due:    Last fill:07/10/2024  Refills:40/5  Medication:   Requested Prescriptions     Pending Prescriptions Disp Refills    dexlansoprazole (DEXILANT) 60 MG CPDR delayed release capsule 90 capsule 3     Sig: Take 1 capsule by mouth daily        Last Filled:      Patient Phone Number: [phone] (home)     Last appt: 4/28/2025   Next appt: Visit date not found    Last OARRS:       12/27/2024    12:19 PM   RX Monitoring   Periodic Controlled Substance Monitoring No signs of potential drug abuse or diversion identified.

## 2025-06-30 DIAGNOSIS — G89.4 CHRONIC PAIN SYNDROME: ICD-10-CM

## 2025-06-30 RX ORDER — GABAPENTIN 300 MG/1
CAPSULE ORAL
Qty: 90 CAPSULE | Refills: 5 | Status: SHIPPED | OUTPATIENT
Start: 2025-06-30 | End: 2025-12-25

## 2025-06-30 NOTE — TELEPHONE ENCOUNTER
Last OV: 4/28/2025  Next OV: Visit date not found    Next appointment due:    Last fill:12/30/2024  Refills:90/5

## 2025-07-01 DIAGNOSIS — G89.4 CHRONIC PAIN SYNDROME: ICD-10-CM

## 2025-07-01 RX ORDER — HYDROCODONE BITARTRATE AND ACETAMINOPHEN 7.5; 325 MG/1; MG/1
1 TABLET ORAL EVERY 6 HOURS PRN
Qty: 120 TABLET | Refills: 0 | Status: SHIPPED | OUTPATIENT
Start: 2025-07-01 | End: 2025-07-31

## 2025-07-01 NOTE — TELEPHONE ENCOUNTER
[facility] #[id number] [place] [address] - P [phone] - F [phone]       Patient called to get a refill on the following medication.    Last ov 04/28/25    HYDROcodone-acetaminophen (NORCO) 7.5-325 MG per tablet [[id number]]     Please advise.  [phone]

## 2025-07-01 NOTE — TELEPHONE ENCOUNTER
Last OV: 4/28/2025  Next OV: Visit date not found    Next appointment due:    Last fill:06/02/2025  Refills:120/0  Medication:   Requested Prescriptions     Pending Prescriptions Disp Refills    HYDROcodone-acetaminophen (NORCO) 7.5-325 MG per tablet 120 tablet 0     Sig: Take 1 tablet by mouth every 6 hours as needed for Pain for up to 30 days.        Last Filled:      Patient Phone Number: [phone] (home)     Last appt: 4/28/2025   Next appt: Visit date not found    Last OARRS:       12/27/2024    12:19 PM   RX Monitoring   Periodic Controlled Substance Monitoring No signs of potential drug abuse or diversion identified.

## 2025-07-28 ENCOUNTER — OFFICE VISIT (OUTPATIENT)
Dept: CARDIOLOGY CLINIC | Age: 70
End: 2025-07-28
Payer: MEDICARE

## 2025-07-28 VITALS
BODY MASS INDEX: 15.35 KG/M2 | DIASTOLIC BLOOD PRESSURE: 68 MMHG | SYSTOLIC BLOOD PRESSURE: 120 MMHG | HEIGHT: 68 IN | WEIGHT: 101.31 LBS | OXYGEN SATURATION: 96 % | HEART RATE: 73 BPM

## 2025-07-28 DIAGNOSIS — I42.8 CARDIOMYOPATHY, NONISCHEMIC (HCC): ICD-10-CM

## 2025-07-28 DIAGNOSIS — I34.0 NONRHEUMATIC MITRAL VALVE REGURGITATION: ICD-10-CM

## 2025-07-28 DIAGNOSIS — I31.39 PERICARDIAL EFFUSION: ICD-10-CM

## 2025-07-28 DIAGNOSIS — I47.19 PAT (PAROXYSMAL ATRIAL TACHYCARDIA): Primary | ICD-10-CM

## 2025-07-28 PROCEDURE — 1036F TOBACCO NON-USER: CPT | Performed by: NURSE PRACTITIONER

## 2025-07-28 PROCEDURE — 1160F RVW MEDS BY RX/DR IN RCRD: CPT | Performed by: NURSE PRACTITIONER

## 2025-07-28 PROCEDURE — 1123F ACP DISCUSS/DSCN MKR DOCD: CPT | Performed by: NURSE PRACTITIONER

## 2025-07-28 PROCEDURE — 93000 ELECTROCARDIOGRAM COMPLETE: CPT | Performed by: NURSE PRACTITIONER

## 2025-07-28 PROCEDURE — 1090F PRES/ABSN URINE INCON ASSESS: CPT | Performed by: NURSE PRACTITIONER

## 2025-07-28 PROCEDURE — 99214 OFFICE O/P EST MOD 30 MIN: CPT | Performed by: NURSE PRACTITIONER

## 2025-07-28 PROCEDURE — 1159F MED LIST DOCD IN RCRD: CPT | Performed by: NURSE PRACTITIONER

## 2025-07-28 PROCEDURE — G8427 DOCREV CUR MEDS BY ELIG CLIN: HCPCS | Performed by: NURSE PRACTITIONER

## 2025-07-28 PROCEDURE — G8400 PT W/DXA NO RESULTS DOC: HCPCS | Performed by: NURSE PRACTITIONER

## 2025-07-28 PROCEDURE — G8419 CALC BMI OUT NRM PARAM NOF/U: HCPCS | Performed by: NURSE PRACTITIONER

## 2025-07-28 PROCEDURE — 3017F COLORECTAL CA SCREEN DOC REV: CPT | Performed by: NURSE PRACTITIONER

## 2025-07-28 NOTE — PROGRESS NOTES
[facility]     Outpatient Follow Up Note    Marielena Razo is 70 y.o. female who presents today with a history of NICM (suspected viral), PAT and chronic pericardial effusion.      CHIEF COMPLAINT / HPI:  Follow Up secondary to NICM    Subjective:     She's had some chest pain uncertain if its related to her allergies. She coughs a lot. She attributes her chest discomfort to her cough. She denies SOB. She denies swelling. Her  wt fluctuates by 3-4# here / there at 106# today  The first thing of the morning is feeling light headed getting up to quickly.  She'll feel palpitations. Some days her BP is higher than others    She denies orthopnea/PND.   She has CLBP. Her pain was in her upper back; like a alfred horse.    These symptoms show no change since the last OV.   With regard to medication therapy the patient has been compliant with prescribed regimen. They have tolerated therapy to date.     Past Medical History:   Diagnosis Date    Arthritis     fingers, hands    Asthma     Back pain     Bell's palsy     CHF (congestive heart failure) (HCC)     Choledocholithiasis 2015    Chronic pain     BACK SURGERY X4, neck, left arm    Colitis 2013    Depression     Elevated sed rate     Family history of breast cancer in first degree relative     Gastric ulcer     GERD (gastroesophageal reflux disease)     colitis    Glaucoma     History of MI (myocardial infarction) 05/10/2016    Hypertension     Movement disorder     chronic back pain    Neuromuscular disorder (HCC)     sciatica rt. leg    Osteoarthritis     PONV (postoperative nausea and vomiting)     SEVERE     Social History:    Social History     Tobacco Use   Smoking Status Former    Current packs/day: 0.00    Average packs/day: 0.3 packs/day for 20.0 years (5.0 ttl pk-yrs)    Types: Cigarettes    Start date: 1996    Quit date: 2016    Years since quittin.3   Smokeless Tobacco Never     Current Medications:  Current Outpatient

## 2025-07-28 NOTE — PATIENT INSTRUCTIONS
Recommend a Dexa Scan : screening for osteoporosis    Calcium supplement  mg daily    Appt in one year ; plan for an echo before appt

## 2025-07-29 DIAGNOSIS — F51.01 PRIMARY INSOMNIA: ICD-10-CM

## 2025-07-29 NOTE — TELEPHONE ENCOUNTER
Last OV: 4/28/2025  Next OV: Visit date not found    Next appointment due:    Last fill:08/06/2024  Refills:60/11  Medication:   Requested Prescriptions     Pending Prescriptions Disp Refills    traZODone (DESYREL) 50 MG tablet 60 tablet 11     Sig: Take 1 tablet by mouth nightly as needed for Sleep        Last Filled:      Patient Phone Number: [phone] (home)     Last appt: 4/28/2025   Next appt: Visit date not found    Last OARRS:       12/27/2024    12:19 PM   RX Monitoring   Periodic Controlled Substance Monitoring No signs of potential drug abuse or diversion identified.

## 2025-07-30 RX ORDER — TRAZODONE HYDROCHLORIDE 50 MG/1
50 TABLET ORAL NIGHTLY PRN
Qty: 60 TABLET | Refills: 5 | Status: SHIPPED | OUTPATIENT
Start: 2025-07-30

## 2025-07-31 DIAGNOSIS — G89.4 CHRONIC PAIN SYNDROME: ICD-10-CM

## 2025-07-31 RX ORDER — HYDROCODONE BITARTRATE AND ACETAMINOPHEN 7.5; 325 MG/1; MG/1
1 TABLET ORAL EVERY 6 HOURS PRN
Qty: 120 TABLET | Refills: 0 | Status: SHIPPED | OUTPATIENT
Start: 2025-07-31 | End: 2025-08-30

## 2025-07-31 NOTE — TELEPHONE ENCOUNTER
Last OV: 4/28/2025  Next OV: 8/1/2025    Next appointment due:    Last fill:07/01/2025  Refills:120/0Medication:   Requested Prescriptions     Pending Prescriptions Disp Refills    HYDROcodone-acetaminophen (NORCO) 7.5-325 MG per tablet 120 tablet 0     Sig: Take 1 tablet by mouth every 6 hours as needed for Pain for up to 30 days.        Last Filled:      Patient Phone Number: [phone] (home)     Last appt: 4/28/2025   Next appt: 8/1/2025    Last OARRS:       12/27/2024    12:19 PM   RX Monitoring   Periodic Controlled Substance Monitoring No signs of potential drug abuse or diversion identified.

## 2025-07-31 NOTE — TELEPHONE ENCOUNTER
Patient called requesting a refill of     HYDROcodone-acetaminophen (NORCO) 7.5-325 MG per tablet     [id number] [place] [address] - P [phone] - F [phone]     LAST OV- 4/28/25  NEXT OV- 8/1/25

## 2025-08-01 ENCOUNTER — OFFICE VISIT (OUTPATIENT)
Dept: FAMILY MEDICINE CLINIC | Age: 70
End: 2025-08-01

## 2025-08-01 VITALS
BODY MASS INDEX: 15.31 KG/M2 | HEART RATE: 95 BPM | DIASTOLIC BLOOD PRESSURE: 70 MMHG | WEIGHT: 101 LBS | HEIGHT: 68 IN | OXYGEN SATURATION: 96 % | SYSTOLIC BLOOD PRESSURE: 110 MMHG | TEMPERATURE: 98.1 F

## 2025-08-01 DIAGNOSIS — J40 BRONCHITIS: ICD-10-CM

## 2025-08-01 DIAGNOSIS — J30.89 SEASONAL ALLERGIC RHINITIS DUE TO OTHER ALLERGIC TRIGGER: ICD-10-CM

## 2025-08-01 DIAGNOSIS — R05.1 ACUTE COUGH: Primary | ICD-10-CM

## 2025-08-01 RX ORDER — FLUTICASONE PROPIONATE 50 MCG
2 SPRAY, SUSPENSION (ML) NASAL DAILY
Qty: 16 G | Refills: 0 | Status: SHIPPED | OUTPATIENT
Start: 2025-08-01

## 2025-08-01 RX ORDER — FLUTICASONE PROPIONATE 50 MCG
2 SPRAY, SUSPENSION (ML) NASAL DAILY
Qty: 48 G | OUTPATIENT
Start: 2025-08-01

## 2025-08-01 RX ORDER — BENZONATATE 100 MG/1
100 CAPSULE ORAL 3 TIMES DAILY PRN
Qty: 15 CAPSULE | Refills: 0 | Status: SHIPPED | OUTPATIENT
Start: 2025-08-01 | End: 2025-08-06

## 2025-08-01 NOTE — TELEPHONE ENCOUNTER
Medication:   Requested Prescriptions     Pending Prescriptions Disp Refills    fluticasone (FLONASE) 50 MCG/ACT nasal spray [Pharmacy Med Name: FLUTICASONE 50MCG NASAL SP (120) RX] 48 g      Sig: SHAKE LIQUID AND USE 2 SPRAYS IN EACH NOSTRIL DAILY     Last Filled:  8/1/25    Last appt: 8/1/2025   Next appt: 9/8/2025    Last OARRS:       7/31/2025    12:10 PM   RX Monitoring   Periodic Controlled Substance Monitoring No signs of potential drug abuse or diversion identified.

## 2025-08-04 ENCOUNTER — TELEPHONE (OUTPATIENT)
Dept: FAMILY MEDICINE CLINIC | Age: 70
End: 2025-08-04

## 2025-08-11 DIAGNOSIS — F33.0 MILD EPISODE OF RECURRENT MAJOR DEPRESSIVE DISORDER: ICD-10-CM

## 2025-08-11 RX ORDER — BUPROPION HYDROCHLORIDE 150 MG/1
150 TABLET ORAL 2 TIMES DAILY
Qty: 60 TABLET | Refills: 0 | Status: SHIPPED | OUTPATIENT
Start: 2025-08-11

## 2025-08-25 DIAGNOSIS — G44.89 OTHER HEADACHE SYNDROME: ICD-10-CM

## 2025-08-25 RX ORDER — ARIPIPRAZOLE 5 MG/1
5 TABLET ORAL DAILY
Qty: 30 TABLET | Refills: 5 | Status: SHIPPED | OUTPATIENT
Start: 2025-08-25

## 2025-08-25 RX ORDER — BUTALBITAL, ACETAMINOPHEN AND CAFFEINE 50; 325; 40 MG/1; MG/1; MG/1
1 TABLET ORAL EVERY 6 HOURS PRN
Qty: 40 TABLET | Refills: 5 | Status: SHIPPED | OUTPATIENT
Start: 2025-08-25

## 2025-08-29 DIAGNOSIS — G89.4 CHRONIC PAIN SYNDROME: ICD-10-CM

## 2025-08-29 RX ORDER — HYDROCODONE BITARTRATE AND ACETAMINOPHEN 7.5; 325 MG/1; MG/1
1 TABLET ORAL EVERY 6 HOURS PRN
Qty: 120 TABLET | Refills: 0 | Status: SHIPPED | OUTPATIENT
Start: 2025-08-29 | End: 2025-09-28

## 2025-09-02 ENCOUNTER — TELEPHONE (OUTPATIENT)
Dept: FAMILY MEDICINE CLINIC | Age: 70
End: 2025-09-02

## 2025-09-02 DIAGNOSIS — F51.01 PRIMARY INSOMNIA: ICD-10-CM

## 2025-09-02 RX ORDER — TRAZODONE HYDROCHLORIDE 50 MG/1
50 TABLET ORAL NIGHTLY PRN
Qty: 60 TABLET | Refills: 5 | Status: SHIPPED | OUTPATIENT
Start: 2025-09-02 | End: 2025-09-04 | Stop reason: SDUPTHER

## 2025-09-04 ENCOUNTER — OFFICE VISIT (OUTPATIENT)
Dept: FAMILY MEDICINE CLINIC | Age: 70
End: 2025-09-04

## 2025-09-04 VITALS
HEART RATE: 96 BPM | OXYGEN SATURATION: 96 % | BODY MASS INDEX: 14.9 KG/M2 | DIASTOLIC BLOOD PRESSURE: 60 MMHG | WEIGHT: 98 LBS | SYSTOLIC BLOOD PRESSURE: 140 MMHG

## 2025-09-04 DIAGNOSIS — Z78.0 POST-MENOPAUSAL: ICD-10-CM

## 2025-09-04 DIAGNOSIS — M15.0 PRIMARY OSTEOARTHRITIS INVOLVING MULTIPLE JOINTS: ICD-10-CM

## 2025-09-04 DIAGNOSIS — Z87.891 FORMER SMOKER: ICD-10-CM

## 2025-09-04 DIAGNOSIS — R63.4 WEIGHT LOSS, ABNORMAL: ICD-10-CM

## 2025-09-04 DIAGNOSIS — F33.0 MILD EPISODE OF RECURRENT MAJOR DEPRESSIVE DISORDER: ICD-10-CM

## 2025-09-04 DIAGNOSIS — F51.01 PRIMARY INSOMNIA: Primary | ICD-10-CM

## 2025-09-04 DIAGNOSIS — R05.3 CHRONIC COUGH: ICD-10-CM

## 2025-09-04 DIAGNOSIS — H40.9 GLAUCOMA, UNSPECIFIED GLAUCOMA TYPE, UNSPECIFIED LATERALITY: ICD-10-CM

## 2025-09-04 DIAGNOSIS — G89.4 CHRONIC PAIN SYNDROME: ICD-10-CM

## 2025-09-04 DIAGNOSIS — R10.84 ABDOMINAL PAIN, GENERALIZED: ICD-10-CM

## 2025-09-04 RX ORDER — TRAZODONE HYDROCHLORIDE 50 MG/1
100 TABLET ORAL NIGHTLY
Qty: 60 TABLET | Refills: 5 | Status: SHIPPED | OUTPATIENT
Start: 2025-09-04

## (undated) DEVICE — LAPAROSCOPIC SCISSORS: Brand: EPIX LAPAROSCOPIC SCISSORS

## (undated) DEVICE — TOWEL,OR,DSP,ST,BLUE,STD,4/PK,20PK/CS: Brand: MEDLINE

## (undated) DEVICE — SUTURE SZ 0 27IN 5/8 CIR UR-6  TAPER PT VIOLET ABSRB VICRYL J603H

## (undated) DEVICE — SHEET,DRAPE,53X77,STERILE: Brand: MEDLINE

## (undated) DEVICE — STAPLER INT DIA5MM 25 ABSRB STRP FIX DISP FOR HERN MESH

## (undated) DEVICE — SUTURE GOR TX SZ 1 L36IN NONABSORBABLE L36MM THX-36 1/2 CIR 0N07A

## (undated) DEVICE — NEEDLE SPNL 22GA L3.5IN BLK HUB S STL REG WALL FIT STYL W/

## (undated) DEVICE — FIRST ENTRY KIOS THRD 5X100MM ST

## (undated) DEVICE — LAPAROSCOPY PACK: Brand: MEDLINE INDUSTRIES, INC.

## (undated) DEVICE — PMI DISPOSABLE PUNCTURE CLOSURE DEVICE / SUTURE GRASPER: Brand: PMI

## (undated) DEVICE — GOWN SIRUS NONREIN XL W/TWL: Brand: MEDLINE INDUSTRIES, INC.

## (undated) DEVICE — CHLORAPREP 26ML ORANGE

## (undated) DEVICE — SUTURE MCRYL SZ 4-0 L27IN ABSRB UD L19MM PS-2 1/2 CIR PRIM Y426H

## (undated) DEVICE — SYRINGE MED 10ML TRNSLUC BRL PLUNG BLK MRK POLYPR CTRL

## (undated) DEVICE — 3M™ IOBAN™ 2 ANTIMICROBIAL INCISE DRAPE 6650EZ: Brand: IOBAN™ 2

## (undated) DEVICE — TROCAR ENDOSCP L100MM DIA12MM BLDELSS OBT RADLUC STBL SL

## (undated) DEVICE — DRAPE,LAP,CHOLE,W/TROUGHS,STERILE: Brand: MEDLINE

## (undated) DEVICE — BLADE CLIPPER GEN PURP NS

## (undated) DEVICE — TROCAR: Brand: KII SHIELDED BLADED DUAL PACK

## (undated) DEVICE — TROCAR ENDOSCP L100MM DIA5MM BLDELSS STBL SL THRD OPT VW

## (undated) DEVICE — HYPODERMIC SAFETY NEEDLE: Brand: MAGELLAN

## (undated) DEVICE — GAUZE,SPONGE,4"X4",8PLY,STRL,LF,10/TRAY: Brand: MEDLINE

## (undated) DEVICE — [HIGH FLOW INSUFFLATOR,  DO NOT USE IF PACKAGE IS DAMAGED,  KEEP DRY,  KEEP AWAY FROM SUNLIGHT,  PROTECT FROM HEAT AND RADIOACTIVE SOURCES.]: Brand: PNEUMOSURE

## (undated) DEVICE — TROCAR ENDOSCP L100MM DIA5MM BLDELSS STBL SL OBT RADLUC

## (undated) DEVICE — CORD ES L10FT MPLR LAP

## (undated) DEVICE — GLOVE SURG SZ 6.5 L11.2IN FNGR THK9.8MIL STRW LTX POLYMER

## (undated) DEVICE — TRAY URIN CATH 16FR DRNGE BG STATLOK STBL DEV F SURSTP